# Patient Record
Sex: FEMALE | Race: OTHER | NOT HISPANIC OR LATINO | URBAN - METROPOLITAN AREA
[De-identification: names, ages, dates, MRNs, and addresses within clinical notes are randomized per-mention and may not be internally consistent; named-entity substitution may affect disease eponyms.]

---

## 2017-04-14 ENCOUNTER — EMERGENCY (EMERGENCY)
Facility: HOSPITAL | Age: 37
LOS: 1 days | Discharge: HOME | End: 2017-04-14
Attending: EMERGENCY MEDICINE | Admitting: EMERGENCY MEDICINE
Payer: COMMERCIAL

## 2017-04-14 VITALS
DIASTOLIC BLOOD PRESSURE: 85 MMHG | OXYGEN SATURATION: 98 % | SYSTOLIC BLOOD PRESSURE: 130 MMHG | RESPIRATION RATE: 16 BRPM | HEART RATE: 67 BPM | HEIGHT: 68 IN | TEMPERATURE: 99 F

## 2017-04-14 DIAGNOSIS — J34.89 OTHER SPECIFIED DISORDERS OF NOSE AND NASAL SINUSES: ICD-10-CM

## 2017-04-14 DIAGNOSIS — Z98.890 OTHER SPECIFIED POSTPROCEDURAL STATES: ICD-10-CM

## 2017-04-14 DIAGNOSIS — N60.02 SOLITARY CYST OF LEFT BREAST: Chronic | ICD-10-CM

## 2017-04-14 DIAGNOSIS — I10 ESSENTIAL (PRIMARY) HYPERTENSION: ICD-10-CM

## 2017-04-14 DIAGNOSIS — E03.9 HYPOTHYROIDISM, UNSPECIFIED: ICD-10-CM

## 2017-04-14 DIAGNOSIS — Z79.899 OTHER LONG TERM (CURRENT) DRUG THERAPY: ICD-10-CM

## 2017-04-14 LAB — RAPID RVP RESULT: SIGNIFICANT CHANGE UP

## 2017-04-14 PROCEDURE — 87798 DETECT AGENT NOS DNA AMP: CPT

## 2017-04-14 PROCEDURE — 99053 MED SERV 10PM-8AM 24 HR FAC: CPT

## 2017-04-14 PROCEDURE — 87486 CHLMYD PNEUM DNA AMP PROBE: CPT

## 2017-04-14 PROCEDURE — 99283 EMERGENCY DEPT VISIT LOW MDM: CPT

## 2017-04-14 PROCEDURE — 87633 RESP VIRUS 12-25 TARGETS: CPT

## 2017-04-14 PROCEDURE — 87581 M.PNEUMON DNA AMP PROBE: CPT

## 2017-04-14 PROCEDURE — 99283 EMERGENCY DEPT VISIT LOW MDM: CPT | Mod: 25

## 2017-04-14 NOTE — ED PROVIDER NOTE - OBJECTIVE STATEMENT
Attending Pretty: 38 y/o previously healthy female presenting with burning nose that started a few hours ago. 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 a few hours ago started with burning to her nose. no cough, fevers, sob or body aches. was told to come to the ED for further evaluation. no h/o lung disease. pt does have an infant at home as was concerned

## 2017-04-14 NOTE — ED PROVIDER NOTE - PHYSICAL EXAMINATION
Attending Pretty: Gen: NAD, heent: atrauamtic, eomi, perrla, mmm, op pink, uvula midline, neck; nttp, no nuchal rigidity, chest: nttp, no crepitus, cv: rrr, no murmurs, lungs: ctab, abd: soft, nontender, nondistended, no peritoneal signs, +BS, no guarding, ext: wwp, neg homans, skin: no rash, neuro: awake and alert, following commands, speech clear, sensation and strength intact, no focal deficits

## 2017-04-14 NOTE — ED PROVIDER NOTE - MEDICAL DECISION MAKING DETAILS
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 pt without any further symptoms of the flu. with infant at home pt request flu testing. no h/o lung disease. will write rx for tamiflu if pt develops any further symptmos of the flu and send RVP. pt well appearing. will d/c

## 2017-04-14 NOTE — ED ADULT NURSE NOTE - OBJECTIVE STATEMENT
pt c/o itchy burning nose.  pt is awake, alert and responsive to all stimuli.  no distress noted.  pt was flu swabbed and was sent by pmd.

## 2017-07-05 ENCOUNTER — RESULT REVIEW (OUTPATIENT)
Age: 37
End: 2017-07-05

## 2017-09-24 ENCOUNTER — EMERGENCY (EMERGENCY)
Facility: HOSPITAL | Age: 37
LOS: 1 days | Discharge: HOME | End: 2017-09-24
Attending: EMERGENCY MEDICINE
Payer: COMMERCIAL

## 2017-09-24 VITALS
WEIGHT: 220.02 LBS | HEART RATE: 93 BPM | OXYGEN SATURATION: 100 % | RESPIRATION RATE: 20 BRPM | TEMPERATURE: 99 F | SYSTOLIC BLOOD PRESSURE: 157 MMHG | HEIGHT: 68 IN | DIASTOLIC BLOOD PRESSURE: 95 MMHG

## 2017-09-24 VITALS
SYSTOLIC BLOOD PRESSURE: 134 MMHG | DIASTOLIC BLOOD PRESSURE: 65 MMHG | RESPIRATION RATE: 16 BRPM | OXYGEN SATURATION: 98 % | HEART RATE: 62 BPM

## 2017-09-24 DIAGNOSIS — N60.02 SOLITARY CYST OF LEFT BREAST: Chronic | ICD-10-CM

## 2017-09-24 LAB
ALBUMIN SERPL ELPH-MCNC: 4.5 G/DL — SIGNIFICANT CHANGE UP (ref 3.3–5.2)
ALP SERPL-CCNC: 95 U/L — SIGNIFICANT CHANGE UP (ref 40–120)
ALT FLD-CCNC: 25 U/L — SIGNIFICANT CHANGE UP
ANION GAP SERPL CALC-SCNC: 16 MMOL/L — SIGNIFICANT CHANGE UP (ref 5–17)
APPEARANCE UR: CLEAR — SIGNIFICANT CHANGE UP
APPEARANCE UR: CLEAR — SIGNIFICANT CHANGE UP
AST SERPL-CCNC: 53 U/L — HIGH
BACTERIA # UR AUTO: ABNORMAL
BILIRUB SERPL-MCNC: 0.7 MG/DL — SIGNIFICANT CHANGE UP (ref 0.4–2)
BILIRUB UR-MCNC: NEGATIVE — SIGNIFICANT CHANGE UP
BILIRUB UR-MCNC: NEGATIVE — SIGNIFICANT CHANGE UP
BUN SERPL-MCNC: 9 MG/DL — SIGNIFICANT CHANGE UP (ref 8–20)
CALCIUM SERPL-MCNC: 9.5 MG/DL — SIGNIFICANT CHANGE UP (ref 8.6–10.2)
CHLORIDE SERPL-SCNC: 102 MMOL/L — SIGNIFICANT CHANGE UP (ref 98–107)
CO2 SERPL-SCNC: 18 MMOL/L — LOW (ref 22–29)
COLOR SPEC: YELLOW — SIGNIFICANT CHANGE UP
COLOR SPEC: YELLOW — SIGNIFICANT CHANGE UP
CREAT SERPL-MCNC: 0.51 MG/DL — SIGNIFICANT CHANGE UP (ref 0.5–1.3)
DIFF PNL FLD: ABNORMAL
DIFF PNL FLD: NEGATIVE — SIGNIFICANT CHANGE UP
EPI CELLS # UR: SIGNIFICANT CHANGE UP
GLUCOSE SERPL-MCNC: 121 MG/DL — HIGH (ref 70–115)
GLUCOSE UR QL: NEGATIVE MG/DL — SIGNIFICANT CHANGE UP
GLUCOSE UR QL: NEGATIVE MG/DL — SIGNIFICANT CHANGE UP
HCG UR QL: NEGATIVE — SIGNIFICANT CHANGE UP
HCT VFR BLD CALC: 37.5 % — SIGNIFICANT CHANGE UP (ref 37–47)
HGB BLD-MCNC: 12.3 G/DL — SIGNIFICANT CHANGE UP (ref 12–16)
KETONES UR-MCNC: ABNORMAL
KETONES UR-MCNC: ABNORMAL
LEUKOCYTE ESTERASE UR-ACNC: NEGATIVE — SIGNIFICANT CHANGE UP
LEUKOCYTE ESTERASE UR-ACNC: NEGATIVE — SIGNIFICANT CHANGE UP
MCHC RBC-ENTMCNC: 27.7 PG — SIGNIFICANT CHANGE UP (ref 27–31)
MCHC RBC-ENTMCNC: 32.8 G/DL — SIGNIFICANT CHANGE UP (ref 32–36)
MCV RBC AUTO: 84.5 FL — SIGNIFICANT CHANGE UP (ref 81–99)
NITRITE UR-MCNC: NEGATIVE — SIGNIFICANT CHANGE UP
NITRITE UR-MCNC: NEGATIVE — SIGNIFICANT CHANGE UP
PH UR: 6 — SIGNIFICANT CHANGE UP (ref 5–8)
PH UR: 6 — SIGNIFICANT CHANGE UP (ref 5–8)
PLATELET # BLD AUTO: 452 K/UL — HIGH (ref 150–400)
POTASSIUM SERPL-MCNC: 4.5 MMOL/L — SIGNIFICANT CHANGE UP (ref 3.5–5.3)
POTASSIUM SERPL-SCNC: 4.5 MMOL/L — SIGNIFICANT CHANGE UP (ref 3.5–5.3)
PROT SERPL-MCNC: 8.2 G/DL — SIGNIFICANT CHANGE UP (ref 6.6–8.7)
PROT UR-MCNC: 15 MG/DL
PROT UR-MCNC: 30 MG/DL
RBC # BLD: 4.44 M/UL — SIGNIFICANT CHANGE UP (ref 4.4–5.2)
RBC # FLD: 14.2 % — SIGNIFICANT CHANGE UP (ref 11–15.6)
RBC CASTS # UR COMP ASSIST: SIGNIFICANT CHANGE UP /HPF (ref 0–4)
SODIUM SERPL-SCNC: 136 MMOL/L — SIGNIFICANT CHANGE UP (ref 135–145)
SP GR SPEC: 1.01 — SIGNIFICANT CHANGE UP (ref 1.01–1.02)
SP GR SPEC: 1.02 — SIGNIFICANT CHANGE UP (ref 1.01–1.02)
UROBILINOGEN FLD QL: NEGATIVE MG/DL — SIGNIFICANT CHANGE UP
UROBILINOGEN FLD QL: NEGATIVE MG/DL — SIGNIFICANT CHANGE UP
WBC # BLD: 13.2 K/UL — HIGH (ref 4.8–10.8)
WBC # FLD AUTO: 13.2 K/UL — HIGH (ref 4.8–10.8)
WBC UR QL: SIGNIFICANT CHANGE UP
WBC UR QL: SIGNIFICANT CHANGE UP

## 2017-09-24 PROCEDURE — 81001 URINALYSIS AUTO W/SCOPE: CPT

## 2017-09-24 PROCEDURE — 96376 TX/PRO/DX INJ SAME DRUG ADON: CPT

## 2017-09-24 PROCEDURE — 99285 EMERGENCY DEPT VISIT HI MDM: CPT | Mod: 25

## 2017-09-24 PROCEDURE — 83690 ASSAY OF LIPASE: CPT

## 2017-09-24 PROCEDURE — 96375 TX/PRO/DX INJ NEW DRUG ADDON: CPT

## 2017-09-24 PROCEDURE — 80053 COMPREHEN METABOLIC PANEL: CPT

## 2017-09-24 PROCEDURE — 99284 EMERGENCY DEPT VISIT MOD MDM: CPT | Mod: 25

## 2017-09-24 PROCEDURE — 96374 THER/PROPH/DIAG INJ IV PUSH: CPT

## 2017-09-24 PROCEDURE — 81025 URINE PREGNANCY TEST: CPT

## 2017-09-24 PROCEDURE — 76705 ECHO EXAM OF ABDOMEN: CPT

## 2017-09-24 PROCEDURE — 76705 ECHO EXAM OF ABDOMEN: CPT | Mod: 26

## 2017-09-24 PROCEDURE — 36415 COLL VENOUS BLD VENIPUNCTURE: CPT

## 2017-09-24 PROCEDURE — 85027 COMPLETE CBC AUTOMATED: CPT

## 2017-09-24 RX ORDER — MORPHINE SULFATE 50 MG/1
4 CAPSULE, EXTENDED RELEASE ORAL ONCE
Qty: 0 | Refills: 0 | Status: DISCONTINUED | OUTPATIENT
Start: 2017-09-24 | End: 2017-09-24

## 2017-09-24 RX ORDER — METOCLOPRAMIDE HCL 10 MG
10 TABLET ORAL ONCE
Qty: 0 | Refills: 0 | Status: COMPLETED | OUTPATIENT
Start: 2017-09-24 | End: 2017-09-24

## 2017-09-24 RX ORDER — SODIUM CHLORIDE 9 MG/ML
1000 INJECTION INTRAMUSCULAR; INTRAVENOUS; SUBCUTANEOUS ONCE
Qty: 0 | Refills: 0 | Status: COMPLETED | OUTPATIENT
Start: 2017-09-24 | End: 2017-09-24

## 2017-09-24 RX ORDER — HYDROMORPHONE HYDROCHLORIDE 2 MG/ML
0.5 INJECTION INTRAMUSCULAR; INTRAVENOUS; SUBCUTANEOUS ONCE
Qty: 0 | Refills: 0 | Status: DISCONTINUED | OUTPATIENT
Start: 2017-09-24 | End: 2017-09-24

## 2017-09-24 RX ORDER — SODIUM CHLORIDE 9 MG/ML
3 INJECTION INTRAMUSCULAR; INTRAVENOUS; SUBCUTANEOUS ONCE
Qty: 0 | Refills: 0 | Status: COMPLETED | OUTPATIENT
Start: 2017-09-24 | End: 2017-09-24

## 2017-09-24 RX ORDER — FAMOTIDINE 10 MG/ML
20 INJECTION INTRAVENOUS ONCE
Qty: 0 | Refills: 0 | Status: COMPLETED | OUTPATIENT
Start: 2017-09-24 | End: 2017-09-24

## 2017-09-24 RX ORDER — KETOROLAC TROMETHAMINE 30 MG/ML
30 SYRINGE (ML) INJECTION ONCE
Qty: 0 | Refills: 0 | Status: DISCONTINUED | OUTPATIENT
Start: 2017-09-24 | End: 2017-09-24

## 2017-09-24 RX ORDER — ESOMEPRAZOLE MAGNESIUM 40 MG/1
1 CAPSULE, DELAYED RELEASE ORAL
Qty: 14 | Refills: 0 | OUTPATIENT
Start: 2017-09-24 | End: 2017-10-08

## 2017-09-24 RX ADMIN — MORPHINE SULFATE 4 MILLIGRAM(S): 50 CAPSULE, EXTENDED RELEASE ORAL at 06:17

## 2017-09-24 RX ADMIN — Medication 30 MILLIGRAM(S): at 03:26

## 2017-09-24 RX ADMIN — FAMOTIDINE 20 MILLIGRAM(S): 10 INJECTION INTRAVENOUS at 03:26

## 2017-09-24 RX ADMIN — HYDROMORPHONE HYDROCHLORIDE 0.5 MILLIGRAM(S): 2 INJECTION INTRAMUSCULAR; INTRAVENOUS; SUBCUTANEOUS at 07:48

## 2017-09-24 RX ADMIN — Medication 30 MILLIGRAM(S): at 04:29

## 2017-09-24 RX ADMIN — Medication 10 MILLIGRAM(S): at 03:26

## 2017-09-24 RX ADMIN — SODIUM CHLORIDE 1000 MILLILITER(S): 9 INJECTION INTRAMUSCULAR; INTRAVENOUS; SUBCUTANEOUS at 04:29

## 2017-09-24 RX ADMIN — MORPHINE SULFATE 4 MILLIGRAM(S): 50 CAPSULE, EXTENDED RELEASE ORAL at 06:50

## 2017-09-24 RX ADMIN — SODIUM CHLORIDE 3 MILLILITER(S): 9 INJECTION INTRAMUSCULAR; INTRAVENOUS; SUBCUTANEOUS at 03:27

## 2017-09-24 RX ADMIN — HYDROMORPHONE HYDROCHLORIDE 0.5 MILLIGRAM(S): 2 INJECTION INTRAMUSCULAR; INTRAVENOUS; SUBCUTANEOUS at 08:00

## 2017-09-24 RX ADMIN — Medication 10 MILLIGRAM(S): at 08:03

## 2017-09-24 NOTE — CONSULT NOTE ADULT - SUBJECTIVE AND OBJECTIVE BOX
GENERAL SURGERY CONSULT NOTE    FROM:   FOR:   RFC:    HPI:  38 y/o F pt with hx of HTN presents to ED c/o epigastric and  mid back pain starting yesterday. Ibuprofen taken with minimal relief. Today back pain worsened associated to epigastric abdominal pain with nausea. She states that yesterday she was having episodes of diarrhea. Denies any recent sick contacts, or recent travel. Denies fevers, chills, emesis, urinary symptoms, chest pain, SOB, or any recent weight loss. CURRENT MEDICAL PROBLEMS:      PAST MEDICAL HISTORY:  Pregnancy  Depression  Hypertension  Hypothyroidism      SURGICAL HISTORY:  Breast cyst, left   delivery delivered      REVIEW OF SYSTEMS:    CONSTITUTIONAL: No fever, weight loss, or fatigue  ENMT:  No difficulty hearing, tinnitus, vertigo; No sinus or throat pain  NECK: No pain or stiffness  BREASTS: No pain, masses, or nipple discharge  RESPIRATORY: No cough, wheezing, chills or hemoptysis; No shortness of breath  CARDIOVASCULAR: No chest pain, palpitations, dizziness, or leg swelling  GASTROINTESTINAL: epigastric pain. nausea, No vomiting, or hematemesis; No diarrhea or constipation. No melena or hematochezia.  GENITOURINARY: No dysuria, frequency, hematuria, or incontinence  NEUROLOGICAL: No headaches, memory loss, loss of strength, numbness, or tremors  SKIN: No itching, burning, rashes, or lesions   MUSCULOSKELETAL: No joint pain or swelling; No muscle, back, or extremity pain  ALLERGY AND IMMUNOLOGIC: No hives or eczema      MEDICATIONS  (STANDING):    MEDICATIONS  (PRN):      Allergies    No Known Allergies    Intolerances        SOCIAL HISTORY:    FAMILY HISTORY:      Vital Signs Last 24 Hrs  T(C): 37.1 (24 Sep 2017 07:43), Max: 37.2 (24 Sep 2017 02:59)  T(F): 98.8 (24 Sep 2017 07:43), Max: 98.9 (24 Sep 2017 02:59)  HR: 84 (24 Sep 2017 07:43) (84 - 93)  BP: 155/93 (24 Sep 2017 07:43) (155/93 - 157/95)  BP(mean): --  RR: 17 (24 Sep 2017 07:43) (17 - 20)  SpO2: 99% (24 Sep 2017 07:43) (99% - 100%)    PHYSICAL EXAM:    GENERAL: NAD, well-groomed, well-developed  HEAD:  Atraumatic, Normocephalic  EYES: EOMI, PERRLA, conjunctiva and sclera clear  ENMT: No tonsillar erythema, exudates, or enlargement; Moist mucous membranes, Good dentition, No lesions  NECK: Supple, No JVD, Normal thyroid  NERVOUS SYSTEM:  Alert & Oriented X3, Good concentration; Motor Strength 5/5 B/L upper and lower extremities; DTRs 2+ intact and symmetric  CHEST/LUNG: Clear to percussion bilaterally; No rales, rhonchi, wheezing, or rubs  HEART: Regular rate and rhythm; No murmurs, rubs, or gallops  ABDOMEN: Soft, Minimally tender to epigastric. , Nondistended; Bowel sounds present. Negative Gavin's   EXTREMITIES:  2+ Peripheral Pulses, No clubbing, cyanosis, or edema  LYMPH: No lymphadenopathy noted  SKIN: No rashes or lesions    LABS:                        12.3   13.2  )-----------( 452      ( 24 Sep 2017 04:23 )             37.5         136  |  102  |  9.0  ----------------------------<  121<H>  4.5   |  18.0<L>  |  0.51    Ca    9.5      24 Sep 2017 04:23    TPro  8.2  /  Alb  4.5  /  TBili  0.7  /  DBili  x   /  AST  53<H>  /  ALT  25  /  AlkPhos  95        Urinalysis Basic - ( 24 Sep 2017 04:23 )    Color: Yellow / Appearance: Clear / S.015 / pH: x  Gluc: x / Ketone: Trace  / Bili: Negative / Urobili: Negative mg/dL   Blood: x / Protein: 15 mg/dL / Nitrite: Negative   Leuk Esterase: Negative / RBC: 0-2 /HPF / WBC 0-2   Sq Epi: x / Non Sq Epi: Few / Bacteria: Moderate            RADIOLOGY & ADDITIONAL STUDIES:

## 2017-09-24 NOTE — ED PROVIDER NOTE - OBJECTIVE STATEMENT
36 y/o F pt with hx of HTN presents to ED c/o mid back pain starting yesterday. Ibuprofen taken with minimal relief. Today back pain worsened associated to mid abdominal pain, nausea, chills. No fevers, urinary symptoms, chest pain, SOB. No further complaints at this time.

## 2017-09-24 NOTE — ED ADULT NURSE REASSESSMENT NOTE - NS ED NURSE REASSESS COMMENT FT1
Plan for PO challenge per surgery team. Plan to monitor and assess for PO challenge tolerance
Resumed care from RACHANA Sommers, PT c/o mid epigastric ABD pain radiating to upper back. Plan for surgery consult. PT update about plan of care at this time.

## 2017-09-24 NOTE — ED PROVIDER NOTE - PROGRESS NOTE DETAILS
Bedside ultrasound; shows gallstones. labs and imaging reviewed  still with pain and tenderness  surgery consulted. pt seen and examined by surgery say ok for d/c home from their perspective if tolerates po which she did no n/v recommend tx for gastrtis will also refer to GI abdomen is soft and benign, pt asks for Dilaudid or morphine says nothing else works but abdomen is benign will recommend ppi/GI f/u and return for worsening pain

## 2017-09-24 NOTE — CONSULT NOTE ADULT - ASSESSMENT
36 y/o F with epigastric pain and diarrhea starting yesterday. Lipase of 18.    - No surgical intervention at this time  - PO trail if patient is able to tolerate, patient is able to be Discharged home  - If worsening pain nausea, emesis, fevers, or chills to return to the ED

## 2017-09-25 ENCOUNTER — TRANSCRIPTION ENCOUNTER (OUTPATIENT)
Age: 37
End: 2017-09-25

## 2017-09-26 ENCOUNTER — INPATIENT (INPATIENT)
Facility: HOSPITAL | Age: 37
LOS: 2 days | Discharge: HOME | DRG: 417 | End: 2017-09-29
Attending: SURGERY | Admitting: SURGERY
Payer: COMMERCIAL

## 2017-09-26 ENCOUNTER — APPOINTMENT (OUTPATIENT)
Dept: GASTROENTEROLOGY | Facility: CLINIC | Age: 37
End: 2017-09-26
Payer: COMMERCIAL

## 2017-09-26 VITALS
HEART RATE: 75 BPM | HEIGHT: 68 IN | DIASTOLIC BLOOD PRESSURE: 98 MMHG | OXYGEN SATURATION: 97 % | RESPIRATION RATE: 20 BRPM | TEMPERATURE: 99 F | SYSTOLIC BLOOD PRESSURE: 139 MMHG | WEIGHT: 222.01 LBS

## 2017-09-26 VITALS
DIASTOLIC BLOOD PRESSURE: 78 MMHG | HEART RATE: 58 BPM | BODY MASS INDEX: 33.65 KG/M2 | SYSTOLIC BLOOD PRESSURE: 160 MMHG | HEIGHT: 68 IN | RESPIRATION RATE: 18 BRPM | WEIGHT: 222 LBS

## 2017-09-26 DIAGNOSIS — R10.11 RIGHT UPPER QUADRANT PAIN: ICD-10-CM

## 2017-09-26 DIAGNOSIS — K80.20 CALCULUS OF GALLBLADDER W/OUT CHOLECYSTITIS W/OUT OBSTRUCTION: ICD-10-CM

## 2017-09-26 DIAGNOSIS — N60.02 SOLITARY CYST OF LEFT BREAST: Chronic | ICD-10-CM

## 2017-09-26 DIAGNOSIS — Z86.79 PERSONAL HISTORY OF OTHER DISEASES OF THE CIRCULATORY SYSTEM: ICD-10-CM

## 2017-09-26 PROCEDURE — 82270 OCCULT BLOOD FECES: CPT

## 2017-09-26 PROCEDURE — 99204 OFFICE O/P NEW MOD 45 MIN: CPT

## 2017-09-26 RX ORDER — METRONIDAZOLE 7.5 MG/G
0.75 GEL VAGINAL
Qty: 1 | Refills: 0 | Status: DISCONTINUED | COMMUNITY
Start: 2017-07-05 | End: 2017-09-26

## 2017-09-26 NOTE — ED ADULT TRIAGE NOTE - CHIEF COMPLAINT QUOTE
As perPt, sent in by Dr. Sumit Thomas for admission to have gal bladder removed.  Pt reports RUQ abd and back pain.

## 2017-09-27 DIAGNOSIS — K80.20 CALCULUS OF GALLBLADDER WITHOUT CHOLECYSTITIS WITHOUT OBSTRUCTION: ICD-10-CM

## 2017-09-27 DIAGNOSIS — K81.0 ACUTE CHOLECYSTITIS: ICD-10-CM

## 2017-09-27 DIAGNOSIS — K85.10 BILIARY ACUTE PANCREATITIS WITHOUT NECROSIS OR INFECTION: ICD-10-CM

## 2017-09-27 LAB
ALBUMIN SERPL ELPH-MCNC: 3.7 G/DL — SIGNIFICANT CHANGE UP (ref 3.3–5.2)
ALBUMIN SERPL ELPH-MCNC: 4.3 G/DL — SIGNIFICANT CHANGE UP (ref 3.3–5.2)
ALP SERPL-CCNC: 214 U/L — HIGH (ref 40–120)
ALP SERPL-CCNC: 240 U/L — HIGH (ref 40–120)
ALT FLD-CCNC: 358 U/L — HIGH
ALT FLD-CCNC: 424 U/L — HIGH
AMYLASE P1 CFR SERPL: 1279 U/L — HIGH (ref 36–128)
ANION GAP SERPL CALC-SCNC: 12 MMOL/L — SIGNIFICANT CHANGE UP (ref 5–17)
ANION GAP SERPL CALC-SCNC: 15 MMOL/L — SIGNIFICANT CHANGE UP (ref 5–17)
APPEARANCE UR: CLEAR — SIGNIFICANT CHANGE UP
AST SERPL-CCNC: 192 U/L — HIGH
AST SERPL-CCNC: 266 U/L — HIGH
BACTERIA # UR AUTO: ABNORMAL
BASOPHILS # BLD AUTO: 0 K/UL — SIGNIFICANT CHANGE UP (ref 0–0.2)
BASOPHILS NFR BLD AUTO: 0.4 % — SIGNIFICANT CHANGE UP (ref 0–2)
BILIRUB SERPL-MCNC: 2.8 MG/DL — HIGH (ref 0.4–2)
BILIRUB SERPL-MCNC: 2.8 MG/DL — HIGH (ref 0.4–2)
BILIRUB UR-MCNC: ABNORMAL
BLD GP AB SCN SERPL QL: SIGNIFICANT CHANGE UP
BUN SERPL-MCNC: 5 MG/DL — LOW (ref 8–20)
BUN SERPL-MCNC: 6 MG/DL — LOW (ref 8–20)
CALCIUM SERPL-MCNC: 9 MG/DL — SIGNIFICANT CHANGE UP (ref 8.6–10.2)
CALCIUM SERPL-MCNC: 9.3 MG/DL — SIGNIFICANT CHANGE UP (ref 8.6–10.2)
CHLORIDE SERPL-SCNC: 104 MMOL/L — SIGNIFICANT CHANGE UP (ref 98–107)
CHLORIDE SERPL-SCNC: 108 MMOL/L — HIGH (ref 98–107)
CO2 SERPL-SCNC: 23 MMOL/L — SIGNIFICANT CHANGE UP (ref 22–29)
CO2 SERPL-SCNC: 23 MMOL/L — SIGNIFICANT CHANGE UP (ref 22–29)
COLOR SPEC: YELLOW — SIGNIFICANT CHANGE UP
CREAT SERPL-MCNC: 0.51 MG/DL — SIGNIFICANT CHANGE UP (ref 0.5–1.3)
CREAT SERPL-MCNC: 0.52 MG/DL — SIGNIFICANT CHANGE UP (ref 0.5–1.3)
DIFF PNL FLD: ABNORMAL
EOSINOPHIL # BLD AUTO: 0 K/UL — SIGNIFICANT CHANGE UP (ref 0–0.5)
EOSINOPHIL NFR BLD AUTO: 0.4 % — SIGNIFICANT CHANGE UP (ref 0–6)
EPI CELLS # UR: SIGNIFICANT CHANGE UP
GLUCOSE SERPL-MCNC: 109 MG/DL — SIGNIFICANT CHANGE UP (ref 70–115)
GLUCOSE SERPL-MCNC: 126 MG/DL — HIGH (ref 70–115)
GLUCOSE UR QL: NEGATIVE MG/DL — SIGNIFICANT CHANGE UP
HCG SERPL-ACNC: <2 MIU/ML — SIGNIFICANT CHANGE UP
HCT VFR BLD CALC: 34.4 % — LOW (ref 37–47)
HCT VFR BLD CALC: 36.7 % — LOW (ref 37–47)
HGB BLD-MCNC: 11.3 G/DL — LOW (ref 12–16)
HGB BLD-MCNC: 12.2 G/DL — SIGNIFICANT CHANGE UP (ref 12–16)
INR BLD: 1.07 RATIO — SIGNIFICANT CHANGE UP (ref 0.88–1.16)
KETONES UR-MCNC: NEGATIVE — SIGNIFICANT CHANGE UP
LDH SERPL L TO P-CCNC: 261 U/L — HIGH (ref 98–192)
LEUKOCYTE ESTERASE UR-ACNC: ABNORMAL
LIDOCAIN IGE QN: 2673 U/L — HIGH (ref 22–51)
LIDOCAIN IGE QN: >3000 U/L — HIGH (ref 22–51)
LYMPHOCYTES # BLD AUTO: 1.5 K/UL — SIGNIFICANT CHANGE UP (ref 1–4.8)
LYMPHOCYTES # BLD AUTO: 14.6 % — LOW (ref 20–55)
MAGNESIUM SERPL-MCNC: 1.8 MG/DL — SIGNIFICANT CHANGE UP (ref 1.8–2.6)
MCHC RBC-ENTMCNC: 27.6 PG — SIGNIFICANT CHANGE UP (ref 27–31)
MCHC RBC-ENTMCNC: 27.7 PG — SIGNIFICANT CHANGE UP (ref 27–31)
MCHC RBC-ENTMCNC: 32.8 G/DL — SIGNIFICANT CHANGE UP (ref 32–36)
MCHC RBC-ENTMCNC: 33.2 G/DL — SIGNIFICANT CHANGE UP (ref 32–36)
MCV RBC AUTO: 83.4 FL — SIGNIFICANT CHANGE UP (ref 81–99)
MCV RBC AUTO: 84.1 FL — SIGNIFICANT CHANGE UP (ref 81–99)
MONOCYTES # BLD AUTO: 1.1 K/UL — HIGH (ref 0–0.8)
MONOCYTES NFR BLD AUTO: 10.6 % — HIGH (ref 3–10)
NEUTROPHILS # BLD AUTO: 7.4 K/UL — SIGNIFICANT CHANGE UP (ref 1.8–8)
NEUTROPHILS NFR BLD AUTO: 73.8 % — HIGH (ref 37–73)
NITRITE UR-MCNC: NEGATIVE — SIGNIFICANT CHANGE UP
PH UR: 6.5 — SIGNIFICANT CHANGE UP (ref 5–8)
PHOSPHATE SERPL-MCNC: 4.2 MG/DL — SIGNIFICANT CHANGE UP (ref 2.4–4.7)
PLATELET # BLD AUTO: 441 K/UL — HIGH (ref 150–400)
PLATELET # BLD AUTO: 494 K/UL — HIGH (ref 150–400)
POTASSIUM SERPL-MCNC: 3.5 MMOL/L — SIGNIFICANT CHANGE UP (ref 3.5–5.3)
POTASSIUM SERPL-MCNC: 3.9 MMOL/L — SIGNIFICANT CHANGE UP (ref 3.5–5.3)
POTASSIUM SERPL-SCNC: 3.5 MMOL/L — SIGNIFICANT CHANGE UP (ref 3.5–5.3)
POTASSIUM SERPL-SCNC: 3.9 MMOL/L — SIGNIFICANT CHANGE UP (ref 3.5–5.3)
PROT SERPL-MCNC: 7 G/DL — SIGNIFICANT CHANGE UP (ref 6.6–8.7)
PROT SERPL-MCNC: 8 G/DL — SIGNIFICANT CHANGE UP (ref 6.6–8.7)
PROT UR-MCNC: NEGATIVE MG/DL — SIGNIFICANT CHANGE UP
PROTHROM AB SERPL-ACNC: 11.8 SEC — SIGNIFICANT CHANGE UP (ref 9.8–12.7)
RBC # BLD: 4.09 M/UL — LOW (ref 4.4–5.2)
RBC # BLD: 4.4 M/UL — SIGNIFICANT CHANGE UP (ref 4.4–5.2)
RBC # FLD: 14 % — SIGNIFICANT CHANGE UP (ref 11–15.6)
RBC # FLD: 14.2 % — SIGNIFICANT CHANGE UP (ref 11–15.6)
RBC CASTS # UR COMP ASSIST: ABNORMAL /HPF (ref 0–4)
SODIUM SERPL-SCNC: 142 MMOL/L — SIGNIFICANT CHANGE UP (ref 135–145)
SODIUM SERPL-SCNC: 143 MMOL/L — SIGNIFICANT CHANGE UP (ref 135–145)
SP GR SPEC: 1.01 — SIGNIFICANT CHANGE UP (ref 1.01–1.02)
TYPE + AB SCN PNL BLD: SIGNIFICANT CHANGE UP
UROBILINOGEN FLD QL: 1 MG/DL
WBC # BLD: 10 K/UL — SIGNIFICANT CHANGE UP (ref 4.8–10.8)
WBC # BLD: 8.9 K/UL — SIGNIFICANT CHANGE UP (ref 4.8–10.8)
WBC # FLD AUTO: 10 K/UL — SIGNIFICANT CHANGE UP (ref 4.8–10.8)
WBC # FLD AUTO: 8.9 K/UL — SIGNIFICANT CHANGE UP (ref 4.8–10.8)
WBC UR QL: SIGNIFICANT CHANGE UP

## 2017-09-27 PROCEDURE — 76705 ECHO EXAM OF ABDOMEN: CPT | Mod: 26

## 2017-09-27 PROCEDURE — 74183 MRI ABD W/O CNTR FLWD CNTR: CPT | Mod: 26

## 2017-09-27 PROCEDURE — 93010 ELECTROCARDIOGRAM REPORT: CPT

## 2017-09-27 PROCEDURE — 99285 EMERGENCY DEPT VISIT HI MDM: CPT | Mod: 25

## 2017-09-27 RX ORDER — SODIUM CHLORIDE 9 MG/ML
1000 INJECTION, SOLUTION INTRAVENOUS
Qty: 0 | Refills: 0 | Status: DISCONTINUED | OUTPATIENT
Start: 2017-09-27 | End: 2017-09-28

## 2017-09-27 RX ORDER — KETOROLAC TROMETHAMINE 30 MG/ML
15 SYRINGE (ML) INJECTION EVERY 4 HOURS
Qty: 0 | Refills: 0 | Status: DISCONTINUED | OUTPATIENT
Start: 2017-09-27 | End: 2017-09-28

## 2017-09-27 RX ORDER — LEVOTHYROXINE SODIUM 125 MCG
75 TABLET ORAL DAILY
Qty: 0 | Refills: 0 | Status: DISCONTINUED | OUTPATIENT
Start: 2017-09-27 | End: 2017-09-28

## 2017-09-27 RX ORDER — SODIUM CHLORIDE 9 MG/ML
3 INJECTION INTRAMUSCULAR; INTRAVENOUS; SUBCUTANEOUS ONCE
Qty: 0 | Refills: 0 | Status: COMPLETED | OUTPATIENT
Start: 2017-09-27 | End: 2017-09-27

## 2017-09-27 RX ORDER — LABETALOL HCL 100 MG
0 TABLET ORAL
Qty: 0 | Refills: 0 | COMMUNITY

## 2017-09-27 RX ORDER — ONDANSETRON 8 MG/1
4 TABLET, FILM COATED ORAL ONCE
Qty: 0 | Refills: 0 | Status: COMPLETED | OUTPATIENT
Start: 2017-09-27 | End: 2017-09-27

## 2017-09-27 RX ORDER — MAGNESIUM SULFATE 500 MG/ML
1 VIAL (ML) INJECTION ONCE
Qty: 0 | Refills: 0 | Status: COMPLETED | OUTPATIENT
Start: 2017-09-27 | End: 2017-09-27

## 2017-09-27 RX ORDER — POTASSIUM CHLORIDE 20 MEQ
10 PACKET (EA) ORAL
Qty: 0 | Refills: 0 | Status: COMPLETED | OUTPATIENT
Start: 2017-09-27 | End: 2017-09-27

## 2017-09-27 RX ORDER — LABETALOL HCL 100 MG
300 TABLET ORAL
Qty: 0 | Refills: 0 | Status: DISCONTINUED | OUTPATIENT
Start: 2017-09-27 | End: 2017-09-28

## 2017-09-27 RX ORDER — MORPHINE SULFATE 50 MG/1
4 CAPSULE, EXTENDED RELEASE ORAL ONCE
Qty: 0 | Refills: 0 | Status: DISCONTINUED | OUTPATIENT
Start: 2017-09-27 | End: 2017-09-27

## 2017-09-27 RX ADMIN — Medication 300 MILLIGRAM(S): at 05:25

## 2017-09-27 RX ADMIN — Medication 100 GRAM(S): at 17:40

## 2017-09-27 RX ADMIN — Medication 75 MICROGRAM(S): at 05:25

## 2017-09-27 RX ADMIN — Medication 300 MILLIGRAM(S): at 17:40

## 2017-09-27 RX ADMIN — Medication 0.5 MILLIGRAM(S): at 14:53

## 2017-09-27 RX ADMIN — MORPHINE SULFATE 4 MILLIGRAM(S): 50 CAPSULE, EXTENDED RELEASE ORAL at 02:42

## 2017-09-27 RX ADMIN — ONDANSETRON 4 MILLIGRAM(S): 8 TABLET, FILM COATED ORAL at 02:34

## 2017-09-27 RX ADMIN — MORPHINE SULFATE 4 MILLIGRAM(S): 50 CAPSULE, EXTENDED RELEASE ORAL at 02:27

## 2017-09-27 RX ADMIN — Medication 100 MILLIEQUIVALENT(S): at 20:44

## 2017-09-27 RX ADMIN — Medication 100 MILLIEQUIVALENT(S): at 22:36

## 2017-09-27 RX ADMIN — Medication 15 MILLIGRAM(S): at 06:06

## 2017-09-27 RX ADMIN — SODIUM CHLORIDE 100 MILLILITER(S): 9 INJECTION, SOLUTION INTRAVENOUS at 02:34

## 2017-09-27 RX ADMIN — Medication 15 MILLIGRAM(S): at 06:21

## 2017-09-27 RX ADMIN — SODIUM CHLORIDE 3 MILLILITER(S): 9 INJECTION INTRAMUSCULAR; INTRAVENOUS; SUBCUTANEOUS at 02:02

## 2017-09-27 NOTE — H&P ADULT - NSHPREVIEWOFSYSTEMS_GEN_ALL_CORE
HEENT: unremarkable, no difficulties, sight, smell, hearing or swallowing.  respiratory: no difficulty breathing  gastro-enterology: loose bm's several days, denies hx, diverticulitis, crohns, etc.  : voids with out difficulty  extremities/ motor - moves arms and legs with out difficulty  endocrinology: hypothyroid, HTN

## 2017-09-27 NOTE — ED ADULT NURSE NOTE - OBJECTIVE STATEMENT
Pt a&ox3 c/o RLQ abd pain radiating to back, 10/10 sharp shooting x1 week. Pt reports needing gall bladder surgery on 9/27/2017 per MD Thomas. Pt last took ibuprofen @ 2100. denies cp denies sob denies bloodthinners denies n/v/d denies diaphoresis denies dizziness. RR even and unlabored, MAEx4, ambulates with steady gait noted. #18G IV placed to Left AC, bloodwork drawn and sent to lab. UA cup provided, pt verbalized understanding of providing UA sample. In no apparent distress, gown applied, pt updated on POC, pending md orders for pain meds, warm blanket provided. Will continue to monitor and reassess

## 2017-09-27 NOTE — ED PROVIDER NOTE - OBJECTIVE STATEMENT
38 y/o F pt with a PMHx of depression, hypothyroid, HTN presents to the ED c/o abdominal pain and diarrhea x3 days. Also reports n/v and pain radiating to her back. Came to this ED for abdominal pain 2 days ago when she was diagnosed with gall stones. Compliant with out patient f/u and she was told that she needed surgery. GI physician informed her he would contact Dr. Sumit Thomas. Dr. Thomas/GI physician think that she needs cholecystectomy tomorrow. Denies HA, dizziness, numbness, tingling, change in vision/hearing/gait/mental status/speech, focal weakness, neck pain, rash, fever, chills, stiffness, hx of DVT/PE, leg swelling, CP, SOB, palpitations, diaphoresis, change in urinary/bowel function, dysuria, hematuria, flank pain, malaise, or motor/sensory deficits. NKDA.

## 2017-09-27 NOTE — H&P ADULT - NSHPPHYSICALEXAM_GEN_ALL_CORE
38 y/o female awake and alert with mild abdominal pain and tenderness RUQ/ mid and radiating to back.  patient denies headache or dizziness, states some nausea no vomiting.   neck : supple trachea midline, no nodes appreciated, full ROM with ou difficulty  chest:  good air exchange, clear BS, denies SOB or chest pain   ( patient stated left present left breast cystectomy - breast exam deferred at present )    Abdomen : soft non-distended, mild tenderness mid- RUQ region, no guarding or rebound no palpable masses.                 well healed lower abdominal  scar.  +BS, +bm ( stated loose )                 denies flank pain  Back: spine with out palpable deformity or pain.  states some discomfort between scapulars( feeling inside body )  extremities:  warm to toes with out calf pain or tenderness  Output: voids with out difficulty

## 2017-09-27 NOTE — H&P ADULT - ASSESSMENT
Impression, cholecystitis , cholelithiasis , liliary pancreatitis  discussed present situation with Dr. Cox and continued care    Plan: admit, NPO,  IVF's, repeat sonogram, and follow serial labs and discuss GI f/u ? ERCP and or MRCP.

## 2017-09-27 NOTE — H&P ADULT - REASON FOR ADMISSION
abdominal pain mid- RUQ and radiates to back.  Patient states also associated with intermittent nausea, and loose bowel movements.

## 2017-09-27 NOTE — H&P ADULT - HISTORY OF PRESENT ILLNESS
36 y/o patient presents to ER with recurrent episodes of abdominal discomfort RUQ/ mid-abdomen with some radiation to mid back.  Patient was seen 4 days prior to this presentation with abdominal pain RUQ area and nausea.  Had sonogram done at that time with reported gallstones, sludge, and borderline gall bladder wall thickening.   At that time under went discussion for recommendation for surgery the or outpatient at future date; patient opted outpatient, however pain and symptoms returned and thus returned to ER and now with new symptom radiation to back.  Patient is thus admitted , labs ordered, IVF's, NPO except her am meds., and repeat sonogram .

## 2017-09-27 NOTE — H&P ADULT - ATTENDING COMMENTS
The patient was seen and examined  Details per the resident's H&P  This is a 37-year old woman who presents to the ED with abdominal pain  The pain is located in the epigastrium and radiates to the back  The patient was seen 4-days prior with abdominal pain and sent home  She now returns with worsening pain  Associated with nausea, no vomiting    Exam:  Afebrile  Anicteric  Abdomen is soft, tenderness in RUQ    Labs:  WBC=10  Bilirubin=2.8  AST/ALT=266/424  Alk phos==240  Amylase=831, Lipase >3000    Ultrasound (9/24) reviewed    Impression:  Choledocholithiasis  Biliary pancreatitis  Cholecystitis    Plan:  Admit  NPO  IVF  Repeat labs in AM  May require pre-operative ERCP  DVT prophylaxis

## 2017-09-27 NOTE — H&P ADULT - NSHPLABSRESULTS_GEN_ALL_CORE
CBC: wbc 10.0 / hg 122.2 / hct 36.7 / plts. 494  CMP:  T. bili.       2.8   up from       0.7                               alk. phos.   240   up from      95          AST/ SGOT 266   up from       53          ALT/ SGPT  424   up from       25  serum Lipase    > 3000  markedly elevated from    18        amylase            831  markedly elevated      sonogram : repeat ordered pending results _ r/o biliary pancreatitis

## 2017-09-28 ENCOUNTER — RESULT REVIEW (OUTPATIENT)
Age: 37
End: 2017-09-28

## 2017-09-28 ENCOUNTER — TRANSCRIPTION ENCOUNTER (OUTPATIENT)
Age: 37
End: 2017-09-28

## 2017-09-28 LAB
ALBUMIN SERPL ELPH-MCNC: 3.5 G/DL — SIGNIFICANT CHANGE UP (ref 3.3–5.2)
ALP SERPL-CCNC: 203 U/L — HIGH (ref 40–120)
ALT FLD-CCNC: 255 U/L — HIGH
ANION GAP SERPL CALC-SCNC: 15 MMOL/L — SIGNIFICANT CHANGE UP (ref 5–17)
AST SERPL-CCNC: 81 U/L — HIGH
BASOPHILS # BLD AUTO: 0 K/UL — SIGNIFICANT CHANGE UP (ref 0–0.2)
BASOPHILS NFR BLD AUTO: 0.3 % — SIGNIFICANT CHANGE UP (ref 0–2)
BILIRUB DIRECT SERPL-MCNC: 0.4 MG/DL — HIGH (ref 0–0.3)
BILIRUB INDIRECT FLD-MCNC: 0.7 MG/DL — SIGNIFICANT CHANGE UP (ref 0.2–1)
BILIRUB SERPL-MCNC: 1.1 MG/DL — SIGNIFICANT CHANGE UP (ref 0.4–2)
BUN SERPL-MCNC: 8 MG/DL — SIGNIFICANT CHANGE UP (ref 8–20)
CALCIUM SERPL-MCNC: 8.7 MG/DL — SIGNIFICANT CHANGE UP (ref 8.6–10.2)
CHLORIDE SERPL-SCNC: 105 MMOL/L — SIGNIFICANT CHANGE UP (ref 98–107)
CO2 SERPL-SCNC: 20 MMOL/L — LOW (ref 22–29)
CREAT SERPL-MCNC: 0.52 MG/DL — SIGNIFICANT CHANGE UP (ref 0.5–1.3)
EOSINOPHIL # BLD AUTO: 0.1 K/UL — SIGNIFICANT CHANGE UP (ref 0–0.5)
EOSINOPHIL NFR BLD AUTO: 1.3 % — SIGNIFICANT CHANGE UP (ref 0–6)
GAS PNL BLDA: SIGNIFICANT CHANGE UP
GLUCOSE SERPL-MCNC: 79 MG/DL — SIGNIFICANT CHANGE UP (ref 70–115)
HCT VFR BLD CALC: 32.3 % — LOW (ref 37–47)
HGB BLD-MCNC: 10.6 G/DL — LOW (ref 12–16)
LIDOCAIN IGE QN: 432 U/L — HIGH (ref 22–51)
LYMPHOCYTES # BLD AUTO: 1.8 K/UL — SIGNIFICANT CHANGE UP (ref 1–4.8)
LYMPHOCYTES # BLD AUTO: 28.9 % — SIGNIFICANT CHANGE UP (ref 20–55)
MAGNESIUM SERPL-MCNC: 1.9 MG/DL — SIGNIFICANT CHANGE UP (ref 1.6–2.6)
MCHC RBC-ENTMCNC: 27.4 PG — SIGNIFICANT CHANGE UP (ref 27–31)
MCHC RBC-ENTMCNC: 32.8 G/DL — SIGNIFICANT CHANGE UP (ref 32–36)
MCV RBC AUTO: 83.5 FL — SIGNIFICANT CHANGE UP (ref 81–99)
MONOCYTES # BLD AUTO: 0.5 K/UL — SIGNIFICANT CHANGE UP (ref 0–0.8)
MONOCYTES NFR BLD AUTO: 7.8 % — SIGNIFICANT CHANGE UP (ref 3–10)
NEUTROPHILS # BLD AUTO: 3.7 K/UL — SIGNIFICANT CHANGE UP (ref 1.8–8)
NEUTROPHILS NFR BLD AUTO: 61.5 % — SIGNIFICANT CHANGE UP (ref 37–73)
PHOSPHATE SERPL-MCNC: 3.5 MG/DL — SIGNIFICANT CHANGE UP (ref 2.4–4.7)
PLATELET # BLD AUTO: 436 K/UL — HIGH (ref 150–400)
POTASSIUM SERPL-MCNC: 3.9 MMOL/L — SIGNIFICANT CHANGE UP (ref 3.5–5.3)
POTASSIUM SERPL-SCNC: 3.9 MMOL/L — SIGNIFICANT CHANGE UP (ref 3.5–5.3)
PROT SERPL-MCNC: 6.7 G/DL — SIGNIFICANT CHANGE UP (ref 6.6–8.7)
RBC # BLD: 3.87 M/UL — LOW (ref 4.4–5.2)
RBC # FLD: 14.3 % — SIGNIFICANT CHANGE UP (ref 11–15.6)
SODIUM SERPL-SCNC: 140 MMOL/L — SIGNIFICANT CHANGE UP (ref 135–145)
WBC # BLD: 6 K/UL — SIGNIFICANT CHANGE UP (ref 4.8–10.8)
WBC # FLD AUTO: 6 K/UL — SIGNIFICANT CHANGE UP (ref 4.8–10.8)

## 2017-09-28 PROCEDURE — 88304 TISSUE EXAM BY PATHOLOGIST: CPT | Mod: 26

## 2017-09-28 PROCEDURE — 47562 LAPAROSCOPIC CHOLECYSTECTOMY: CPT

## 2017-09-28 PROCEDURE — 47562 LAPAROSCOPIC CHOLECYSTECTOMY: CPT | Mod: AS

## 2017-09-28 RX ORDER — HYDROMORPHONE HYDROCHLORIDE 2 MG/ML
0.5 INJECTION INTRAMUSCULAR; INTRAVENOUS; SUBCUTANEOUS
Qty: 0 | Refills: 0 | Status: DISCONTINUED | OUTPATIENT
Start: 2017-09-28 | End: 2017-09-29

## 2017-09-28 RX ORDER — LEVOTHYROXINE SODIUM 125 MCG
75 TABLET ORAL DAILY
Qty: 0 | Refills: 0 | Status: DISCONTINUED | OUTPATIENT
Start: 2017-09-28 | End: 2017-09-29

## 2017-09-28 RX ORDER — HYDROMORPHONE HYDROCHLORIDE 2 MG/ML
1 INJECTION INTRAMUSCULAR; INTRAVENOUS; SUBCUTANEOUS EVERY 4 HOURS
Qty: 0 | Refills: 0 | Status: DISCONTINUED | OUTPATIENT
Start: 2017-09-28 | End: 2017-09-29

## 2017-09-28 RX ORDER — SODIUM CHLORIDE 9 MG/ML
1000 INJECTION, SOLUTION INTRAVENOUS
Qty: 0 | Refills: 0 | Status: DISCONTINUED | OUTPATIENT
Start: 2017-09-28 | End: 2017-09-28

## 2017-09-28 RX ORDER — CEFOTETAN DISODIUM 1 G
1 VIAL (EA) INJECTION EVERY 12 HOURS
Qty: 0 | Refills: 0 | Status: DISCONTINUED | OUTPATIENT
Start: 2017-09-28 | End: 2017-09-29

## 2017-09-28 RX ORDER — OXYCODONE AND ACETAMINOPHEN 5; 325 MG/1; MG/1
1 TABLET ORAL EVERY 4 HOURS
Qty: 0 | Refills: 0 | Status: DISCONTINUED | OUTPATIENT
Start: 2017-09-28 | End: 2017-09-29

## 2017-09-28 RX ORDER — OXYCODONE AND ACETAMINOPHEN 5; 325 MG/1; MG/1
2 TABLET ORAL EVERY 6 HOURS
Qty: 0 | Refills: 0 | Status: DISCONTINUED | OUTPATIENT
Start: 2017-09-28 | End: 2017-09-29

## 2017-09-28 RX ORDER — LABETALOL HCL 100 MG
300 TABLET ORAL
Qty: 0 | Refills: 0 | Status: DISCONTINUED | OUTPATIENT
Start: 2017-09-28 | End: 2017-09-29

## 2017-09-28 RX ORDER — ONDANSETRON 8 MG/1
4 TABLET, FILM COATED ORAL ONCE
Qty: 0 | Refills: 0 | Status: COMPLETED | OUTPATIENT
Start: 2017-09-28 | End: 2017-09-28

## 2017-09-28 RX ORDER — ONDANSETRON 8 MG/1
4 TABLET, FILM COATED ORAL ONCE
Qty: 0 | Refills: 0 | Status: DISCONTINUED | OUTPATIENT
Start: 2017-09-28 | End: 2017-09-28

## 2017-09-28 RX ORDER — HYDROMORPHONE HYDROCHLORIDE 2 MG/ML
0.5 INJECTION INTRAMUSCULAR; INTRAVENOUS; SUBCUTANEOUS
Qty: 0 | Refills: 0 | Status: DISCONTINUED | OUTPATIENT
Start: 2017-09-28 | End: 2017-09-28

## 2017-09-28 RX ORDER — FENTANYL CITRATE 50 UG/ML
50 INJECTION INTRAVENOUS
Qty: 0 | Refills: 0 | Status: DISCONTINUED | OUTPATIENT
Start: 2017-09-28 | End: 2017-09-28

## 2017-09-28 RX ORDER — SODIUM CHLORIDE 9 MG/ML
1000 INJECTION, SOLUTION INTRAVENOUS
Qty: 0 | Refills: 0 | Status: DISCONTINUED | OUTPATIENT
Start: 2017-09-28 | End: 2017-09-29

## 2017-09-28 RX ADMIN — OXYCODONE AND ACETAMINOPHEN 1 TABLET(S): 5; 325 TABLET ORAL at 20:04

## 2017-09-28 RX ADMIN — ONDANSETRON 4 MILLIGRAM(S): 8 TABLET, FILM COATED ORAL at 19:36

## 2017-09-28 RX ADMIN — OXYCODONE AND ACETAMINOPHEN 1 TABLET(S): 5; 325 TABLET ORAL at 19:20

## 2017-09-28 RX ADMIN — Medication 75 MICROGRAM(S): at 05:35

## 2017-09-28 RX ADMIN — Medication 300 MILLIGRAM(S): at 19:56

## 2017-09-28 RX ADMIN — Medication 15 MILLIGRAM(S): at 03:03

## 2017-09-28 RX ADMIN — Medication 300 MILLIGRAM(S): at 05:35

## 2017-09-28 RX ADMIN — Medication 200 MILLIGRAM(S): at 20:48

## 2017-09-28 RX ADMIN — FENTANYL CITRATE 50 MICROGRAM(S): 50 INJECTION INTRAVENOUS at 18:49

## 2017-09-28 RX ADMIN — SODIUM CHLORIDE 125 MILLILITER(S): 9 INJECTION, SOLUTION INTRAVENOUS at 02:49

## 2017-09-28 RX ADMIN — FENTANYL CITRATE 50 MICROGRAM(S): 50 INJECTION INTRAVENOUS at 19:21

## 2017-09-28 RX ADMIN — Medication 15 MILLIGRAM(S): at 02:48

## 2017-09-28 RX ADMIN — FENTANYL CITRATE 50 MICROGRAM(S): 50 INJECTION INTRAVENOUS at 18:33

## 2017-09-28 RX ADMIN — FENTANYL CITRATE 50 MICROGRAM(S): 50 INJECTION INTRAVENOUS at 19:50

## 2017-09-28 RX ADMIN — Medication 100 MILLIEQUIVALENT(S): at 00:48

## 2017-09-28 NOTE — PROGRESS NOTE ADULT - ASSESSMENT
Pt is a 38 yo F s/p Lap mariann POD#0. Doing well postop, denies any pain, nausea or vomiting. Tolerating CLD.   - advance diet as tolerated  - pain control PRN  - encourage ambulation  - monitor bowel function  - serial abdominal exams  - DVT PPX  - cont course of ABx

## 2017-09-28 NOTE — BRIEF OPERATIVE NOTE - PROCEDURE
<<-----Click on this checkbox to enter Procedure Laparoscopic cholecystectomy  09/28/2017    Active  DALE

## 2017-09-28 NOTE — PROGRESS NOTE ADULT - SUBJECTIVE AND OBJECTIVE BOX
POST-OP CHECK    Patient seen and examined, s/p laparoscopic cholecystectomy  Tolerated procedure well. Denied any pain at all when seen at bedside. Tolerated CLD with no nausea or vomiting. Abdomen soft, mildly tender, non distended, lap incision dressings dry and intact.     T(C): 36.8 (09-28-17 @ 21:32), Max: 37.1 (09-27-17 @ 23:16)  HR: 75 (09-28-17 @ 21:32) (62 - 90)  BP: 182/100 (09-28-17 @ 21:32) (106/90 - 185/90)  RR: 18 (09-28-17 @ 21:32) (11 - 18)  SpO2: 99% (09-28-17 @ 21:32) (95% - 100%)    09-27-17 @ 07:01  -  09-28-17 @ 07:00  --------------------------------------------------------  IN: 1925 mL / OUT: 0 mL / NET: 1925 mL    09-28-17 @ 07:01  -  09-28-17 @ 23:05  --------------------------------------------------------  IN: 500 mL / OUT: 0 mL / NET: 500 mL        lactated ringers. 1000 milliLiter(s) (100 mL/Hr) IV Continuous <Continuous>  cefoTEtan  IVPB 1 Gram(s) IV Intermittent every 12 hours  HYDROmorphone  Injectable 0.5 milliGRAM(s) IV Push every 3 hours PRN  HYDROmorphone  Injectable 1 milliGRAM(s) IV Push every 4 hours PRN  oxyCODONE    5 mG/acetaminophen 325 mG 1 Tablet(s) Oral every 4 hours PRN  oxyCODONE    5 mG/acetaminophen 325 mG 2 Tablet(s) Oral every 6 hours PRN  labetalol 300 milliGRAM(s) Oral two times a day  levothyroxine 75 MICROGram(s) Oral daily      GEN:  Appears to be in no acute distress  HEAD: NC/AT  EYES: PERRL, EOMI.  CARDIAC: RRR, S1/S2. No S3, S4   LUNGS: CTAB  ABDOMEN: S, ND, mildly tender, lap incision dressing dry  SKIN: Skin normal color, no rash

## 2017-09-29 ENCOUNTER — TRANSCRIPTION ENCOUNTER (OUTPATIENT)
Age: 37
End: 2017-09-29

## 2017-09-29 VITALS
SYSTOLIC BLOOD PRESSURE: 134 MMHG | HEART RATE: 73 BPM | RESPIRATION RATE: 18 BRPM | OXYGEN SATURATION: 98 % | TEMPERATURE: 98 F | DIASTOLIC BLOOD PRESSURE: 80 MMHG

## 2017-09-29 LAB
ALBUMIN SERPL ELPH-MCNC: 3.6 G/DL — SIGNIFICANT CHANGE UP (ref 3.3–5.2)
ALP SERPL-CCNC: 188 U/L — HIGH (ref 40–120)
ALT FLD-CCNC: 220 U/L — HIGH
AMYLASE P1 CFR SERPL: 75 U/L — SIGNIFICANT CHANGE UP (ref 36–128)
ANION GAP SERPL CALC-SCNC: 14 MMOL/L — SIGNIFICANT CHANGE UP (ref 5–17)
AST SERPL-CCNC: 73 U/L — HIGH
BILIRUB SERPL-MCNC: 0.5 MG/DL — SIGNIFICANT CHANGE UP (ref 0.4–2)
BUN SERPL-MCNC: 7 MG/DL — LOW (ref 8–20)
CALCIUM SERPL-MCNC: 8.9 MG/DL — SIGNIFICANT CHANGE UP (ref 8.6–10.2)
CHLORIDE SERPL-SCNC: 100 MMOL/L — SIGNIFICANT CHANGE UP (ref 98–107)
CO2 SERPL-SCNC: 23 MMOL/L — SIGNIFICANT CHANGE UP (ref 22–29)
CREAT SERPL-MCNC: 0.64 MG/DL — SIGNIFICANT CHANGE UP (ref 0.5–1.3)
GLUCOSE SERPL-MCNC: 163 MG/DL — HIGH (ref 70–115)
HCT VFR BLD CALC: 32.5 % — LOW (ref 37–47)
HGB BLD-MCNC: 10.9 G/DL — LOW (ref 12–16)
LIDOCAIN IGE QN: 60 U/L — HIGH (ref 22–51)
MCHC RBC-ENTMCNC: 27.8 PG — SIGNIFICANT CHANGE UP (ref 27–31)
MCHC RBC-ENTMCNC: 33.5 G/DL — SIGNIFICANT CHANGE UP (ref 32–36)
MCV RBC AUTO: 82.9 FL — SIGNIFICANT CHANGE UP (ref 81–99)
PLATELET # BLD AUTO: 450 K/UL — HIGH (ref 150–400)
POTASSIUM SERPL-MCNC: 4 MMOL/L — SIGNIFICANT CHANGE UP (ref 3.5–5.3)
POTASSIUM SERPL-SCNC: 4 MMOL/L — SIGNIFICANT CHANGE UP (ref 3.5–5.3)
PROT SERPL-MCNC: 7 G/DL — SIGNIFICANT CHANGE UP (ref 6.6–8.7)
RBC # BLD: 3.92 M/UL — LOW (ref 4.4–5.2)
RBC # FLD: 14.2 % — SIGNIFICANT CHANGE UP (ref 11–15.6)
SODIUM SERPL-SCNC: 137 MMOL/L — SIGNIFICANT CHANGE UP (ref 135–145)
WBC # BLD: 8.3 K/UL — SIGNIFICANT CHANGE UP (ref 4.8–10.8)
WBC # FLD AUTO: 8.3 K/UL — SIGNIFICANT CHANGE UP (ref 4.8–10.8)

## 2017-09-29 PROCEDURE — 80076 HEPATIC FUNCTION PANEL: CPT

## 2017-09-29 PROCEDURE — 84132 ASSAY OF SERUM POTASSIUM: CPT

## 2017-09-29 PROCEDURE — 85610 PROTHROMBIN TIME: CPT

## 2017-09-29 PROCEDURE — 36415 COLL VENOUS BLD VENIPUNCTURE: CPT

## 2017-09-29 PROCEDURE — 76705 ECHO EXAM OF ABDOMEN: CPT

## 2017-09-29 PROCEDURE — 80053 COMPREHEN METABOLIC PANEL: CPT

## 2017-09-29 PROCEDURE — 82150 ASSAY OF AMYLASE: CPT

## 2017-09-29 PROCEDURE — 81001 URINALYSIS AUTO W/SCOPE: CPT

## 2017-09-29 PROCEDURE — 99285 EMERGENCY DEPT VISIT HI MDM: CPT | Mod: 25

## 2017-09-29 PROCEDURE — 83605 ASSAY OF LACTIC ACID: CPT

## 2017-09-29 PROCEDURE — 93005 ELECTROCARDIOGRAM TRACING: CPT

## 2017-09-29 PROCEDURE — 85014 HEMATOCRIT: CPT

## 2017-09-29 PROCEDURE — 83615 LACTATE (LD) (LDH) ENZYME: CPT

## 2017-09-29 PROCEDURE — 85027 COMPLETE CBC AUTOMATED: CPT

## 2017-09-29 PROCEDURE — 86901 BLOOD TYPING SEROLOGIC RH(D): CPT

## 2017-09-29 PROCEDURE — 96375 TX/PRO/DX INJ NEW DRUG ADDON: CPT

## 2017-09-29 PROCEDURE — 82435 ASSAY OF BLOOD CHLORIDE: CPT

## 2017-09-29 PROCEDURE — 88304 TISSUE EXAM BY PATHOLOGIST: CPT

## 2017-09-29 PROCEDURE — 83690 ASSAY OF LIPASE: CPT

## 2017-09-29 PROCEDURE — 80048 BASIC METABOLIC PNL TOTAL CA: CPT

## 2017-09-29 PROCEDURE — 96374 THER/PROPH/DIAG INJ IV PUSH: CPT

## 2017-09-29 PROCEDURE — 84295 ASSAY OF SERUM SODIUM: CPT

## 2017-09-29 PROCEDURE — 84702 CHORIONIC GONADOTROPIN TEST: CPT

## 2017-09-29 PROCEDURE — 83735 ASSAY OF MAGNESIUM: CPT

## 2017-09-29 PROCEDURE — 86900 BLOOD TYPING SEROLOGIC ABO: CPT

## 2017-09-29 PROCEDURE — 74183 MRI ABD W/O CNTR FLWD CNTR: CPT

## 2017-09-29 PROCEDURE — 86850 RBC ANTIBODY SCREEN: CPT

## 2017-09-29 PROCEDURE — 82803 BLOOD GASES ANY COMBINATION: CPT

## 2017-09-29 PROCEDURE — 36600 WITHDRAWAL OF ARTERIAL BLOOD: CPT

## 2017-09-29 PROCEDURE — 84100 ASSAY OF PHOSPHORUS: CPT

## 2017-09-29 PROCEDURE — 82947 ASSAY GLUCOSE BLOOD QUANT: CPT

## 2017-09-29 PROCEDURE — 82330 ASSAY OF CALCIUM: CPT

## 2017-09-29 RX ORDER — ONDANSETRON 8 MG/1
4 TABLET, FILM COATED ORAL ONCE
Qty: 0 | Refills: 0 | Status: COMPLETED | OUTPATIENT
Start: 2017-09-29 | End: 2017-09-29

## 2017-09-29 RX ADMIN — ONDANSETRON 4 MILLIGRAM(S): 8 TABLET, FILM COATED ORAL at 11:44

## 2017-09-29 RX ADMIN — SODIUM CHLORIDE 100 MILLILITER(S): 9 INJECTION, SOLUTION INTRAVENOUS at 05:57

## 2017-09-29 RX ADMIN — HYDROMORPHONE HYDROCHLORIDE 1 MILLIGRAM(S): 2 INJECTION INTRAMUSCULAR; INTRAVENOUS; SUBCUTANEOUS at 08:15

## 2017-09-29 RX ADMIN — HYDROMORPHONE HYDROCHLORIDE 1 MILLIGRAM(S): 2 INJECTION INTRAMUSCULAR; INTRAVENOUS; SUBCUTANEOUS at 03:24

## 2017-09-29 RX ADMIN — Medication 100 GRAM(S): at 06:39

## 2017-09-29 RX ADMIN — HYDROMORPHONE HYDROCHLORIDE 1 MILLIGRAM(S): 2 INJECTION INTRAMUSCULAR; INTRAVENOUS; SUBCUTANEOUS at 03:39

## 2017-09-29 RX ADMIN — Medication 300 MILLIGRAM(S): at 05:53

## 2017-09-29 RX ADMIN — Medication 75 MICROGRAM(S): at 05:53

## 2017-09-29 RX ADMIN — HYDROMORPHONE HYDROCHLORIDE 1 MILLIGRAM(S): 2 INJECTION INTRAMUSCULAR; INTRAVENOUS; SUBCUTANEOUS at 07:38

## 2017-09-29 NOTE — DISCHARGE NOTE ADULT - CARE PROVIDER_API CALL
Sumit Thomas), Surgery; Surgical Critical Care  [address] [facility] [facility]  [address] [place] [address]  Phone: [phone]  Fax: [phone]

## 2017-09-29 NOTE — PROGRESS NOTE ADULT - ASSESSMENT
Patient is a 36 yo F with gallstone pancreatitis s/p lap cholecystectomy. Doing well postop, tolerating CLD and advanced to regular diet today. Experiencing some minor pain, but if resolves can be discharged today.  - Regular diet  - IVF  - pain control  - DVT PPX  - serial abdominal exams

## 2017-09-29 NOTE — DISCHARGE NOTE ADULT - MEDICATION SUMMARY - MEDICATIONS TO TAKE
I will START or STAY ON the medications listed below when I get home from the hospital:    oxyCODONE-acetaminophen 5 mg-325 mg oral tablet  -- 1 tab(s) by mouth every 4 hours, As needed, Mild Pain (1 - 3)  -- Indication: For Postop pain    labetalol 200 mg oral tablet  -- 1 tab(s) by mouth 2 times a day  -- Indication: For HTN    levothyroxine 75 mcg (0.075 mg) oral tablet  -- 1 tab(s) by mouth once a day  -- Indication: For Hypothyroidism I will START or STAY ON the medications listed below when I get home from the hospital:    oxyCODONE-acetaminophen 5 mg-325 mg oral tablet  -- 1 tab(s) by mouth every 4 hours, As needed, Mild Pain (1 - 3) MDD:6 tablets  -- Indication: For Acute cholecystitis    labetalol 200 mg oral tablet  -- 1 tab(s) by mouth 2 times a day  -- Indication: For HTN    levothyroxine 75 mcg (0.075 mg) oral tablet  -- 1 tab(s) by mouth once a day  -- Indication: For Hypothyroidism

## 2017-09-29 NOTE — DISCHARGE NOTE ADULT - HOSPITAL COURSE
Patient presented to the emergency department with abdominal pain, nausea and loose bowel movements. Patient had a sonogram done that showed sludge, gallstones, and borderline gallbladder wall thickening. Her liver enzymes were also found to be elevated so the patient also underwent an MRCP that showed cholelithiasis, but no choledocholithiasis. Patient was kept NPO and underwent laparoscopic cholecystectomy on 9/28 without complications. Post operatively, the patient was hemodynamically stable, voiding on her own and tolerating a clear liquid diet. She was on perioperative antibiotics that were discontinued the day after surgery was completed. At the time of discharge, patient was tolerating a regular diet, her pain was controlled with oral pain medications, she was fully ambulatory. Patient presented to the emergency department with abdominal pain, nausea and loose bowel movements. Patient had a sonogram done that showed sludge, gallstones, and borderline gallbladder wall thickening. Her liver enzymes were also found to be elevated so the patient also underwent an MRCP that showed cholelithiasis, but no choledocholithiasis. Patient was kept NPO and underwent laparoscopic cholecystectomy on 9/28 without complications. Post operatively, the patient was hemodynamically stable, voiding on her own and tolerating a clear liquid diet. She was on perioperative antibiotics that were discontinued the day after surgery was completed. 9/29 POD1 At the time of discharge, patient was tolerating a regular diet, her pain was controlled with oral pain medications, she was fully ambulatory.

## 2017-09-29 NOTE — DISCHARGE NOTE ADULT - PATIENT PORTAL LINK FT
“You can access the [employer] Patient Portal, offered by [facility], by registering with the following website: [email or web address]”

## 2017-09-29 NOTE — DISCHARGE NOTE ADULT - ADDITIONAL INSTRUCTIONS
Please call to make an appointment with the ACS clinic in 1-2 weeks for follow up. Please follow up with a PMD within 2 weeks to discuss this hospital course and the findings of fatty liver seen intraoperatively. Ok to shower as per routine. Please call to make an appointment with the ACS clinic in 1-2 weeks for follow up. Please follow up with a PMD within 2 weeks to discuss this hospital course and the findings of fatty liver seen intraoperatively. Ok to shower as per routine. Ok to remove transparent dressing 9/30, leave steristrips they will fall off on their own

## 2017-09-29 NOTE — PROGRESS NOTE ADULT - SUBJECTIVE AND OBJECTIVE BOX
INTERVAL HPI/OVERNIGHT EVENTS:  s/p Lap Maria Elena   Patient was seen and examined at bedside this AM.  No acute events overnight.   Patient still doing well, but experiencing minor abdominal pain in RUQ, appropriate postop  Tolerating CLD, no nausea or vomiting  Voiding and ambulating  Abdomen soft, ND, mildly tender in RUQ, incision sites c/d/i  Denies fever, chills, nausea, vomiting, shortness of breath, chest pain, dysuria      MEDICATIONS  (STANDING):  lactated ringers. 1000 milliLiter(s) (100 mL/Hr) IV Continuous <Continuous>  cefoTEtan  IVPB 1 Gram(s) IV Intermittent every 12 hours  labetalol 300 milliGRAM(s) Oral two times a day  levothyroxine 75 MICROGram(s) Oral daily    MEDICATIONS  (PRN):  HYDROmorphone  Injectable 0.5 milliGRAM(s) IV Push every 3 hours PRN Mild Pain (1 - 3)  HYDROmorphone  Injectable 1 milliGRAM(s) IV Push every 4 hours PRN Moderate Pain (4 - 6)  oxyCODONE    5 mG/acetaminophen 325 mG 1 Tablet(s) Oral every 4 hours PRN Mild Pain (1 - 3)  oxyCODONE    5 mG/acetaminophen 325 mG 2 Tablet(s) Oral every 6 hours PRN Moderate Pain (4 - 6)      Vital Signs Last 24 Hrs  T(C): 36.8 (28 Sep 2017 21:32), Max: 36.9 (28 Sep 2017 11:16)  T(F): 98.3 (28 Sep 2017 21:32), Max: 98.4 (28 Sep 2017 11:16)  HR: 75 (28 Sep 2017 21:32) (67 - 90)  BP: 182/100 (28 Sep 2017 21:32) (106/90 - 182/100)  BP(mean): --  RR: 18 (28 Sep 2017 21:32) (11 - 18)  SpO2: 99% (28 Sep 2017 21:32) (95% - 100%)    Physical Exam:  Gen: NAD  Neurological:  No sensory/motor deficits  HEENT: PERRLA, EOMI  Respiratory: Breath Sounds equal & CTA bilaterally, no accessory muscle use  Cardiovascular: Regular rate & rhythm, normal S1, S2; no murmurs, gallops or rubs  Gastrointestinal: Soft, mild RUQ tender, nondistended  Vascular: Equal and normal pulses: 2+ peripheral pulses throughout  Musculoskeletal: No joint pain, swelling or deformity; no limitation of movement  Skin: No rashes      I&O's Detail    28 Sep 2017 07:01  -  29 Sep 2017 07:00  --------------------------------------------------------  IN:    lactated ringers.: 500 mL  Total IN: 500 mL    OUT:  Total OUT: 0 mL    Total NET: 500 mL          LABS:                        10.6   6.0   )-----------( 436      ( 28 Sep 2017 05:52 )             32.3         140  |  105  |  8.0  ----------------------------<  79  3.9   |  20.0<L>  |  0.52    Ca    8.7      28 Sep 2017 05:52  Phos  3.5       Mg     1.9         TPro  6.7  /  Alb  3.5  /  TBili  1.1  /  DBili  0.4<H>  /  AST  81<H>  /  ALT  255<H>  /  AlkPhos  203<H>        Urinalysis Basic - ( 27 Sep 2017 08:06 )    Color: Yellow / Appearance: Clear / S.010 / pH: x  Gluc: x / Ketone: Negative  / Bili: Small / Urobili: 1 mg/dL   Blood: x / Protein: Negative mg/dL / Nitrite: Negative   Leuk Esterase: Trace / RBC: 6-10 /HPF / WBC 3-5   Sq Epi: x / Non Sq Epi: x / Bacteria: Few        RADIOLOGY & ADDITIONAL STUDIES:

## 2017-09-29 NOTE — DISCHARGE NOTE ADULT - NS AS ACTIVITY OBS
Walking-Indoors allowed/Bathing allowed/No Heavy lifting/straining/Showering allowed/Walking-Outdoors allowed

## 2017-09-29 NOTE — DISCHARGE NOTE ADULT - CARE PLAN
Principal Discharge DX:	Cholecystitis, acute  Goal:	alleviation of symptoms  Instructions for follow-up, activity and diet:	Please follow up with ACS clinic in 1-2 weeks for follow up outpatient. Resume activity as tolerated. No heavy lifting of 15pounds for 4 weeks. Resume diet as tolerated.  Secondary Diagnosis:	Hypothyroidism  Goal:	maintain normal thyroid levels  Instructions for follow-up, activity and diet:	Please continue home dose of levothyroxine Principal Discharge DX:	Cholecystitis, acute  Goal:	alleviation of symptoms  Instructions for follow-up, activity and diet:	Please follow up with ACS clinic in 1-2 weeks for follow up outpatient. No heavy lifting greater than 10lbs for anything uncomfortable for 5 weeks. Resume diet as tolerated.  Secondary Diagnosis:	Hypothyroidism  Goal:	maintain normal thyroid levels  Instructions for follow-up, activity and diet:	Please continue home dose of levothyroxine Principal Discharge DX:	Cholecystitis, acute  Goal:	alleviation of symptoms  Instructions for follow-up, activity and diet:	Please follow up with ACS clinic in 1-2 weeks for follow up outpatient. No heavy lifting greater than 10lbs for anything uncomfortable for 5 weeks. Resume diet as tolerated. While on percocet you can not drive , operate heavy machinery , make important decisions, take tylenol, drink alcohol, drive a car.  Secondary Diagnosis:	Hypothyroidism  Goal:	maintain normal thyroid levels  Instructions for follow-up, activity and diet:	Please continue home dose of levothyroxine

## 2017-10-04 LAB — SURGICAL PATHOLOGY FINAL REPORT - CH: SIGNIFICANT CHANGE UP

## 2017-10-05 ENCOUNTER — APPOINTMENT (OUTPATIENT)
Dept: TRAUMA SURGERY | Facility: CLINIC | Age: 37
End: 2017-10-05
Payer: COMMERCIAL

## 2017-10-05 VITALS
RESPIRATION RATE: 16 BRPM | HEIGHT: 66.25 IN | OXYGEN SATURATION: 99 % | HEART RATE: 68 BPM | TEMPERATURE: 98.9 F | SYSTOLIC BLOOD PRESSURE: 128 MMHG | WEIGHT: 218 LBS | BODY MASS INDEX: 35.03 KG/M2 | DIASTOLIC BLOOD PRESSURE: 85 MMHG

## 2017-10-05 PROCEDURE — 99024 POSTOP FOLLOW-UP VISIT: CPT

## 2017-10-09 ENCOUNTER — APPOINTMENT (OUTPATIENT)
Dept: TRAUMA SURGERY | Facility: CLINIC | Age: 37
End: 2017-10-09
Payer: COMMERCIAL

## 2017-10-09 VITALS
OXYGEN SATURATION: 100 % | DIASTOLIC BLOOD PRESSURE: 85 MMHG | HEART RATE: 85 BPM | TEMPERATURE: 98.9 F | SYSTOLIC BLOOD PRESSURE: 129 MMHG | HEIGHT: 66.25 IN | WEIGHT: 220.03 LBS | RESPIRATION RATE: 16 BRPM | BODY MASS INDEX: 35.36 KG/M2

## 2017-10-09 PROCEDURE — 99024 POSTOP FOLLOW-UP VISIT: CPT

## 2017-10-13 ENCOUNTER — APPOINTMENT (OUTPATIENT)
Dept: TRAUMA SURGERY | Facility: CLINIC | Age: 37
End: 2017-10-13
Payer: COMMERCIAL

## 2017-10-13 VITALS
SYSTOLIC BLOOD PRESSURE: 132 MMHG | BODY MASS INDEX: 36 KG/M2 | OXYGEN SATURATION: 98 % | HEART RATE: 78 BPM | DIASTOLIC BLOOD PRESSURE: 88 MMHG | HEIGHT: 66.25 IN | TEMPERATURE: 98.6 F | WEIGHT: 224 LBS

## 2017-10-13 PROCEDURE — 99024 POSTOP FOLLOW-UP VISIT: CPT

## 2017-10-20 ENCOUNTER — APPOINTMENT (OUTPATIENT)
Dept: TRAUMA SURGERY | Facility: CLINIC | Age: 37
End: 2017-10-20
Payer: COMMERCIAL

## 2017-10-20 VITALS
RESPIRATION RATE: 16 BRPM | HEIGHT: 66.25 IN | TEMPERATURE: 98.5 F | WEIGHT: 220 LBS | DIASTOLIC BLOOD PRESSURE: 90 MMHG | OXYGEN SATURATION: 98 % | SYSTOLIC BLOOD PRESSURE: 137 MMHG | HEART RATE: 80 BPM | BODY MASS INDEX: 35.36 KG/M2

## 2017-10-20 DIAGNOSIS — Z90.49 ACQUIRED ABSENCE OF OTHER SPECIFIED PARTS OF DIGESTIVE TRACT: ICD-10-CM

## 2017-10-20 PROCEDURE — 99024 POSTOP FOLLOW-UP VISIT: CPT

## 2017-10-25 PROBLEM — Z90.49 STATUS POST LAPAROSCOPIC CHOLECYSTECTOMY: Status: ACTIVE | Noted: 2017-10-11

## 2018-04-30 ENCOUNTER — APPOINTMENT (OUTPATIENT)
Dept: HUMAN REPRODUCTION | Facility: CLINIC | Age: 38
End: 2018-04-30
Payer: COMMERCIAL

## 2018-04-30 PROCEDURE — 76830 TRANSVAGINAL US NON-OB: CPT

## 2018-04-30 PROCEDURE — 36415 COLL VENOUS BLD VENIPUNCTURE: CPT

## 2018-04-30 PROCEDURE — 99205 OFFICE O/P NEW HI 60 MIN: CPT | Mod: 25

## 2018-05-08 ENCOUNTER — OTHER (OUTPATIENT)
Age: 38
End: 2018-05-08

## 2018-05-08 DIAGNOSIS — N97.9 FEMALE INFERTILITY, UNSPECIFIED: ICD-10-CM

## 2018-05-15 ENCOUNTER — APPOINTMENT (OUTPATIENT)
Dept: HUMAN REPRODUCTION | Facility: CLINIC | Age: 38
End: 2018-05-15
Payer: COMMERCIAL

## 2018-05-15 ENCOUNTER — APPOINTMENT (OUTPATIENT)
Dept: RADIOLOGY | Facility: HOSPITAL | Age: 38
End: 2018-05-15

## 2018-05-15 ENCOUNTER — OUTPATIENT (OUTPATIENT)
Dept: OUTPATIENT SERVICES | Facility: HOSPITAL | Age: 38
LOS: 1 days | End: 2018-05-15
Payer: COMMERCIAL

## 2018-05-15 DIAGNOSIS — N60.02 SOLITARY CYST OF LEFT BREAST: Chronic | ICD-10-CM

## 2018-05-15 DIAGNOSIS — N97.1 FEMALE INFERTILITY OF TUBAL ORIGIN: ICD-10-CM

## 2018-05-15 PROCEDURE — 99213 OFFICE O/P EST LOW 20 MIN: CPT | Mod: 25

## 2018-05-15 PROCEDURE — 74740 X-RAY FEMALE GENITAL TRACT: CPT | Mod: 59

## 2018-05-15 PROCEDURE — 36415 COLL VENOUS BLD VENIPUNCTURE: CPT

## 2018-05-15 PROCEDURE — 58340 CATHETER FOR HYSTEROGRAPHY: CPT | Mod: 59

## 2018-05-16 PROCEDURE — 74740 X-RAY FEMALE GENITAL TRACT: CPT

## 2018-05-16 PROCEDURE — 58340 CATHETER FOR HYSTEROGRAPHY: CPT

## 2018-06-04 ENCOUNTER — APPOINTMENT (OUTPATIENT)
Dept: HUMAN REPRODUCTION | Facility: CLINIC | Age: 38
End: 2018-06-04

## 2018-06-20 ENCOUNTER — APPOINTMENT (OUTPATIENT)
Dept: ULTRASOUND IMAGING | Facility: CLINIC | Age: 38
End: 2018-06-20

## 2018-06-20 ENCOUNTER — APPOINTMENT (OUTPATIENT)
Dept: MAMMOGRAPHY | Facility: CLINIC | Age: 38
End: 2018-06-20

## 2018-07-27 ENCOUNTER — APPOINTMENT (OUTPATIENT)
Dept: NEUROLOGY | Facility: CLINIC | Age: 38
End: 2018-07-27
Payer: COMMERCIAL

## 2018-07-27 VITALS
WEIGHT: 227 LBS | HEART RATE: 78 BPM | DIASTOLIC BLOOD PRESSURE: 80 MMHG | RESPIRATION RATE: 16 BRPM | SYSTOLIC BLOOD PRESSURE: 140 MMHG | BODY MASS INDEX: 35.63 KG/M2 | HEIGHT: 67 IN

## 2018-07-27 DIAGNOSIS — Z83.49 FAMILY HISTORY OF OTHER ENDOCRINE, NUTRITIONAL AND METABOLIC DISEASES: ICD-10-CM

## 2018-07-27 DIAGNOSIS — Z83.42 FAMILY HISTORY OF FAMILIAL HYPERCHOLESTEROLEMIA: ICD-10-CM

## 2018-07-27 PROCEDURE — 99204 OFFICE O/P NEW MOD 45 MIN: CPT

## 2018-08-15 ENCOUNTER — APPOINTMENT (OUTPATIENT)
Dept: HUMAN REPRODUCTION | Facility: CLINIC | Age: 38
End: 2018-08-15
Payer: COMMERCIAL

## 2018-08-15 PROCEDURE — 99215 OFFICE O/P EST HI 40 MIN: CPT

## 2018-08-16 ENCOUNTER — FORM ENCOUNTER (OUTPATIENT)
Age: 38
End: 2018-08-16

## 2018-08-17 ENCOUNTER — OUTPATIENT (OUTPATIENT)
Dept: OUTPATIENT SERVICES | Facility: HOSPITAL | Age: 38
LOS: 1 days | End: 2018-08-17
Payer: COMMERCIAL

## 2018-08-17 ENCOUNTER — APPOINTMENT (OUTPATIENT)
Dept: MRI IMAGING | Facility: CLINIC | Age: 38
End: 2018-08-17
Payer: COMMERCIAL

## 2018-08-17 DIAGNOSIS — N60.02 SOLITARY CYST OF LEFT BREAST: Chronic | ICD-10-CM

## 2018-08-17 DIAGNOSIS — R51 HEADACHE: ICD-10-CM

## 2018-08-17 DIAGNOSIS — G43.009 MIGRAINE WITHOUT AURA, NOT INTRACTABLE, WITHOUT STATUS MIGRAINOSUS: ICD-10-CM

## 2018-08-17 PROCEDURE — 70553 MRI BRAIN STEM W/O & W/DYE: CPT

## 2018-08-17 PROCEDURE — 70553 MRI BRAIN STEM W/O & W/DYE: CPT | Mod: 26

## 2018-08-31 ENCOUNTER — OUTPATIENT (OUTPATIENT)
Dept: OUTPATIENT SERVICES | Facility: HOSPITAL | Age: 38
LOS: 1 days | End: 2018-08-31
Payer: COMMERCIAL

## 2018-08-31 ENCOUNTER — APPOINTMENT (OUTPATIENT)
Dept: RADIOLOGY | Facility: HOSPITAL | Age: 38
End: 2018-08-31

## 2018-08-31 DIAGNOSIS — R76.11 NONSPECIFIC REACTION TO TUBERCULIN SKIN TEST WITHOUT ACTIVE TUBERCULOSIS: ICD-10-CM

## 2018-08-31 DIAGNOSIS — N60.02 SOLITARY CYST OF LEFT BREAST: Chronic | ICD-10-CM

## 2018-08-31 PROCEDURE — 71045 X-RAY EXAM CHEST 1 VIEW: CPT | Mod: 26

## 2018-09-11 ENCOUNTER — RESULT REVIEW (OUTPATIENT)
Age: 38
End: 2018-09-11

## 2018-09-14 ENCOUNTER — OUTPATIENT (OUTPATIENT)
Dept: OUTPATIENT SERVICES | Facility: HOSPITAL | Age: 38
LOS: 1 days | End: 2018-09-14
Payer: COMMERCIAL

## 2018-09-14 ENCOUNTER — APPOINTMENT (OUTPATIENT)
Dept: ULTRASOUND IMAGING | Facility: CLINIC | Age: 38
End: 2018-09-14
Payer: COMMERCIAL

## 2018-09-14 ENCOUNTER — APPOINTMENT (OUTPATIENT)
Dept: MAMMOGRAPHY | Facility: CLINIC | Age: 38
End: 2018-09-14
Payer: COMMERCIAL

## 2018-09-14 DIAGNOSIS — N60.02 SOLITARY CYST OF LEFT BREAST: Chronic | ICD-10-CM

## 2018-09-14 DIAGNOSIS — Z00.8 ENCOUNTER FOR OTHER GENERAL EXAMINATION: ICD-10-CM

## 2018-09-14 PROCEDURE — 77066 DX MAMMO INCL CAD BI: CPT | Mod: 26

## 2018-09-14 PROCEDURE — G0279: CPT | Mod: 26

## 2018-09-14 PROCEDURE — 76641 ULTRASOUND BREAST COMPLETE: CPT | Mod: 26,50

## 2018-09-14 PROCEDURE — 76641 ULTRASOUND BREAST COMPLETE: CPT

## 2018-09-14 PROCEDURE — 77066 DX MAMMO INCL CAD BI: CPT

## 2018-09-14 PROCEDURE — G0279: CPT

## 2018-09-20 ENCOUNTER — APPOINTMENT (OUTPATIENT)
Dept: NEUROLOGY | Facility: CLINIC | Age: 38
End: 2018-09-20
Payer: COMMERCIAL

## 2018-09-20 VITALS
WEIGHT: 226 LBS | SYSTOLIC BLOOD PRESSURE: 142 MMHG | DIASTOLIC BLOOD PRESSURE: 84 MMHG | HEIGHT: 67 IN | HEART RATE: 78 BPM | BODY MASS INDEX: 35.47 KG/M2

## 2018-09-20 PROCEDURE — 99214 OFFICE O/P EST MOD 30 MIN: CPT

## 2018-09-20 RX ORDER — NADOLOL 40 MG/1
40 TABLET ORAL
Qty: 30 | Refills: 0 | Status: DISCONTINUED | COMMUNITY
Start: 2018-07-26 | End: 2018-09-20

## 2018-10-29 ENCOUNTER — APPOINTMENT (OUTPATIENT)
Dept: HUMAN REPRODUCTION | Facility: CLINIC | Age: 38
End: 2018-10-29
Payer: COMMERCIAL

## 2018-10-29 PROCEDURE — 76830 TRANSVAGINAL US NON-OB: CPT

## 2018-10-29 PROCEDURE — 99213 OFFICE O/P EST LOW 20 MIN: CPT | Mod: 25

## 2018-11-02 ENCOUNTER — APPOINTMENT (OUTPATIENT)
Dept: HUMAN REPRODUCTION | Facility: CLINIC | Age: 38
End: 2018-11-02
Payer: COMMERCIAL

## 2018-11-02 PROCEDURE — 99213 OFFICE O/P EST LOW 20 MIN: CPT | Mod: 25

## 2018-11-02 PROCEDURE — 76830 TRANSVAGINAL US NON-OB: CPT

## 2018-11-05 ENCOUNTER — APPOINTMENT (OUTPATIENT)
Dept: HUMAN REPRODUCTION | Facility: CLINIC | Age: 38
End: 2018-11-05
Payer: COMMERCIAL

## 2018-11-05 PROCEDURE — 76830 TRANSVAGINAL US NON-OB: CPT

## 2018-11-05 PROCEDURE — 36415 COLL VENOUS BLD VENIPUNCTURE: CPT

## 2018-11-05 PROCEDURE — 99213 OFFICE O/P EST LOW 20 MIN: CPT | Mod: 25

## 2018-11-06 ENCOUNTER — APPOINTMENT (OUTPATIENT)
Dept: HUMAN REPRODUCTION | Facility: CLINIC | Age: 38
End: 2018-11-06

## 2018-11-07 ENCOUNTER — APPOINTMENT (OUTPATIENT)
Dept: HUMAN REPRODUCTION | Facility: CLINIC | Age: 38
End: 2018-11-07
Payer: COMMERCIAL

## 2018-11-07 PROCEDURE — 36415 COLL VENOUS BLD VENIPUNCTURE: CPT

## 2018-11-07 PROCEDURE — 99213 OFFICE O/P EST LOW 20 MIN: CPT | Mod: 25

## 2018-11-07 PROCEDURE — 76830 TRANSVAGINAL US NON-OB: CPT

## 2018-11-08 ENCOUNTER — APPOINTMENT (OUTPATIENT)
Dept: HUMAN REPRODUCTION | Facility: CLINIC | Age: 38
End: 2018-11-08
Payer: COMMERCIAL

## 2018-11-08 PROCEDURE — 76830 TRANSVAGINAL US NON-OB: CPT

## 2018-11-08 PROCEDURE — 89261 SPERM ISOLATION COMPLEX: CPT

## 2018-11-08 PROCEDURE — 58322 ARTIFICIAL INSEMINATION: CPT

## 2018-11-08 PROCEDURE — 99213 OFFICE O/P EST LOW 20 MIN: CPT | Mod: 25

## 2018-11-23 ENCOUNTER — APPOINTMENT (OUTPATIENT)
Dept: HUMAN REPRODUCTION | Facility: CLINIC | Age: 38
End: 2018-11-23
Payer: COMMERCIAL

## 2018-11-23 PROCEDURE — 76830 TRANSVAGINAL US NON-OB: CPT

## 2018-11-23 PROCEDURE — 99213 OFFICE O/P EST LOW 20 MIN: CPT | Mod: 25

## 2018-12-04 ENCOUNTER — APPOINTMENT (OUTPATIENT)
Dept: HUMAN REPRODUCTION | Facility: CLINIC | Age: 38
End: 2018-12-04

## 2018-12-14 ENCOUNTER — APPOINTMENT (OUTPATIENT)
Dept: NEUROLOGY | Facility: CLINIC | Age: 38
End: 2018-12-14

## 2019-01-19 ENCOUNTER — EMERGENCY (EMERGENCY)
Facility: HOSPITAL | Age: 39
LOS: 1 days | Discharge: HOME | End: 2019-01-19
Attending: EMERGENCY MEDICINE
Payer: COMMERCIAL

## 2019-01-19 VITALS
WEIGHT: 225.09 LBS | TEMPERATURE: 98 F | HEIGHT: 68 IN | SYSTOLIC BLOOD PRESSURE: 166 MMHG | HEART RATE: 84 BPM | OXYGEN SATURATION: 100 % | RESPIRATION RATE: 20 BRPM | DIASTOLIC BLOOD PRESSURE: 109 MMHG

## 2019-01-19 VITALS
TEMPERATURE: 98 F | RESPIRATION RATE: 18 BRPM | OXYGEN SATURATION: 98 % | SYSTOLIC BLOOD PRESSURE: 142 MMHG | DIASTOLIC BLOOD PRESSURE: 77 MMHG | HEART RATE: 70 BPM

## 2019-01-19 DIAGNOSIS — N60.02 SOLITARY CYST OF LEFT BREAST: Chronic | ICD-10-CM

## 2019-01-19 LAB
ALBUMIN SERPL ELPH-MCNC: 4.6 G/DL — SIGNIFICANT CHANGE UP (ref 3.3–5)
ALP SERPL-CCNC: 56 U/L — SIGNIFICANT CHANGE UP (ref 40–120)
ALT FLD-CCNC: 15 U/L — SIGNIFICANT CHANGE UP (ref 10–45)
ANION GAP SERPL CALC-SCNC: 14 MMOL/L — SIGNIFICANT CHANGE UP (ref 5–17)
AST SERPL-CCNC: 16 U/L — SIGNIFICANT CHANGE UP (ref 10–40)
BASOPHILS # BLD AUTO: 0 K/UL — SIGNIFICANT CHANGE UP (ref 0–0.2)
BASOPHILS NFR BLD AUTO: 0.4 % — SIGNIFICANT CHANGE UP (ref 0–2)
BILIRUB SERPL-MCNC: 0.4 MG/DL — SIGNIFICANT CHANGE UP (ref 0.2–1.2)
BUN SERPL-MCNC: 8 MG/DL — SIGNIFICANT CHANGE UP (ref 7–23)
CALCIUM SERPL-MCNC: 9.5 MG/DL — SIGNIFICANT CHANGE UP (ref 8.4–10.5)
CHLORIDE SERPL-SCNC: 104 MMOL/L — SIGNIFICANT CHANGE UP (ref 96–108)
CO2 SERPL-SCNC: 23 MMOL/L — SIGNIFICANT CHANGE UP (ref 22–31)
CREAT SERPL-MCNC: 0.6 MG/DL — SIGNIFICANT CHANGE UP (ref 0.5–1.3)
EOSINOPHIL # BLD AUTO: 0 K/UL — SIGNIFICANT CHANGE UP (ref 0–0.5)
EOSINOPHIL NFR BLD AUTO: 0.3 % — SIGNIFICANT CHANGE UP (ref 0–6)
GLUCOSE SERPL-MCNC: 111 MG/DL — HIGH (ref 70–99)
HCT VFR BLD CALC: 37.7 % — SIGNIFICANT CHANGE UP (ref 34.5–45)
HGB BLD-MCNC: 12.6 G/DL — SIGNIFICANT CHANGE UP (ref 11.5–15.5)
LYMPHOCYTES # BLD AUTO: 1.4 K/UL — SIGNIFICANT CHANGE UP (ref 1–3.3)
LYMPHOCYTES # BLD AUTO: 13.3 % — SIGNIFICANT CHANGE UP (ref 13–44)
MCHC RBC-ENTMCNC: 28 PG — SIGNIFICANT CHANGE UP (ref 27–34)
MCHC RBC-ENTMCNC: 33.4 GM/DL — SIGNIFICANT CHANGE UP (ref 32–36)
MCV RBC AUTO: 83.8 FL — SIGNIFICANT CHANGE UP (ref 80–100)
MONOCYTES # BLD AUTO: 0.5 K/UL — SIGNIFICANT CHANGE UP (ref 0–0.9)
MONOCYTES NFR BLD AUTO: 4.8 % — SIGNIFICANT CHANGE UP (ref 2–14)
NEUTROPHILS # BLD AUTO: 8.6 K/UL — HIGH (ref 1.8–7.4)
NEUTROPHILS NFR BLD AUTO: 81.2 % — HIGH (ref 43–77)
PLATELET # BLD AUTO: 562 K/UL — HIGH (ref 150–400)
POTASSIUM SERPL-MCNC: 3.9 MMOL/L — SIGNIFICANT CHANGE UP (ref 3.5–5.3)
POTASSIUM SERPL-SCNC: 3.9 MMOL/L — SIGNIFICANT CHANGE UP (ref 3.5–5.3)
PROT SERPL-MCNC: 8 G/DL — SIGNIFICANT CHANGE UP (ref 6–8.3)
RBC # BLD: 4.5 M/UL — SIGNIFICANT CHANGE UP (ref 3.8–5.2)
RBC # FLD: 13.7 % — SIGNIFICANT CHANGE UP (ref 10.3–14.5)
SODIUM SERPL-SCNC: 141 MMOL/L — SIGNIFICANT CHANGE UP (ref 135–145)
WBC # BLD: 10.6 K/UL — HIGH (ref 3.8–10.5)
WBC # FLD AUTO: 10.6 K/UL — HIGH (ref 3.8–10.5)

## 2019-01-19 PROCEDURE — 96375 TX/PRO/DX INJ NEW DRUG ADDON: CPT

## 2019-01-19 PROCEDURE — 80053 COMPREHEN METABOLIC PANEL: CPT

## 2019-01-19 PROCEDURE — 85027 COMPLETE CBC AUTOMATED: CPT

## 2019-01-19 PROCEDURE — 99284 EMERGENCY DEPT VISIT MOD MDM: CPT | Mod: 25

## 2019-01-19 PROCEDURE — 99284 EMERGENCY DEPT VISIT MOD MDM: CPT

## 2019-01-19 PROCEDURE — 96374 THER/PROPH/DIAG INJ IV PUSH: CPT

## 2019-01-19 RX ORDER — ACETAMINOPHEN 500 MG
975 TABLET ORAL ONCE
Qty: 0 | Refills: 0 | Status: COMPLETED | OUTPATIENT
Start: 2019-01-19 | End: 2019-01-19

## 2019-01-19 RX ORDER — KETOROLAC TROMETHAMINE 30 MG/ML
15 SYRINGE (ML) INJECTION ONCE
Qty: 0 | Refills: 0 | Status: DISCONTINUED | OUTPATIENT
Start: 2019-01-19 | End: 2019-01-19

## 2019-01-19 RX ORDER — DEXAMETHASONE 0.5 MG/5ML
10 ELIXIR ORAL ONCE
Qty: 0 | Refills: 0 | Status: COMPLETED | OUTPATIENT
Start: 2019-01-19 | End: 2019-01-19

## 2019-01-19 RX ORDER — METOCLOPRAMIDE HCL 10 MG
10 TABLET ORAL ONCE
Qty: 0 | Refills: 0 | Status: COMPLETED | OUTPATIENT
Start: 2019-01-19 | End: 2019-01-19

## 2019-01-19 RX ORDER — SODIUM CHLORIDE 9 MG/ML
1000 INJECTION, SOLUTION INTRAVENOUS ONCE
Qty: 0 | Refills: 0 | Status: COMPLETED | OUTPATIENT
Start: 2019-01-19 | End: 2019-01-19

## 2019-01-19 RX ADMIN — Medication 975 MILLIGRAM(S): at 21:15

## 2019-01-19 RX ADMIN — Medication 15 MILLIGRAM(S): at 22:14

## 2019-01-19 RX ADMIN — SODIUM CHLORIDE 2000 MILLILITER(S): 9 INJECTION, SOLUTION INTRAVENOUS at 21:15

## 2019-01-19 RX ADMIN — Medication 10 MILLIGRAM(S): at 22:17

## 2019-01-19 RX ADMIN — Medication 15 MILLIGRAM(S): at 23:07

## 2019-01-19 RX ADMIN — Medication 10 MILLIGRAM(S): at 21:15

## 2019-01-19 RX ADMIN — Medication 975 MILLIGRAM(S): at 22:17

## 2019-01-19 RX ADMIN — Medication 102 MILLIGRAM(S): at 22:14

## 2019-01-19 NOTE — ED ADULT NURSE NOTE - NSIMPLEMENTINTERV_GEN_ALL_ED
Implemented All Universal Safety Interventions:  Floyd to call system. Call bell, personal items and telephone within reach. Instruct patient to call for assistance. Room bathroom lighting operational. Non-slip footwear when patient is off stretcher. Physically safe environment: no spills, clutter or unnecessary equipment. Stretcher in lowest position, wheels locked, appropriate side rails in place.

## 2019-01-19 NOTE — ED PROVIDER NOTE - OBJECTIVE STATEMENT
38F with pmh HTN, hypothyroidism, migraine presenting with headache, nausea, vomiting, diarrhea x 1 day. Patient states migraine quality similar to usual migraine. Endorses MR several months ago which was normal and follows with neurology for migraines. Tried taking excedrin at home with no relief. 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 No fever, chills, cp, sob, dizziness, dysuria 38F with pmh HTN, hypothyroidism, migraine presenting with headache, nausea, vomiting, diarrhea x 1 day. States after overnight shift noted mild headache, worsening throughout daytime after nap. Took BP at home and noted diastolic of 110 and became worried. Patient states migraine quality similar to usual migraine. Endorses MR several months ago which was normal and follows with neurology for migraines. Tried taking excedrin at home with no relief. 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 No fever, chills, cp, sob, dizziness, dysuria

## 2019-01-19 NOTE — ED PROVIDER NOTE - ATTENDING CONTRIBUTION TO CARE
37 yo female, hx of migraines, HTN, p/w headache, N/V, diarrhea x 2.  HA c/w her prior migraines per patient.  No recent illness otherwise.  On exam, conversant, smiling, no acute distress, no neuro deficit.  Will check labs, IVF, medicate and reassess.  Not HTN emergency, not CVA.

## 2019-01-19 NOTE — ED ADULT NURSE NOTE - CHPI ED NUR SYMPTOMS NEG
no nausea/no tingling/no fever/no vomiting/no chills/no weakness/no decreased eating/drinking/no dizziness

## 2019-01-19 NOTE — ED PROVIDER NOTE - PROGRESS NOTE DETAILS
ERASTO: still in pain.  states that hydrocortisone/corticosteroids has worked for her in the past.  will give decadron x 1. Patient with improvement of symptoms

## 2019-01-19 NOTE — ED PROVIDER NOTE - NSFOLLOWUPINSTRUCTIONS_ED_ALL_ED_FT
Take tylenol 600mg every 6 hours for pain  May also take motrin 400mg every 6 hours  Follow up with your primary care doctor  Return to ED for any new or worsening symptoms

## 2019-01-19 NOTE — ED ADULT NURSE NOTE - OBJECTIVE STATEMENT
2039 pt 38yf aox4 c/o ha hx migraine since 8am, neck pain and elev b/p at home, had vomited in the afternoon, was being f/u w/ neuro last chk was november and was prescribed meds for migraine w/c the pt never had the prescription filled/took her last dose of labetalol 200 mg po at 1730, bp slightly elevated, pt able to verbalize concerns, inc anxiety noted/pending eval/gcruz

## 2019-01-19 NOTE — ED PROVIDER NOTE - MEDICAL DECISION MAKING DETAILS
38F presenting headache, nausea, vomiting, diarrhea. Likely migraine vs tension headache. Possible viral gastro. Benign exam. Will treat symptomatically 38F presenting headache, nausea, vomiting, diarrhea. Likely migraine vs tension headache. Possible viral gastro. Benign exam. Will treat symptomatically. Low suspicion for hypertensive emergency given presentation and exam

## 2019-04-22 ENCOUNTER — APPOINTMENT (OUTPATIENT)
Dept: NEUROLOGY | Facility: CLINIC | Age: 39
End: 2019-04-22
Payer: COMMERCIAL

## 2019-04-22 VITALS
HEART RATE: 75 BPM | WEIGHT: 225 LBS | SYSTOLIC BLOOD PRESSURE: 166 MMHG | BODY MASS INDEX: 35.31 KG/M2 | DIASTOLIC BLOOD PRESSURE: 90 MMHG | HEIGHT: 67 IN

## 2019-04-22 DIAGNOSIS — I10 ESSENTIAL (PRIMARY) HYPERTENSION: ICD-10-CM

## 2019-04-22 PROCEDURE — 99214 OFFICE O/P EST MOD 30 MIN: CPT

## 2019-04-22 RX ORDER — DOXYCYCLINE HYCLATE 100 MG/1
100 TABLET ORAL TWICE DAILY
Qty: 5 | Refills: 0 | Status: DISCONTINUED | COMMUNITY
Start: 2018-04-30 | End: 2019-04-22

## 2019-04-22 RX ORDER — IBUPROFEN 800 MG/1
800 TABLET, FILM COATED ORAL
Qty: 21 | Refills: 0 | Status: DISCONTINUED | COMMUNITY
Start: 2017-09-23 | End: 2019-04-22

## 2019-04-22 RX ORDER — CLOMIPHENE CITRATE 50 MG/1
50 TABLET ORAL DAILY
Qty: 5 | Refills: 0 | Status: DISCONTINUED | COMMUNITY
Start: 2018-11-23 | End: 2019-04-22

## 2019-04-22 RX ORDER — DOXYCYCLINE HYCLATE 100 MG/1
100 CAPSULE ORAL
Qty: 5 | Refills: 0 | Status: DISCONTINUED | COMMUNITY
Start: 2018-05-08 | End: 2019-04-22

## 2019-04-22 RX ORDER — CHORIOGONADOTROPIN ALFA 250 UG/.5ML
250 INJECTION, SOLUTION SUBCUTANEOUS
Qty: 1 | Refills: 1 | Status: DISCONTINUED | COMMUNITY
Start: 2018-10-29 | End: 2019-04-22

## 2019-04-22 NOTE — REASON FOR VISIT
[Follow-Up: _____] : a [unfilled] follow-up visit [FreeTextEntry1] : Follow up fro pt with Ch Migraines with underlying uncontrolled HTN

## 2019-04-22 NOTE — HISTORY OF PRESENT ILLNESS
[FreeTextEntry1] :  she is 38-year-old patient coming here for followup evaluation for uncontrolled hypertension and chronic migraine headaches mixed tension headaches coming here for followup evaluation.\par \par The pressure is still elevated and currently taking labetalol 200 mg twice a day and was taking Avapro which was not continued patient states that she was seen in an urgent care center was given Avapro and which helped her headaches and restarted having increasing headaches when she completed the medication. Did not make a followup with her primary care doctor properly noted to be elevated today at 166/90. Headaches are mostly bilateral biparietal headaches pressure-like headaches not associated any focal symptoms. Neurological work up is negative.\par \par Sometimes when she gets migraines associated nausea vomiting s. Did not respond with Maxalt.\par \par No other focal symptoms.

## 2019-04-22 NOTE — PHYSICAL EXAM
[General Appearance - Alert] : alert [General Appearance - In No Acute Distress] : in no acute distress [Oriented To Time, Place, And Person] : oriented to person, place, and time [Affect] : the affect was normal [Impaired Insight] : insight and judgment were intact [Person] : oriented to person [Place] : oriented to place [Concentration Intact] : normal concentrating ability [Visual Intact] : visual attention was ~T not ~L decreased [Time] : oriented to time [Naming Objects] : no difficulty naming common objects [Repeating Phrases] : no difficulty repeating a phrase [Fluency] : fluency intact [Writing A Sentence] : no difficulty writing a sentence [Comprehension] : comprehension intact [Reading] : reading intact [Past History] : adequate knowledge of personal past history [Cranial Nerves Optic (II)] : visual acuity intact bilaterally,  visual fields full to confrontation, pupils equal round and reactive to light [Cranial Nerves Oculomotor (III)] : extraocular motion intact [Cranial Nerves Trigeminal (V)] : facial sensation intact symmetrically [Cranial Nerves Facial (VII)] : face symmetrical [Cranial Nerves Vestibulocochlear (VIII)] : hearing was intact bilaterally [Cranial Nerves Glossopharyngeal (IX)] : tongue and palate midline [Cranial Nerves Accessory (XI - Cranial And Spinal)] : head turning and shoulder shrug symmetric [Cranial Nerves Hypoglossal (XII)] : there was no tongue deviation with protrusion [Motor Strength] : muscle strength was normal in all four extremities [No Muscle Atrophy] : normal bulk in all four extremities [Sensation Tactile Decrease] : light touch was intact [Past-pointing] : there was no past-pointing [Balance] : balance was intact [Tremor] : no tremor present [2+] : Brachioradialis left 2+ [1+] : Ankle jerk left 1+ [Plantar Reflex Right Only] : normal on the right [Plantar Reflex Left Only] : normal on the left [Sclera] : the sclera and conjunctiva were normal [Extraocular Movements] : extraocular movements were intact [PERRL With Normal Accommodation] : pupils were equal in size, round, reactive to light, with normal accommodation [Oropharynx] : the oropharynx was normal [Outer Ear] : the ears and nose were normal in appearance [Neck Appearance] : the appearance of the neck was normal [Neck Cervical Mass (___cm)] : no neck mass was observed [Thyroid Diffuse Enlargement] : the thyroid was not enlarged [Jugular Venous Distention Increased] : there was no jugular-venous distention [Thyroid Nodule] : there were no palpable thyroid nodules [Auscultation Breath Sounds / Voice Sounds] : lungs were clear to auscultation bilaterally [Heart Rate And Rhythm] : heart rate was normal and rhythm regular [Heart Sounds] : normal S1 and S2 [Heart Sounds Gallop] : no gallops [Heart Sounds Pericardial Friction Rub] : no pericardial rub [Murmurs] : no murmurs [Full Pulse] : the pedal pulses are present [Edema] : there was no peripheral edema [Abdomen Soft] : soft [Bowel Sounds] : normal bowel sounds [Abdomen Tenderness] : non-tender [Abdomen Mass (___ Cm)] : no abdominal mass palpated [No Spinal Tenderness] : no spinal tenderness [No CVA Tenderness] : no ~M costovertebral angle tenderness [Nail Clubbing] : no clubbing  or cyanosis of the fingernails [Musculoskeletal - Swelling] : no joint swelling seen [Abnormal Walk] : normal gait [Motor Tone] : muscle strength and tone were normal [Skin Color & Pigmentation] : normal skin color and pigmentation [Skin Turgor] : normal skin turgor [] : no rash

## 2019-04-22 NOTE — REVIEW OF SYSTEMS
[Recent Weight Gain (___ Lbs)] : recent [unfilled] ~Ulb weight gain [Migraine Headache] : migraine headaches [Tension Headache] : tension-type headaches [Negative] : Heme/Lymph

## 2019-04-22 NOTE — CONSULT LETTER
[Dear  ___] : Dear  [unfilled], [Consult Letter:] : I had the pleasure of evaluating your patient, [unfilled]. [Consult Closing:] : Thank you very much for allowing me to participate in the care of this patient.  If you have any questions, please do not hesitate to contact me. [Please see my note below.] : Please see my note below. [Sincerely,] : Sincerely,

## 2019-04-22 NOTE — DISCUSSION/SUMMARY
[FreeTextEntry1] : Patient with chronic migraine headaches with underlying uncontrolled hypertension. And nonfocal examination.\par \par At this time patient is not planning to get pregnant.\par \par Continue labetalol and add verapamil  mg once a day.\par \par Adjust doses as needed. \par \par Recommend follow up with you.\par \par Patient education provided.\par \par Return for followup in 3 months or as needed.\par \par \par \par

## 2019-04-29 NOTE — ED ADULT NURSE NOTE - CHIEF COMPLAINT QUOTE
Hi can you please call and schedule this pt an apt with Dr. Pennington? He is already an established pt. Thank you!   headache and elevated bp at home today. +vomiting. Denies numbness/tingling

## 2019-05-20 ENCOUNTER — TRANSCRIPTION ENCOUNTER (OUTPATIENT)
Age: 39
End: 2019-05-20

## 2019-06-20 ENCOUNTER — APPOINTMENT (OUTPATIENT)
Dept: DERMATOLOGY | Facility: CLINIC | Age: 39
End: 2019-06-20

## 2019-07-10 ENCOUNTER — APPOINTMENT (OUTPATIENT)
Dept: ORTHOPEDIC SURGERY | Facility: CLINIC | Age: 39
End: 2019-07-10

## 2019-07-22 ENCOUNTER — APPOINTMENT (OUTPATIENT)
Dept: ORTHOPEDIC SURGERY | Facility: CLINIC | Age: 39
End: 2019-07-22
Payer: COMMERCIAL

## 2019-07-22 VITALS
HEIGHT: 67 IN | WEIGHT: 226 LBS | SYSTOLIC BLOOD PRESSURE: 149 MMHG | DIASTOLIC BLOOD PRESSURE: 89 MMHG | BODY MASS INDEX: 35.47 KG/M2 | HEART RATE: 75 BPM

## 2019-07-22 DIAGNOSIS — M54.6 PAIN IN THORACIC SPINE: ICD-10-CM

## 2019-07-22 DIAGNOSIS — G89.29 PAIN IN THORACIC SPINE: ICD-10-CM

## 2019-07-22 PROCEDURE — 99204 OFFICE O/P NEW MOD 45 MIN: CPT

## 2019-07-22 PROCEDURE — 72040 X-RAY EXAM NECK SPINE 2-3 VW: CPT

## 2019-07-22 PROCEDURE — 72100 X-RAY EXAM L-S SPINE 2/3 VWS: CPT

## 2019-07-22 NOTE — HISTORY OF PRESENT ILLNESS
[de-identified] : Patient is here for neck/back pain that began about 2 years ago after her . She states that she had an epidural performed which did not work and then had a spinal. She has had pain in her neck and thoracic spine since. She describes the pain as stiffness and says that some days she has difficulty getting out of bed. She does not have radicular symptoms in her UE but does have occasional numbness/tingling into her toes. She has taken Ibuprofen in the past but did not get relief. Denies prior injury.

## 2019-07-22 NOTE — DISCUSSION/SUMMARY
[de-identified] : Patient was seen today for evaluation of chronic global back pain. Patient states her pain started after epidural injection and subsequent  section delivery of her child 2 years ago. She has had persistent cervicothoracic back pain since that time. She's also had intermittent low back pain since that time. And most recently has now developed new onset of bilateral feet numbness. Patient has clinical exam consistent with paraspinal muscle spasm throughout her cervical, thoracic and lumbar areas. She does not have any significant motor weakness in either the upper or lower extremities. However, with the new onset of numbness and the chronicity of this pain would recommend advance imaging of the lumbar spine with MRI to evaluate for disc herniation.  Patient has only tried oral NSAIDs without any benefit. Also recommend patient start a course of physical therapy to address this entirety of back pain. Patient will followup one MRI results are available.  Patient appreciates and agrees with current plan.\par \par This note was generated using dragon medical dictation software.  A reasonable effort has been made for proofreading its contents, but typos may still remain.  If there are any questions or points of clarification needed please notify my office.

## 2019-07-22 NOTE — PHYSICAL EXAM
[de-identified] : Constitutional: Well-nourished, well-developed, No acute distress, obese\par Respiratory:  Good respiratory effort, no SOB\par Lymphatic: No regional lymphadenopathy, no lymphedema\par Psychiatric: Pleasant and normal affect, alert and oriented x3\par Skin: Clean dry and intact B/L UE/LE\par Musculoskeletal: normal except where as noted in regional exam\par \par Cervical Spine Exam\par APPEARANCE: no marked deformities or malalignment, normal curvature, good posture\par POSITIVE TENDERNESS: + Bilateral upper trapezius, levator scapula, rhomboid major, and rhomboid minor, + spasm noted in the same muscles.\par NONTENDER: no bony midline tenderness.\par ROM: limited in all planes, most notably in flexion and sidebending due to pain\par RESISTIVE TESTING: painful 4/5 resisted ext, bilateral sidebending, rotation and shoulder shrug , 5/5 flexion \par SPECIAL TESTS: neg Spurling's b/l\par Vasc: 2+ radial pulse b/l\par Neuro: C5 - T1 intact to motor, DTRs 2+/4 biceps, triceps, brachioradialis\par Sensation: Intact to light touch throughout b/l UE\par \par Thoracic Spine Exam:\par \par normal curvature and normal alignment. good posture. no midline bony tenderness, + marked spasm and associated tenderness of bilateral paraspinal and periscapular muscles.  ROM mildly limited in sidebending and rotation due to noted spasm\par \par B/L Shoulders:  No asymmetry, malalignment, or swelling, Full ROM, 5/5 strength in flexion/ext, Abd/Add, IR/ER, Joints stable\par B/L Elbows:  No asymmetry, malalignment, or swelling, Full ROM, 5/5 strength in flexion/ext, pronation/supination, Joints stable\par B/L Wrist and Hand:  No asymmetry, malalignment, or swelling, Full ROM, 5/5 strength in wrist and long finger flexion/ext, radial/ulnar deviation, Joints stable\par \par Lumbar Spine Exam\par \par APPEARANCE: no marked deformities or malalignment, normal curvature of the lumbosacral spine. no marked deformities. good posture\par POSITIVE TENDERNESS: + Bilateral lumbar tenderness and spasm noted in erector spinae and quadratus lumborum\par NONTENDER: no bony midline tenderness\par ROM: full, although painful at end range of flexion and extension\par RESISTIVE TESTING: painless 5/5 resisted flex/ext, sidebending b/l, and rotation\par SPECIAL TESTS: neg SLR b/l, neg JORGE L b/l, neg Trendelenburg b/l \par PULSES: 2+ DP/PT pulses\par NEURO:  L1 - S2 intact to sensation and motor, DTRs 2+/4 patella and achilles\par \par B/L Hips: No asymmetry, malalignment, or swelling, Full ROM, 5/5 strength in flexion/ext, IR/ER, Abd/Add, Joints stable\par B/L Knees: No asymmetry, malalignment, or swelling, Full ROM, 5/5 strength in flexion/ext, Joints stable\par B/L Ankles: No asymmetry, malalignment, or swelling, Full ROM, 5/5 strength in DF/PF/Inv/Ev, Joints stable\par BIOMECHANICAL EXAM: no marked leg length discrepancy, moderate hip abductor weakness b/l, no marked foot pronation, Normal gait and station\par  [de-identified] : The following radiographs were ordered and read by me during this patient's visit. I reviewed each radiograph in detail with the patient and discussed the findings as highlighted below. \par \par 2 views of the cervical spine were obtained today that show no fracture, or dislocation. There are no degenerative changes seen. There is no malalignment. No obvious osseous abnormality. Otherwise unremarkable.\par \par 2 views of the lumbar spine were obtained today that show no fracture, or dislocation. There are no degenerative changes seen. There is no malalignment. No obvious osseous abnormality. Otherwise unremarkable.\par

## 2019-07-29 ENCOUNTER — APPOINTMENT (OUTPATIENT)
Dept: NEUROLOGY | Facility: CLINIC | Age: 39
End: 2019-07-29
Payer: COMMERCIAL

## 2019-07-29 VITALS
SYSTOLIC BLOOD PRESSURE: 136 MMHG | BODY MASS INDEX: 35.47 KG/M2 | DIASTOLIC BLOOD PRESSURE: 80 MMHG | WEIGHT: 226 LBS | HEART RATE: 76 BPM | HEIGHT: 67 IN

## 2019-07-29 DIAGNOSIS — G89.29 CERVICALGIA: ICD-10-CM

## 2019-07-29 DIAGNOSIS — M54.2 CERVICALGIA: ICD-10-CM

## 2019-07-29 DIAGNOSIS — M54.16 RADICULOPATHY, LUMBAR REGION: ICD-10-CM

## 2019-07-29 PROCEDURE — 99214 OFFICE O/P EST MOD 30 MIN: CPT

## 2019-07-29 RX ORDER — RIZATRIPTAN BENZOATE 5 MG/1
5 TABLET ORAL
Qty: 12 | Refills: 2 | Status: DISCONTINUED | COMMUNITY
Start: 2018-07-27 | End: 2019-07-29

## 2019-07-29 RX ORDER — VERAPAMIL HYDROCHLORIDE 120 MG/1
120 CAPSULE, EXTENDED RELEASE ORAL
Qty: 30 | Refills: 2 | Status: DISCONTINUED | COMMUNITY
Start: 2019-04-22 | End: 2019-07-29

## 2019-07-29 NOTE — REASON FOR VISIT
[Follow-Up: _____] : a [unfilled] follow-up visit [FreeTextEntry1] : Follow up for pt with [facility] Migraines mixed with tension Headaches

## 2019-07-29 NOTE — PHYSICAL EXAM
[General Appearance - Alert] : alert [General Appearance - In No Acute Distress] : in no acute distress [Oriented To Time, Place, And Person] : oriented to person, place, and time [Affect] : the affect was normal [Impaired Insight] : insight and judgment were intact [Person] : oriented to person [Time] : oriented to time [Place] : oriented to place [Visual Intact] : visual attention was ~T not ~L decreased [Concentration Intact] : normal concentrating ability [Naming Objects] : no difficulty naming common objects [Repeating Phrases] : no difficulty repeating a phrase [Writing A Sentence] : no difficulty writing a sentence [Comprehension] : comprehension intact [Fluency] : fluency intact [Reading] : reading intact [Past History] : adequate knowledge of personal past history [Cranial Nerves Trigeminal (V)] : facial sensation intact symmetrically [Cranial Nerves Oculomotor (III)] : extraocular motion intact [Cranial Nerves Optic (II)] : visual acuity intact bilaterally,  visual fields full to confrontation, pupils equal round and reactive to light [Cranial Nerves Glossopharyngeal (IX)] : tongue and palate midline [Cranial Nerves Vestibulocochlear (VIII)] : hearing was intact bilaterally [Cranial Nerves Facial (VII)] : face symmetrical [Cranial Nerves Hypoglossal (XII)] : there was no tongue deviation with protrusion [Cranial Nerves Accessory (XI - Cranial And Spinal)] : head turning and shoulder shrug symmetric [No Muscle Atrophy] : normal bulk in all four extremities [Motor Strength] : muscle strength was normal in all four extremities [Sensation Tactile Decrease] : light touch was intact [Past-pointing] : there was no past-pointing [Balance] : balance was intact [Tremor] : no tremor present [2+] : Patella left 2+ [Plantar Reflex Right Only] : normal on the right [Plantar Reflex Left Only] : normal on the left [PERRL With Normal Accommodation] : pupils were equal in size, round, reactive to light, with normal accommodation [Sclera] : the sclera and conjunctiva were normal [Oropharynx] : the oropharynx was normal [Extraocular Movements] : extraocular movements were intact [Outer Ear] : the ears and nose were normal in appearance [Neck Cervical Mass (___cm)] : no neck mass was observed [Neck Appearance] : the appearance of the neck was normal [Jugular Venous Distention Increased] : there was no jugular-venous distention [Thyroid Diffuse Enlargement] : the thyroid was not enlarged [Thyroid Nodule] : there were no palpable thyroid nodules [Auscultation Breath Sounds / Voice Sounds] : lungs were clear to auscultation bilaterally [Heart Rate And Rhythm] : heart rate was normal and rhythm regular [Heart Sounds] : normal S1 and S2 [Heart Sounds Gallop] : no gallops [Heart Sounds Pericardial Friction Rub] : no pericardial rub [Full Pulse] : the pedal pulses are present [Murmurs] : no murmurs [Edema] : there was no peripheral edema [Bowel Sounds] : normal bowel sounds [Abdomen Soft] : soft [Abdomen Tenderness] : non-tender [Abdomen Mass (___ Cm)] : no abdominal mass palpated [No CVA Tenderness] : no ~M costovertebral angle tenderness [No Spinal Tenderness] : no spinal tenderness [Abnormal Walk] : normal gait [Nail Clubbing] : no clubbing  or cyanosis of the fingernails [Musculoskeletal - Swelling] : no joint swelling seen [Motor Tone] : muscle strength and tone were normal [Skin Turgor] : normal skin turgor [Skin Color & Pigmentation] : normal skin color and pigmentation [] : no rash

## 2019-07-29 NOTE — DISCUSSION/SUMMARY
[FreeTextEntry1] : Patient with chronic migraine headaches mixed with tension headaches with a nonfocal examination.\par \par Uncontrolled hypertension well controlled currently with verapamil and labetalol.\par \par Continue verapamil  mg once a day.\par \par If needed if headaches persist or increase can adjust the dose to 240-360 mg as needed.\par \par Adjust labetalol dose accordingly.\par \par Patient education provided and discussed with the patient.\par \par Followup with you for other medical problems.\par \par Maxalt 10 mg p.r.n. as additional medication given.\par \par Return for reevaluation in 6 months or as needed.\par \par \par \par

## 2019-07-29 NOTE — HISTORY OF PRESENT ILLNESS
[FreeTextEntry1] : She is 39-year-old patient coming here for a followup evaluation for chronic migraine headaches mixed with tension headaches and uncontrolled hypertension.\par \par Headaches improved significantly since starting on verapamil  mg added to Labetalol 200 mg twice a day past visit. No new complaints since last visit tolerating medication well. Verapamil dose was increased to 180 mg. Blood pressure is well controlled offers no side effects no new complaints. Had only 2 minor headache since last visit. Offers no other new complaints.

## 2019-08-02 ENCOUNTER — TRANSCRIPTION ENCOUNTER (OUTPATIENT)
Age: 39
End: 2019-08-02

## 2019-09-19 ENCOUNTER — APPOINTMENT (OUTPATIENT)
Dept: MRI IMAGING | Facility: CLINIC | Age: 39
End: 2019-09-19

## 2019-09-24 ENCOUNTER — APPOINTMENT (OUTPATIENT)
Dept: INTERNAL MEDICINE | Facility: CLINIC | Age: 39
End: 2019-09-24

## 2019-10-09 ENCOUNTER — APPOINTMENT (OUTPATIENT)
Dept: GASTROENTEROLOGY | Facility: CLINIC | Age: 39
End: 2019-10-09
Payer: COMMERCIAL

## 2019-10-09 VITALS
HEART RATE: 85 BPM | DIASTOLIC BLOOD PRESSURE: 108 MMHG | HEIGHT: 67 IN | BODY MASS INDEX: 35.94 KG/M2 | WEIGHT: 229 LBS | SYSTOLIC BLOOD PRESSURE: 170 MMHG

## 2019-10-09 DIAGNOSIS — R10.84 GENERALIZED ABDOMINAL PAIN: ICD-10-CM

## 2019-10-09 DIAGNOSIS — L81.8 OTHER SPECIFIED DISORDERS OF PIGMENTATION: ICD-10-CM

## 2019-10-09 DIAGNOSIS — Z78.9 OTHER SPECIFIED HEALTH STATUS: ICD-10-CM

## 2019-10-09 PROCEDURE — 99203 OFFICE O/P NEW LOW 30 MIN: CPT

## 2019-10-09 PROCEDURE — 99213 OFFICE O/P EST LOW 20 MIN: CPT

## 2019-10-09 NOTE — ASSESSMENT
[FreeTextEntry1] : This is  39 year old woman with constipation likely related to verapamil. Increase water and fiber intake. I will start her on MiraLAX daily. She will follow up in 3 weeks.

## 2019-10-09 NOTE — HISTORY OF PRESENT ILLNESS
[de-identified] : This is a 39 year old man with a history of GERD. She has nausea and heartburn daily. She also complains of constipation daily for 3-4 months associated with abdominal pain. She has needed enemas. Tried Miralax as needed. She was recently started on verapamil for headaches. She denies rectal bleeding and weight loss.

## 2019-10-09 NOTE — PHYSICAL EXAM
[General Appearance - Alert] : alert [General Appearance - In No Acute Distress] : in no acute distress [Sclera] : the sclera and conjunctiva were normal [PERRL With Normal Accommodation] : pupils were equal in size, round, and reactive to light [Extraocular Movements] : extraocular movements were intact [Neck Appearance] : the appearance of the neck was normal [Neck Cervical Mass (___cm)] : no neck mass was observed [Jugular Venous Distention Increased] : there was no jugular-venous distention [Thyroid Diffuse Enlargement] : the thyroid was not enlarged [Thyroid Nodule] : there were no palpable thyroid nodules [Auscultation Breath Sounds / Voice Sounds] : lungs were clear to auscultation bilaterally [Heart Rate And Rhythm] : heart rate was normal and rhythm regular [Heart Sounds] : normal S1 and S2 [Murmurs] : no murmurs [Heart Sounds Gallop] : no gallops [Heart Sounds Pericardial Friction Rub] : no pericardial rub [Bowel Sounds] : normal bowel sounds [Abdomen Soft] : soft [Abdomen Tenderness] : non-tender [Abdomen Mass (___ Cm)] : no abdominal mass palpated [No CVA Tenderness] : no ~M costovertebral angle tenderness [No Spinal Tenderness] : no spinal tenderness [Skin Color & Pigmentation] : normal skin color and pigmentation [Skin Turgor] : normal skin turgor [] : no rash [Oriented To Time, Place, And Person] : oriented to person, place, and time [Impaired Insight] : insight and judgment were intact [Affect] : the affect was normal

## 2019-11-27 ENCOUNTER — APPOINTMENT (OUTPATIENT)
Dept: GASTROENTEROLOGY | Facility: CLINIC | Age: 39
End: 2019-11-27
Payer: COMMERCIAL

## 2019-11-27 VITALS — WEIGHT: 230 LBS | HEIGHT: 69 IN | BODY MASS INDEX: 34.07 KG/M2

## 2019-11-27 DIAGNOSIS — K59.00 CONSTIPATION, UNSPECIFIED: ICD-10-CM

## 2019-11-27 DIAGNOSIS — K21.9 GASTRO-ESOPHAGEAL REFLUX DISEASE W/OUT ESOPHAGITIS: ICD-10-CM

## 2019-11-27 DIAGNOSIS — K62.5 HEMORRHAGE OF ANUS AND RECTUM: ICD-10-CM

## 2019-11-27 PROCEDURE — 99213 OFFICE O/P EST LOW 20 MIN: CPT

## 2019-11-27 NOTE — HISTORY OF PRESENT ILLNESS
[de-identified] : This is a 39 year old man with a history of GERD. She has nausea and heartburn daily. She also complains of constipation daily for 3-4 months associated with abdominal pain. She has needed enemas. Tried Miralax as needed. She was recently started on verapamil for headaches. She denies rectal bleeding and weight loss. \par \par Follow up \par She has tried the MiraLAX daily. She still only has a BM once a week. She has noticed some rectal bleeding.  Three episodes of bright red blood per rectum. She denies a history of hemorrhoids. She occasionally has associated abdominal discomfort. She still has the epigastric pain after eating. She has not tried the omeprazole yet because she never picked it up. She has occasional nausea after eating.

## 2019-11-27 NOTE — ASSESSMENT
[FreeTextEntry1] : This is  39 year old woman with constipation likely related to verapamil. Continue MiraLAX daily. I will schedule her for a colonoscopy given the rectal bleeding.

## 2019-12-12 ENCOUNTER — FORM ENCOUNTER (OUTPATIENT)
Age: 39
End: 2019-12-12

## 2019-12-13 ENCOUNTER — APPOINTMENT (OUTPATIENT)
Dept: ULTRASOUND IMAGING | Facility: CLINIC | Age: 39
End: 2019-12-13
Payer: COMMERCIAL

## 2019-12-13 ENCOUNTER — APPOINTMENT (OUTPATIENT)
Dept: MRI IMAGING | Facility: CLINIC | Age: 39
End: 2019-12-13
Payer: COMMERCIAL

## 2019-12-13 ENCOUNTER — APPOINTMENT (OUTPATIENT)
Dept: MAMMOGRAPHY | Facility: CLINIC | Age: 39
End: 2019-12-13
Payer: COMMERCIAL

## 2019-12-13 ENCOUNTER — OUTPATIENT (OUTPATIENT)
Dept: OUTPATIENT SERVICES | Facility: HOSPITAL | Age: 39
LOS: 1 days | End: 2019-12-13
Payer: COMMERCIAL

## 2019-12-13 DIAGNOSIS — N60.02 SOLITARY CYST OF LEFT BREAST: Chronic | ICD-10-CM

## 2019-12-13 DIAGNOSIS — M54.16 RADICULOPATHY, LUMBAR REGION: ICD-10-CM

## 2019-12-13 PROCEDURE — 77063 BREAST TOMOSYNTHESIS BI: CPT

## 2019-12-13 PROCEDURE — 77067 SCR MAMMO BI INCL CAD: CPT | Mod: 26

## 2019-12-13 PROCEDURE — 77067 SCR MAMMO BI INCL CAD: CPT

## 2019-12-13 PROCEDURE — 76641 ULTRASOUND BREAST COMPLETE: CPT | Mod: 26,50

## 2019-12-13 PROCEDURE — 76641 ULTRASOUND BREAST COMPLETE: CPT

## 2019-12-13 PROCEDURE — 72148 MRI LUMBAR SPINE W/O DYE: CPT | Mod: 26

## 2019-12-13 PROCEDURE — 72148 MRI LUMBAR SPINE W/O DYE: CPT

## 2019-12-13 PROCEDURE — 77063 BREAST TOMOSYNTHESIS BI: CPT | Mod: 26

## 2019-12-18 ENCOUNTER — CLINICAL ADVICE (OUTPATIENT)
Age: 39
End: 2019-12-18

## 2020-01-08 ENCOUNTER — APPOINTMENT (OUTPATIENT)
Dept: GASTROENTEROLOGY | Facility: AMBULATORY MEDICAL SERVICES | Age: 40
End: 2020-01-08
Payer: COMMERCIAL

## 2020-01-08 PROCEDURE — 45378 DIAGNOSTIC COLONOSCOPY: CPT

## 2020-02-18 ENCOUNTER — APPOINTMENT (OUTPATIENT)
Dept: NEUROLOGY | Facility: CLINIC | Age: 40
End: 2020-02-18
Payer: COMMERCIAL

## 2020-02-18 VITALS
BODY MASS INDEX: 34.07 KG/M2 | HEART RATE: 81 BPM | WEIGHT: 230 LBS | DIASTOLIC BLOOD PRESSURE: 96 MMHG | HEIGHT: 69 IN | SYSTOLIC BLOOD PRESSURE: 146 MMHG

## 2020-02-18 PROCEDURE — 99214 OFFICE O/P EST MOD 30 MIN: CPT

## 2020-02-18 RX ORDER — FLUOXETINE HYDROCHLORIDE 40 MG/1
40 CAPSULE ORAL
Refills: 0 | Status: DISCONTINUED | COMMUNITY
End: 2020-02-18

## 2020-02-18 NOTE — HISTORY OF PRESENT ILLNESS
[FreeTextEntry1] : She is 40-year-old patient coming here for followup evaluation for chronic migraine headaches mixed with tension headaches.\par \par Her headaches improved significantly since starting on verapamil  mg once a day. But patient complaining about constipation since increase the dose from 120 milligrams to 180 mg dose. Recently had GI workup did not reveal any acute pathology except for hemorrhoids.\par \par Denies of any other new complaints except using fiber in her diet and extra water. No other new complaints. Her pressure noted to be limited today on examination states that she did not take her labetalol this morning.

## 2020-02-18 NOTE — DISCUSSION/SUMMARY
[FreeTextEntry1] : Patient with chronic migraine headaches mixed with tension headaches with a nonfocal examination.\par \par Recommend continue verapamil  mg once a day.\par \par Reduce the dose from 180- milligrams to 120 mg daily. \par \par Followup with you for adequate control of blood pressure.\par \par Discussed the patient regarding side effects and constipation.\par \par Adequate fiber intake and fluid intake recommended.\par \par Continue Maxalt 10 mg p.r.n. as rescue medication.\par \par Patient education provided regarding migraines.\par \par Followup evaluation in 6 months or as needed.\par \par 4 chronic back problems recommend physical therapy and follow up with orthopedics spine surgery.

## 2020-02-18 NOTE — PHYSICAL EXAM
[General Appearance - Alert] : alert [General Appearance - In No Acute Distress] : in no acute distress [Impaired Insight] : insight and judgment were intact [Oriented To Time, Place, And Person] : oriented to person, place, and time [Affect] : the affect was normal [Place] : oriented to place [Person] : oriented to person [Time] : oriented to time [Concentration Intact] : normal concentrating ability [Visual Intact] : visual attention was ~T not ~L decreased [Naming Objects] : no difficulty naming common objects [Repeating Phrases] : no difficulty repeating a phrase [Writing A Sentence] : no difficulty writing a sentence [Fluency] : fluency intact [Comprehension] : comprehension intact [Reading] : reading intact [Past History] : adequate knowledge of personal past history [Cranial Nerves Optic (II)] : visual acuity intact bilaterally,  visual fields full to confrontation, pupils equal round and reactive to light [Cranial Nerves Oculomotor (III)] : extraocular motion intact [Cranial Nerves Trigeminal (V)] : facial sensation intact symmetrically [Cranial Nerves Facial (VII)] : face symmetrical [Cranial Nerves Vestibulocochlear (VIII)] : hearing was intact bilaterally [Cranial Nerves Glossopharyngeal (IX)] : tongue and palate midline [Cranial Nerves Accessory (XI - Cranial And Spinal)] : head turning and shoulder shrug symmetric [Motor Strength] : muscle strength was normal in all four extremities [Cranial Nerves Hypoglossal (XII)] : there was no tongue deviation with protrusion [No Muscle Atrophy] : normal bulk in all four extremities [Sensation Tactile Decrease] : light touch was intact [Balance] : balance was intact [Past-pointing] : there was no past-pointing [Tremor] : no tremor present [Plantar Reflex Right Only] : normal on the right [2+] : Ankle jerk left 2+ [Plantar Reflex Left Only] : normal on the left [Sclera] : the sclera and conjunctiva were normal [PERRL With Normal Accommodation] : pupils were equal in size, round, reactive to light, with normal accommodation [Extraocular Movements] : extraocular movements were intact [Outer Ear] : the ears and nose were normal in appearance [Oropharynx] : the oropharynx was normal [Neck Cervical Mass (___cm)] : no neck mass was observed [Neck Appearance] : the appearance of the neck was normal [Thyroid Diffuse Enlargement] : the thyroid was not enlarged [Jugular Venous Distention Increased] : there was no jugular-venous distention [Thyroid Nodule] : there were no palpable thyroid nodules [Auscultation Breath Sounds / Voice Sounds] : lungs were clear to auscultation bilaterally [Heart Sounds] : normal S1 and S2 [Heart Rate And Rhythm] : heart rate was normal and rhythm regular [Heart Sounds Gallop] : no gallops [Heart Sounds Pericardial Friction Rub] : no pericardial rub [Murmurs] : no murmurs [Edema] : there was no peripheral edema [Full Pulse] : the pedal pulses are present [Abdomen Soft] : soft [Bowel Sounds] : normal bowel sounds [Abdomen Tenderness] : non-tender [No CVA Tenderness] : no ~M costovertebral angle tenderness [Abdomen Mass (___ Cm)] : no abdominal mass palpated [No Spinal Tenderness] : no spinal tenderness [Abnormal Walk] : normal gait [Nail Clubbing] : no clubbing  or cyanosis of the fingernails [Motor Tone] : muscle strength and tone were normal [Musculoskeletal - Swelling] : no joint swelling seen [Skin Color & Pigmentation] : normal skin color and pigmentation [Skin Turgor] : normal skin turgor [] : no rash

## 2020-02-18 NOTE — REASON FOR VISIT
[Follow-Up: _____] : a [unfilled] follow-up visit [FreeTextEntry1] : Follow up for CH Migraines mixed with tension Headaches

## 2020-02-18 NOTE — CONSULT LETTER
[Please see my note below.] : Please see my note below. [Consult Letter:] : I had the pleasure of evaluating your patient, [unfilled]. [Dear  ___] : Dear  [unfilled], [Consult Closing:] : Thank you very much for allowing me to participate in the care of this patient.  If you have any questions, please do not hesitate to contact me. [Sincerely,] : Sincerely,

## 2020-04-25 ENCOUNTER — MESSAGE (OUTPATIENT)
Age: 40
End: 2020-04-25

## 2020-05-05 LAB
SARS-COV-2 IGG SERPL IA-ACNC: 0.6 INDEX
SARS-COV-2 IGG SERPL QL IA: NEGATIVE

## 2020-05-12 ENCOUNTER — APPOINTMENT (OUTPATIENT)
Dept: DISASTER EMERGENCY | Facility: CLINIC | Age: 40
End: 2020-05-12

## 2020-06-29 ENCOUNTER — TRANSCRIPTION ENCOUNTER (OUTPATIENT)
Age: 40
End: 2020-06-29

## 2020-09-10 ENCOUNTER — APPOINTMENT (OUTPATIENT)
Dept: HUMAN REPRODUCTION | Facility: CLINIC | Age: 40
End: 2020-09-10
Payer: COMMERCIAL

## 2020-09-10 PROCEDURE — 99204 OFFICE O/P NEW MOD 45 MIN: CPT | Mod: 95

## 2020-10-21 ENCOUNTER — APPOINTMENT (OUTPATIENT)
Dept: HUMAN REPRODUCTION | Facility: CLINIC | Age: 40
End: 2020-10-21
Payer: COMMERCIAL

## 2020-10-21 PROCEDURE — 36415 COLL VENOUS BLD VENIPUNCTURE: CPT

## 2020-10-21 PROCEDURE — 99072 ADDL SUPL MATRL&STAF TM PHE: CPT

## 2020-10-27 ENCOUNTER — APPOINTMENT (OUTPATIENT)
Dept: HUMAN REPRODUCTION | Facility: CLINIC | Age: 40
End: 2020-10-27
Payer: COMMERCIAL

## 2020-10-27 PROCEDURE — 36415 COLL VENOUS BLD VENIPUNCTURE: CPT

## 2020-10-27 PROCEDURE — 99072 ADDL SUPL MATRL&STAF TM PHE: CPT

## 2020-10-29 ENCOUNTER — APPOINTMENT (OUTPATIENT)
Dept: HUMAN REPRODUCTION | Facility: CLINIC | Age: 40
End: 2020-10-29
Payer: COMMERCIAL

## 2020-10-29 PROCEDURE — 58340 CATHETER FOR HYSTEROGRAPHY: CPT

## 2020-10-29 PROCEDURE — 99072 ADDL SUPL MATRL&STAF TM PHE: CPT

## 2020-10-29 PROCEDURE — 99213 OFFICE O/P EST LOW 20 MIN: CPT | Mod: 25

## 2020-10-29 PROCEDURE — 76831 ECHO EXAM UTERUS: CPT

## 2020-12-03 ENCOUNTER — FORM ENCOUNTER (OUTPATIENT)
Age: 40
End: 2020-12-03

## 2020-12-04 ENCOUNTER — APPOINTMENT (OUTPATIENT)
Dept: OBGYN | Facility: CLINIC | Age: 40
End: 2020-12-04
Payer: COMMERCIAL

## 2020-12-04 PROCEDURE — 99203 OFFICE O/P NEW LOW 30 MIN: CPT

## 2020-12-04 PROCEDURE — 99072 ADDL SUPL MATRL&STAF TM PHE: CPT

## 2020-12-06 ENCOUNTER — FORM ENCOUNTER (OUTPATIENT)
Age: 40
End: 2020-12-06

## 2020-12-08 ENCOUNTER — FORM ENCOUNTER (OUTPATIENT)
Age: 40
End: 2020-12-08

## 2020-12-31 ENCOUNTER — NON-APPOINTMENT (OUTPATIENT)
Age: 40
End: 2020-12-31

## 2021-01-07 ENCOUNTER — APPOINTMENT (OUTPATIENT)
Dept: OBGYN | Facility: CLINIC | Age: 41
End: 2021-01-07

## 2021-01-19 ENCOUNTER — OUTPATIENT (OUTPATIENT)
Dept: OUTPATIENT SERVICES | Facility: HOSPITAL | Age: 41
LOS: 1 days | End: 2021-01-19
Payer: COMMERCIAL

## 2021-01-19 VITALS
TEMPERATURE: 98 F | RESPIRATION RATE: 16 BRPM | HEART RATE: 76 BPM | DIASTOLIC BLOOD PRESSURE: 90 MMHG | WEIGHT: 235.89 LBS | HEIGHT: 67.5 IN | SYSTOLIC BLOOD PRESSURE: 140 MMHG | OXYGEN SATURATION: 99 %

## 2021-01-19 DIAGNOSIS — I10 ESSENTIAL (PRIMARY) HYPERTENSION: ICD-10-CM

## 2021-01-19 DIAGNOSIS — N60.02 SOLITARY CYST OF LEFT BREAST: Chronic | ICD-10-CM

## 2021-01-19 DIAGNOSIS — N84.0 POLYP OF CORPUS UTERI: ICD-10-CM

## 2021-01-19 DIAGNOSIS — N97.9 FEMALE INFERTILITY, UNSPECIFIED: ICD-10-CM

## 2021-01-19 DIAGNOSIS — Z01.818 ENCOUNTER FOR OTHER PREPROCEDURAL EXAMINATION: ICD-10-CM

## 2021-01-19 DIAGNOSIS — Z90.49 ACQUIRED ABSENCE OF OTHER SPECIFIED PARTS OF DIGESTIVE TRACT: Chronic | ICD-10-CM

## 2021-01-19 DIAGNOSIS — G43.909 MIGRAINE, UNSPECIFIED, NOT INTRACTABLE, WITHOUT STATUS MIGRAINOSUS: ICD-10-CM

## 2021-01-19 LAB
ANION GAP SERPL CALC-SCNC: 13 MMOL/L — SIGNIFICANT CHANGE UP (ref 5–17)
BUN SERPL-MCNC: 12 MG/DL — SIGNIFICANT CHANGE UP (ref 7–23)
CALCIUM SERPL-MCNC: 9.3 MG/DL — SIGNIFICANT CHANGE UP (ref 8.4–10.5)
CHLORIDE SERPL-SCNC: 101 MMOL/L — SIGNIFICANT CHANGE UP (ref 96–108)
CO2 SERPL-SCNC: 23 MMOL/L — SIGNIFICANT CHANGE UP (ref 22–31)
CREAT SERPL-MCNC: 0.68 MG/DL — SIGNIFICANT CHANGE UP (ref 0.5–1.3)
GLUCOSE SERPL-MCNC: 91 MG/DL — SIGNIFICANT CHANGE UP (ref 70–99)
HCT VFR BLD CALC: 33 % — LOW (ref 34.5–45)
HGB BLD-MCNC: 10.7 G/DL — LOW (ref 11.5–15.5)
MCHC RBC-ENTMCNC: 27.9 PG — SIGNIFICANT CHANGE UP (ref 27–34)
MCHC RBC-ENTMCNC: 32.4 GM/DL — SIGNIFICANT CHANGE UP (ref 32–36)
MCV RBC AUTO: 85.9 FL — SIGNIFICANT CHANGE UP (ref 80–100)
NRBC # BLD: 0 /100 WBCS — SIGNIFICANT CHANGE UP (ref 0–0)
PLATELET # BLD AUTO: 566 K/UL — HIGH (ref 150–400)
POTASSIUM SERPL-MCNC: 3.8 MMOL/L — SIGNIFICANT CHANGE UP (ref 3.5–5.3)
POTASSIUM SERPL-SCNC: 3.8 MMOL/L — SIGNIFICANT CHANGE UP (ref 3.5–5.3)
RBC # BLD: 3.84 M/UL — SIGNIFICANT CHANGE UP (ref 3.8–5.2)
RBC # FLD: 13.4 % — SIGNIFICANT CHANGE UP (ref 10.3–14.5)
SODIUM SERPL-SCNC: 137 MMOL/L — SIGNIFICANT CHANGE UP (ref 135–145)
WBC # BLD: 9.91 K/UL — SIGNIFICANT CHANGE UP (ref 3.8–10.5)
WBC # FLD AUTO: 9.91 K/UL — SIGNIFICANT CHANGE UP (ref 3.8–10.5)

## 2021-01-19 PROCEDURE — 85027 COMPLETE CBC AUTOMATED: CPT

## 2021-01-19 PROCEDURE — 80048 BASIC METABOLIC PNL TOTAL CA: CPT

## 2021-01-19 PROCEDURE — G0463: CPT

## 2021-01-19 RX ORDER — SODIUM CHLORIDE 9 MG/ML
3 INJECTION INTRAMUSCULAR; INTRAVENOUS; SUBCUTANEOUS EVERY 8 HOURS
Refills: 0 | Status: DISCONTINUED | OUTPATIENT
Start: 2021-02-02 | End: 2021-02-17

## 2021-01-19 RX ORDER — LIDOCAINE HCL 20 MG/ML
0.2 VIAL (ML) INJECTION ONCE
Refills: 0 | Status: DISCONTINUED | OUTPATIENT
Start: 2021-02-02 | End: 2021-02-17

## 2021-01-19 NOTE — H&P PST ADULT - HISTORY OF PRESENT ILLNESS
40 yo female, PMH HTN, migraines, reports menorrhagia and pelvic pain with menses for many years, pt. presents to [facility] for scheduled Operative Hysteroscopy, Polypectomy, Fibroid Resection on 2/2/21. Pt. denies COVID-19 infection this year, denies close contact with anyone that has been ill, no fever, cough, dyspnea in past two weeks.    Pt. has COVID-19 testing scheduled on 1/30/21 at [facility] 40 yo female, PMH HTN, migraines, reports menorrhagia and pelvic pain with menses for many years, pt. presents to [facility] for scheduled Operative Hysteroscopy, Polypectomy, Fibroid Resection on 2/2/21. Pt. denies COVID-19 infection in 2020/2021, denies close contact with anyone that has been ill, no fever, cough, dyspnea in past two weeks.    Pt. has COVID-19 testing scheduled on 1/30/21 at [facility]

## 2021-01-19 NOTE — H&P PST ADULT - ATTENDING COMMENTS
pt seen at bedside, hx of infertility with endometrial polyp/fibroid, here for resection, explained risks of procedure including but not limited to fluid overload, need for interval procedure, uterine perforation, need for laparoscopy. Discussed post operative recovery, pain control. Has follow up in office to see me. All questions answered. Pt understands learners are part of her case and will be performing EUA.

## 2021-01-19 NOTE — H&P PST ADULT - NSICDXPROBLEM_GEN_ALL_CORE_FT
PROBLEM DIAGNOSES  Problem: Polyp of corpus uteri  Assessment and Plan: Operative Hysteroscopy, Polypectomy, Fibroid Resection on 2/2/21  Pre-op instructions provided - all questions answered  [facility] DOS    Problem: Hypertension  Assessment and Plan: Continue labetolol as directed, including am of surgery with sip of water    Problem: Migraine  Assessment and Plan: Continue verapamil as directed, including am of surgery with sip of water

## 2021-01-21 ENCOUNTER — FORM ENCOUNTER (OUTPATIENT)
Age: 41
End: 2021-01-21

## 2021-01-25 ENCOUNTER — NON-APPOINTMENT (OUTPATIENT)
Age: 41
End: 2021-01-25

## 2021-01-25 ENCOUNTER — APPOINTMENT (OUTPATIENT)
Dept: CARDIOLOGY | Facility: CLINIC | Age: 41
End: 2021-01-25
Payer: COMMERCIAL

## 2021-01-25 VITALS
OXYGEN SATURATION: 97 % | BODY MASS INDEX: 34.8 KG/M2 | SYSTOLIC BLOOD PRESSURE: 144 MMHG | HEART RATE: 80 BPM | DIASTOLIC BLOOD PRESSURE: 92 MMHG | WEIGHT: 235 LBS | HEIGHT: 69 IN

## 2021-01-25 VITALS — SYSTOLIC BLOOD PRESSURE: 122 MMHG | DIASTOLIC BLOOD PRESSURE: 70 MMHG

## 2021-01-25 DIAGNOSIS — R06.00 DYSPNEA, UNSPECIFIED: ICD-10-CM

## 2021-01-25 PROCEDURE — 99072 ADDL SUPL MATRL&STAF TM PHE: CPT

## 2021-01-25 PROCEDURE — 99204 OFFICE O/P NEW MOD 45 MIN: CPT

## 2021-01-25 PROCEDURE — 93000 ELECTROCARDIOGRAM COMPLETE: CPT

## 2021-01-25 RX ORDER — VERAPAMIL HYDROCHLORIDE 180 MG/1
180 TABLET ORAL
Refills: 0 | Status: DISCONTINUED | COMMUNITY
End: 2021-01-25

## 2021-01-25 RX ORDER — LABETALOL HYDROCHLORIDE 300 MG/1
300 TABLET, FILM COATED ORAL
Qty: 90 | Refills: 0 | Status: DISCONTINUED | COMMUNITY
Start: 2017-09-08 | End: 2021-01-25

## 2021-01-25 NOTE — HISTORY OF PRESENT ILLNESS
[FreeTextEntry1] : 46 year old with a history of fibroid, GERDs, hypertension. migraines, hypothyroid presents for an initial cardiac evaluation. \par \par She has been complaining of more dyspnea on exertion which has worsened over the past few months. She notes her exercise tolerance is about 1 flight of stairs and 2 blocks of walking. \par She   denies any chest pain, PND, orthopnea, lower extremity edema, near syncope, syncope, strokelike symptoms. \par \par She is planning on having a hysteroscopy with a removal of a polyp and possible myomectomy for fibroids on 2/2/21 at [facility].

## 2021-01-25 NOTE — PHYSICAL EXAM
[Well Groomed] : well groomed [General Appearance - In No Acute Distress] : no acute distress [Normal Conjunctiva] : the conjunctiva exhibited no abnormalities [Eyelids - No Xanthelasma] : the eyelids demonstrated no xanthelasmas [Normal Oral Mucosa] : normal oral mucosa [No Oral Pallor] : no oral pallor [No Oral Cyanosis] : no oral cyanosis [Normal Jugular Venous A Waves Present] : normal jugular venous A waves present [Normal Jugular Venous V Waves Present] : normal jugular venous V waves present [No Jugular Venous Murphy A Waves] : no jugular venous murphy A waves [Normal Rate] : normal [Rhythm Regular] : regular [Normal S1] : normal S1 [Normal S2] : normal S2 [No Gallop] : no gallop heard [No Murmur] : no murmurs heard [2+] : left 2+ [No Pitting Edema] : no pitting edema present [Respiration, Rhythm And Depth] : normal respiratory rhythm and effort [Exaggerated Use Of Accessory Muscles For Inspiration] : no accessory muscle use [Auscultation Breath Sounds / Voice Sounds] : lungs were clear to auscultation bilaterally [Abdomen Soft] : soft [Abdomen Tenderness] : non-tender [Abdomen Mass (___ Cm)] : no abdominal mass palpated [Abnormal Walk] : normal gait [Gait - Sufficient For Exercise Testing] : the gait was sufficient for exercise testing [Nail Clubbing] : no clubbing of the fingernails [Cyanosis, Localized] : no localized cyanosis [Petechial Hemorrhages (___cm)] : no petechial hemorrhages [Skin Color & Pigmentation] : normal skin color and pigmentation [] : no rash [No Venous Stasis] : no venous stasis [Skin Lesions] : no skin lesions [No Skin Ulcers] : no skin ulcer [No Xanthoma] : no  xanthoma was observed [Oriented To Time, Place, And Person] : oriented to person, place, and time [Affect] : the affect was normal [Mood] : the mood was normal [No Anxiety] : not feeling anxious [Right Carotid Bruit] : no bruit heard over the right carotid [Left Carotid Bruit] : no bruit heard over the left carotid [Bruit] : no bruit heard

## 2021-01-25 NOTE — DISCUSSION/SUMMARY
[FreeTextEntry1] : 40 year woman with a history as listed presents for an initial cardiac evaluation. \par Mandie is complaining of CAMEJO which is likely multifactorial, but mostly from deconditioning and her anemia. She denies any anginal symptoms. Clinically she is euvolemic on exam. Her EKG did not reveal any significant ischemic changes. She will get a 2d echo to assess for any  new structural heart disease, changes in valvular and ventricular function. \par  She currently has no active cardiac conditions. She may proceed with the planned low to intermediate risk GYN  surgery without any further cardiac workup. Routine hemodynamic monitoring is suggested during the procedure \par Her blood pressure and heart rate are controlled. She will continue Labetalol 200mg q12. She will take Verapamil for her migraine. Higher doses of the verapamil previously caused issues. \par Exercise and diet counseling was performed in order to reduce her future cardiovascular risk.\par She will followup with me in 6 months or sooner if necessary.

## 2021-01-26 ENCOUNTER — APPOINTMENT (OUTPATIENT)
Dept: CARDIOLOGY | Facility: CLINIC | Age: 41
End: 2021-01-26
Payer: COMMERCIAL

## 2021-01-26 PROCEDURE — 99072 ADDL SUPL MATRL&STAF TM PHE: CPT

## 2021-01-26 PROCEDURE — 93306 TTE W/DOPPLER COMPLETE: CPT

## 2021-01-30 ENCOUNTER — OUTPATIENT (OUTPATIENT)
Dept: OUTPATIENT SERVICES | Facility: HOSPITAL | Age: 41
LOS: 1 days | End: 2021-01-30
Payer: COMMERCIAL

## 2021-01-30 DIAGNOSIS — Z11.52 ENCOUNTER FOR SCREENING FOR COVID-19: ICD-10-CM

## 2021-01-30 DIAGNOSIS — Z90.49 ACQUIRED ABSENCE OF OTHER SPECIFIED PARTS OF DIGESTIVE TRACT: Chronic | ICD-10-CM

## 2021-01-30 DIAGNOSIS — N60.02 SOLITARY CYST OF LEFT BREAST: Chronic | ICD-10-CM

## 2021-01-30 LAB — SARS-COV-2 RNA SPEC QL NAA+PROBE: SIGNIFICANT CHANGE UP

## 2021-01-30 PROCEDURE — U0005: CPT

## 2021-01-30 PROCEDURE — U0003: CPT

## 2021-01-30 PROCEDURE — C9803: CPT

## 2021-02-01 ENCOUNTER — TRANSCRIPTION ENCOUNTER (OUTPATIENT)
Age: 41
End: 2021-02-01

## 2021-02-01 RX ORDER — OXYCODONE HYDROCHLORIDE 5 MG/1
5 TABLET ORAL ONCE
Refills: 0 | Status: DISCONTINUED | OUTPATIENT
Start: 2021-02-02 | End: 2021-02-02

## 2021-02-01 RX ORDER — SODIUM CHLORIDE 9 MG/ML
1000 INJECTION, SOLUTION INTRAVENOUS
Refills: 0 | Status: DISCONTINUED | OUTPATIENT
Start: 2021-02-02 | End: 2021-02-17

## 2021-02-01 RX ORDER — ONDANSETRON 8 MG/1
4 TABLET, FILM COATED ORAL ONCE
Refills: 0 | Status: COMPLETED | OUTPATIENT
Start: 2021-02-02 | End: 2021-02-02

## 2021-02-02 ENCOUNTER — RESULT REVIEW (OUTPATIENT)
Age: 41
End: 2021-02-02

## 2021-02-02 ENCOUNTER — APPOINTMENT (OUTPATIENT)
Dept: OBGYN | Facility: HOSPITAL | Age: 41
End: 2021-02-02

## 2021-02-02 ENCOUNTER — OUTPATIENT (OUTPATIENT)
Dept: OUTPATIENT SERVICES | Facility: HOSPITAL | Age: 41
LOS: 1 days | End: 2021-02-02
Payer: COMMERCIAL

## 2021-02-02 ENCOUNTER — FORM ENCOUNTER (OUTPATIENT)
Age: 41
End: 2021-02-02

## 2021-02-02 VITALS
DIASTOLIC BLOOD PRESSURE: 76 MMHG | TEMPERATURE: 98 F | WEIGHT: 235.89 LBS | SYSTOLIC BLOOD PRESSURE: 146 MMHG | HEIGHT: 67.5 IN | HEART RATE: 93 BPM | OXYGEN SATURATION: 98 %

## 2021-02-02 VITALS
HEART RATE: 77 BPM | SYSTOLIC BLOOD PRESSURE: 140 MMHG | DIASTOLIC BLOOD PRESSURE: 70 MMHG | OXYGEN SATURATION: 98 % | RESPIRATION RATE: 16 BRPM

## 2021-02-02 DIAGNOSIS — Z90.49 ACQUIRED ABSENCE OF OTHER SPECIFIED PARTS OF DIGESTIVE TRACT: Chronic | ICD-10-CM

## 2021-02-02 DIAGNOSIS — D25.0 SUBMUCOUS LEIOMYOMA OF UTERUS: ICD-10-CM

## 2021-02-02 DIAGNOSIS — N60.02 SOLITARY CYST OF LEFT BREAST: Chronic | ICD-10-CM

## 2021-02-02 DIAGNOSIS — N97.9 FEMALE INFERTILITY, UNSPECIFIED: ICD-10-CM

## 2021-02-02 PROCEDURE — 88305 TISSUE EXAM BY PATHOLOGIST: CPT | Mod: 26

## 2021-02-02 PROCEDURE — 58558 HYSTEROSCOPY BIOPSY: CPT

## 2021-02-02 PROCEDURE — C9399: CPT

## 2021-02-02 PROCEDURE — 88305 TISSUE EXAM BY PATHOLOGIST: CPT

## 2021-02-02 RX ORDER — VERAPAMIL HCL 240 MG
1 CAPSULE, EXTENDED RELEASE PELLETS 24 HR ORAL
Qty: 0 | Refills: 0 | DISCHARGE

## 2021-02-02 RX ORDER — LEVOTHYROXINE SODIUM 125 MCG
1 TABLET ORAL
Qty: 0 | Refills: 0 | DISCHARGE

## 2021-02-02 RX ORDER — LABETALOL HCL 100 MG
1 TABLET ORAL
Qty: 0 | Refills: 0 | DISCHARGE

## 2021-02-02 RX ADMIN — OXYCODONE HYDROCHLORIDE 5 MILLIGRAM(S): 5 TABLET ORAL at 17:34

## 2021-02-02 RX ADMIN — ONDANSETRON 4 MILLIGRAM(S): 8 TABLET, FILM COATED ORAL at 17:34

## 2021-02-02 NOTE — BRIEF OPERATIVE NOTE - NSICDXBRIEFPREOP_GEN_ALL_CORE_FT
PRE-OP DIAGNOSIS:  Fibroids, submucosal 02-Feb-2021 17:07:44  Maty Schaefer  Female infertility 02-Feb-2021 17:07:30  Maty Schaefer

## 2021-02-02 NOTE — ASU DISCHARGE PLAN (ADULT/PEDIATRIC) - MEDICATION INSTRUCTIONS
Take Tylenol 975mg q 6 hours, Motrin 600mg q 6 hours as needed Take Tylenol 975mg q 6 hours, Motrin 600mg q 6 hours as needed, next dose of tylenol @ 10pm

## 2021-02-02 NOTE — BRIEF OPERATIVE NOTE - OPERATION/FINDINGS
anteverted 10 week size uterus, on EUA cervix noted to be small/friable. no discernable posterior lip of cervix, retracted back. Grasped with Vinicio tenaculum, dilation began with hegar dilators, dilated without difficultly to 6mm, without inability to place traction without tenaculum ripping cervix unable to dilate further, hysteroscopy performed and cervical canal seen noted to be intact, small polyp removed from canal. with any traction on cervix, cervix would rip/ further retract. Unable to perform hysteroscopy in entirety. Hysteroscope removed, polyp sent to pathology. cervical laceration with left extension repaired with vicryl. anteverted 10 week size uterus, on EUA cervix noted to be small/friable. no discernable posterior lip of cervix, retracted back. Grasped with Vinicio tenaculum, dilation began with hegar dilators, cervical canal dilated without difficultly to 6mm, without inability to place traction without tenaculum ripping cervix unable to dilate further, and extremely stenotic cervical os. hysteroscopy performed and cervical canal seen noted to be intact, small polyp removed from canal. with any traction on cervix, cervix would rip/ further retract. Unable to perform hysteroscopy in entirety. Hysteroscope removed, polyp sent to pathology. cervical laceration with left extension repaired with vicryl.  Attempt made to change to smallest scope possible for hysteroscopy and unable to get through internal os- any sort of counter traction caused the cervix to rip

## 2021-02-02 NOTE — BRIEF OPERATIVE NOTE - NSICDXBRIEFPROCEDURE_GEN_ALL_CORE_FT
PROCEDURES:  Dilation and curettage, cervix 02-Feb-2021 17:07:10  Maty Schaefer  Dilation and curettage, uterus, without hysteroscopy 02-Feb-2021 17:06:55  Maty Schaefer

## 2021-02-02 NOTE — PRE-ANESTHESIA EVALUATION ADULT - NSANTHPMHFT_GEN_ALL_CORE
Migraines, HLD, gallstones/pancreatitis, fatty liver, lumbar radiculopathy; GERD (when she lies flat, she starts coughing).

## 2021-02-04 LAB — SURGICAL PATHOLOGY STUDY: SIGNIFICANT CHANGE UP

## 2021-02-05 ENCOUNTER — APPOINTMENT (OUTPATIENT)
Dept: OBGYN | Facility: CLINIC | Age: 41
End: 2021-02-05

## 2021-02-08 ENCOUNTER — FORM ENCOUNTER (OUTPATIENT)
Age: 41
End: 2021-02-08

## 2021-02-09 ENCOUNTER — APPOINTMENT (OUTPATIENT)
Dept: OBGYN | Facility: CLINIC | Age: 41
End: 2021-02-09
Payer: COMMERCIAL

## 2021-02-09 PROCEDURE — 99212 OFFICE O/P EST SF 10 MIN: CPT

## 2021-02-09 PROCEDURE — 99072 ADDL SUPL MATRL&STAF TM PHE: CPT

## 2021-02-11 ENCOUNTER — APPOINTMENT (OUTPATIENT)
Dept: HUMAN REPRODUCTION | Facility: CLINIC | Age: 41
End: 2021-02-11
Payer: COMMERCIAL

## 2021-02-11 PROCEDURE — 99214 OFFICE O/P EST MOD 30 MIN: CPT | Mod: 95

## 2021-03-12 ENCOUNTER — APPOINTMENT (OUTPATIENT)
Dept: HUMAN REPRODUCTION | Facility: CLINIC | Age: 41
End: 2021-03-12
Payer: COMMERCIAL

## 2021-03-12 PROCEDURE — 36415 COLL VENOUS BLD VENIPUNCTURE: CPT

## 2021-03-12 PROCEDURE — 99072 ADDL SUPL MATRL&STAF TM PHE: CPT

## 2021-03-15 ENCOUNTER — APPOINTMENT (OUTPATIENT)
Dept: HUMAN REPRODUCTION | Facility: CLINIC | Age: 41
End: 2021-03-15
Payer: COMMERCIAL

## 2021-03-15 PROCEDURE — 99213 OFFICE O/P EST LOW 20 MIN: CPT | Mod: 25

## 2021-03-15 PROCEDURE — 76830 TRANSVAGINAL US NON-OB: CPT

## 2021-03-15 PROCEDURE — 99072 ADDL SUPL MATRL&STAF TM PHE: CPT

## 2021-03-17 ENCOUNTER — APPOINTMENT (OUTPATIENT)
Dept: HUMAN REPRODUCTION | Facility: CLINIC | Age: 41
End: 2021-03-17
Payer: COMMERCIAL

## 2021-03-17 PROCEDURE — 76830 TRANSVAGINAL US NON-OB: CPT

## 2021-03-17 PROCEDURE — 99213 OFFICE O/P EST LOW 20 MIN: CPT | Mod: 25

## 2021-03-17 PROCEDURE — 36415 COLL VENOUS BLD VENIPUNCTURE: CPT

## 2021-03-17 PROCEDURE — 99072 ADDL SUPL MATRL&STAF TM PHE: CPT

## 2021-03-17 PROCEDURE — 58340 CATHETER FOR HYSTEROGRAPHY: CPT | Mod: 52

## 2021-03-17 PROCEDURE — 76831 ECHO EXAM UTERUS: CPT | Mod: 52

## 2021-03-25 ENCOUNTER — APPOINTMENT (OUTPATIENT)
Dept: OBGYN | Facility: CLINIC | Age: 41
End: 2021-03-25

## 2021-03-31 ENCOUNTER — APPOINTMENT (OUTPATIENT)
Dept: HUMAN REPRODUCTION | Facility: CLINIC | Age: 41
End: 2021-03-31
Payer: COMMERCIAL

## 2021-03-31 PROCEDURE — 99072 ADDL SUPL MATRL&STAF TM PHE: CPT

## 2021-03-31 PROCEDURE — 76830 TRANSVAGINAL US NON-OB: CPT

## 2021-03-31 PROCEDURE — 99213 OFFICE O/P EST LOW 20 MIN: CPT | Mod: 25

## 2021-03-31 PROCEDURE — 36415 COLL VENOUS BLD VENIPUNCTURE: CPT

## 2021-04-29 ENCOUNTER — APPOINTMENT (OUTPATIENT)
Dept: HUMAN REPRODUCTION | Facility: CLINIC | Age: 41
End: 2021-04-29
Payer: COMMERCIAL

## 2021-04-29 PROCEDURE — 76830 TRANSVAGINAL US NON-OB: CPT

## 2021-04-29 PROCEDURE — 99213 OFFICE O/P EST LOW 20 MIN: CPT | Mod: 25

## 2021-04-29 PROCEDURE — 99072 ADDL SUPL MATRL&STAF TM PHE: CPT

## 2021-04-29 PROCEDURE — 36415 COLL VENOUS BLD VENIPUNCTURE: CPT

## 2021-05-04 ENCOUNTER — APPOINTMENT (OUTPATIENT)
Dept: HUMAN REPRODUCTION | Facility: CLINIC | Age: 41
End: 2021-05-04
Payer: COMMERCIAL

## 2021-05-04 PROCEDURE — 99072 ADDL SUPL MATRL&STAF TM PHE: CPT

## 2021-05-04 PROCEDURE — 99213 OFFICE O/P EST LOW 20 MIN: CPT | Mod: 25

## 2021-05-04 PROCEDURE — 76830 TRANSVAGINAL US NON-OB: CPT

## 2021-05-04 PROCEDURE — 36415 COLL VENOUS BLD VENIPUNCTURE: CPT

## 2021-05-07 ENCOUNTER — APPOINTMENT (OUTPATIENT)
Dept: HUMAN REPRODUCTION | Facility: CLINIC | Age: 41
End: 2021-05-07

## 2021-05-13 ENCOUNTER — ASOB RESULT (OUTPATIENT)
Age: 41
End: 2021-05-13

## 2021-05-13 ENCOUNTER — APPOINTMENT (OUTPATIENT)
Dept: MATERNAL FETAL MEDICINE | Facility: CLINIC | Age: 41
End: 2021-05-13
Payer: COMMERCIAL

## 2021-05-13 PROCEDURE — 99215 OFFICE O/P EST HI 40 MIN: CPT | Mod: 95

## 2021-07-22 ENCOUNTER — APPOINTMENT (OUTPATIENT)
Dept: NEUROLOGY | Facility: CLINIC | Age: 41
End: 2021-07-22
Payer: COMMERCIAL

## 2021-07-22 VITALS
WEIGHT: 234 LBS | BODY MASS INDEX: 34.66 KG/M2 | HEART RATE: 78 BPM | DIASTOLIC BLOOD PRESSURE: 86 MMHG | HEIGHT: 69 IN | TEMPERATURE: 97.2 F | SYSTOLIC BLOOD PRESSURE: 126 MMHG

## 2021-07-22 DIAGNOSIS — R51.9 HEADACHE, UNSPECIFIED: ICD-10-CM

## 2021-07-22 PROCEDURE — 67515 INJECT/TREAT EYE SOCKET: CPT

## 2021-07-22 PROCEDURE — 99072 ADDL SUPL MATRL&STAF TM PHE: CPT

## 2021-07-22 PROCEDURE — 99213 OFFICE O/P EST LOW 20 MIN: CPT | Mod: 25

## 2021-07-22 RX ORDER — VERAPAMIL HYDROCHLORIDE 120 MG/1
120 TABLET ORAL DAILY
Qty: 30 | Refills: 5 | Status: ACTIVE | COMMUNITY
Start: 2020-02-18 | End: 1900-01-01

## 2021-07-22 RX ORDER — METOCLOPRAMIDE 10 MG/1
10 TABLET ORAL
Qty: 30 | Refills: 2 | Status: ACTIVE | COMMUNITY
Start: 2021-07-22 | End: 1900-01-01

## 2021-07-22 RX ORDER — RIZATRIPTAN BENZOATE 10 MG/1
10 TABLET ORAL
Qty: 27 | Refills: 1 | Status: DISCONTINUED | COMMUNITY
Start: 2019-07-29 | End: 2021-07-22

## 2021-07-22 NOTE — HISTORY OF PRESENT ILLNESS
[FreeTextEntry1] : She is 41-year-old patient coming here for followup evaluation for chronic migraine headaches mixed tension headaches currently on verapamil  mg once a day persistent symptoms with the headaches with nausea and vomiting.\par Currently using Relpax p.r.n. as a rescue medication with breakthrough headaches. No significant change but unable to come for followup evaluation since she was moving and unable to come. No focal symptoms associated with the headaches. Workup was negative.\par Unable to use Imitrex and Relpax due to blood pressure problems. Also taking labetalol for elevated blood pressure. Seen by cardiology and recently.\par No other new complaints. Discussed with her her recently regarding new medications.\par \par

## 2021-07-22 NOTE — PHYSICAL EXAM
[General Appearance - Alert] : alert [General Appearance - In No Acute Distress] : in no acute distress [Oriented To Time, Place, And Person] : oriented to person, place, and time [Impaired Insight] : insight and judgment were intact [Affect] : the affect was normal [Person] : oriented to person [Place] : oriented to place [Time] : oriented to time [Concentration Intact] : normal concentrating ability [Visual Intact] : visual attention was ~T not ~L decreased [Naming Objects] : no difficulty naming common objects [Repeating Phrases] : no difficulty repeating a phrase [Writing A Sentence] : no difficulty writing a sentence [Fluency] : fluency intact [Comprehension] : comprehension intact [Reading] : reading intact [Past History] : adequate knowledge of personal past history [Cranial Nerves Optic (II)] : visual acuity intact bilaterally,  visual fields full to confrontation, pupils equal round and reactive to light [Cranial Nerves Oculomotor (III)] : extraocular motion intact [Cranial Nerves Trigeminal (V)] : facial sensation intact symmetrically [Cranial Nerves Facial (VII)] : face symmetrical [Cranial Nerves Vestibulocochlear (VIII)] : hearing was intact bilaterally [Cranial Nerves Glossopharyngeal (IX)] : tongue and palate midline [Cranial Nerves Accessory (XI - Cranial And Spinal)] : head turning and shoulder shrug symmetric [Cranial Nerves Hypoglossal (XII)] : there was no tongue deviation with protrusion [Motor Strength] : muscle strength was normal in all four extremities [No Muscle Atrophy] : normal bulk in all four extremities [Sensation Tactile Decrease] : light touch was intact [Balance] : balance was intact [Past-pointing] : there was no past-pointing [Tremor] : no tremor present [2+] : Ankle jerk left 2+ [Plantar Reflex Right Only] : normal on the right [Plantar Reflex Left Only] : normal on the left [Sclera] : the sclera and conjunctiva were normal [PERRL With Normal Accommodation] : pupils were equal in size, round, reactive to light, with normal accommodation [Extraocular Movements] : extraocular movements were intact [Outer Ear] : the ears and nose were normal in appearance [Oropharynx] : the oropharynx was normal [Neck Appearance] : the appearance of the neck was normal [Neck Cervical Mass (___cm)] : no neck mass was observed [Jugular Venous Distention Increased] : there was no jugular-venous distention [Thyroid Diffuse Enlargement] : the thyroid was not enlarged [Thyroid Nodule] : there were no palpable thyroid nodules [Auscultation Breath Sounds / Voice Sounds] : lungs were clear to auscultation bilaterally [Heart Rate And Rhythm] : heart rate was normal and rhythm regular [Heart Sounds Gallop] : no gallops [Heart Sounds] : normal S1 and S2 [Murmurs] : no murmurs [Heart Sounds Pericardial Friction Rub] : no pericardial rub [Full Pulse] : the pedal pulses are present [Edema] : there was no peripheral edema [Bowel Sounds] : normal bowel sounds [Abdomen Soft] : soft [Abdomen Tenderness] : non-tender [Abdomen Mass (___ Cm)] : no abdominal mass palpated [No CVA Tenderness] : no ~M costovertebral angle tenderness [No Spinal Tenderness] : no spinal tenderness [Abnormal Walk] : normal gait [Nail Clubbing] : no clubbing  or cyanosis of the fingernails [Musculoskeletal - Swelling] : no joint swelling seen [Motor Tone] : muscle strength and tone were normal [Skin Color & Pigmentation] : normal skin color and pigmentation [Skin Turgor] : normal skin turgor [] : no rash

## 2021-07-22 NOTE — REVIEW OF SYSTEMS
[Recent Weight Gain (___ Lbs)] : recent [unfilled] ~Ulb weight gain [Migraine Headache] : migraine headaches [Tension Headache] : tension-type headaches [Negative] : Heme/Lymph [de-identified] : Persistent Headaches

## 2021-07-22 NOTE — REASON FOR VISIT
[Follow-Up: _____] : a [unfilled] follow-up visit [FreeTextEntry1] : Follow up for pt with Ch MIgraines  with increasing frequency

## 2021-07-22 NOTE — DISCUSSION/SUMMARY
[FreeTextEntry1] : Patient with chronic migraine headaches mixed with tension headaches not responding to conventional treatments with underlying hypertension.\par Recommend trial dose of  Aimovig 140 mg sq given in office.\par Rx for Aimovig 70 mg sq monthly .\par Continue verapamil  mg once a day.\par Follow up with you on cardiology for other medical problems.\par Reglan 10 mg p.r.n. for nausea/vomiting.\par Recommend adequate hydration.\par Patient education provided and discussed with patient regarding migraines and prophylaxis.\par Returns for followup evaluation in 3 months or as needed.\par

## 2021-08-20 ENCOUNTER — APPOINTMENT (OUTPATIENT)
Dept: RADIOLOGY | Facility: CLINIC | Age: 41
End: 2021-08-20
Payer: COMMERCIAL

## 2021-08-20 ENCOUNTER — OUTPATIENT (OUTPATIENT)
Dept: OUTPATIENT SERVICES | Facility: HOSPITAL | Age: 41
LOS: 1 days | End: 2021-08-20
Payer: COMMERCIAL

## 2021-08-20 DIAGNOSIS — N60.02 SOLITARY CYST OF LEFT BREAST: Chronic | ICD-10-CM

## 2021-08-20 DIAGNOSIS — Z00.8 ENCOUNTER FOR OTHER GENERAL EXAMINATION: ICD-10-CM

## 2021-08-20 DIAGNOSIS — Z90.49 ACQUIRED ABSENCE OF OTHER SPECIFIED PARTS OF DIGESTIVE TRACT: Chronic | ICD-10-CM

## 2021-08-20 PROCEDURE — 71046 X-RAY EXAM CHEST 2 VIEWS: CPT

## 2021-08-20 PROCEDURE — 71046 X-RAY EXAM CHEST 2 VIEWS: CPT | Mod: 26

## 2021-09-15 NOTE — H&P PST ADULT - GENITOURINARY
Date of Service: 09/15/2021    HISTORY OF PRESENT ILLNESS:  The patient is a 77-year-old gentleman being followed by me for acute on chronic kidney injury with IV hydration.  He has good urine output and serum creatinine has improved back to his baseline.  He complains of some swelling of his lower extremities, generalized weakness.  Appetite is not the best, though denies any nausea or vomiting, no chest pain.    PHYSICAL EXAMINATION:  On examination, awake, alert, lying supine, not in any acute distress.  VITAL SIGNS:  Blood pressure 133/75, heart rate 91 per minute, temperature 98.4 Fahrenheit.  Oxygen saturation 98% on room air.  Documented I's and O's:  Urine output yesterday 850 mL.  LUNGS:  Essentially clear on auscultation bilaterally.  Diminished breath sounds bibasilar posteriorly.  HEART:  S1, S2 audible.  ABDOMEN:  Bowel sounds positive, soft, nontender.  EXTREMITIES:  Bilateral lower extremity edema positive.    LABORATORY DATA:  This morning, serum sodium 148, potassium 3.1, BUN 36, creatinine 1.3, chloride 116.    Hemoglobin 9.3.    ASSESSMENT:  1.  Acute kidney injury, multifactorial including possibly prerenal, improved, nonoliguric.  2.  Hyponatremia, mild.  3.  Hyperkalemia, mild, asymptomatic.  4.  Hypophosphatemia, improving.  5.  Lower extremity edema.  6.  Other medical problem including anemia, being managed by his hematologist/oncologist, and hospitalist.    PLAN:  Discontinue IV fluids.  Cautious diuretic use.  We will give him a dose of IV Lasix today.  Continue potassium and phosphorus oral supplement.  Continue to monitor I's and O's and serum chemistry closely.  No indication for renal replacement therapy at this time.    I will continue to follow the patient for further nephrology care, modify the treatment as needed based on his clinical and biochemical course.      Dictated By: Casa Estevez MD  Signing Provider: MD MARQUITA Stern/celine ([id number])  DD: 09/15/2021 13:06:05  TD: 09/15/2021 13:24:25    Copy Sent To:    details…

## 2021-10-22 ENCOUNTER — APPOINTMENT (OUTPATIENT)
Dept: NEUROLOGY | Facility: CLINIC | Age: 41
End: 2021-10-22

## 2021-10-26 ENCOUNTER — APPOINTMENT (OUTPATIENT)
Dept: OBGYN | Facility: CLINIC | Age: 41
End: 2021-10-26

## 2021-12-14 ENCOUNTER — RESULT REVIEW (OUTPATIENT)
Age: 41
End: 2021-12-14

## 2021-12-14 ENCOUNTER — APPOINTMENT (OUTPATIENT)
Dept: OBGYN | Facility: CLINIC | Age: 41
End: 2021-12-14
Payer: COMMERCIAL

## 2021-12-14 VITALS
BODY MASS INDEX: 34.07 KG/M2 | WEIGHT: 230 LBS | SYSTOLIC BLOOD PRESSURE: 148 MMHG | HEIGHT: 69 IN | DIASTOLIC BLOOD PRESSURE: 92 MMHG

## 2021-12-14 DIAGNOSIS — Z12.31 ENCOUNTER FOR SCREENING MAMMOGRAM FOR MALIGNANT NEOPLASM OF BREAST: ICD-10-CM

## 2021-12-14 DIAGNOSIS — R92.2 INCONCLUSIVE MAMMOGRAM: ICD-10-CM

## 2021-12-14 DIAGNOSIS — Z01.419 ENCOUNTER FOR GYNECOLOGICAL EXAMINATION (GENERAL) (ROUTINE) W/OUT ABNORMAL FINDINGS: ICD-10-CM

## 2021-12-14 PROCEDURE — 99396 PREV VISIT EST AGE 40-64: CPT

## 2021-12-14 RX ORDER — ERENUMAB-AOOE 70 MG/ML
70 INJECTION SUBCUTANEOUS
Qty: 1 | Refills: 5 | Status: DISCONTINUED | COMMUNITY
Start: 2021-07-22 | End: 2021-12-14

## 2021-12-14 RX ORDER — SODIUM SULFATE, POTASSIUM SULFATE, MAGNESIUM SULFATE 17.5; 3.13; 1.6 G/ML; G/ML; G/ML
17.5-3.13-1.6 SOLUTION, CONCENTRATE ORAL
Qty: 1 | Refills: 0 | Status: DISCONTINUED | COMMUNITY
Start: 2019-11-27 | End: 2021-12-14

## 2021-12-14 NOTE — PLAN
no
[FreeTextEntry1] : HCM\par -SBE\par -unable to do pap r/t menses - pt. will come back at another time\par -Rx mammo/sono\par -MVI, Calcium, Vit d, iron supplement\par -Weight/exercise\par RTO 1 year\par

## 2021-12-14 NOTE — PHYSICAL EXAM
[Chaperone Declined] : Patient declined chaperone [Appropriately responsive] : appropriately responsive [Alert] : alert [No Acute Distress] : no acute distress [No Lymphadenopathy] : no lymphadenopathy [Soft] : soft [Non-tender] : non-tender [Non-distended] : non-distended [No Lesions] : no lesions [No Mass] : no mass [Oriented x3] : oriented x3 [Examination Of The Breasts] : a normal appearance [No Masses] : no breast masses were palpable [Labia Majora] : normal [Labia Minora] : normal [Normal] : normal [Uterine Adnexae] : normal [FreeTextEntry5] : Resp. rate wnl, color pink, no SOB [FreeTextEntry4] : + menses - dark moderate

## 2021-12-14 NOTE — HISTORY OF PRESENT ILLNESS
[Regular Cycle Intervals] : periods have been regular [Frequency: Q ___ days] : menstrual periods occur approximately every [unfilled] days [Prior Menses:  ___ (Date)] : date of prior menstruation was [unfilled] [Menarche Age: ____] : age at menarche was [unfilled] [Previously active] : previously active [No] : No [Patient refuses STI testing] : Patient refuses STI testing [TextBox_4] : 40yo  presents routine gyn exam.  Pt. reports her periods continue to be heavy with regular cycle and after Vaccine for COVID she is now bleeding for 6-7 days instead of 5.  Pt with elevated BP today but did not take her blood pressure medication and will take it before she leaves today.\par \par Hx of hypertension\par PGynHx: Hx of abnormal paps s/p cryo/accepts blood products\par Demographics:  Race: Black or  Ethnicity: Not  or  Native Language: English Occupation: RN at [employer] [place] \par Referring Physician: Nathalie Maxwell MD \par : 1  Para:  1 0 0 1 \par c/s  [Mammogramdate] : 2020 [TextBox_19] : wnl [BreastSonogramDate] : 2020 [TextBox_25] : wnl [PapSmeardate] : 2020 [TextBox_31] : remote Hx abnormal paps [ColonoscopyDate] : 2020 [FreeTextEntry1] : 12/14/21

## 2022-01-01 ENCOUNTER — APPOINTMENT (OUTPATIENT)
Dept: BARIATRICS/WEIGHT MGMT | Facility: CLINIC | Age: 42
End: 2022-01-01

## 2022-01-01 ENCOUNTER — NON-APPOINTMENT (OUTPATIENT)
Age: 42
End: 2022-01-01

## 2022-01-01 ENCOUNTER — APPOINTMENT (OUTPATIENT)
Dept: HEMATOLOGY ONCOLOGY | Facility: CLINIC | Age: 42
End: 2022-01-01

## 2022-01-01 DIAGNOSIS — D75.839 THROMBOCYTOSIS, UNSPECIFIED: ICD-10-CM

## 2022-01-01 PROCEDURE — 99213 OFFICE O/P EST LOW 20 MIN: CPT

## 2022-01-13 ENCOUNTER — NON-APPOINTMENT (OUTPATIENT)
Age: 42
End: 2022-01-13

## 2022-01-13 ENCOUNTER — APPOINTMENT (OUTPATIENT)
Dept: FAMILY MEDICINE | Facility: CLINIC | Age: 42
End: 2022-01-13
Payer: COMMERCIAL

## 2022-01-13 VITALS
HEART RATE: 78 BPM | SYSTOLIC BLOOD PRESSURE: 139 MMHG | BODY MASS INDEX: 34.96 KG/M2 | WEIGHT: 236 LBS | DIASTOLIC BLOOD PRESSURE: 97 MMHG | HEIGHT: 69 IN | RESPIRATION RATE: 16 BRPM

## 2022-01-13 DIAGNOSIS — Z11.4 ENCOUNTER FOR SCREENING FOR HUMAN IMMUNODEFICIENCY VIRUS [HIV]: ICD-10-CM

## 2022-01-13 DIAGNOSIS — Z11.59 ENCOUNTER FOR SCREENING FOR OTHER VIRAL DISEASES: ICD-10-CM

## 2022-01-13 DIAGNOSIS — Z78.9 OTHER SPECIFIED HEALTH STATUS: ICD-10-CM

## 2022-01-13 DIAGNOSIS — Z13.31 ENCOUNTER FOR SCREENING FOR DEPRESSION: ICD-10-CM

## 2022-01-13 DIAGNOSIS — Z23 ENCOUNTER FOR IMMUNIZATION: ICD-10-CM

## 2022-01-13 DIAGNOSIS — Z13.6 ENCOUNTER FOR SCREENING FOR CARDIOVASCULAR DISORDERS: ICD-10-CM

## 2022-01-13 DIAGNOSIS — Z13.1 ENCOUNTER FOR SCREENING FOR DIABETES MELLITUS: ICD-10-CM

## 2022-01-13 PROCEDURE — 90471 IMMUNIZATION ADMIN: CPT

## 2022-01-13 PROCEDURE — 36415 COLL VENOUS BLD VENIPUNCTURE: CPT

## 2022-01-13 PROCEDURE — 99386 PREV VISIT NEW AGE 40-64: CPT | Mod: 25

## 2022-01-13 PROCEDURE — 90715 TDAP VACCINE 7 YRS/> IM: CPT

## 2022-01-13 PROCEDURE — 99203 OFFICE O/P NEW LOW 30 MIN: CPT | Mod: 25

## 2022-01-13 PROCEDURE — G0444 DEPRESSION SCREEN ANNUAL: CPT | Mod: 59

## 2022-01-13 RX ORDER — OMEPRAZOLE 20 MG/1
20 CAPSULE, DELAYED RELEASE ORAL TWICE DAILY
Qty: 60 | Refills: 3 | Status: DISCONTINUED | COMMUNITY
Start: 2019-10-09 | End: 2022-01-13

## 2022-01-13 RX ORDER — LABETALOL HYDROCHLORIDE 200 MG/1
200 TABLET, FILM COATED ORAL
Qty: 60 | Refills: 5 | Status: DISCONTINUED | COMMUNITY
End: 2022-01-13

## 2022-01-13 NOTE — HISTORY OF PRESENT ILLNESS
[FreeTextEntry1] : establish care [de-identified] : Ms. YOAN JORDAN is a pleasant 41 year old female with PMH of migraines, GERD, HTN, obesity, hypothyroidism not on levothyroxine who comes in to the office to establish care and for CPE. She has been on labetalol for HTN since her last pregnancy 5 years ago but doesn’t take it consistently. 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

## 2022-01-13 NOTE — PHYSICAL EXAM
[No Acute Distress] : no acute distress [Well Nourished] : well nourished [Well Developed] : well developed [Well-Appearing] : well-appearing [Normal Voice/Communication] : normal voice/communication [Normal Sclera/Conjunctiva] : normal sclera/conjunctiva [PERRL] : pupils equal round and reactive to light [EOMI] : extraocular movements intact [Normal Outer Ear/Nose] : the outer ears and nose were normal in appearance [Normal Oropharynx] : the oropharynx was normal [Normal Nasal Mucosa] : the nasal mucosa was normal [No JVD] : no jugular venous distention [No Lymphadenopathy] : no lymphadenopathy [Supple] : supple [Thyroid Normal, No Nodules] : the thyroid was normal and there were no nodules present [No Respiratory Distress] : no respiratory distress  [No Accessory Muscle Use] : no accessory muscle use [Clear to Auscultation] : lungs were clear to auscultation bilaterally [Normal Rate] : normal rate  [Regular Rhythm] : with a regular rhythm [Normal S1, S2] : normal S1 and S2 [No Murmur] : no murmur heard [No Carotid Bruits] : no carotid bruits [No Abdominal Bruit] : a ~M bruit was not heard ~T in the abdomen [No Varicosities] : no varicosities [Pedal Pulses Present] : the pedal pulses are present [No Edema] : there was no peripheral edema [No Palpable Aorta] : no palpable aorta [No Extremity Clubbing/Cyanosis] : no extremity clubbing/cyanosis [Soft] : abdomen soft [Non Tender] : non-tender [Non-distended] : non-distended [No Masses] : no abdominal mass palpated [No HSM] : no HSM [Normal Bowel Sounds] : normal bowel sounds [Normal Posterior Cervical Nodes] : no posterior cervical lymphadenopathy [Normal Anterior Cervical Nodes] : no anterior cervical lymphadenopathy [No CVA Tenderness] : no CVA  tenderness [No Spinal Tenderness] : no spinal tenderness [No Joint Swelling] : no joint swelling [Grossly Normal Strength/Tone] : grossly normal strength/tone [No Rash] : no rash [Coordination Grossly Intact] : coordination grossly intact [No Focal Deficits] : no focal deficits [Normal Gait] : normal gait [Deep Tendon Reflexes (DTR)] : deep tendon reflexes were 2+ and symmetric [Speech Grossly Normal] : speech grossly normal [Normal Affect] : the affect was normal [Normal Mood] : the mood was normal [Normal Insight/Judgement] : insight and judgment were intact [de-identified] : wax in right ear. Left TM normal.

## 2022-01-13 NOTE — PLAN
[FreeTextEntry1] : \par In regards annual physical exam: \par Not interested in Influenza vaccine, Tdap vaccine given, tolerated well\par Ordering CBC, CMP, Hep C, HIV\par Checking MMR, Hep B titers per patient's requests\par Following up with OB/Gyn for her PAP smear and mammogram\par PHQ-2 test done, < 2 as noted above\par AUDIT-C test done, negative as noted above\par Advised to see optometrist once at year and dentist every 6 months \par \par In regards to hypertension\par Patient's blood pressure not in target today. She has not been taking her labetalol consistently. WIll change to amlodipine 5 mg. Patient will monitor her blood pressure and knos that if it consoisnsntly: systolic BP > 140 or diastolic BP > 90, she will increase the dose to 10 mg QD.\par Reports that her cardiologist advised her that would eventually need to be off the beta blocker.\par \par In regards Hypothyroidism:\par Checking TSH\par Has not been on levothyroxine for years, reports that her thyroid was controlled and can only take the Synthroid brand\par EKG reviewed: no acute ST or T-wave abnormalities, patient without cardiac symptoms. \par \par Obesity \par Lifestyle modifications discussed\par Checking TSH, lipid panel, HbA1C\par Referring to weight medicine \par \par Return to care: within 2 weeks for blood pressure check or earlier if needed\par Call or return for any questions

## 2022-01-13 NOTE — HEALTH RISK ASSESSMENT
[Never] : Never [No] : No [0] : 2) Feeling down, depressed, or hopeless: Not at all (0) [PHQ-2 Negative - No further assessment needed] : PHQ-2 Negative - No further assessment needed [Patient reported mammogram was normal] : Patient reported mammogram was normal [Patient reported PAP Smear was normal] : Patient reported PAP Smear was normal [HIV Test offered] : HIV Test offered [Hepatitis C test offered] : Hepatitis C test offered [Employed] : employed [Single] : single [Sexually Active] : sexually active [[id number]] : 0 [Reports changes in vision] : Reports no changes in vision [Reports changes in dental health] : Reports no changes in dental health [MammogramDate] : 12/2019 [MammogramComments] : has one scheduled for this month by OB/Gyn [PapSmearDate] : 09/2020 [PapSmearComments] : h [FreeTextEntry2] : RN

## 2022-01-14 LAB
ALBUMIN SERPL ELPH-MCNC: 4.1 G/DL
ALP BLD-CCNC: 61 U/L
ALT SERPL-CCNC: 13 U/L
ANION GAP SERPL CALC-SCNC: 14 MMOL/L
AST SERPL-CCNC: 15 U/L
BASOPHILS # BLD AUTO: 0.05 K/UL
BASOPHILS NFR BLD AUTO: 0.8 %
BILIRUB SERPL-MCNC: <0.2 MG/DL
BUN SERPL-MCNC: 7 MG/DL
CALCIUM SERPL-MCNC: 9.6 MG/DL
CHLORIDE SERPL-SCNC: 106 MMOL/L
CHOLEST SERPL-MCNC: 193 MG/DL
CO2 SERPL-SCNC: 21 MMOL/L
CREAT SERPL-MCNC: 0.7 MG/DL
EOSINOPHIL # BLD AUTO: 0.09 K/UL
EOSINOPHIL NFR BLD AUTO: 1.4 %
ESTIMATED AVERAGE GLUCOSE: 128 MG/DL
GLUCOSE SERPL-MCNC: 90 MG/DL
HBA1C MFR BLD HPLC: 6.1 %
HBV SURFACE AB SER QL: REACTIVE
HBV SURFACE AG SER QL: NONREACTIVE
HCT VFR BLD CALC: 33.9 %
HCV AB SER QL: NONREACTIVE
HCV S/CO RATIO: 0.09 S/CO
HDLC SERPL-MCNC: 47 MG/DL
HGB BLD-MCNC: 10.1 G/DL
HIV1+2 AB SPEC QL IA.RAPID: NONREACTIVE
IMM GRANULOCYTES NFR BLD AUTO: 0.3 %
LDLC SERPL CALC-MCNC: 124 MG/DL
LYMPHOCYTES # BLD AUTO: 2.26 K/UL
LYMPHOCYTES NFR BLD AUTO: 35.1 %
MAN DIFF?: NORMAL
MCHC RBC-ENTMCNC: 24.8 PG
MCHC RBC-ENTMCNC: 29.8 GM/DL
MCV RBC AUTO: 83.1 FL
MONOCYTES # BLD AUTO: 0.46 K/UL
MONOCYTES NFR BLD AUTO: 7.1 %
NEUTROPHILS # BLD AUTO: 3.56 K/UL
NEUTROPHILS NFR BLD AUTO: 55.3 %
NONHDLC SERPL-MCNC: 146 MG/DL
PLATELET # BLD AUTO: 606 K/UL
POTASSIUM SERPL-SCNC: 4.8 MMOL/L
PROT SERPL-MCNC: 7.2 G/DL
RBC # BLD: 4.08 M/UL
RBC # FLD: 17.6 %
SODIUM SERPL-SCNC: 140 MMOL/L
TRIGL SERPL-MCNC: 109 MG/DL
TSH SERPL-ACNC: 0.69 UIU/ML
WBC # FLD AUTO: 6.44 K/UL

## 2022-01-15 LAB
MEV IGG FLD QL IA: >300 AU/ML
MEV IGG+IGM SER-IMP: POSITIVE
MUV AB SER-ACNC: POSITIVE
MUV IGG SER QL IA: 33.8 AU/ML
RUBV IGG FLD-ACNC: 1.3 INDEX
RUBV IGG SER-IMP: POSITIVE

## 2022-01-18 ENCOUNTER — NON-APPOINTMENT (OUTPATIENT)
Age: 42
End: 2022-01-18

## 2022-01-21 ENCOUNTER — RESULT REVIEW (OUTPATIENT)
Age: 42
End: 2022-01-21

## 2022-01-21 ENCOUNTER — APPOINTMENT (OUTPATIENT)
Dept: ULTRASOUND IMAGING | Facility: CLINIC | Age: 42
End: 2022-01-21
Payer: COMMERCIAL

## 2022-01-21 ENCOUNTER — APPOINTMENT (OUTPATIENT)
Dept: MAMMOGRAPHY | Facility: CLINIC | Age: 42
End: 2022-01-21
Payer: COMMERCIAL

## 2022-01-21 ENCOUNTER — OUTPATIENT (OUTPATIENT)
Dept: OUTPATIENT SERVICES | Facility: HOSPITAL | Age: 42
LOS: 1 days | End: 2022-01-21
Payer: COMMERCIAL

## 2022-01-21 DIAGNOSIS — R92.2 INCONCLUSIVE MAMMOGRAM: ICD-10-CM

## 2022-01-21 DIAGNOSIS — N60.02 SOLITARY CYST OF LEFT BREAST: Chronic | ICD-10-CM

## 2022-01-21 DIAGNOSIS — Z12.31 ENCOUNTER FOR SCREENING MAMMOGRAM FOR MALIGNANT NEOPLASM OF BREAST: ICD-10-CM

## 2022-01-21 DIAGNOSIS — Z90.49 ACQUIRED ABSENCE OF OTHER SPECIFIED PARTS OF DIGESTIVE TRACT: Chronic | ICD-10-CM

## 2022-01-21 DIAGNOSIS — Z00.8 ENCOUNTER FOR OTHER GENERAL EXAMINATION: ICD-10-CM

## 2022-01-21 PROCEDURE — 76641 ULTRASOUND BREAST COMPLETE: CPT | Mod: 26,50

## 2022-01-21 PROCEDURE — 76641 ULTRASOUND BREAST COMPLETE: CPT

## 2022-01-21 PROCEDURE — 77067 SCR MAMMO BI INCL CAD: CPT | Mod: 26

## 2022-01-21 PROCEDURE — 77063 BREAST TOMOSYNTHESIS BI: CPT | Mod: 26

## 2022-01-21 PROCEDURE — 77067 SCR MAMMO BI INCL CAD: CPT

## 2022-01-21 PROCEDURE — 77063 BREAST TOMOSYNTHESIS BI: CPT

## 2022-02-08 ENCOUNTER — APPOINTMENT (OUTPATIENT)
Dept: ORTHOPEDIC SURGERY | Facility: CLINIC | Age: 42
End: 2022-02-08
Payer: COMMERCIAL

## 2022-02-08 VITALS
BODY MASS INDEX: 34.86 KG/M2 | SYSTOLIC BLOOD PRESSURE: 142 MMHG | WEIGHT: 230 LBS | DIASTOLIC BLOOD PRESSURE: 89 MMHG | HEIGHT: 68 IN | HEART RATE: 82 BPM

## 2022-02-08 DIAGNOSIS — M54.12 RADICULOPATHY, CERVICAL REGION: ICD-10-CM

## 2022-02-08 DIAGNOSIS — M25.519 PAIN IN UNSPECIFIED SHOULDER: ICD-10-CM

## 2022-02-08 PROCEDURE — 20610 DRAIN/INJ JOINT/BURSA W/O US: CPT | Mod: RT

## 2022-02-08 PROCEDURE — 73030 X-RAY EXAM OF SHOULDER: CPT | Mod: RT

## 2022-02-08 PROCEDURE — 99213 OFFICE O/P EST LOW 20 MIN: CPT | Mod: 25

## 2022-02-08 NOTE — DISCUSSION/SUMMARY
[Medication Risks Reviewed] : Medication risks reviewed [de-identified] : Patient is a 42-year-old female with right rotator cuff tendinitis and shoulder impingement presenting today for initial evaluation.  I recommended conservative treatment at this time.  I gave her an injection of cortisone which she tolerated well.  Have also recommended Loxitane 15 mg daily as needed for pain.  I have given her prescription for physical therapy.  I will see her back in 2 months for repeat evaluation.  If no improvement or symptoms we will consider an MRI for further evaluation of her right shoulder.  All questions were asked and answered.  As for her cervical radiculopathy I referred her to spine surgery for further evaluation and management.

## 2022-02-08 NOTE — PROCEDURE
[de-identified] : I injected the right shoulder. I discussed at length with the patient the planned steroid and lidocaine injection. The risks, benefits, convalescence and alternatives were reviewed. The possible side effects discussed included but were not limited to: pain, swelling, heat, bleeding, and redness. Symptoms are generally mild but if they are extensive then contact the office. Giving pain relievers by mouth such as NSAIDs or Tylenol can generally treat the reactions to steroid and lidocaine. Rare cases of infection have been noted. Rash, hives and itching may occur post injection. If you have muscle pain or cramps, flushing and or swelling of the face, rapid heart beat, nausea, dizziness, fever, chills, headache, difficulty breathing, swelling in the arms or legs, or have a prickly feeling of your skin, contact a health care provider immediately. Following this discussion, the shoulder was prepped with Alcohol and under sterile condition the 80 mg Depo-Medrol and 6 cc Lidocaine injection was performed with a 20 gauge needle. The needle was introduced into the joint, aspiration was performed to ensure intra-articular placement and the medication was injected. Upon withdrawal of the needle the site was cleaned with alcohol and a band aid applied. The patient tolerated the injection well and there were no adverse effects. Post injection instructions included no strenuous activity for 24 hours, cryotherapy and if there are any adverse effects to contact the office.

## 2022-02-08 NOTE — HISTORY OF PRESENT ILLNESS
[de-identified] : 42-year-old female presenting with chief complaint of right shoulder pain has been going to the past 3 to 4 months.  Patient states that she had pain in the anterior and posterior aspects of her shoulder and now over the last few weeks has been developing pain in her neck that is also radiating down her right arm towards her hand.  Denies any weakness but does complain of some tightness feeling radiating down her arm.  States that she has pain with overhead activities as well as lifting heavy objects and pushing objects.  Has been taking some NSAIDs as needed for the pain.  Denies any mechanical symptoms in the shoulder.  No elbow pain.

## 2022-02-08 NOTE — PHYSICAL EXAM
[de-identified] : GENERAL APPEARANCE: Well nourished and hydrated, pleasant, alert, and oriented x 3. Appears their stated age. \par HEENT: Normocephalic, extraocular eye motion intact. Nasal septum midline. Oral cavity clear. External auditory canal clear. \par RESPIRATORY: Breath sounds clear and audible in all lobes. No wheezing, No accessory muscle use.\par CARDIOVASCULAR: No apparent abnormalities. No lower leg edema. No varicosities. Pedal pulses are palpable.\par NEUROLOGIC: Sensation is normal, no muscle weakness in the upper or lower extremities.\par DERMATOLOGIC: No apparent skin lesions, moist, warm, no rash.\par SPINE: Cervical spine appears normal and moves freely; thoracic spine appears normal and moves freely; lumbosacral spine appears normal and moves freely, normal, nontender.\par MUSCULOSKELETAL: Hands, wrists, and elbows are normal and move freely, \par Psychiatric: Oriented to person, place, and time, insight and judgement were intact and the affect was normal. \par Right upper extremity: Full active and passive range of motion of the right shoulder without pain, positive empty can on the right, positive cross chest, mildly positive [place]'s, negative belly press, full external and internal rotation, AIN PIN median radial ulnar nerves intact, fingers warm well-perfused brisk cap refill, strength 5 out of 5 in bilateral deltoids biceps triceps wrist flexors extensors and hand intrinsics [de-identified] : 3 views of the right shoulder obtained in the office today show no acute fracture or dislocation.  No significant degenerative changes noted.

## 2022-03-02 NOTE — H&P ADULT - DOES THIS PATIENT HAVE A HISTORY OF OR HAS BEEN DX WITH HEART FAILURE?
3/2/2022       RE: Deepika Oliva  [address] [id number]     Dear Colleague,    Thank you for referring your patient, Deepika Oliva, to the [facility] [place] at . Please see a copy of my visit note below.    [facility] follow up     Sumatriptan inj and oral-not effective  Topiramate 100 mg daily -no significant side effects-may be some tiredness and helping with headaches   Rizatriptan helps   Headache frequency -one headache past month and Nurtec was helpful.   Ketorolac pills-did not try it yet.   Patient is on zoloft and trazadone   On abilify for depression and stable after zoloft was increased to 150 mg daily  Gastroparesis -on promethazine     PMH reviewed and updated  Meds reviewed and updated.     Plan:  Headache prevention-continue topiramate 100 mg at bedtime and staying hydrated  Rescue treatment -Nurtec and/rizatriptan as needed  If you take ketorolac for a short time only -2-5 days per month and take it with food.   Follow up 6-9 months or sooner if needed     Patient is alert and no in apparent acute distress,  mentation appears normal, judgement and insight intact, normal speech.    I discussed all my recommendations with Deepika WHITNEY Destinyjesse who verbalizes understanding and comfortable with the plan.  All of patient's questions were answered from the best of my knowledge.  Patient is in agreement with the plan.     23 minutes spent on the date of the encounter doing video access, chart  review, results review,  meds review, treatment plan, documentation and further activities as noted above      LORRIE Potter, CNP [facility]  Headache certified  [facility]  
no

## 2022-04-11 ENCOUNTER — APPOINTMENT (OUTPATIENT)
Dept: ORTHOPEDIC SURGERY | Facility: CLINIC | Age: 42
End: 2022-04-11
Payer: COMMERCIAL

## 2022-04-11 VITALS
BODY MASS INDEX: 34.86 KG/M2 | SYSTOLIC BLOOD PRESSURE: 144 MMHG | HEIGHT: 68 IN | DIASTOLIC BLOOD PRESSURE: 97 MMHG | WEIGHT: 230 LBS | HEART RATE: 76 BPM

## 2022-04-11 DIAGNOSIS — M75.80 OTHER SHOULDER LESIONS, UNSPECIFIED SHOULDER: ICD-10-CM

## 2022-04-11 PROCEDURE — 99214 OFFICE O/P EST MOD 30 MIN: CPT

## 2022-04-11 NOTE — HISTORY OF PRESENT ILLNESS
[de-identified] : 42 to female here today for follow-up of her right shoulder pain.  Patient has been taking the meloxicam for her pain and states that there is been no improvement the pain is still 7 out of 10 and the injection did not help.  She has not attended physical therapy due to her father being in the hospital and work.  States that she is also not followed up with spine surgery for cervical radiculopathy due to being busy as well as problems at work.  Patient is still having severe pain in the shoulder and especially at night.  Denies any new issues since her last visit.  Denies any elbow pain.

## 2022-04-11 NOTE — DISCUSSION/SUMMARY
[Medication Risks Reviewed] : Medication risks reviewed [de-identified] : Patient is a 42-year-old female with continued right shoulder pain despite a cortisone injection as well as NSAID use.  She is still having 7 out of 10 pain is impacting her daily activities.  She does have pain with empty can as well as some shoulder impingement.  I therefore recommend an MRI of her right shoulder for further evaluation.  I have also again recommended she follow-up with one of my spine colleagues for her neck pain and cervical radiculopathy.  All questions were asked and answered.  I will see her back after the MRI for further evaluation and management.

## 2022-04-11 NOTE — PHYSICAL EXAM
[de-identified] : MUSCULOSKELETAL: Hands, wrists, and elbows are normal and move freely, \par Psychiatric: Oriented to person, place, and time, insight and judgement were intact and the affect was normal. \par Right upper extremity: Full active and passive range of motion of the right shoulder without pain, positive empty can on the right, positive cross chest, mildly positive [place]'s, negative belly press, full external and internal rotation, AIN PIN median radial ulnar nerves intact, fingers warm well-perfused brisk cap refill, strength 5 out of 5 in bilateral deltoids biceps triceps wrist flexors extensors and hand intrinsics

## 2022-04-12 NOTE — ASU PREOP CHECKLIST - ISOLATION PRECAUTIONS
[FreeTextEntry1] : In summary this is an 5 year male  presenting to the child neurology clinic for concerns for headaches. \par \par The patient has a normal neurological exam without focal deficits, lateralizing signs or signs of increased intracranial pressure. \par \par  \par 1. Headache type/description:  Tension headache improved\par 2. Enlarged Pituitary\par \par \par \par \par \par 
none

## 2022-05-12 ENCOUNTER — APPOINTMENT (OUTPATIENT)
Dept: HUMAN REPRODUCTION | Facility: CLINIC | Age: 42
End: 2022-05-12
Payer: COMMERCIAL

## 2022-05-12 PROCEDURE — 99215 OFFICE O/P EST HI 40 MIN: CPT | Mod: 95

## 2022-05-25 ENCOUNTER — APPOINTMENT (OUTPATIENT)
Dept: HUMAN REPRODUCTION | Facility: CLINIC | Age: 42
End: 2022-05-25
Payer: COMMERCIAL

## 2022-05-25 PROCEDURE — 99214 OFFICE O/P EST MOD 30 MIN: CPT | Mod: 95

## 2022-06-01 ENCOUNTER — APPOINTMENT (OUTPATIENT)
Dept: HUMAN REPRODUCTION | Facility: CLINIC | Age: 42
End: 2022-06-01
Payer: COMMERCIAL

## 2022-06-01 PROCEDURE — 36415 COLL VENOUS BLD VENIPUNCTURE: CPT

## 2022-06-01 PROCEDURE — 99213 OFFICE O/P EST LOW 20 MIN: CPT | Mod: 25

## 2022-06-01 PROCEDURE — 76830 TRANSVAGINAL US NON-OB: CPT

## 2022-06-09 DIAGNOSIS — N84.0 POLYP OF CORPUS UTERI: ICD-10-CM

## 2022-06-09 DIAGNOSIS — Z87.42 PERSONAL HISTORY OF OTHER DISEASES OF THE FEMALE GENITAL TRACT: ICD-10-CM

## 2022-06-09 DIAGNOSIS — Z82.0 FAMILY HISTORY OF EPILEPSY AND OTHER DISEASES OF THE NERVOUS SYSTEM: ICD-10-CM

## 2022-06-10 ENCOUNTER — APPOINTMENT (OUTPATIENT)
Dept: FAMILY MEDICINE | Facility: CLINIC | Age: 42
End: 2022-06-10
Payer: COMMERCIAL

## 2022-06-10 ENCOUNTER — APPOINTMENT (OUTPATIENT)
Dept: HUMAN REPRODUCTION | Facility: CLINIC | Age: 42
End: 2022-06-10
Payer: COMMERCIAL

## 2022-06-10 VITALS
WEIGHT: 233 LBS | DIASTOLIC BLOOD PRESSURE: 92 MMHG | SYSTOLIC BLOOD PRESSURE: 136 MMHG | BODY MASS INDEX: 35.31 KG/M2 | HEIGHT: 68 IN

## 2022-06-10 DIAGNOSIS — Z01.818 ENCOUNTER FOR OTHER PREPROCEDURAL EXAMINATION: ICD-10-CM

## 2022-06-10 PROCEDURE — 99213 OFFICE O/P EST LOW 20 MIN: CPT | Mod: 25

## 2022-06-10 PROCEDURE — 99215 OFFICE O/P EST HI 40 MIN: CPT

## 2022-06-10 PROCEDURE — 36415 COLL VENOUS BLD VENIPUNCTURE: CPT

## 2022-06-10 PROCEDURE — 76830 TRANSVAGINAL US NON-OB: CPT

## 2022-06-10 RX ORDER — LABETALOL HYDROCHLORIDE 200 MG/1
200 TABLET, FILM COATED ORAL TWICE DAILY
Refills: 0 | Status: DISCONTINUED | COMMUNITY
End: 2022-06-10

## 2022-06-10 NOTE — HISTORY OF PRESENT ILLNESS
[FreeTextEntry1] : follow up [de-identified] : Ms. YOAN JORDAN is a pleasant 42 year old female with PMH of migraines, GERD, HTN, obesity, hypothyroidism not on levothyroxine, pre-DM, HLD, thrombocytosis who comes in to the office to follow up in medical conditions. She was seen at this office one time previously on 01/13/2022, I changed her labetalol to amlodipine as she was not taking her labetalol consistently. Also was found with anemia, thrombocytosis, I advised her to take ASA and repeat lab work within 1 month after. Patient did not follow up at this office as advised since then. \par She is seeing a reproductive endocrinologist specialist, will get Intrauterine insemination. She had repeat lab work done recently\par No other complaints today. \par She needs a note saying that she is being treated for blood pressure\par \par Fertility Specialist: [place]\par Previous PCP: Norm

## 2022-06-10 NOTE — PLAN
[FreeTextEntry1] : \par In regards to hypertension\par Patient's blood pressure improving but not in target today. \par Increase amlodipine from 5 mg to 10 mg QD. \par Avoid salt\par Patient wants a letter for her fertility specialist . Patient is giving me her permission to release her medical information regarding her current treatment for HTN\par \par In regards Hypothyroidism:\par Last TSH 0.96 on 06/01/2022 per [facility]IE\par Has not been on levothyroxine for years, reports that her thyroid was controlled and can only take the Synthroid brand\par \par Obesity, HLD, pre-DM\par Lifestyle modifications discussed\par Per [facility]IE, labs from 06/01/2022: HbA1C 5.9%\par Repeat lipids\par Pending to see weight medicine \par \par Thrombocytosis\par Asymptomatic, chronic per HIE, down to 539 on 06/01/2022 in the HIE, from 606 on jan/2022\par Continue ASA 81 mg\par Referring to heme/onc, may need clearance for her IUI procedure and pregnancy\par \par Anemia\par Asymptomatic, improving per CBC from 06/01/2022 in the HIE: H/H 11.1/35.7 from 10.1/33.9 on jan/2022\par Reports heavy menstrual periods\par Continue taking MVI with iron and Vit C, folic acid\par Monitor for bleeding symptoms\par \par This was an extensive visit that included review of previous labs and specialists notes before and after the face to face encounter \par Return to care: within 2 weeks for blood pressure check or earlier if needed, advised to keep her appointments and adhere to the treatment\par Call or return for any questions

## 2022-06-16 ENCOUNTER — OUTPATIENT (OUTPATIENT)
Dept: OUTPATIENT SERVICES | Facility: HOSPITAL | Age: 42
LOS: 1 days | Discharge: HOME | End: 2022-06-16

## 2022-06-16 DIAGNOSIS — N60.02 SOLITARY CYST OF LEFT BREAST: Chronic | ICD-10-CM

## 2022-06-16 DIAGNOSIS — D75.839 THROMBOCYTOSIS, UNSPECIFIED: ICD-10-CM

## 2022-06-16 DIAGNOSIS — Z90.49 ACQUIRED ABSENCE OF OTHER SPECIFIED PARTS OF DIGESTIVE TRACT: Chronic | ICD-10-CM

## 2022-06-22 ENCOUNTER — APPOINTMENT (OUTPATIENT)
Dept: HEMATOLOGY ONCOLOGY | Facility: CLINIC | Age: 42
End: 2022-06-22

## 2022-07-05 ENCOUNTER — APPOINTMENT (OUTPATIENT)
Dept: HEMATOLOGY ONCOLOGY | Facility: CLINIC | Age: 42
End: 2022-07-05

## 2022-07-05 ENCOUNTER — RESULT REVIEW (OUTPATIENT)
Age: 42
End: 2022-07-05

## 2022-07-05 VITALS
BODY MASS INDEX: 34.25 KG/M2 | HEIGHT: 68 IN | HEART RATE: 82 BPM | DIASTOLIC BLOOD PRESSURE: 80 MMHG | SYSTOLIC BLOOD PRESSURE: 138 MMHG | WEIGHT: 226 LBS | OXYGEN SATURATION: 98 %

## 2022-07-05 DIAGNOSIS — D64.9 ANEMIA, UNSPECIFIED: ICD-10-CM

## 2022-07-05 LAB
BASOPHILS # BLD AUTO: 0.1 K/UL — SIGNIFICANT CHANGE UP (ref 0–0.2)
BASOPHILS NFR BLD AUTO: 0.9 % — SIGNIFICANT CHANGE UP (ref 0–2)
EOSINOPHIL # BLD AUTO: 0.2 K/UL — SIGNIFICANT CHANGE UP (ref 0–0.5)
EOSINOPHIL NFR BLD AUTO: 2.2 % — SIGNIFICANT CHANGE UP (ref 0–6)
HCT VFR BLD CALC: 35.6 % — SIGNIFICANT CHANGE UP (ref 34.5–45)
HGB BLD-MCNC: 11.4 G/DL — LOW (ref 11.5–15.5)
LYMPHOCYTES # BLD AUTO: 2.2 K/UL — SIGNIFICANT CHANGE UP (ref 1–3.3)
LYMPHOCYTES # BLD AUTO: 29.4 % — SIGNIFICANT CHANGE UP (ref 13–44)
MCHC RBC-ENTMCNC: 26.8 PG — LOW (ref 27–34)
MCHC RBC-ENTMCNC: 32 G/DL — SIGNIFICANT CHANGE UP (ref 32–36)
MCV RBC AUTO: 83.8 FL — SIGNIFICANT CHANGE UP (ref 80–100)
MONOCYTES # BLD AUTO: 0.6 K/UL — SIGNIFICANT CHANGE UP (ref 0–0.9)
MONOCYTES NFR BLD AUTO: 7.9 % — SIGNIFICANT CHANGE UP (ref 2–14)
NEUTROPHILS # BLD AUTO: 4.4 K/UL — SIGNIFICANT CHANGE UP (ref 1.8–7.4)
NEUTROPHILS NFR BLD AUTO: 59.6 % — SIGNIFICANT CHANGE UP (ref 43–77)
PLATELET # BLD AUTO: 534 K/UL — HIGH (ref 150–400)
RBC # BLD: 4.25 M/UL — SIGNIFICANT CHANGE UP (ref 3.8–5.2)
RBC # FLD: 14.8 % — HIGH (ref 10.3–14.5)
WBC # BLD: 7.4 K/UL — SIGNIFICANT CHANGE UP (ref 3.8–10.5)
WBC # FLD AUTO: 7.4 K/UL — SIGNIFICANT CHANGE UP (ref 3.8–10.5)

## 2022-07-05 PROCEDURE — 99205 OFFICE O/P NEW HI 60 MIN: CPT

## 2022-07-05 NOTE — HISTORY OF PRESENT ILLNESS
[de-identified] :  YOAN JORDAN is a 42 year old F with a h/o migraines, GERD, HTN, obesity, hypothyroidism not on levothyroxine, pre-DM, HLD was referred to our office with Thrombocytosis \par with a plt count of 606 in Jan 2022\par \par Patient reports that she has had THrombocytosis in the past and saw a hematologist ~ 2012, Reports that she was told that she needed an in fusion which awas expensive so she did not proceed with it \par Patient reports heavy periods  Takes oral iron Intermittently. \par \par 5/3/2016: wbc 14.6, hB 11, pLT 419 \par 1/13/22: WBC 6.4, Hb 10.1, plT 606 \par 7/5/222: wbc 7.4, hB 11.4, pLT 534  [de-identified] : Patient here for initial visit \par \par SHe has been taking oral iron over the past 3-4 month \par + constipation, Some abdominal discomfort \par + heavy periods Recently completed period 3 days ago \par She is an OR nurse at [employer] \par \par \par \par \par

## 2022-07-05 NOTE — ASSESSMENT
[FreeTextEntry1] :  YOAN JORDAN is a 42 year old F with a h/o migraines, GERD, HTN, obesity, hypothyroidism not on levothyroxine, pre-DM, HLD was referred to our office with Thrombocytosis \par h/o THrombocytosis sinc 2012 and saw a hematologist around then, advised 'infusion' which she did not f/u with \par \par Patient reports heavy periods  Takes oral iron Intermittently. \par \par 5/3/2016: wbc 14.6, hB 11, pLT 419 \par 1/13/22: WBC 6.4, Hb 10.1, plT 606 \par 7/5/222: wbc 7.4, hB 11.4, pLT 534 \par \par # thrombocytosis \par # anemia\par \par DD for thrombocytosis include reactive, 2/2 anemia, However also discussed primary BM d/o like MPN \par - send CMp, Iron Ferritin, Folate, B12 \par -  She has recently started taking oral iron over the past 3-4 months\par Hb increased today to 11.4 and Plt count at 534 \par - she is currently on Baby asa however stops taking it around thetime of her period, \par - If counts continue to trend up will consider w/u with [facility] and Jak2 \par - She is considering having another child ( via artificial insemination) later this year \par - f/u with me in 3 months

## 2022-07-07 ENCOUNTER — APPOINTMENT (OUTPATIENT)
Dept: FAMILY MEDICINE | Facility: CLINIC | Age: 42
End: 2022-07-07

## 2022-07-07 VITALS
SYSTOLIC BLOOD PRESSURE: 138 MMHG | WEIGHT: 226 LBS | HEART RATE: 81 BPM | DIASTOLIC BLOOD PRESSURE: 88 MMHG | HEIGHT: 68 IN | RESPIRATION RATE: 16 BRPM | BODY MASS INDEX: 34.25 KG/M2

## 2022-07-07 DIAGNOSIS — L30.9 DERMATITIS, UNSPECIFIED: ICD-10-CM

## 2022-07-07 LAB
ALBUMIN SERPL ELPH-MCNC: 4.3 G/DL
ALP BLD-CCNC: 67 U/L
ALT SERPL-CCNC: 17 U/L
ANION GAP SERPL CALC-SCNC: 12 MMOL/L
AST SERPL-CCNC: 19 U/L
BILIRUB SERPL-MCNC: 0.2 MG/DL
BUN SERPL-MCNC: 10 MG/DL
CALCIUM SERPL-MCNC: 9.6 MG/DL
CHLORIDE SERPL-SCNC: 103 MMOL/L
CO2 SERPL-SCNC: 24 MMOL/L
CREAT SERPL-MCNC: 0.7 MG/DL
EGFR: 110 ML/MIN/1.73M2
FERRITIN SERPL-MCNC: 23 NG/ML
FOLATE SERPL-MCNC: 15.7 NG/ML
GLUCOSE SERPL-MCNC: 90 MG/DL
IRON SATN MFR SERPL: 11 %
IRON SERPL-MCNC: 44 UG/DL
POTASSIUM SERPL-SCNC: 4.4 MMOL/L
PROT SERPL-MCNC: 7.3 G/DL
RBC # BLD: 4.32 M/UL
RETICS # AUTO: 1.3 %
RETICS AGGREG/RBC NFR: 55.7 K/UL
SODIUM SERPL-SCNC: 139 MMOL/L
TIBC SERPL-MCNC: 409 UG/DL
UIBC SERPL-MCNC: 365 UG/DL
VIT B12 SERPL-MCNC: 461 PG/ML

## 2022-07-07 PROCEDURE — 99214 OFFICE O/P EST MOD 30 MIN: CPT

## 2022-07-07 NOTE — PLAN
[FreeTextEntry1] : \par In regards to hypertension\par Patient's blood pressure now in target. \par Continue amlodipine from 5 mg to 10 mg QD. \par Avoid salt\par \par Dermatitis\par Seems that is resolving\par Sending a medrol-pack, patient will take it if her symptoms don't improved within 2 days\par \par In regards Hypothyroidism:\par Last TSH 0.96 on 06/01/2022 per HIE\par Has not been on levothyroxine for years, reports that her thyroid was controlled and can only take the Synthroid brand\par \par Obesity, HLD, pre-DM\par Lifestyle modifications discussed\par Per HIE, labs from 06/01/2022: HbA1C 5.9%\par Repeat lipids on sep/2022\par Pending to see weight medicine \par \par Thrombocytosis\par Asymptomatic, chronic per HIE\par Saw heme/onc. On ASA 81 mg and iron\par Referring to heme/onc, may need clearance for her IUI procedure and pregnancy\par \par Anemia\par Asymptomatic, improving per CBC from 07/05/2022 from 10.1/33.9 on jan/2022\par Reports heavy menstrual periods\par Continue taking MVI with iron and Vit C, folic acid, sees heme/onc\par Monitor for bleeding symptoms\par \par Return to care: within 3 months for blood pressure check or earlier if needed, advised to keep her appointments and adhere to the treatment\par Call or return for any questions

## 2022-07-07 NOTE — PHYSICAL EXAM
[Normal] : normal rate, regular rhythm, normal S1 and S2 and no murmur heard [de-identified] : minimal macular rash in both antecubital fossae. Nobleeding or wounds noted

## 2022-07-07 NOTE — HISTORY OF PRESENT ILLNESS
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 YOAN JORDAN is a pleasant 42 year old female with PMH of migraines, GERD, HTN, obesity, hypothyroidism not on levothyroxine, pre-DM, HLD, thrombocytosis who comes in to the office to follow up in medical conditions. Her amlodipine was increased to 10 mg last month..Also was found with anemia, thrombocytosis, saw heme/onc Dr Garcia on 07/05/2022,  is on ASA and iron. \par She is seeing a reproductive endocrinologist specialist, will get Intrauterine insemination. \par She reports a seasonal rash in her her arms, at the beginning of the summer and with sun exposure which is clearing now. She sees dermatology who gives her a medrol-pack and it resolves. Denies shortness of breath, other triggers. \par She needs a job physical form to be filled out\par No other complaints today.\par \par Fertility Specialist: [place]\par Previous PCP: Norm

## 2022-07-14 LAB
BASOPHILS # BLD AUTO: 0.04 K/UL
BASOPHILS NFR BLD AUTO: 0.6 %
EOSINOPHIL # BLD AUTO: 0.15 K/UL
EOSINOPHIL NFR BLD AUTO: 2.2 %
HCT VFR BLD CALC: 36.7 %
HGB BLD-MCNC: 11.3 G/DL
IMM GRANULOCYTES NFR BLD AUTO: 0.3 %
LYMPHOCYTES # BLD AUTO: 1.93 K/UL
LYMPHOCYTES NFR BLD AUTO: 28.8 %
MAN DIFF?: NORMAL
MCHC RBC-ENTMCNC: 26.2 PG
MCHC RBC-ENTMCNC: 30.8 GM/DL
MCV RBC AUTO: 85 FL
MONOCYTES # BLD AUTO: 0.59 K/UL
MONOCYTES NFR BLD AUTO: 8.8 %
NEUTROPHILS # BLD AUTO: 3.98 K/UL
NEUTROPHILS NFR BLD AUTO: 59.3 %
PLATELET # BLD AUTO: 624 K/UL
RBC # BLD: 4.32 M/UL
RBC # FLD: 16 %
WBC # FLD AUTO: 6.71 K/UL

## 2022-07-25 ENCOUNTER — APPOINTMENT (OUTPATIENT)
Dept: OBGYN | Facility: CLINIC | Age: 42
End: 2022-07-25

## 2022-07-25 VITALS
WEIGHT: 226 LBS | SYSTOLIC BLOOD PRESSURE: 135 MMHG | BODY MASS INDEX: 34.25 KG/M2 | DIASTOLIC BLOOD PRESSURE: 90 MMHG | HEIGHT: 68 IN

## 2022-07-25 DIAGNOSIS — Z12.4 ENCOUNTER FOR SCREENING FOR MALIGNANT NEOPLASM OF CERVIX: ICD-10-CM

## 2022-07-25 DIAGNOSIS — Z11.51 ENCOUNTER FOR SCREENING FOR HUMAN PAPILLOMAVIRUS (HPV): ICD-10-CM

## 2022-07-25 PROCEDURE — 99212 OFFICE O/P EST SF 10 MIN: CPT

## 2022-07-25 NOTE — PHYSICAL EXAM
[Appropriately responsive] : appropriately responsive [Alert] : alert [No Acute Distress] : no acute distress [Soft] : soft [Non-tender] : non-tender [Non-distended] : non-distended [No HSM] : No HSM [No Lesions] : no lesions [No Mass] : no mass [Oriented x3] : oriented x3 [Labia Majora] : normal [Labia Minora] : normal [Normal] : normal [Uterine Adnexae] : normal [Chaperone Declined] : Patient declined chaperone [No Bleeding] : There was no active vaginal bleeding

## 2022-07-25 NOTE — END OF VISIT
[FreeTextEntry3] :  I, Bela Dawson, acted as a scribe on behalf of Jenna Pat NP on 07/25/2022 .\par \par All medical entries made by the scribe were at my, Jenna Pat NP direction and personally dictated by me on 07/25/2022. I have reviewed the chart and agree that the record accurately reflects my personal performance of the history, physical exam, assessment and plan. I have also personally directed, reviewed, and agreed with the chart. \par

## 2022-07-25 NOTE — COUNSELING
[Nutrition/ Exercise/ Weight Management] : nutrition, exercise, weight management [Vitamins/Supplements] : vitamins/supplements [Breast Self Exam] : breast self exam [STD (testing, results, tx)] : STD (testing, results, tx) [HPV Vaccine] : HPV Vaccine [Confidentiality] : confidentiality [Lab Results] : lab results

## 2022-07-25 NOTE — HISTORY OF PRESENT ILLNESS
[Patient reported PAP Smear was normal] : Patient reported PAP Smear was normal [FreeTextEntry1] : 41 yo  LMP 22, presents for annual pap. Pt saw NP Jillian ABBOTT on 2021 for annual GYN; did not have pap at that time because pt was bleeding heavily with her period. Her periods have been heavy, lasting for 6-7 days following receiving the covid vaccine in September. Pt has not had the Gardasil vaccine. 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

## 2022-07-25 NOTE — PLAN
[FreeTextEntry1] : 41 yo  LMP 22,for pap\par \par HCM\par -offered Gardasil vaccine, pt declined.\par -Discussed and reviewed importance of monthly BSE\par -Pap test, HPV collected and sent at today's visit\par -Mammo/sono utd, will call next Anupam for repeat rx.\par -f/u PCP for recommended HCM, vaccinations and CA screening \par -RTO 1 year for annual

## 2022-07-26 LAB — HPV HIGH+LOW RISK DNA PNL CVX: NOT DETECTED

## 2022-07-27 ENCOUNTER — APPOINTMENT (OUTPATIENT)
Dept: FAMILY MEDICINE | Facility: CLINIC | Age: 42
End: 2022-07-27

## 2022-08-02 LAB — CYTOLOGY CVX/VAG DOC THIN PREP: NORMAL

## 2022-08-17 ENCOUNTER — NON-APPOINTMENT (OUTPATIENT)
Age: 42
End: 2022-08-17

## 2022-08-19 ENCOUNTER — APPOINTMENT (OUTPATIENT)
Dept: BARIATRICS/WEIGHT MGMT | Facility: CLINIC | Age: 42
End: 2022-08-19

## 2022-08-19 VITALS — BODY MASS INDEX: 33.8 KG/M2 | HEIGHT: 68 IN | WEIGHT: 223 LBS

## 2022-08-19 DIAGNOSIS — G43.009 MIGRAINE W/OUT AURA, NOT INTRACTABLE, W/OUT STATUS MIGRAINOSUS: ICD-10-CM

## 2022-08-19 PROCEDURE — 99204 OFFICE O/P NEW MOD 45 MIN: CPT | Mod: 95

## 2022-08-19 RX ORDER — METHYLPREDNISOLONE 4 MG/1
4 TABLET ORAL
Qty: 1 | Refills: 0 | Status: DISCONTINUED | COMMUNITY
Start: 2022-07-07 | End: 2022-08-19

## 2022-08-19 RX ORDER — MELOXICAM 15 MG/1
15 TABLET ORAL
Qty: 30 | Refills: 0 | Status: DISCONTINUED | COMMUNITY
Start: 2022-02-08 | End: 2022-08-19

## 2022-09-02 LAB
25(OH)D3 SERPL-MCNC: 40.4 NG/ML
ALBUMIN SERPL ELPH-MCNC: 4.4 G/DL
ALP BLD-CCNC: 67 U/L
ALT SERPL-CCNC: 9 U/L
ANION GAP SERPL CALC-SCNC: 21 MMOL/L
AST SERPL-CCNC: 14 U/L
BASOPHILS # BLD AUTO: 0.08 K/UL
BASOPHILS NFR BLD AUTO: 1 %
BILIRUB SERPL-MCNC: 0.2 MG/DL
BUN SERPL-MCNC: 12 MG/DL
CALCIUM SERPL-MCNC: 10.4 MG/DL
CHLORIDE SERPL-SCNC: 101 MMOL/L
CHOLEST SERPL-MCNC: 185 MG/DL
CO2 SERPL-SCNC: 17 MMOL/L
CREAT SERPL-MCNC: 0.73 MG/DL
CRP SERPL-MCNC: <3 MG/L
EGFR: 105 ML/MIN/1.73M2
EOSINOPHIL # BLD AUTO: 0.07 K/UL
EOSINOPHIL NFR BLD AUTO: 0.8 %
ESTIMATED AVERAGE GLUCOSE: 131 MG/DL
GLUCOSE SERPL-MCNC: 102 MG/DL
HBA1C MFR BLD HPLC: 6.2 %
HCT VFR BLD CALC: 36.9 %
HDLC SERPL-MCNC: 51 MG/DL
HGB BLD-MCNC: 11.4 G/DL
IMM GRANULOCYTES NFR BLD AUTO: 0.1 %
INSULIN SERPL-MCNC: 9.4 UU/ML
LDLC SERPL CALC-MCNC: 112 MG/DL
LYMPHOCYTES # BLD AUTO: 2.96 K/UL
LYMPHOCYTES NFR BLD AUTO: 35.4 %
MAN DIFF?: NORMAL
MCHC RBC-ENTMCNC: 26.8 PG
MCHC RBC-ENTMCNC: 30.9 GM/DL
MCV RBC AUTO: 86.8 FL
MONOCYTES # BLD AUTO: 0.56 K/UL
MONOCYTES NFR BLD AUTO: 6.7 %
NEUTROPHILS # BLD AUTO: 4.67 K/UL
NEUTROPHILS NFR BLD AUTO: 56 %
NONHDLC SERPL-MCNC: 134 MG/DL
PLATELET # BLD AUTO: 642 K/UL
POTASSIUM SERPL-SCNC: 4.3 MMOL/L
PROT SERPL-MCNC: 7.6 G/DL
RBC # BLD: 4.25 M/UL
RBC # FLD: 16.4 %
SODIUM SERPL-SCNC: 140 MMOL/L
T3FREE SERPL-MCNC: 2.9 PG/ML
T4 FREE SERPL-MCNC: 1.2 NG/DL
TRIGL SERPL-MCNC: 112 MG/DL
TSH SERPL-ACNC: 3.32 UIU/ML
WBC # FLD AUTO: 8.35 K/UL

## 2022-09-02 NOTE — REVIEW OF SYSTEMS
[Negative] : Genitourinary [MED-ROS-Musclo-FT] : knee pain right  [MED-ROS-Neuro-FT] : hx of migraines

## 2022-09-02 NOTE — ASSESSMENT
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

## 2022-09-02 NOTE — HISTORY OF PRESENT ILLNESS
[Home] : at home, [unfilled] , at the time of the visit. [Other Location: e.g. Home (Enter Location, City,State)___] : at [unfilled] [Verbal consent obtained from patient] : the patient, [unfilled] [Improved Health] : Improved health [Quality of Life] : Quality of life [Improved Looks/Aesthetics] : Improved looks/aesthetics [Improve Mood] : Improved mood [Decrease Medications] : Decrease medications [Young Adult] : yound adult [Cut/Track Calories] : cut/track calories [Exercise: ____] : exercise: [unfilled] [Poor eating habits] : poor eating habits [Time management] : time management [Cravings] : cravings [Motivation] : motivation [Portions/overeating] : portions/overeating [Work stress] : work stress [Medical conditions] : medical conditions [Change in activity level] : change in activity level [6] : 6 [I usually sleep 4-6 hours] : I usually sleep 4-6 hours [I snore] : I snore [Lunch] : lunch [Dinner] : dinner [Evening] : evening [Crunchy] : crunchy [Salty] : salty [1-2] : Meat, fish, poultry, eggs, cheese: 1-2 [Less than 1] : Sweets: less than 1 [3] : Fast food - meals per week: 3 [1] : How many cans of soda do you drink per day: 1 [8+] : How many cups of water do you regularly drink per day: 8+ [2] : Cups of coffee per day: 2 [Creamer] : creamer [Sugar] : sugar [Self] : self [Overlarge portions] : overlarge portions [Skip Meals] : skip meals [Binge] : binge [Often Go For Seconds] : often go for seconds [Emotional Eating] : emotional eating [Boredom Eating] : boredom eating [Finish Your Plate Mentality] : finish your plate mentality [1 mile] : Walking distance capability:1 mile [Walking] : walking [0] : 0 [None] : none [T2DM] : T2DM [HTN] : HTN [FreeTextEntry2] : 165 [FreeTextEntry3] : 233 [] : No [FreeTextEntry1] : 42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\par \par Breakfast: \par gen does not eat\par coffee: 1T sugar with creamer\par if she were to eat breakfast:  3 eggs and 2 slices of bread\par \par snack \par none\par \par Lunch\par sandwich : turkey , cheese( 1) bean or or mustard \par water\par dessert\par none \par \par snack \par +/-\par ramen noodles \par chips\par \par Dinner: 5pm - 7 pm \par soup: chicken noodle\par or \par rice beans and chicken or beef\par +/- cooked veggies \par dessert: none\par \par snack \par popcorn, cheese nips or gold fish \par \par No exercise \par No ETOH\par \par Sleep: works nights : 3 days: 6p to 6 a one week a month 4th night

## 2022-09-02 NOTE — PHYSICAL EXAM
[Obese, well nourished, in no acute distress] : obese, well nourished, in no acute distress [Normal] : affect appropriate [de-identified] : enlarged thyroid visually

## 2022-09-02 NOTE — CONSULT LETTER
[Dear  ___] : Dear  [unfilled], [Consult Letter:] : I had the pleasure of evaluating your patient, [unfilled]. [Please see my note below.] : Please see my note below. [Consult Closing:] : Thank you very much for allowing me to participate in the care of this patient.  If you have any questions, please do not hesitate to contact me. [Sincerely,] : Sincerely, [FreeTextEntry3] : Priscilla Tijerina MD

## 2022-09-28 ENCOUNTER — APPOINTMENT (OUTPATIENT)
Dept: BARIATRICS | Facility: CLINIC | Age: 42
End: 2022-09-28

## 2022-09-28 ENCOUNTER — APPOINTMENT (OUTPATIENT)
Dept: FAMILY MEDICINE | Facility: CLINIC | Age: 42
End: 2022-09-28

## 2022-09-28 VITALS — WEIGHT: 218 LBS | BODY MASS INDEX: 33.04 KG/M2 | HEIGHT: 68 IN

## 2022-09-28 VITALS
SYSTOLIC BLOOD PRESSURE: 135 MMHG | WEIGHT: 218 LBS | DIASTOLIC BLOOD PRESSURE: 93 MMHG | HEART RATE: 71 BPM | BODY MASS INDEX: 33.04 KG/M2 | HEIGHT: 68 IN

## 2022-09-28 PROCEDURE — 97802 MEDICAL NUTRITION INDIV IN: CPT | Mod: 95

## 2022-09-28 PROCEDURE — 99214 OFFICE O/P EST MOD 30 MIN: CPT

## 2022-09-28 RX ORDER — AMLODIPINE BESYLATE 10 MG/1
10 TABLET ORAL
Qty: 90 | Refills: 0 | Status: DISCONTINUED | COMMUNITY
Start: 2022-01-13 | End: 2022-09-28

## 2022-09-28 RX ORDER — LEVOTHYROXINE SODIUM 75 UG/1
75 TABLET ORAL DAILY
Refills: 0 | Status: DISCONTINUED | COMMUNITY
End: 2022-09-28

## 2022-09-28 NOTE — PLAN
[FreeTextEntry1] : \par In regards to hypertension\par Patient's blood pressure now in target. \par Changing amlodipine 10 mg QD to Losartan 50 mg QD due to leg swelling. Can try leg elevation\par Avoid salt\par Monitor blood pressures at home and birng the log\par \par In regards Hypothyroidism:\par Last TSH 3.32 on 09/01/2022\par Has not been on levothyroxine for years, reports that her thyroid was controlled and can only take the Synthroid brand\par \par Obesity, HLD, pre-DM\par Lifestyle modifications discussed\par HbA1C 6.2% on 09/01/2022\par lipids on 09/01/2022: , rest unremarkable\par Follows up with weight medicine \par \par Thrombocytosis, anemia\par Asymptomatic, chronic per HIE\par Saw heme/onc. On ASA 81 mg and iron\par May need clearance for her IUI procedure and pregnancy\par Monitor for bleeding symptoms\par \par Return to care: within 3 months for blood pressure check or earlier if needed, advised to keep her appointments and adhere to the treatment\par Call or return for any questions

## 2022-09-28 NOTE — HISTORY OF PRESENT ILLNESS
[FreeTextEntry1] : follow up [de-identified] : Ms. YOAN JORDAN is a pleasant 42 year old female with PMH of migraines, GERD, HTN, obesity, hypothyroidism not on levothyroxine, pre-DM, HLD, thrombocytosis who comes in to the office to follow up in medical conditions. Her amlodipine was increased to 10 mg and has ankle swelling. She stopped the amlodipine 2 days ago and her leg/ankle swelling is almost resolved.\par \par She has  anemia, thrombocytosis, saw heme/onc Dr Garcia on 07/05/2022,  is on ASA and iron. \par She is seeing a reproductive endocrinologist specialist, will get Intrauterine insemination. \par She reports a seasonal rash in her her arms, at the beginning of the summer and with sun exposure which is clearing now. She sees dermatology who gives her a medrol-pack and it resolves. Denies shortness of breath, other triggers. \par She needs a job physical form to be filled out\par No other complaints today.\par \par Fertility Specialist: [place] [facility]\par Previous PCP: Norm

## 2022-10-07 ENCOUNTER — APPOINTMENT (OUTPATIENT)
Dept: BARIATRICS/WEIGHT MGMT | Facility: CLINIC | Age: 42
End: 2022-10-07

## 2022-10-07 VITALS — WEIGHT: 214.8 LBS | BODY MASS INDEX: 32.66 KG/M2

## 2022-10-07 PROCEDURE — 99213 OFFICE O/P EST LOW 20 MIN: CPT | Mod: 95

## 2022-10-07 NOTE — HISTORY OF PRESENT ILLNESS
[Home] : at home, [unfilled] , at the time of the visit. [Other Location: e.g. Home (Enter Location, City,State)___] : at [unfilled] [Verbal consent obtained from patient] : the patient, [unfilled] [FreeTextEntry1] : here for follow up for wt management. Diet had improved in general, aware of her a1c 6.2 and more aware of what she is eating \par \par breakfast\par egg with veggies\par coffee: no sugar creamer\par \par snack \par none \par \par lunch \par ground chicken and veggies \par cauliflower rice or brown rice \par \par snack\par +/- apple \par \par Dinner 5: 30 \par plant based burger/black bean and broccoli and cauliflower and carrots \par no dessert\par \par exer\par none \par sleep \par does the night shift 6p to 6a \par hour during lunch break\par 9a - 2 pm the latest  no continuous sleep \par \par ETOH none

## 2022-10-07 NOTE — ASSESSMENT
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 A1c = 6.2 so  will try for Mounjaro 2.5 mg, last exam no nodules in her neck, no family hx of malig or personal \par 1 stop simple carbs: done\par 2 veg with every meal, eating healthy crackers, and has days that she is more hungry but making better choices , suggested apples  fruits berries \par 3 fruit instead of chips and fruit daily- has cookies occasionally \par 4 exer: goal 150/60 cardio-wts\par 5 nutritional eval\par 6 journal food\par \par \par \par

## 2022-10-13 NOTE — REVIEW OF SYSTEMS
[Negative] : Heme/Lymph Quinolones Counseling:  I discussed with the patient the risks of fluoroquinolones including but not limited to GI upset, allergic reaction, drug rash, diarrhea, dizziness, photosensitivity, yeast infections, liver function test abnormalities, tendonitis/tendon rupture.

## 2022-10-31 ENCOUNTER — APPOINTMENT (OUTPATIENT)
Dept: HUMAN REPRODUCTION | Facility: CLINIC | Age: 42
End: 2022-10-31

## 2022-11-03 ENCOUNTER — APPOINTMENT (OUTPATIENT)
Dept: HUMAN REPRODUCTION | Facility: CLINIC | Age: 42
End: 2022-11-03

## 2022-11-03 PROCEDURE — 99215 OFFICE O/P EST HI 40 MIN: CPT | Mod: 95

## 2022-11-07 ENCOUNTER — APPOINTMENT (OUTPATIENT)
Dept: BARIATRICS/WEIGHT MGMT | Facility: CLINIC | Age: 42
End: 2022-11-07

## 2022-11-07 ENCOUNTER — OUTPATIENT (OUTPATIENT)
Dept: OUTPATIENT SERVICES | Facility: HOSPITAL | Age: 42
LOS: 1 days | Discharge: HOME | End: 2022-11-07

## 2022-11-07 VITALS — WEIGHT: 208 LBS | BODY MASS INDEX: 31.63 KG/M2

## 2022-11-07 DIAGNOSIS — N60.02 SOLITARY CYST OF LEFT BREAST: Chronic | ICD-10-CM

## 2022-11-07 DIAGNOSIS — D75.839 THROMBOCYTOSIS, UNSPECIFIED: ICD-10-CM

## 2022-11-07 DIAGNOSIS — Z90.49 ACQUIRED ABSENCE OF OTHER SPECIFIED PARTS OF DIGESTIVE TRACT: Chronic | ICD-10-CM

## 2022-11-07 PROCEDURE — 99213 OFFICE O/P EST LOW 20 MIN: CPT | Mod: 95

## 2022-11-07 RX ORDER — AMOXICILLIN 500 MG/1
500 CAPSULE ORAL
Qty: 21 | Refills: 0 | Status: COMPLETED | COMMUNITY
Start: 2022-10-12

## 2022-11-07 NOTE — ASSESSMENT
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 A1c = 6.2 on Mounjaro 2.5 mg, last exam no nodules in her neck, no family hx of malig or personal , and down 7 lbs but with some constipation  and  adjusting to the meds so will increase to 5, and add MirLax and or senna or Mg so she has a BM daily\par 1 stop simple carbs: done\par 2 veg with every meal, eating healthy crackers, cukes , and has days that she is more hungry but making better choices , suggested apples  fruits berries \par 3 fruit instead of chips and fruit daily- has cookies occasionally \par 4 exer: goal 150/60 cardio-wts doing u tube with her daughter \par 5 nutritional eval done \par 6 journal food\par 7 food desires have clearly decreased \par \par \par \par

## 2022-11-07 NOTE — HISTORY OF PRESENT ILLNESS
[Home] : at home, [unfilled] , at the time of the visit. [Other Location: e.g. Home (Enter Location, City,State)___] : at [unfilled] [Verbal consent obtained from patient] : the patient, [unfilled] [FreeTextEntry1] : here for follow up for wt management . Finding that her appetite was initially suppressed  and now she is hungrier and able to eat more  but is constipated despite colace and fiber( does take with a bottle of water) \par \par breakfast: \par 1 egg with veggies \par or oatmeal with 1/2 cup of yogurt \par \par snack none\par \par Lunch \par ground meat: chicken or turkey with lettuce or cukes \par \par snack \par none \par \par dinner: 3: 30\par ground turkey with chick pea pasta \par snack 4: 30 \par chicken with egg plant \par \par 9 pm kiwi with water\par \par exer:\par walking up and down the starir, doing u tube with her daughter\par \par ETOH none \par \par Sleep : works the overnight shift \par

## 2022-11-18 ENCOUNTER — APPOINTMENT (OUTPATIENT)
Dept: HUMAN REPRODUCTION | Facility: CLINIC | Age: 42
End: 2022-11-18

## 2022-11-18 PROCEDURE — 58999I: CUSTOM

## 2022-11-18 PROCEDURE — 58340 CATHETER FOR HYSTEROGRAPHY: CPT

## 2022-11-18 PROCEDURE — 76831 ECHO EXAM UTERUS: CPT

## 2022-12-02 PROBLEM — D75.839 THROMBOCYTOSIS: Status: ACTIVE | Noted: 2022-06-10

## 2022-12-02 NOTE — ADDENDUM
[FreeTextEntry1] : Documented by Winsome David acting as scribe for Dr. Dillon on 12/02/2022 \par \par All Medical record entries made by the Scribe were at my, Dr. Dillon's, direction and personally dictated by me on 12/02/2022 . I have reviewed the chart and agree that the record accurately reflects my personal performance of the history, physical exam, assessment and plan. I have also personally directed, reviewed, and agreed with the discharge instructions.\par

## 2022-12-02 NOTE — ASSESSMENT
[FreeTextEntry1] :  YOAN JORDAN is a 42 year old F with a h/o migraines, GERD, HTN, obesity, hypothyroidism not on levothyroxine, pre-DM, HLD was referred to our office with Thrombocytosis \par h/o THrombocytosis sinc 2012 and saw a hematologist around then, advised 'infusion' which she did not f/u with \par \par Patient reports heavy periods  Takes oral iron Intermittently. \par \par 5/3/2016: wbc 14.6, hB 11, pLT 419 \par 1/13/22: WBC 6.4, Hb 10.1, plT 606 \par 7/5/222: wbc 7.4, hB 11.4, pLT 534 \par 7/7/22: Ferritin 23, B-12 461, Folate 15.7, \par \par # thrombocytosis \par # anemia\par \par DD for thrombocytosis include reactive, 2/2 anemia, However also discussed primary BM d/o like MPN \par - send CMp, Iron Ferritin, Folate, B12 \par -  She has recently started taking oral iron over the past 3-4 months\par Hb increased today to 11.4 and Plt count at 534 \par - she is currently on Baby asa however stops taking it around thetime of her period, \par - If counts continue to trend up will consider w/u with [facility]PL and Jak2 \par - She is considering having another child ( via artificial insemination) later this year \par - f/u with me in 3 months

## 2022-12-02 NOTE — HISTORY OF PRESENT ILLNESS
[de-identified] :  YOAN JORDAN is a 42 year old F with a h/o migraines, GERD, HTN, obesity, hypothyroidism not on levothyroxine, pre-DM, HLD was referred to our office with Thrombocytosis \par with a plt count of 606 in Jan 2022\par \par Patient reports that she has had THrombocytosis in the past and saw a hematologist ~ 2012, Reports that she was told that she needed an in fusion which awas expensive so she did not proceed with it \par Patient reports heavy periods  Takes oral iron Intermittently. \par \par 5/3/2016: wbc 14.6, hB 11, pLT 419 \par 1/13/22: WBC 6.4, Hb 10.1, plT 606 \par 7/5/222: wbc 7.4, hB 11.4, pLT 534  [de-identified] : Patient presents for a followup\par \par \par SHe has been taking oral iron over the past 3-4 month \par + constipation, Some abdominal discomfort \par + heavy periods Recently completed period 3 days ago \par She is an OR nurse at [employer] \par \par \par \par \par

## 2022-12-14 NOTE — HISTORY OF PRESENT ILLNESS
[FreeTextEntry1] : here for follow up,  doing well with the Mounjaro 5 mg, constipation still present, 1-2 bm per week but she stopped the fiber when she started the senna and colace \par \par Breakfast: \par coffee\par egg x 1 with kale\par \par \par lunch \par chicken with kale and carrots, hearts of palm rice \par \par snack \par none \par \par dinner \par ground meat with veggies and hearts of palm rice or salad \par \par ETOH \par none \par \par Exer: \par had covid so none \par \par sleep : works nights

## 2022-12-14 NOTE — PHYSICAL EXAM
[Obese, well nourished, in no acute distress] : obese, well nourished, in no acute distress [Normal] : normoactive bowel sounds, soft and nontender, no hepatosplenomegaly or masses appreciated [de-identified] : no thyroid nodules, ?enlarged

## 2022-12-14 NOTE — ASSESSMENT
[FreeTextEntry1] : 1.Doing well> needs to get her constipation under control:restart the fiber pills and veg in add to the senna and colace,  and if that does not work then will need to add something in add prior to increasing the Mounjaro 7.5 .\par \par 2. exer as her energy returns from covid\par \par 3. labs in a month: after 3 mos of Mounjaro\par \par 4 renewed the Mounjaro recently \par 5 to see endo for ? enlarged thyroid \par \par

## 2023-01-01 ENCOUNTER — NON-APPOINTMENT (OUTPATIENT)
Age: 43
End: 2023-01-01

## 2023-01-01 ENCOUNTER — APPOINTMENT (OUTPATIENT)
Dept: OBGYN | Facility: CLINIC | Age: 43
End: 2023-01-01
Payer: COMMERCIAL

## 2023-01-01 ENCOUNTER — APPOINTMENT (OUTPATIENT)
Dept: HUMAN REPRODUCTION | Facility: CLINIC | Age: 43
End: 2023-01-01
Payer: COMMERCIAL

## 2023-01-01 ENCOUNTER — OUTPATIENT (OUTPATIENT)
Dept: OUTPATIENT SERVICES | Facility: HOSPITAL | Age: 43
LOS: 1 days | End: 2023-01-01
Payer: COMMERCIAL

## 2023-01-01 ENCOUNTER — RX RENEWAL (OUTPATIENT)
Age: 43
End: 2023-01-01

## 2023-01-01 ENCOUNTER — RESULT REVIEW (OUTPATIENT)
Age: 43
End: 2023-01-01

## 2023-01-01 ENCOUNTER — APPOINTMENT (OUTPATIENT)
Dept: FAMILY MEDICINE | Facility: CLINIC | Age: 43
End: 2023-01-01
Payer: COMMERCIAL

## 2023-01-01 ENCOUNTER — APPOINTMENT (OUTPATIENT)
Dept: ULTRASOUND IMAGING | Facility: CLINIC | Age: 43
End: 2023-01-01
Payer: COMMERCIAL

## 2023-01-01 ENCOUNTER — APPOINTMENT (OUTPATIENT)
Dept: BARIATRICS/WEIGHT MGMT | Facility: CLINIC | Age: 43
End: 2023-01-01

## 2023-01-01 ENCOUNTER — TRANSCRIPTION ENCOUNTER (OUTPATIENT)
Age: 43
End: 2023-01-01

## 2023-01-01 ENCOUNTER — APPOINTMENT (OUTPATIENT)
Dept: ENDOCRINOLOGY | Facility: CLINIC | Age: 43
End: 2023-01-01
Payer: COMMERCIAL

## 2023-01-01 ENCOUNTER — APPOINTMENT (OUTPATIENT)
Dept: BARIATRICS/WEIGHT MGMT | Facility: CLINIC | Age: 43
End: 2023-01-01
Payer: COMMERCIAL

## 2023-01-01 ENCOUNTER — APPOINTMENT (OUTPATIENT)
Dept: OBGYN | Facility: HOSPITAL | Age: 43
End: 2023-01-01

## 2023-01-01 ENCOUNTER — APPOINTMENT (OUTPATIENT)
Dept: ENDOCRINOLOGY | Facility: CLINIC | Age: 43
End: 2023-01-01

## 2023-01-01 ENCOUNTER — APPOINTMENT (OUTPATIENT)
Dept: MAMMOGRAPHY | Facility: CLINIC | Age: 43
End: 2023-01-01

## 2023-01-01 ENCOUNTER — APPOINTMENT (OUTPATIENT)
Dept: MAMMOGRAPHY | Facility: CLINIC | Age: 43
End: 2023-01-01
Payer: COMMERCIAL

## 2023-01-01 ENCOUNTER — APPOINTMENT (OUTPATIENT)
Dept: ULTRASOUND IMAGING | Facility: CLINIC | Age: 43
End: 2023-01-01

## 2023-01-01 ENCOUNTER — INPATIENT (INPATIENT)
Facility: HOSPITAL | Age: 43
LOS: 12 days | DRG: 91 | End: 2023-11-27
Attending: INTERNAL MEDICINE | Admitting: INTERNAL MEDICINE
Payer: COMMERCIAL

## 2023-01-01 ENCOUNTER — APPOINTMENT (OUTPATIENT)
Dept: HUMAN REPRODUCTION | Facility: CLINIC | Age: 43
End: 2023-01-01

## 2023-01-01 VITALS
SYSTOLIC BLOOD PRESSURE: 121 MMHG | DIASTOLIC BLOOD PRESSURE: 92 MMHG | WEIGHT: 194 LBS | RESPIRATION RATE: 16 BRPM | HEIGHT: 67 IN | HEART RATE: 82 BPM | BODY MASS INDEX: 30.45 KG/M2

## 2023-01-01 VITALS
OXYGEN SATURATION: 100 % | TEMPERATURE: 98 F | HEART RATE: 80 BPM | RESPIRATION RATE: 16 BRPM | SYSTOLIC BLOOD PRESSURE: 124 MMHG | DIASTOLIC BLOOD PRESSURE: 90 MMHG | HEIGHT: 67 IN | WEIGHT: 192.02 LBS

## 2023-01-01 VITALS
HEART RATE: 71 BPM | OXYGEN SATURATION: 100 % | SYSTOLIC BLOOD PRESSURE: 140 MMHG | RESPIRATION RATE: 16 BRPM | DIASTOLIC BLOOD PRESSURE: 83 MMHG

## 2023-01-01 VITALS
WEIGHT: 197 LBS | SYSTOLIC BLOOD PRESSURE: 133 MMHG | RESPIRATION RATE: 16 BRPM | DIASTOLIC BLOOD PRESSURE: 88 MMHG | HEIGHT: 67 IN | HEART RATE: 81 BPM | BODY MASS INDEX: 30.92 KG/M2

## 2023-01-01 VITALS
BODY MASS INDEX: 31.55 KG/M2 | SYSTOLIC BLOOD PRESSURE: 156 MMHG | HEART RATE: 75 BPM | OXYGEN SATURATION: 99 % | DIASTOLIC BLOOD PRESSURE: 103 MMHG | WEIGHT: 201 LBS | HEIGHT: 67 IN

## 2023-01-01 VITALS
DIASTOLIC BLOOD PRESSURE: 93 MMHG | OXYGEN SATURATION: 96 % | HEART RATE: 109 BPM | SYSTOLIC BLOOD PRESSURE: 145 MMHG | RESPIRATION RATE: 18 BRPM | HEIGHT: 68 IN | WEIGHT: 192.02 LBS

## 2023-01-01 VITALS
WEIGHT: 198 LBS | SYSTOLIC BLOOD PRESSURE: 136 MMHG | DIASTOLIC BLOOD PRESSURE: 100 MMHG | BODY MASS INDEX: 31.08 KG/M2 | HEIGHT: 67 IN

## 2023-01-01 VITALS — BODY MASS INDEX: 30.13 KG/M2 | HEIGHT: 67 IN | WEIGHT: 192 LBS

## 2023-01-01 VITALS — BODY MASS INDEX: 30.07 KG/M2 | WEIGHT: 192 LBS

## 2023-01-01 VITALS
DIASTOLIC BLOOD PRESSURE: 88 MMHG | SYSTOLIC BLOOD PRESSURE: 154 MMHG | TEMPERATURE: 97 F | OXYGEN SATURATION: 100 % | HEART RATE: 82 BPM | RESPIRATION RATE: 18 BRPM

## 2023-01-01 VITALS — HEIGHT: 67 IN | WEIGHT: 197 LBS | BODY MASS INDEX: 30.92 KG/M2

## 2023-01-01 VITALS
DIASTOLIC BLOOD PRESSURE: 80 MMHG | BODY MASS INDEX: 29.82 KG/M2 | HEIGHT: 67 IN | WEIGHT: 190 LBS | SYSTOLIC BLOOD PRESSURE: 130 MMHG

## 2023-01-01 VITALS
DIASTOLIC BLOOD PRESSURE: 98 MMHG | BODY MASS INDEX: 30.45 KG/M2 | HEART RATE: 80 BPM | HEIGHT: 67 IN | WEIGHT: 194 LBS | SYSTOLIC BLOOD PRESSURE: 150 MMHG

## 2023-01-01 DIAGNOSIS — N60.02 SOLITARY CYST OF LEFT BREAST: Chronic | ICD-10-CM

## 2023-01-01 DIAGNOSIS — Z98.890 OTHER SPECIFIED POSTPROCEDURAL STATES: Chronic | ICD-10-CM

## 2023-01-01 DIAGNOSIS — I46.9 CARDIAC ARREST, CAUSE UNSPECIFIED: ICD-10-CM

## 2023-01-01 DIAGNOSIS — Z11.3 ENCOUNTER FOR SCREENING FOR INFECTIONS WITH A PREDOMINANTLY SEXUAL MODE OF TRANSMISSION: ICD-10-CM

## 2023-01-01 DIAGNOSIS — E03.9 HYPOTHYROIDISM, UNSPECIFIED: ICD-10-CM

## 2023-01-01 DIAGNOSIS — Z11.8 ENCOUNTER FOR SCREENING FOR OTHER INFECTIOUS AND PARASITIC DISEASES: ICD-10-CM

## 2023-01-01 DIAGNOSIS — Z12.39 ENCOUNTER FOR OTHER SCREENING FOR MALIGNANT NEOPLASM OF BREAST: ICD-10-CM

## 2023-01-01 DIAGNOSIS — D21.9 BENIGN NEOPLASM OF CONNECTIVE AND OTHER SOFT TISSUE, UNSPECIFIED: ICD-10-CM

## 2023-01-01 DIAGNOSIS — Z01.818 ENCOUNTER FOR OTHER PREPROCEDURAL EXAMINATION: ICD-10-CM

## 2023-01-01 DIAGNOSIS — R11.0 NAUSEA: ICD-10-CM

## 2023-01-01 DIAGNOSIS — R92.8 OTHER ABNORMAL AND INCONCLUSIVE FINDINGS ON DIAGNOSTIC IMAGING OF BREAST: ICD-10-CM

## 2023-01-01 DIAGNOSIS — Z11.4 ENCOUNTER FOR SCREENING FOR HUMAN IMMUNODEFICIENCY VIRUS [HIV]: ICD-10-CM

## 2023-01-01 DIAGNOSIS — E03.9 ENDOCRINE, NUTRITIONAL AND METABOLIC DISEASES COMPLICATING PREGNANCY, SECOND TRIMESTER: ICD-10-CM

## 2023-01-01 DIAGNOSIS — R73.03 PREDIABETES.: ICD-10-CM

## 2023-01-01 DIAGNOSIS — Z90.49 ACQUIRED ABSENCE OF OTHER SPECIFIED PARTS OF DIGESTIVE TRACT: Chronic | ICD-10-CM

## 2023-01-01 DIAGNOSIS — R10.11 RIGHT UPPER QUADRANT PAIN: ICD-10-CM

## 2023-01-01 DIAGNOSIS — E78.5 HYPERLIPIDEMIA, UNSPECIFIED: ICD-10-CM

## 2023-01-01 DIAGNOSIS — E66.9 OBESITY, UNSPECIFIED: ICD-10-CM

## 2023-01-01 DIAGNOSIS — H60.90 UNSPECIFIED OTITIS EXTERNA, UNSPECIFIED EAR: ICD-10-CM

## 2023-01-01 DIAGNOSIS — N84.0 POLYP OF CORPUS UTERI: ICD-10-CM

## 2023-01-01 DIAGNOSIS — I10 ESSENTIAL (PRIMARY) HYPERTENSION: ICD-10-CM

## 2023-01-01 DIAGNOSIS — Z02.89 ENCOUNTER FOR OTHER ADMINISTRATIVE EXAMINATIONS: ICD-10-CM

## 2023-01-01 DIAGNOSIS — Z00.8 ENCOUNTER FOR OTHER GENERAL EXAMINATION: ICD-10-CM

## 2023-01-01 DIAGNOSIS — Z11.52 ENCOUNTER FOR SCREENING FOR COVID-19: ICD-10-CM

## 2023-01-01 DIAGNOSIS — Z51.5 ENCOUNTER FOR PALLIATIVE CARE: ICD-10-CM

## 2023-01-01 DIAGNOSIS — R53.2 FUNCTIONAL QUADRIPLEGIA: ICD-10-CM

## 2023-01-01 DIAGNOSIS — O99.282 ENDOCRINE, NUTRITIONAL AND METABOLIC DISEASES COMPLICATING PREGNANCY, SECOND TRIMESTER: ICD-10-CM

## 2023-01-01 DIAGNOSIS — Z11.1 ENCOUNTER FOR SCREENING FOR RESPIRATORY TUBERCULOSIS: ICD-10-CM

## 2023-01-01 DIAGNOSIS — R59.9 ENLARGED LYMPH NODES, UNSPECIFIED: ICD-10-CM

## 2023-01-01 DIAGNOSIS — Z71.89 OTHER SPECIFIED COUNSELING: ICD-10-CM

## 2023-01-01 DIAGNOSIS — E11.9 TYPE 2 DIABETES MELLITUS WITHOUT COMPLICATIONS: ICD-10-CM

## 2023-01-01 DIAGNOSIS — J06.9 ACUTE UPPER RESPIRATORY INFECTION, UNSPECIFIED: ICD-10-CM

## 2023-01-01 DIAGNOSIS — Z00.00 ENCOUNTER FOR GENERAL ADULT MEDICAL EXAMINATION W/OUT ABNORMAL FINDINGS: ICD-10-CM

## 2023-01-01 DIAGNOSIS — G93.1 ANOXIC BRAIN DAMAGE, NOT ELSEWHERE CLASSIFIED: ICD-10-CM

## 2023-01-01 LAB
-  STAPHYLOCOCCUS EPIDERMIDIS, METHICILLIN RESISTANT: SIGNIFICANT CHANGE UP
-  STAPHYLOCOCCUS EPIDERMIDIS, METHICILLIN RESISTANT: SIGNIFICANT CHANGE UP
25(OH)D3 SERPL-MCNC: 29.9 NG/ML
ALBUMIN SERPL ELPH-MCNC: 1.8 G/DL — LOW (ref 3.3–5)
ALBUMIN SERPL ELPH-MCNC: 1.8 G/DL — LOW (ref 3.3–5)
ALBUMIN SERPL ELPH-MCNC: 1.9 G/DL — LOW (ref 3.3–5)
ALBUMIN SERPL ELPH-MCNC: 1.9 G/DL — LOW (ref 3.3–5)
ALBUMIN SERPL ELPH-MCNC: 2 G/DL — LOW (ref 3.3–5)
ALBUMIN SERPL ELPH-MCNC: 2 G/DL — LOW (ref 3.3–5)
ALBUMIN SERPL ELPH-MCNC: 2.2 G/DL — LOW (ref 3.3–5)
ALBUMIN SERPL ELPH-MCNC: 2.2 G/DL — LOW (ref 3.3–5)
ALBUMIN SERPL ELPH-MCNC: 2.5 G/DL — LOW (ref 3.3–5)
ALBUMIN SERPL ELPH-MCNC: 2.5 G/DL — LOW (ref 3.3–5)
ALBUMIN SERPL ELPH-MCNC: 2.6 G/DL — LOW (ref 3.3–5)
ALBUMIN SERPL ELPH-MCNC: 2.8 G/DL — LOW (ref 3.3–5)
ALBUMIN SERPL ELPH-MCNC: 2.9 G/DL — LOW (ref 3.3–5)
ALBUMIN SERPL ELPH-MCNC: 3 G/DL — LOW (ref 3.3–5)
ALBUMIN SERPL ELPH-MCNC: 3.2 G/DL — LOW (ref 3.3–5)
ALBUMIN SERPL ELPH-MCNC: 3.4 G/DL — SIGNIFICANT CHANGE UP (ref 3.3–5)
ALBUMIN SERPL ELPH-MCNC: 3.4 G/DL — SIGNIFICANT CHANGE UP (ref 3.3–5)
ALBUMIN SERPL ELPH-MCNC: 4.2 G/DL
ALBUMIN SERPL ELPH-MCNC: 4.6 G/DL
ALP BLD-CCNC: 57 U/L
ALP BLD-CCNC: 64 U/L
ALP SERPL-CCNC: 108 U/L — SIGNIFICANT CHANGE UP (ref 40–120)
ALP SERPL-CCNC: 108 U/L — SIGNIFICANT CHANGE UP (ref 40–120)
ALP SERPL-CCNC: 114 U/L — SIGNIFICANT CHANGE UP (ref 40–120)
ALP SERPL-CCNC: 114 U/L — SIGNIFICANT CHANGE UP (ref 40–120)
ALP SERPL-CCNC: 116 U/L — SIGNIFICANT CHANGE UP (ref 40–120)
ALP SERPL-CCNC: 116 U/L — SIGNIFICANT CHANGE UP (ref 40–120)
ALP SERPL-CCNC: 117 U/L — SIGNIFICANT CHANGE UP (ref 40–120)
ALP SERPL-CCNC: 117 U/L — SIGNIFICANT CHANGE UP (ref 40–120)
ALP SERPL-CCNC: 119 U/L — SIGNIFICANT CHANGE UP (ref 40–120)
ALP SERPL-CCNC: 119 U/L — SIGNIFICANT CHANGE UP (ref 40–120)
ALP SERPL-CCNC: 126 U/L — HIGH (ref 40–120)
ALP SERPL-CCNC: 126 U/L — HIGH (ref 40–120)
ALP SERPL-CCNC: 137 U/L — HIGH (ref 40–120)
ALP SERPL-CCNC: 137 U/L — HIGH (ref 40–120)
ALP SERPL-CCNC: 138 U/L — HIGH (ref 40–120)
ALP SERPL-CCNC: 138 U/L — HIGH (ref 40–120)
ALP SERPL-CCNC: 148 U/L — HIGH (ref 40–120)
ALP SERPL-CCNC: 148 U/L — HIGH (ref 40–120)
ALP SERPL-CCNC: 152 U/L — HIGH (ref 40–120)
ALP SERPL-CCNC: 152 U/L — HIGH (ref 40–120)
ALP SERPL-CCNC: 159 U/L — HIGH (ref 40–120)
ALP SERPL-CCNC: 159 U/L — HIGH (ref 40–120)
ALP SERPL-CCNC: 165 U/L — HIGH (ref 40–120)
ALP SERPL-CCNC: 165 U/L — HIGH (ref 40–120)
ALP SERPL-CCNC: 168 U/L — HIGH (ref 40–120)
ALP SERPL-CCNC: 168 U/L — HIGH (ref 40–120)
ALP SERPL-CCNC: 176 U/L — HIGH (ref 40–120)
ALP SERPL-CCNC: 176 U/L — HIGH (ref 40–120)
ALP SERPL-CCNC: 193 U/L — HIGH (ref 40–120)
ALP SERPL-CCNC: 193 U/L — HIGH (ref 40–120)
ALP SERPL-CCNC: 75 U/L — SIGNIFICANT CHANGE UP (ref 40–120)
ALP SERPL-CCNC: 75 U/L — SIGNIFICANT CHANGE UP (ref 40–120)
ALP SERPL-CCNC: 79 U/L — SIGNIFICANT CHANGE UP (ref 40–120)
ALP SERPL-CCNC: 82 U/L — SIGNIFICANT CHANGE UP (ref 40–120)
ALP SERPL-CCNC: 82 U/L — SIGNIFICANT CHANGE UP (ref 40–120)
ALT FLD-CCNC: 11 U/L — SIGNIFICANT CHANGE UP (ref 10–45)
ALT FLD-CCNC: 11 U/L — SIGNIFICANT CHANGE UP (ref 10–45)
ALT FLD-CCNC: 13 U/L — SIGNIFICANT CHANGE UP (ref 10–45)
ALT FLD-CCNC: 18 U/L — SIGNIFICANT CHANGE UP (ref 10–45)
ALT FLD-CCNC: 18 U/L — SIGNIFICANT CHANGE UP (ref 10–45)
ALT FLD-CCNC: 23 U/L — SIGNIFICANT CHANGE UP (ref 10–45)
ALT FLD-CCNC: 23 U/L — SIGNIFICANT CHANGE UP (ref 10–45)
ALT FLD-CCNC: 26 U/L — SIGNIFICANT CHANGE UP (ref 10–45)
ALT FLD-CCNC: 26 U/L — SIGNIFICANT CHANGE UP (ref 10–45)
ALT FLD-CCNC: 27 U/L — SIGNIFICANT CHANGE UP (ref 10–45)
ALT FLD-CCNC: 30 U/L — SIGNIFICANT CHANGE UP (ref 10–45)
ALT FLD-CCNC: 30 U/L — SIGNIFICANT CHANGE UP (ref 10–45)
ALT FLD-CCNC: 33 U/L — SIGNIFICANT CHANGE UP (ref 10–45)
ALT FLD-CCNC: 33 U/L — SIGNIFICANT CHANGE UP (ref 10–45)
ALT FLD-CCNC: 36 U/L — SIGNIFICANT CHANGE UP (ref 10–45)
ALT FLD-CCNC: 36 U/L — SIGNIFICANT CHANGE UP (ref 10–45)
ALT FLD-CCNC: 37 U/L — SIGNIFICANT CHANGE UP (ref 10–45)
ALT FLD-CCNC: 37 U/L — SIGNIFICANT CHANGE UP (ref 10–45)
ALT FLD-CCNC: 38 U/L — SIGNIFICANT CHANGE UP (ref 10–45)
ALT FLD-CCNC: 43 U/L — SIGNIFICANT CHANGE UP (ref 10–45)
ALT FLD-CCNC: 43 U/L — SIGNIFICANT CHANGE UP (ref 10–45)
ALT FLD-CCNC: 51 U/L — HIGH (ref 10–45)
ALT FLD-CCNC: 51 U/L — HIGH (ref 10–45)
ALT FLD-CCNC: 52 U/L — HIGH (ref 10–45)
ALT FLD-CCNC: 52 U/L — HIGH (ref 10–45)
ALT FLD-CCNC: 65 U/L — HIGH (ref 10–45)
ALT FLD-CCNC: 65 U/L — HIGH (ref 10–45)
ALT FLD-CCNC: 69 U/L — HIGH (ref 10–45)
ALT FLD-CCNC: 69 U/L — HIGH (ref 10–45)
ALT SERPL-CCNC: 16 U/L
ALT SERPL-CCNC: 9 U/L
ANA TITR SER: NEGATIVE — SIGNIFICANT CHANGE UP
ANA TITR SER: NEGATIVE — SIGNIFICANT CHANGE UP
ANION GAP SERPL CALC-SCNC: 10 MMOL/L — SIGNIFICANT CHANGE UP (ref 5–17)
ANION GAP SERPL CALC-SCNC: 11 MMOL/L — SIGNIFICANT CHANGE UP (ref 5–17)
ANION GAP SERPL CALC-SCNC: 12 MMOL/L — SIGNIFICANT CHANGE UP (ref 5–17)
ANION GAP SERPL CALC-SCNC: 13 MMOL/L — SIGNIFICANT CHANGE UP (ref 5–17)
ANION GAP SERPL CALC-SCNC: 14 MMOL/L
ANION GAP SERPL CALC-SCNC: 14 MMOL/L — SIGNIFICANT CHANGE UP (ref 5–17)
ANION GAP SERPL CALC-SCNC: 14 MMOL/L — SIGNIFICANT CHANGE UP (ref 5–17)
ANION GAP SERPL CALC-SCNC: 15 MMOL/L — SIGNIFICANT CHANGE UP (ref 5–17)
ANION GAP SERPL CALC-SCNC: 15 MMOL/L — SIGNIFICANT CHANGE UP (ref 5–17)
ANION GAP SERPL CALC-SCNC: 16 MMOL/L — SIGNIFICANT CHANGE UP (ref 5–17)
ANION GAP SERPL CALC-SCNC: 16 MMOL/L — SIGNIFICANT CHANGE UP (ref 5–17)
ANION GAP SERPL CALC-SCNC: 17 MMOL/L — SIGNIFICANT CHANGE UP (ref 5–17)
ANION GAP SERPL CALC-SCNC: 19 MMOL/L
ANION GAP SERPL CALC-SCNC: 9 MMOL/L — SIGNIFICANT CHANGE UP (ref 5–17)
ANION GAP SERPL CALC-SCNC: 9 MMOL/L — SIGNIFICANT CHANGE UP (ref 5–17)
ANISOCYTOSIS BLD QL: SLIGHT — SIGNIFICANT CHANGE UP
ANISOCYTOSIS BLD QL: SLIGHT — SIGNIFICANT CHANGE UP
APPEARANCE UR: ABNORMAL
APPEARANCE UR: CLEAR — SIGNIFICANT CHANGE UP
APTT BLD: 33.9 SEC — SIGNIFICANT CHANGE UP (ref 24.5–35.6)
APTT BLD: 33.9 SEC — SIGNIFICANT CHANGE UP (ref 24.5–35.6)
AST SERPL-CCNC: 14 U/L
AST SERPL-CCNC: 15 U/L — SIGNIFICANT CHANGE UP (ref 10–40)
AST SERPL-CCNC: 15 U/L — SIGNIFICANT CHANGE UP (ref 10–40)
AST SERPL-CCNC: 16 U/L — SIGNIFICANT CHANGE UP (ref 10–40)
AST SERPL-CCNC: 16 U/L — SIGNIFICANT CHANGE UP (ref 10–40)
AST SERPL-CCNC: 18 U/L — SIGNIFICANT CHANGE UP (ref 10–40)
AST SERPL-CCNC: 19 U/L
AST SERPL-CCNC: 19 U/L — SIGNIFICANT CHANGE UP (ref 10–40)
AST SERPL-CCNC: 22 U/L — SIGNIFICANT CHANGE UP (ref 10–40)
AST SERPL-CCNC: 22 U/L — SIGNIFICANT CHANGE UP (ref 10–40)
AST SERPL-CCNC: 24 U/L — SIGNIFICANT CHANGE UP (ref 10–40)
AST SERPL-CCNC: 24 U/L — SIGNIFICANT CHANGE UP (ref 10–40)
AST SERPL-CCNC: 26 U/L — SIGNIFICANT CHANGE UP (ref 10–40)
AST SERPL-CCNC: 26 U/L — SIGNIFICANT CHANGE UP (ref 10–40)
AST SERPL-CCNC: 28 U/L — SIGNIFICANT CHANGE UP (ref 10–40)
AST SERPL-CCNC: 28 U/L — SIGNIFICANT CHANGE UP (ref 10–40)
AST SERPL-CCNC: 32 U/L — SIGNIFICANT CHANGE UP (ref 10–40)
AST SERPL-CCNC: 32 U/L — SIGNIFICANT CHANGE UP (ref 10–40)
AST SERPL-CCNC: 37 U/L — SIGNIFICANT CHANGE UP (ref 10–40)
AST SERPL-CCNC: 37 U/L — SIGNIFICANT CHANGE UP (ref 10–40)
AST SERPL-CCNC: 42 U/L — HIGH (ref 10–40)
AST SERPL-CCNC: 42 U/L — HIGH (ref 10–40)
AST SERPL-CCNC: 45 U/L — HIGH (ref 10–40)
AST SERPL-CCNC: 45 U/L — HIGH (ref 10–40)
AST SERPL-CCNC: 49 U/L — HIGH (ref 10–40)
AST SERPL-CCNC: 49 U/L — HIGH (ref 10–40)
AST SERPL-CCNC: 50 U/L — HIGH (ref 10–40)
AST SERPL-CCNC: 50 U/L — HIGH (ref 10–40)
AST SERPL-CCNC: 52 U/L — HIGH (ref 10–40)
AST SERPL-CCNC: 52 U/L — HIGH (ref 10–40)
AST SERPL-CCNC: 61 U/L — HIGH (ref 10–40)
AST SERPL-CCNC: 61 U/L — HIGH (ref 10–40)
AST SERPL-CCNC: 79 U/L — HIGH (ref 10–40)
AST SERPL-CCNC: 79 U/L — HIGH (ref 10–40)
AUTO DIFF PNL BLD: NEGATIVE — SIGNIFICANT CHANGE UP
AUTO DIFF PNL BLD: NEGATIVE — SIGNIFICANT CHANGE UP
B-OH-BUTYR SERPL-SCNC: 0.1 MMOL/L — SIGNIFICANT CHANGE UP
B-OH-BUTYR SERPL-SCNC: 0.1 MMOL/L — SIGNIFICANT CHANGE UP
B2 GLYCOPROT1 AB SER QL: NEGATIVE — SIGNIFICANT CHANGE UP
B2 GLYCOPROT1 AB SER QL: NEGATIVE — SIGNIFICANT CHANGE UP
BACTERIA # UR AUTO: ABNORMAL /HPF
BACTERIA # UR AUTO: ABNORMAL /HPF
BACTERIA # UR AUTO: NEGATIVE /HPF — SIGNIFICANT CHANGE UP
BASE EXCESS BLDV CALC-SCNC: 10.8 MMOL/L — HIGH (ref -2–3)
BASE EXCESS BLDV CALC-SCNC: 10.8 MMOL/L — HIGH (ref -2–3)
BASE EXCESS BLDV CALC-SCNC: 2 MMOL/L — SIGNIFICANT CHANGE UP (ref -2–3)
BASE EXCESS BLDV CALC-SCNC: 2 MMOL/L — SIGNIFICANT CHANGE UP (ref -2–3)
BASE EXCESS BLDV CALC-SCNC: 4.8 MMOL/L — HIGH (ref -2–3)
BASE EXCESS BLDV CALC-SCNC: 4.8 MMOL/L — HIGH (ref -2–3)
BASE EXCESS BLDV CALC-SCNC: 6.4 MMOL/L — HIGH (ref -2–3)
BASE EXCESS BLDV CALC-SCNC: 6.4 MMOL/L — HIGH (ref -2–3)
BASE EXCESS BLDV CALC-SCNC: 9.6 MMOL/L — HIGH (ref -2–3)
BASE EXCESS BLDV CALC-SCNC: 9.6 MMOL/L — HIGH (ref -2–3)
BASOPHILS # BLD AUTO: 0 K/UL — SIGNIFICANT CHANGE UP (ref 0–0.2)
BASOPHILS # BLD AUTO: 0.01 K/UL — SIGNIFICANT CHANGE UP (ref 0–0.2)
BASOPHILS # BLD AUTO: 0.01 K/UL — SIGNIFICANT CHANGE UP (ref 0–0.2)
BASOPHILS # BLD AUTO: 0.02 K/UL — SIGNIFICANT CHANGE UP (ref 0–0.2)
BASOPHILS # BLD AUTO: 0.03 K/UL — SIGNIFICANT CHANGE UP (ref 0–0.2)
BASOPHILS # BLD AUTO: 0.04 K/UL — SIGNIFICANT CHANGE UP (ref 0–0.2)
BASOPHILS # BLD AUTO: 0.05 K/UL — SIGNIFICANT CHANGE UP (ref 0–0.2)
BASOPHILS # BLD AUTO: 0.06 K/UL
BASOPHILS NFR BLD AUTO: 0 % — SIGNIFICANT CHANGE UP (ref 0–2)
BASOPHILS NFR BLD AUTO: 0.1 % — SIGNIFICANT CHANGE UP (ref 0–2)
BASOPHILS NFR BLD AUTO: 0.2 % — SIGNIFICANT CHANGE UP (ref 0–2)
BASOPHILS NFR BLD AUTO: 0.3 % — SIGNIFICANT CHANGE UP (ref 0–2)
BASOPHILS NFR BLD AUTO: 0.4 % — SIGNIFICANT CHANGE UP (ref 0–2)
BASOPHILS NFR BLD AUTO: 0.8 %
BILIRUB SERPL-MCNC: 0.1 MG/DL — LOW (ref 0.2–1.2)
BILIRUB SERPL-MCNC: 0.2 MG/DL
BILIRUB SERPL-MCNC: 0.2 MG/DL
BILIRUB SERPL-MCNC: 0.2 MG/DL — SIGNIFICANT CHANGE UP (ref 0.2–1.2)
BILIRUB SERPL-MCNC: 0.3 MG/DL — SIGNIFICANT CHANGE UP (ref 0.2–1.2)
BILIRUB SERPL-MCNC: 0.3 MG/DL — SIGNIFICANT CHANGE UP (ref 0.2–1.2)
BILIRUB SERPL-MCNC: 0.4 MG/DL — SIGNIFICANT CHANGE UP (ref 0.2–1.2)
BILIRUB SERPL-MCNC: 0.5 MG/DL — SIGNIFICANT CHANGE UP (ref 0.2–1.2)
BILIRUB SERPL-MCNC: <0.1 MG/DL — LOW (ref 0.2–1.2)
BILIRUB SERPL-MCNC: <0.1 MG/DL — LOW (ref 0.2–1.2)
BILIRUB UR-MCNC: NEGATIVE — SIGNIFICANT CHANGE UP
BLD GP AB SCN SERPL QL: NEGATIVE — SIGNIFICANT CHANGE UP
BUN SERPL-MCNC: 10 MG/DL — SIGNIFICANT CHANGE UP (ref 7–23)
BUN SERPL-MCNC: 101 MG/DL — HIGH (ref 7–23)
BUN SERPL-MCNC: 101 MG/DL — HIGH (ref 7–23)
BUN SERPL-MCNC: 104 MG/DL — HIGH (ref 7–23)
BUN SERPL-MCNC: 104 MG/DL — HIGH (ref 7–23)
BUN SERPL-MCNC: 11 MG/DL
BUN SERPL-MCNC: 11 MG/DL — SIGNIFICANT CHANGE UP (ref 7–23)
BUN SERPL-MCNC: 13 MG/DL — SIGNIFICANT CHANGE UP (ref 7–23)
BUN SERPL-MCNC: 13 MG/DL — SIGNIFICANT CHANGE UP (ref 7–23)
BUN SERPL-MCNC: 14 MG/DL — SIGNIFICANT CHANGE UP (ref 7–23)
BUN SERPL-MCNC: 15 MG/DL — SIGNIFICANT CHANGE UP (ref 7–23)
BUN SERPL-MCNC: 18 MG/DL — SIGNIFICANT CHANGE UP (ref 7–23)
BUN SERPL-MCNC: 19 MG/DL — SIGNIFICANT CHANGE UP (ref 7–23)
BUN SERPL-MCNC: 19 MG/DL — SIGNIFICANT CHANGE UP (ref 7–23)
BUN SERPL-MCNC: 20 MG/DL — SIGNIFICANT CHANGE UP (ref 7–23)
BUN SERPL-MCNC: 20 MG/DL — SIGNIFICANT CHANGE UP (ref 7–23)
BUN SERPL-MCNC: 23 MG/DL — SIGNIFICANT CHANGE UP (ref 7–23)
BUN SERPL-MCNC: 23 MG/DL — SIGNIFICANT CHANGE UP (ref 7–23)
BUN SERPL-MCNC: 32 MG/DL — HIGH (ref 7–23)
BUN SERPL-MCNC: 32 MG/DL — HIGH (ref 7–23)
BUN SERPL-MCNC: 44 MG/DL — HIGH (ref 7–23)
BUN SERPL-MCNC: 44 MG/DL — HIGH (ref 7–23)
BUN SERPL-MCNC: 54 MG/DL — HIGH (ref 7–23)
BUN SERPL-MCNC: 54 MG/DL — HIGH (ref 7–23)
BUN SERPL-MCNC: 77 MG/DL — HIGH (ref 7–23)
BUN SERPL-MCNC: 77 MG/DL — HIGH (ref 7–23)
BUN SERPL-MCNC: 8 MG/DL
C-ANCA SER-ACNC: NEGATIVE — SIGNIFICANT CHANGE UP
C-ANCA SER-ACNC: NEGATIVE — SIGNIFICANT CHANGE UP
CA-I SERPL-SCNC: 1.12 MMOL/L — LOW (ref 1.15–1.33)
CA-I SERPL-SCNC: 1.12 MMOL/L — LOW (ref 1.15–1.33)
CA-I SERPL-SCNC: 1.3 MMOL/L — SIGNIFICANT CHANGE UP (ref 1.15–1.33)
CA-I SERPL-SCNC: 1.3 MMOL/L — SIGNIFICANT CHANGE UP (ref 1.15–1.33)
CA-I SERPL-SCNC: 1.31 MMOL/L — SIGNIFICANT CHANGE UP (ref 1.15–1.33)
CA-I SERPL-SCNC: 1.31 MMOL/L — SIGNIFICANT CHANGE UP (ref 1.15–1.33)
CA-I SERPL-SCNC: 1.38 MMOL/L — HIGH (ref 1.15–1.33)
CA-I SERPL-SCNC: 1.38 MMOL/L — HIGH (ref 1.15–1.33)
CA-I SERPL-SCNC: 1.4 MMOL/L — HIGH (ref 1.15–1.33)
CA-I SERPL-SCNC: 1.4 MMOL/L — HIGH (ref 1.15–1.33)
CALCIUM SERPL-MCNC: 10.1 MG/DL
CALCIUM SERPL-MCNC: 10.1 MG/DL — SIGNIFICANT CHANGE UP (ref 8.4–10.5)
CALCIUM SERPL-MCNC: 10.1 MG/DL — SIGNIFICANT CHANGE UP (ref 8.4–10.5)
CALCIUM SERPL-MCNC: 10.4 MG/DL — SIGNIFICANT CHANGE UP (ref 8.4–10.5)
CALCIUM SERPL-MCNC: 10.4 MG/DL — SIGNIFICANT CHANGE UP (ref 8.4–10.5)
CALCIUM SERPL-MCNC: 10.5 MG/DL — SIGNIFICANT CHANGE UP (ref 8.4–10.5)
CALCIUM SERPL-MCNC: 10.5 MG/DL — SIGNIFICANT CHANGE UP (ref 8.4–10.5)
CALCIUM SERPL-MCNC: 10.7 MG/DL — HIGH (ref 8.4–10.5)
CALCIUM SERPL-MCNC: 10.7 MG/DL — HIGH (ref 8.4–10.5)
CALCIUM SERPL-MCNC: 10.8 MG/DL — HIGH (ref 8.4–10.5)
CALCIUM SERPL-MCNC: 11 MG/DL — HIGH (ref 8.4–10.5)
CALCIUM SERPL-MCNC: 11 MG/DL — HIGH (ref 8.4–10.5)
CALCIUM SERPL-MCNC: 11.4 MG/DL — HIGH (ref 8.4–10.5)
CALCIUM SERPL-MCNC: 11.4 MG/DL — HIGH (ref 8.4–10.5)
CALCIUM SERPL-MCNC: 8.5 MG/DL — SIGNIFICANT CHANGE UP (ref 8.4–10.5)
CALCIUM SERPL-MCNC: 8.5 MG/DL — SIGNIFICANT CHANGE UP (ref 8.4–10.5)
CALCIUM SERPL-MCNC: 8.8 MG/DL — SIGNIFICANT CHANGE UP (ref 8.4–10.5)
CALCIUM SERPL-MCNC: 8.8 MG/DL — SIGNIFICANT CHANGE UP (ref 8.4–10.5)
CALCIUM SERPL-MCNC: 9.3 MG/DL — SIGNIFICANT CHANGE UP (ref 8.4–10.5)
CALCIUM SERPL-MCNC: 9.3 MG/DL — SIGNIFICANT CHANGE UP (ref 8.4–10.5)
CALCIUM SERPL-MCNC: 9.4 MG/DL
CALCIUM SERPL-MCNC: 9.4 MG/DL — SIGNIFICANT CHANGE UP (ref 8.4–10.5)
CALCIUM SERPL-MCNC: 9.5 MG/DL — SIGNIFICANT CHANGE UP (ref 8.4–10.5)
CALCIUM SERPL-MCNC: 9.5 MG/DL — SIGNIFICANT CHANGE UP (ref 8.4–10.5)
CALCIUM SERPL-MCNC: 9.6 MG/DL — SIGNIFICANT CHANGE UP (ref 8.4–10.5)
CALCIUM SERPL-MCNC: 9.6 MG/DL — SIGNIFICANT CHANGE UP (ref 8.4–10.5)
CALCIUM SERPL-MCNC: 9.9 MG/DL — SIGNIFICANT CHANGE UP (ref 8.4–10.5)
CARDIOLIPIN AB SER-ACNC: NEGATIVE — SIGNIFICANT CHANGE UP
CARDIOLIPIN AB SER-ACNC: NEGATIVE — SIGNIFICANT CHANGE UP
CAST: 2 /LPF — SIGNIFICANT CHANGE UP (ref 0–4)
CAST: 2 /LPF — SIGNIFICANT CHANGE UP (ref 0–4)
CAST: 24 /LPF — HIGH (ref 0–4)
CAST: 24 /LPF — HIGH (ref 0–4)
CAST: 3 /LPF — SIGNIFICANT CHANGE UP (ref 0–4)
CHLORIDE BLDV-SCNC: 106 MMOL/L — SIGNIFICANT CHANGE UP (ref 96–108)
CHLORIDE BLDV-SCNC: 106 MMOL/L — SIGNIFICANT CHANGE UP (ref 96–108)
CHLORIDE BLDV-SCNC: 112 MMOL/L — HIGH (ref 96–108)
CHLORIDE BLDV-SCNC: 112 MMOL/L — HIGH (ref 96–108)
CHLORIDE BLDV-SCNC: 115 MMOL/L — HIGH (ref 96–108)
CHLORIDE BLDV-SCNC: 115 MMOL/L — HIGH (ref 96–108)
CHLORIDE BLDV-SCNC: 117 MMOL/L — HIGH (ref 96–108)
CHLORIDE BLDV-SCNC: 117 MMOL/L — HIGH (ref 96–108)
CHLORIDE BLDV-SCNC: 118 MMOL/L — HIGH (ref 96–108)
CHLORIDE BLDV-SCNC: 118 MMOL/L — HIGH (ref 96–108)
CHLORIDE SERPL-SCNC: 101 MMOL/L — SIGNIFICANT CHANGE UP (ref 96–108)
CHLORIDE SERPL-SCNC: 101 MMOL/L — SIGNIFICANT CHANGE UP (ref 96–108)
CHLORIDE SERPL-SCNC: 102 MMOL/L
CHLORIDE SERPL-SCNC: 103 MMOL/L
CHLORIDE SERPL-SCNC: 103 MMOL/L — SIGNIFICANT CHANGE UP (ref 96–108)
CHLORIDE SERPL-SCNC: 103 MMOL/L — SIGNIFICANT CHANGE UP (ref 96–108)
CHLORIDE SERPL-SCNC: 104 MMOL/L — SIGNIFICANT CHANGE UP (ref 96–108)
CHLORIDE SERPL-SCNC: 104 MMOL/L — SIGNIFICANT CHANGE UP (ref 96–108)
CHLORIDE SERPL-SCNC: 105 MMOL/L — SIGNIFICANT CHANGE UP (ref 96–108)
CHLORIDE SERPL-SCNC: 105 MMOL/L — SIGNIFICANT CHANGE UP (ref 96–108)
CHLORIDE SERPL-SCNC: 106 MMOL/L — SIGNIFICANT CHANGE UP (ref 96–108)
CHLORIDE SERPL-SCNC: 106 MMOL/L — SIGNIFICANT CHANGE UP (ref 96–108)
CHLORIDE SERPL-SCNC: 107 MMOL/L — SIGNIFICANT CHANGE UP (ref 96–108)
CHLORIDE SERPL-SCNC: 109 MMOL/L — HIGH (ref 96–108)
CHLORIDE SERPL-SCNC: 110 MMOL/L — HIGH (ref 96–108)
CHLORIDE SERPL-SCNC: 111 MMOL/L — HIGH (ref 96–108)
CHLORIDE SERPL-SCNC: 112 MMOL/L — HIGH (ref 96–108)
CHLORIDE SERPL-SCNC: 112 MMOL/L — HIGH (ref 96–108)
CHLORIDE SERPL-SCNC: 114 MMOL/L — HIGH (ref 96–108)
CHLORIDE SERPL-SCNC: 115 MMOL/L — HIGH (ref 96–108)
CHOLEST SERPL-MCNC: 159 MG/DL
CHOLEST SERPL-MCNC: 165 MG/DL
CHOLEST SERPL-MCNC: 170 MG/DL
CK MB BLD-MCNC: 1.5 % — SIGNIFICANT CHANGE UP (ref 0–3.5)
CK MB BLD-MCNC: 1.5 % — SIGNIFICANT CHANGE UP (ref 0–3.5)
CK MB CFR SERPL CALC: 3.7 NG/ML — SIGNIFICANT CHANGE UP (ref 0–3.8)
CK MB CFR SERPL CALC: 3.7 NG/ML — SIGNIFICANT CHANGE UP (ref 0–3.8)
CK SERPL-CCNC: 239 U/L — HIGH (ref 25–170)
CK SERPL-CCNC: 239 U/L — HIGH (ref 25–170)
CO2 BLDV-SCNC: 29 MMOL/L — HIGH (ref 22–26)
CO2 BLDV-SCNC: 35 MMOL/L — HIGH (ref 22–26)
CO2 BLDV-SCNC: 35 MMOL/L — HIGH (ref 22–26)
CO2 BLDV-SCNC: 36 MMOL/L — HIGH (ref 22–26)
CO2 BLDV-SCNC: 36 MMOL/L — HIGH (ref 22–26)
CO2 BLDV-SCNC: 37 MMOL/L — HIGH (ref 22–26)
CO2 BLDV-SCNC: 37 MMOL/L — HIGH (ref 22–26)
CO2 SERPL-SCNC: 18 MMOL/L
CO2 SERPL-SCNC: 20 MMOL/L — LOW (ref 22–31)
CO2 SERPL-SCNC: 20 MMOL/L — LOW (ref 22–31)
CO2 SERPL-SCNC: 21 MMOL/L — LOW (ref 22–31)
CO2 SERPL-SCNC: 21 MMOL/L — LOW (ref 22–31)
CO2 SERPL-SCNC: 22 MMOL/L — SIGNIFICANT CHANGE UP (ref 22–31)
CO2 SERPL-SCNC: 22 MMOL/L — SIGNIFICANT CHANGE UP (ref 22–31)
CO2 SERPL-SCNC: 23 MMOL/L
CO2 SERPL-SCNC: 23 MMOL/L — SIGNIFICANT CHANGE UP (ref 22–31)
CO2 SERPL-SCNC: 23 MMOL/L — SIGNIFICANT CHANGE UP (ref 22–31)
CO2 SERPL-SCNC: 24 MMOL/L — SIGNIFICANT CHANGE UP (ref 22–31)
CO2 SERPL-SCNC: 24 MMOL/L — SIGNIFICANT CHANGE UP (ref 22–31)
CO2 SERPL-SCNC: 26 MMOL/L — SIGNIFICANT CHANGE UP (ref 22–31)
CO2 SERPL-SCNC: 27 MMOL/L — SIGNIFICANT CHANGE UP (ref 22–31)
CO2 SERPL-SCNC: 27 MMOL/L — SIGNIFICANT CHANGE UP (ref 22–31)
CO2 SERPL-SCNC: 28 MMOL/L — SIGNIFICANT CHANGE UP (ref 22–31)
CO2 SERPL-SCNC: 29 MMOL/L — SIGNIFICANT CHANGE UP (ref 22–31)
CO2 SERPL-SCNC: 30 MMOL/L — SIGNIFICANT CHANGE UP (ref 22–31)
CO2 SERPL-SCNC: 30 MMOL/L — SIGNIFICANT CHANGE UP (ref 22–31)
CO2 SERPL-SCNC: 31 MMOL/L — SIGNIFICANT CHANGE UP (ref 22–31)
COLOR SPEC: ABNORMAL
COLOR SPEC: ABNORMAL
COLOR SPEC: SIGNIFICANT CHANGE UP
COLOR SPEC: SIGNIFICANT CHANGE UP
COLOR SPEC: YELLOW — SIGNIFICANT CHANGE UP
CORTIS AM PEAK SERPL-MCNC: 21.4 UG/DL — HIGH (ref 6–18.4)
CORTIS AM PEAK SERPL-MCNC: 21.4 UG/DL — HIGH (ref 6–18.4)
CREAT ?TM UR-MCNC: 44 MG/DL — SIGNIFICANT CHANGE UP
CREAT ?TM UR-MCNC: 44 MG/DL — SIGNIFICANT CHANGE UP
CREAT SERPL-MCNC: 0.43 MG/DL — LOW (ref 0.5–1.3)
CREAT SERPL-MCNC: 0.43 MG/DL — LOW (ref 0.5–1.3)
CREAT SERPL-MCNC: 0.48 MG/DL — LOW (ref 0.5–1.3)
CREAT SERPL-MCNC: 0.48 MG/DL — LOW (ref 0.5–1.3)
CREAT SERPL-MCNC: 0.49 MG/DL — LOW (ref 0.5–1.3)
CREAT SERPL-MCNC: 0.49 MG/DL — LOW (ref 0.5–1.3)
CREAT SERPL-MCNC: 0.53 MG/DL — SIGNIFICANT CHANGE UP (ref 0.5–1.3)
CREAT SERPL-MCNC: 0.53 MG/DL — SIGNIFICANT CHANGE UP (ref 0.5–1.3)
CREAT SERPL-MCNC: 0.57 MG/DL — SIGNIFICANT CHANGE UP (ref 0.5–1.3)
CREAT SERPL-MCNC: 0.58 MG/DL — SIGNIFICANT CHANGE UP (ref 0.5–1.3)
CREAT SERPL-MCNC: 0.58 MG/DL — SIGNIFICANT CHANGE UP (ref 0.5–1.3)
CREAT SERPL-MCNC: 0.62 MG/DL — SIGNIFICANT CHANGE UP (ref 0.5–1.3)
CREAT SERPL-MCNC: 0.62 MG/DL — SIGNIFICANT CHANGE UP (ref 0.5–1.3)
CREAT SERPL-MCNC: 0.63 MG/DL — SIGNIFICANT CHANGE UP (ref 0.5–1.3)
CREAT SERPL-MCNC: 0.63 MG/DL — SIGNIFICANT CHANGE UP (ref 0.5–1.3)
CREAT SERPL-MCNC: 0.64 MG/DL — SIGNIFICANT CHANGE UP (ref 0.5–1.3)
CREAT SERPL-MCNC: 0.64 MG/DL — SIGNIFICANT CHANGE UP (ref 0.5–1.3)
CREAT SERPL-MCNC: 0.65 MG/DL — SIGNIFICANT CHANGE UP (ref 0.5–1.3)
CREAT SERPL-MCNC: 0.65 MG/DL — SIGNIFICANT CHANGE UP (ref 0.5–1.3)
CREAT SERPL-MCNC: 0.71 MG/DL — SIGNIFICANT CHANGE UP (ref 0.5–1.3)
CREAT SERPL-MCNC: 0.71 MG/DL — SIGNIFICANT CHANGE UP (ref 0.5–1.3)
CREAT SERPL-MCNC: 0.76 MG/DL
CREAT SERPL-MCNC: 0.8 MG/DL
CREAT SERPL-MCNC: 0.91 MG/DL — SIGNIFICANT CHANGE UP (ref 0.5–1.3)
CREAT SERPL-MCNC: 0.91 MG/DL — SIGNIFICANT CHANGE UP (ref 0.5–1.3)
CREAT SERPL-MCNC: 1.03 MG/DL — SIGNIFICANT CHANGE UP (ref 0.5–1.3)
CREAT SERPL-MCNC: 1.03 MG/DL — SIGNIFICANT CHANGE UP (ref 0.5–1.3)
CREAT SERPL-MCNC: 1.67 MG/DL — HIGH (ref 0.5–1.3)
CREAT SERPL-MCNC: 1.67 MG/DL — HIGH (ref 0.5–1.3)
CREAT SERPL-MCNC: 2.57 MG/DL — HIGH (ref 0.5–1.3)
CREAT SERPL-MCNC: 2.57 MG/DL — HIGH (ref 0.5–1.3)
CREAT SERPL-MCNC: 3.18 MG/DL — HIGH (ref 0.5–1.3)
CREAT SERPL-MCNC: 3.18 MG/DL — HIGH (ref 0.5–1.3)
CREAT SERPL-MCNC: 3.8 MG/DL — HIGH (ref 0.5–1.3)
CREAT SERPL-MCNC: 3.8 MG/DL — HIGH (ref 0.5–1.3)
CREAT SERPL-MCNC: 4.16 MG/DL — HIGH (ref 0.5–1.3)
CREAT SERPL-MCNC: 4.16 MG/DL — HIGH (ref 0.5–1.3)
CREAT SERPL-MCNC: 4.24 MG/DL — HIGH (ref 0.5–1.3)
CREAT SERPL-MCNC: 4.24 MG/DL — HIGH (ref 0.5–1.3)
CRP SERPL-MCNC: <3 MG/L
CULTURE RESULTS: ABNORMAL
CULTURE RESULTS: NO GROWTH — SIGNIFICANT CHANGE UP
CULTURE RESULTS: SIGNIFICANT CHANGE UP
DIFF PNL FLD: ABNORMAL
DIFF PNL FLD: NEGATIVE — SIGNIFICANT CHANGE UP
DIFF PNL FLD: NEGATIVE — SIGNIFICANT CHANGE UP
DRVVT RATIO: 0.94 RATIO — SIGNIFICANT CHANGE UP (ref 0–1.21)
DRVVT RATIO: 0.94 RATIO — SIGNIFICANT CHANGE UP (ref 0–1.21)
DRVVT SCREEN TO CONFIRM RATIO: SIGNIFICANT CHANGE UP
DRVVT SCREEN TO CONFIRM RATIO: SIGNIFICANT CHANGE UP
EGFR: 107 ML/MIN/1.73M2 — SIGNIFICANT CHANGE UP
EGFR: 107 ML/MIN/1.73M2 — SIGNIFICANT CHANGE UP
EGFR: 111 ML/MIN/1.73M2 — SIGNIFICANT CHANGE UP
EGFR: 111 ML/MIN/1.73M2 — SIGNIFICANT CHANGE UP
EGFR: 112 ML/MIN/1.73M2 — SIGNIFICANT CHANGE UP
EGFR: 113 ML/MIN/1.73M2 — SIGNIFICANT CHANGE UP
EGFR: 113 ML/MIN/1.73M2 — SIGNIFICANT CHANGE UP
EGFR: 114 ML/MIN/1.73M2 — SIGNIFICANT CHANGE UP
EGFR: 114 ML/MIN/1.73M2 — SIGNIFICANT CHANGE UP
EGFR: 115 ML/MIN/1.73M2 — SIGNIFICANT CHANGE UP
EGFR: 117 ML/MIN/1.73M2 — SIGNIFICANT CHANGE UP
EGFR: 117 ML/MIN/1.73M2 — SIGNIFICANT CHANGE UP
EGFR: 119 ML/MIN/1.73M2 — SIGNIFICANT CHANGE UP
EGFR: 119 ML/MIN/1.73M2 — SIGNIFICANT CHANGE UP
EGFR: 120 ML/MIN/1.73M2 — SIGNIFICANT CHANGE UP
EGFR: 120 ML/MIN/1.73M2 — SIGNIFICANT CHANGE UP
EGFR: 123 ML/MIN/1.73M2 — SIGNIFICANT CHANGE UP
EGFR: 123 ML/MIN/1.73M2 — SIGNIFICANT CHANGE UP
EGFR: 13 ML/MIN/1.73M2 — LOW
EGFR: 14 ML/MIN/1.73M2 — LOW
EGFR: 14 ML/MIN/1.73M2 — LOW
EGFR: 18 ML/MIN/1.73M2 — LOW
EGFR: 18 ML/MIN/1.73M2 — LOW
EGFR: 23 ML/MIN/1.73M2 — LOW
EGFR: 23 ML/MIN/1.73M2 — LOW
EGFR: 38 ML/MIN/1.73M2 — LOW
EGFR: 38 ML/MIN/1.73M2 — LOW
EGFR: 69 ML/MIN/1.73M2 — SIGNIFICANT CHANGE UP
EGFR: 69 ML/MIN/1.73M2 — SIGNIFICANT CHANGE UP
EGFR: 80 ML/MIN/1.73M2 — SIGNIFICANT CHANGE UP
EGFR: 80 ML/MIN/1.73M2 — SIGNIFICANT CHANGE UP
EGFR: 94 ML/MIN/1.73M2
EGFR: 99 ML/MIN/1.73M2
ELLIPTOCYTES BLD QL SMEAR: SLIGHT — SIGNIFICANT CHANGE UP
ELLIPTOCYTES BLD QL SMEAR: SLIGHT — SIGNIFICANT CHANGE UP
EOSINOPHIL # BLD AUTO: 0.02 K/UL — SIGNIFICANT CHANGE UP (ref 0–0.5)
EOSINOPHIL # BLD AUTO: 0.04 K/UL — SIGNIFICANT CHANGE UP (ref 0–0.5)
EOSINOPHIL # BLD AUTO: 0.04 K/UL — SIGNIFICANT CHANGE UP (ref 0–0.5)
EOSINOPHIL # BLD AUTO: 0.06 K/UL — SIGNIFICANT CHANGE UP (ref 0–0.5)
EOSINOPHIL # BLD AUTO: 0.06 K/UL — SIGNIFICANT CHANGE UP (ref 0–0.5)
EOSINOPHIL # BLD AUTO: 0.17 K/UL — SIGNIFICANT CHANGE UP (ref 0–0.5)
EOSINOPHIL # BLD AUTO: 0.17 K/UL — SIGNIFICANT CHANGE UP (ref 0–0.5)
EOSINOPHIL # BLD AUTO: 0.19 K/UL
EOSINOPHIL # BLD AUTO: 0.2 K/UL — SIGNIFICANT CHANGE UP (ref 0–0.5)
EOSINOPHIL # BLD AUTO: 0.22 K/UL — SIGNIFICANT CHANGE UP (ref 0–0.5)
EOSINOPHIL # BLD AUTO: 0.22 K/UL — SIGNIFICANT CHANGE UP (ref 0–0.5)
EOSINOPHIL # BLD AUTO: 0.27 K/UL — SIGNIFICANT CHANGE UP (ref 0–0.5)
EOSINOPHIL # BLD AUTO: 0.27 K/UL — SIGNIFICANT CHANGE UP (ref 0–0.5)
EOSINOPHIL # BLD AUTO: 0.32 K/UL — SIGNIFICANT CHANGE UP (ref 0–0.5)
EOSINOPHIL # BLD AUTO: 0.32 K/UL — SIGNIFICANT CHANGE UP (ref 0–0.5)
EOSINOPHIL # BLD AUTO: 0.38 K/UL — SIGNIFICANT CHANGE UP (ref 0–0.5)
EOSINOPHIL # BLD AUTO: 0.63 K/UL — HIGH (ref 0–0.5)
EOSINOPHIL # BLD AUTO: 0.63 K/UL — HIGH (ref 0–0.5)
EOSINOPHIL NFR BLD AUTO: 0.2 % — SIGNIFICANT CHANGE UP (ref 0–6)
EOSINOPHIL NFR BLD AUTO: 0.3 % — SIGNIFICANT CHANGE UP (ref 0–6)
EOSINOPHIL NFR BLD AUTO: 0.3 % — SIGNIFICANT CHANGE UP (ref 0–6)
EOSINOPHIL NFR BLD AUTO: 0.6 % — SIGNIFICANT CHANGE UP (ref 0–6)
EOSINOPHIL NFR BLD AUTO: 0.6 % — SIGNIFICANT CHANGE UP (ref 0–6)
EOSINOPHIL NFR BLD AUTO: 0.9 % — SIGNIFICANT CHANGE UP (ref 0–6)
EOSINOPHIL NFR BLD AUTO: 0.9 % — SIGNIFICANT CHANGE UP (ref 0–6)
EOSINOPHIL NFR BLD AUTO: 1 % — SIGNIFICANT CHANGE UP (ref 0–6)
EOSINOPHIL NFR BLD AUTO: 1 % — SIGNIFICANT CHANGE UP (ref 0–6)
EOSINOPHIL NFR BLD AUTO: 1.2 % — SIGNIFICANT CHANGE UP (ref 0–6)
EOSINOPHIL NFR BLD AUTO: 1.7 % — SIGNIFICANT CHANGE UP (ref 0–6)
EOSINOPHIL NFR BLD AUTO: 1.7 % — SIGNIFICANT CHANGE UP (ref 0–6)
EOSINOPHIL NFR BLD AUTO: 2.4 %
EOSINOPHIL NFR BLD AUTO: 2.4 % — SIGNIFICANT CHANGE UP (ref 0–6)
EOSINOPHIL NFR BLD AUTO: 2.4 % — SIGNIFICANT CHANGE UP (ref 0–6)
EOSINOPHIL NFR BLD AUTO: 2.7 % — SIGNIFICANT CHANGE UP (ref 0–6)
EOSINOPHIL NFR BLD AUTO: 2.7 % — SIGNIFICANT CHANGE UP (ref 0–6)
EOSINOPHIL NFR BLD AUTO: 3.1 % — SIGNIFICANT CHANGE UP (ref 0–6)
EOSINOPHIL NFR BLD AUTO: 3.1 % — SIGNIFICANT CHANGE UP (ref 0–6)
EOSINOPHIL NFR BLD AUTO: 4.4 % — SIGNIFICANT CHANGE UP (ref 0–6)
EOSINOPHIL NFR BLD AUTO: 4.4 % — SIGNIFICANT CHANGE UP (ref 0–6)
ESTIMATED AVERAGE GLUCOSE: 111 MG/DL
ESTIMATED AVERAGE GLUCOSE: 111 MG/DL
ESTIMATED AVERAGE GLUCOSE: 114 MG/DL
GAS PNL BLDA: SIGNIFICANT CHANGE UP
GAS PNL BLDV: 135 MMOL/L — LOW (ref 136–145)
GAS PNL BLDV: 135 MMOL/L — LOW (ref 136–145)
GAS PNL BLDV: 147 MMOL/L — HIGH (ref 136–145)
GAS PNL BLDV: 147 MMOL/L — HIGH (ref 136–145)
GAS PNL BLDV: 151 MMOL/L — HIGH (ref 136–145)
GAS PNL BLDV: 151 MMOL/L — HIGH (ref 136–145)
GAS PNL BLDV: 153 MMOL/L — HIGH (ref 136–145)
GAS PNL BLDV: 153 MMOL/L — HIGH (ref 136–145)
GAS PNL BLDV: 154 MMOL/L — HIGH (ref 136–145)
GAS PNL BLDV: 154 MMOL/L — HIGH (ref 136–145)
GAS PNL BLDV: SIGNIFICANT CHANGE UP
GIANT PLATELETS BLD QL SMEAR: PRESENT — SIGNIFICANT CHANGE UP
GLUCOSE BLDC GLUCOMTR-MCNC: 120 MG/DL — HIGH (ref 70–99)
GLUCOSE BLDC GLUCOMTR-MCNC: 120 MG/DL — HIGH (ref 70–99)
GLUCOSE BLDC GLUCOMTR-MCNC: 125 MG/DL — HIGH (ref 70–99)
GLUCOSE BLDC GLUCOMTR-MCNC: 140 MG/DL — HIGH (ref 70–99)
GLUCOSE BLDC GLUCOMTR-MCNC: 140 MG/DL — HIGH (ref 70–99)
GLUCOSE BLDC GLUCOMTR-MCNC: 144 MG/DL — HIGH (ref 70–99)
GLUCOSE BLDC GLUCOMTR-MCNC: 144 MG/DL — HIGH (ref 70–99)
GLUCOSE BLDC GLUCOMTR-MCNC: 146 MG/DL — HIGH (ref 70–99)
GLUCOSE BLDC GLUCOMTR-MCNC: 146 MG/DL — HIGH (ref 70–99)
GLUCOSE BLDC GLUCOMTR-MCNC: 148 MG/DL — HIGH (ref 70–99)
GLUCOSE BLDC GLUCOMTR-MCNC: 148 MG/DL — HIGH (ref 70–99)
GLUCOSE BLDC GLUCOMTR-MCNC: 156 MG/DL — HIGH (ref 70–99)
GLUCOSE BLDC GLUCOMTR-MCNC: 156 MG/DL — HIGH (ref 70–99)
GLUCOSE BLDC GLUCOMTR-MCNC: 165 MG/DL — HIGH (ref 70–99)
GLUCOSE BLDC GLUCOMTR-MCNC: 165 MG/DL — HIGH (ref 70–99)
GLUCOSE BLDC GLUCOMTR-MCNC: 191 MG/DL — HIGH (ref 70–99)
GLUCOSE BLDC GLUCOMTR-MCNC: 191 MG/DL — HIGH (ref 70–99)
GLUCOSE BLDC GLUCOMTR-MCNC: 200 MG/DL — HIGH (ref 70–99)
GLUCOSE BLDC GLUCOMTR-MCNC: 200 MG/DL — HIGH (ref 70–99)
GLUCOSE BLDC GLUCOMTR-MCNC: 204 MG/DL — HIGH (ref 70–99)
GLUCOSE BLDC GLUCOMTR-MCNC: 204 MG/DL — HIGH (ref 70–99)
GLUCOSE BLDC GLUCOMTR-MCNC: 220 MG/DL — HIGH (ref 70–99)
GLUCOSE BLDC GLUCOMTR-MCNC: 220 MG/DL — HIGH (ref 70–99)
GLUCOSE BLDC GLUCOMTR-MCNC: 232 MG/DL — HIGH (ref 70–99)
GLUCOSE BLDC GLUCOMTR-MCNC: 232 MG/DL — HIGH (ref 70–99)
GLUCOSE BLDC GLUCOMTR-MCNC: 310 MG/DL — HIGH (ref 70–99)
GLUCOSE BLDC GLUCOMTR-MCNC: 310 MG/DL — HIGH (ref 70–99)
GLUCOSE BLDC GLUCOMTR-MCNC: 313 MG/DL — HIGH (ref 70–99)
GLUCOSE BLDC GLUCOMTR-MCNC: 313 MG/DL — HIGH (ref 70–99)
GLUCOSE BLDC GLUCOMTR-MCNC: 318 MG/DL — HIGH (ref 70–99)
GLUCOSE BLDC GLUCOMTR-MCNC: 318 MG/DL — HIGH (ref 70–99)
GLUCOSE BLDC GLUCOMTR-MCNC: 354 MG/DL — HIGH (ref 70–99)
GLUCOSE BLDC GLUCOMTR-MCNC: 354 MG/DL — HIGH (ref 70–99)
GLUCOSE BLDC GLUCOMTR-MCNC: 82 MG/DL — SIGNIFICANT CHANGE UP (ref 70–99)
GLUCOSE BLDV-MCNC: 117 MG/DL — HIGH (ref 70–99)
GLUCOSE BLDV-MCNC: 117 MG/DL — HIGH (ref 70–99)
GLUCOSE BLDV-MCNC: 119 MG/DL — HIGH (ref 70–99)
GLUCOSE BLDV-MCNC: 119 MG/DL — HIGH (ref 70–99)
GLUCOSE BLDV-MCNC: 156 MG/DL — HIGH (ref 70–99)
GLUCOSE BLDV-MCNC: 156 MG/DL — HIGH (ref 70–99)
GLUCOSE BLDV-MCNC: 194 MG/DL — HIGH (ref 70–99)
GLUCOSE BLDV-MCNC: 194 MG/DL — HIGH (ref 70–99)
GLUCOSE BLDV-MCNC: 230 MG/DL — HIGH (ref 70–99)
GLUCOSE BLDV-MCNC: 230 MG/DL — HIGH (ref 70–99)
GLUCOSE SERPL-MCNC: 100 MG/DL
GLUCOSE SERPL-MCNC: 106 MG/DL — HIGH (ref 70–99)
GLUCOSE SERPL-MCNC: 106 MG/DL — HIGH (ref 70–99)
GLUCOSE SERPL-MCNC: 115 MG/DL — HIGH (ref 70–99)
GLUCOSE SERPL-MCNC: 115 MG/DL — HIGH (ref 70–99)
GLUCOSE SERPL-MCNC: 123 MG/DL — HIGH (ref 70–99)
GLUCOSE SERPL-MCNC: 126 MG/DL — HIGH (ref 70–99)
GLUCOSE SERPL-MCNC: 126 MG/DL — HIGH (ref 70–99)
GLUCOSE SERPL-MCNC: 132 MG/DL — HIGH (ref 70–99)
GLUCOSE SERPL-MCNC: 132 MG/DL — HIGH (ref 70–99)
GLUCOSE SERPL-MCNC: 134 MG/DL — HIGH (ref 70–99)
GLUCOSE SERPL-MCNC: 134 MG/DL — HIGH (ref 70–99)
GLUCOSE SERPL-MCNC: 135 MG/DL — HIGH (ref 70–99)
GLUCOSE SERPL-MCNC: 135 MG/DL — HIGH (ref 70–99)
GLUCOSE SERPL-MCNC: 140 MG/DL — HIGH (ref 70–99)
GLUCOSE SERPL-MCNC: 140 MG/DL — HIGH (ref 70–99)
GLUCOSE SERPL-MCNC: 152 MG/DL — HIGH (ref 70–99)
GLUCOSE SERPL-MCNC: 153 MG/DL — HIGH (ref 70–99)
GLUCOSE SERPL-MCNC: 153 MG/DL — HIGH (ref 70–99)
GLUCOSE SERPL-MCNC: 162 MG/DL — HIGH (ref 70–99)
GLUCOSE SERPL-MCNC: 162 MG/DL — HIGH (ref 70–99)
GLUCOSE SERPL-MCNC: 166 MG/DL — HIGH (ref 70–99)
GLUCOSE SERPL-MCNC: 166 MG/DL — HIGH (ref 70–99)
GLUCOSE SERPL-MCNC: 179 MG/DL — HIGH (ref 70–99)
GLUCOSE SERPL-MCNC: 179 MG/DL — HIGH (ref 70–99)
GLUCOSE SERPL-MCNC: 198 MG/DL — HIGH (ref 70–99)
GLUCOSE SERPL-MCNC: 198 MG/DL — HIGH (ref 70–99)
GLUCOSE SERPL-MCNC: 229 MG/DL — HIGH (ref 70–99)
GLUCOSE SERPL-MCNC: 229 MG/DL — HIGH (ref 70–99)
GLUCOSE SERPL-MCNC: 308 MG/DL — HIGH (ref 70–99)
GLUCOSE SERPL-MCNC: 308 MG/DL — HIGH (ref 70–99)
GLUCOSE SERPL-MCNC: 315 MG/DL — HIGH (ref 70–99)
GLUCOSE SERPL-MCNC: 315 MG/DL — HIGH (ref 70–99)
GLUCOSE SERPL-MCNC: 370 MG/DL — HIGH (ref 70–99)
GLUCOSE SERPL-MCNC: 370 MG/DL — HIGH (ref 70–99)
GLUCOSE SERPL-MCNC: 84 MG/DL
GLUCOSE UR QL: NEGATIVE MG/DL — SIGNIFICANT CHANGE UP
GLUCOSE UR QL: NEGATIVE — SIGNIFICANT CHANGE UP
GLUCOSE UR QL: NEGATIVE — SIGNIFICANT CHANGE UP
GRAM STN FLD: ABNORMAL
GRAM STN FLD: SIGNIFICANT CHANGE UP
GRAM STN FLD: SIGNIFICANT CHANGE UP
HAPTOGLOB SERPL-MCNC: 444 MG/DL — HIGH (ref 34–200)
HAPTOGLOB SERPL-MCNC: 444 MG/DL — HIGH (ref 34–200)
HBA1C MFR BLD HPLC: 5.5 %
HBA1C MFR BLD HPLC: 5.5 %
HBA1C MFR BLD HPLC: 5.6 %
HBV SURFACE AG SER QL: NONREACTIVE
HCG SERPL-ACNC: <2 MIU/ML — SIGNIFICANT CHANGE UP
HCG SERPL-ACNC: <2 MIU/ML — SIGNIFICANT CHANGE UP
HCO3 BLDV-SCNC: 27 MMOL/L — SIGNIFICANT CHANGE UP (ref 22–29)
HCO3 BLDV-SCNC: 27 MMOL/L — SIGNIFICANT CHANGE UP (ref 22–29)
HCO3 BLDV-SCNC: 28 MMOL/L — SIGNIFICANT CHANGE UP (ref 22–29)
HCO3 BLDV-SCNC: 28 MMOL/L — SIGNIFICANT CHANGE UP (ref 22–29)
HCO3 BLDV-SCNC: 34 MMOL/L — HIGH (ref 22–29)
HCO3 BLDV-SCNC: 35 MMOL/L — HIGH (ref 22–29)
HCO3 BLDV-SCNC: 35 MMOL/L — HIGH (ref 22–29)
HCT VFR BLD CALC: 22.5 % — LOW (ref 34.5–45)
HCT VFR BLD CALC: 22.5 % — LOW (ref 34.5–45)
HCT VFR BLD CALC: 23.6 % — LOW (ref 34.5–45)
HCT VFR BLD CALC: 23.6 % — LOW (ref 34.5–45)
HCT VFR BLD CALC: 24.1 % — LOW (ref 34.5–45)
HCT VFR BLD CALC: 24.1 % — LOW (ref 34.5–45)
HCT VFR BLD CALC: 24.3 % — LOW (ref 34.5–45)
HCT VFR BLD CALC: 24.3 % — LOW (ref 34.5–45)
HCT VFR BLD CALC: 24.5 % — LOW (ref 34.5–45)
HCT VFR BLD CALC: 24.5 % — LOW (ref 34.5–45)
HCT VFR BLD CALC: 27.7 % — LOW (ref 34.5–45)
HCT VFR BLD CALC: 27.7 % — LOW (ref 34.5–45)
HCT VFR BLD CALC: 27.8 % — LOW (ref 34.5–45)
HCT VFR BLD CALC: 27.8 % — LOW (ref 34.5–45)
HCT VFR BLD CALC: 28.4 % — LOW (ref 34.5–45)
HCT VFR BLD CALC: 28.6 % — LOW (ref 34.5–45)
HCT VFR BLD CALC: 28.6 % — LOW (ref 34.5–45)
HCT VFR BLD CALC: 29.3 % — LOW (ref 34.5–45)
HCT VFR BLD CALC: 29.3 % — LOW (ref 34.5–45)
HCT VFR BLD CALC: 29.5 % — LOW (ref 34.5–45)
HCT VFR BLD CALC: 29.5 % — LOW (ref 34.5–45)
HCT VFR BLD CALC: 30.4 % — LOW (ref 34.5–45)
HCT VFR BLD CALC: 30.4 % — LOW (ref 34.5–45)
HCT VFR BLD CALC: 30.7 % — LOW (ref 34.5–45)
HCT VFR BLD CALC: 30.7 % — LOW (ref 34.5–45)
HCT VFR BLD CALC: 38.5 %
HCT VFR BLDA CALC: 24 % — LOW (ref 34.5–46.5)
HCT VFR BLDA CALC: 24 % — LOW (ref 34.5–46.5)
HCT VFR BLDA CALC: 27 % — LOW (ref 34.5–46.5)
HCT VFR BLDA CALC: 28 % — LOW (ref 34.5–46.5)
HCT VFR BLDA CALC: 28 % — LOW (ref 34.5–46.5)
HCT VFR BLDA CALC: 29 % — LOW (ref 34.5–46.5)
HCT VFR BLDA CALC: 29 % — LOW (ref 34.5–46.5)
HCV AB SER QL: NONREACTIVE
HCV S/CO RATIO: 0.07 S/CO
HDLC SERPL-MCNC: 42 MG/DL
HDLC SERPL-MCNC: 53 MG/DL
HDLC SERPL-MCNC: 54 MG/DL
HGB BLD CALC-MCNC: 7.9 G/DL — LOW (ref 11.7–16.1)
HGB BLD CALC-MCNC: 7.9 G/DL — LOW (ref 11.7–16.1)
HGB BLD CALC-MCNC: 9.1 G/DL — LOW (ref 11.7–16.1)
HGB BLD CALC-MCNC: 9.4 G/DL — LOW (ref 11.7–16.1)
HGB BLD CALC-MCNC: 9.4 G/DL — LOW (ref 11.7–16.1)
HGB BLD CALC-MCNC: 9.5 G/DL — LOW (ref 11.7–16.1)
HGB BLD CALC-MCNC: 9.5 G/DL — LOW (ref 11.7–16.1)
HGB BLD-MCNC: 12.3 G/DL
HGB BLD-MCNC: 6.8 G/DL — CRITICAL LOW (ref 11.5–15.5)
HGB BLD-MCNC: 6.8 G/DL — CRITICAL LOW (ref 11.5–15.5)
HGB BLD-MCNC: 7 G/DL — CRITICAL LOW (ref 11.5–15.5)
HGB BLD-MCNC: 7 G/DL — CRITICAL LOW (ref 11.5–15.5)
HGB BLD-MCNC: 7.3 G/DL — LOW (ref 11.5–15.5)
HGB BLD-MCNC: 7.3 G/DL — LOW (ref 11.5–15.5)
HGB BLD-MCNC: 7.4 G/DL — LOW (ref 11.5–15.5)
HGB BLD-MCNC: 7.4 G/DL — LOW (ref 11.5–15.5)
HGB BLD-MCNC: 7.5 G/DL — LOW (ref 11.5–15.5)
HGB BLD-MCNC: 7.5 G/DL — LOW (ref 11.5–15.5)
HGB BLD-MCNC: 8.4 G/DL — LOW (ref 11.5–15.5)
HGB BLD-MCNC: 8.6 G/DL — LOW (ref 11.5–15.5)
HGB BLD-MCNC: 8.6 G/DL — LOW (ref 11.5–15.5)
HGB BLD-MCNC: 8.7 G/DL — LOW (ref 11.5–15.5)
HGB BLD-MCNC: 8.8 G/DL — LOW (ref 11.5–15.5)
HGB BLD-MCNC: 8.8 G/DL — LOW (ref 11.5–15.5)
HGB BLD-MCNC: 8.9 G/DL — LOW (ref 11.5–15.5)
HGB BLD-MCNC: 8.9 G/DL — LOW (ref 11.5–15.5)
HGB BLD-MCNC: 9.1 G/DL — LOW (ref 11.5–15.5)
HGB BLD-MCNC: 9.1 G/DL — LOW (ref 11.5–15.5)
HGB BLD-MCNC: 9.4 G/DL — LOW (ref 11.5–15.5)
HGB BLD-MCNC: 9.4 G/DL — LOW (ref 11.5–15.5)
HIV1+2 AB SPEC QL IA.RAPID: NONREACTIVE
HOROWITZ INDEX BLDV+IHG-RTO: 40 — SIGNIFICANT CHANGE UP
HOROWITZ INDEX BLDV+IHG-RTO: 50 — SIGNIFICANT CHANGE UP
HOROWITZ INDEX BLDV+IHG-RTO: 50 — SIGNIFICANT CHANGE UP
HYPOCHROMIA BLD QL: SLIGHT — SIGNIFICANT CHANGE UP
IMM GRANULOCYTES NFR BLD AUTO: 0.3 %
IMM GRANULOCYTES NFR BLD AUTO: 0.4 % — SIGNIFICANT CHANGE UP (ref 0–0.9)
IMM GRANULOCYTES NFR BLD AUTO: 0.5 % — SIGNIFICANT CHANGE UP (ref 0–0.9)
IMM GRANULOCYTES NFR BLD AUTO: 0.5 % — SIGNIFICANT CHANGE UP (ref 0–0.9)
IMM GRANULOCYTES NFR BLD AUTO: 0.8 % — SIGNIFICANT CHANGE UP (ref 0–0.9)
IMM GRANULOCYTES NFR BLD AUTO: 0.8 % — SIGNIFICANT CHANGE UP (ref 0–0.9)
IMM GRANULOCYTES NFR BLD AUTO: 0.9 % — SIGNIFICANT CHANGE UP (ref 0–0.9)
IMM GRANULOCYTES NFR BLD AUTO: 1.1 % — HIGH (ref 0–0.9)
IMM GRANULOCYTES NFR BLD AUTO: 1.3 % — HIGH (ref 0–0.9)
IMM GRANULOCYTES NFR BLD AUTO: 1.4 % — HIGH (ref 0–0.9)
IMM GRANULOCYTES NFR BLD AUTO: 1.4 % — HIGH (ref 0–0.9)
IMM GRANULOCYTES NFR BLD AUTO: 2.5 % — HIGH (ref 0–0.9)
IMM GRANULOCYTES NFR BLD AUTO: 2.5 % — HIGH (ref 0–0.9)
INSULIN SERPL-MCNC: 7 UU/ML
IRON SATN MFR SERPL: 3 % — LOW (ref 14–50)
IRON SATN MFR SERPL: 3 % — LOW (ref 14–50)
IRON SATN MFR SERPL: 7 UG/DL — LOW (ref 30–160)
IRON SATN MFR SERPL: 7 UG/DL — LOW (ref 30–160)
KETONES UR-MCNC: NEGATIVE MG/DL — SIGNIFICANT CHANGE UP
KETONES UR-MCNC: NEGATIVE — SIGNIFICANT CHANGE UP
KETONES UR-MCNC: NEGATIVE — SIGNIFICANT CHANGE UP
LACTATE BLDV-MCNC: 0.9 MMOL/L — SIGNIFICANT CHANGE UP (ref 0.5–2)
LACTATE BLDV-MCNC: 0.9 MMOL/L — SIGNIFICANT CHANGE UP (ref 0.5–2)
LACTATE BLDV-MCNC: 1.1 MMOL/L — SIGNIFICANT CHANGE UP (ref 0.5–2)
LACTATE BLDV-MCNC: 1.1 MMOL/L — SIGNIFICANT CHANGE UP (ref 0.5–2)
LACTATE BLDV-MCNC: 1.4 MMOL/L — SIGNIFICANT CHANGE UP (ref 0.5–2)
LACTATE BLDV-MCNC: 1.5 MMOL/L — SIGNIFICANT CHANGE UP (ref 0.5–2)
LACTATE BLDV-MCNC: 1.5 MMOL/L — SIGNIFICANT CHANGE UP (ref 0.5–2)
LDH SERPL L TO P-CCNC: 501 U/L — HIGH (ref 50–242)
LDH SERPL L TO P-CCNC: 501 U/L — HIGH (ref 50–242)
LDLC SERPL CALC-MCNC: 100 MG/DL
LDLC SERPL CALC-MCNC: 89 MG/DL
LDLC SERPL CALC-MCNC: 92 MG/DL
LEGIONELLA AG UR QL: NEGATIVE — SIGNIFICANT CHANGE UP
LEGIONELLA AG UR QL: NEGATIVE — SIGNIFICANT CHANGE UP
LEUKOCYTE ESTERASE UR-ACNC: ABNORMAL
LEUKOCYTE ESTERASE UR-ACNC: NEGATIVE — SIGNIFICANT CHANGE UP
LEUKOCYTE ESTERASE UR-ACNC: NEGATIVE — SIGNIFICANT CHANGE UP
LYMPHOCYTES # BLD AUTO: 1.04 K/UL — SIGNIFICANT CHANGE UP (ref 1–3.3)
LYMPHOCYTES # BLD AUTO: 1.04 K/UL — SIGNIFICANT CHANGE UP (ref 1–3.3)
LYMPHOCYTES # BLD AUTO: 1.06 K/UL — SIGNIFICANT CHANGE UP (ref 1–3.3)
LYMPHOCYTES # BLD AUTO: 1.06 K/UL — SIGNIFICANT CHANGE UP (ref 1–3.3)
LYMPHOCYTES # BLD AUTO: 1.18 K/UL — SIGNIFICANT CHANGE UP (ref 1–3.3)
LYMPHOCYTES # BLD AUTO: 1.18 K/UL — SIGNIFICANT CHANGE UP (ref 1–3.3)
LYMPHOCYTES # BLD AUTO: 1.48 K/UL — SIGNIFICANT CHANGE UP (ref 1–3.3)
LYMPHOCYTES # BLD AUTO: 1.48 K/UL — SIGNIFICANT CHANGE UP (ref 1–3.3)
LYMPHOCYTES # BLD AUTO: 1.56 K/UL — SIGNIFICANT CHANGE UP (ref 1–3.3)
LYMPHOCYTES # BLD AUTO: 1.62 K/UL — SIGNIFICANT CHANGE UP (ref 1–3.3)
LYMPHOCYTES # BLD AUTO: 1.62 K/UL — SIGNIFICANT CHANGE UP (ref 1–3.3)
LYMPHOCYTES # BLD AUTO: 1.71 K/UL — SIGNIFICANT CHANGE UP (ref 1–3.3)
LYMPHOCYTES # BLD AUTO: 1.71 K/UL — SIGNIFICANT CHANGE UP (ref 1–3.3)
LYMPHOCYTES # BLD AUTO: 1.81 K/UL — SIGNIFICANT CHANGE UP (ref 1–3.3)
LYMPHOCYTES # BLD AUTO: 1.81 K/UL — SIGNIFICANT CHANGE UP (ref 1–3.3)
LYMPHOCYTES # BLD AUTO: 1.87 K/UL — SIGNIFICANT CHANGE UP (ref 1–3.3)
LYMPHOCYTES # BLD AUTO: 1.87 K/UL — SIGNIFICANT CHANGE UP (ref 1–3.3)
LYMPHOCYTES # BLD AUTO: 1.89 K/UL — SIGNIFICANT CHANGE UP (ref 1–3.3)
LYMPHOCYTES # BLD AUTO: 1.89 K/UL — SIGNIFICANT CHANGE UP (ref 1–3.3)
LYMPHOCYTES # BLD AUTO: 10.7 % — LOW (ref 13–44)
LYMPHOCYTES # BLD AUTO: 10.7 % — LOW (ref 13–44)
LYMPHOCYTES # BLD AUTO: 10.8 % — LOW (ref 13–44)
LYMPHOCYTES # BLD AUTO: 10.8 % — LOW (ref 13–44)
LYMPHOCYTES # BLD AUTO: 11.1 % — LOW (ref 13–44)
LYMPHOCYTES # BLD AUTO: 11.1 % — LOW (ref 13–44)
LYMPHOCYTES # BLD AUTO: 11.6 % — LOW (ref 13–44)
LYMPHOCYTES # BLD AUTO: 11.6 % — LOW (ref 13–44)
LYMPHOCYTES # BLD AUTO: 12.1 % — LOW (ref 13–44)
LYMPHOCYTES # BLD AUTO: 12.1 % — LOW (ref 13–44)
LYMPHOCYTES # BLD AUTO: 13.7 % — SIGNIFICANT CHANGE UP (ref 13–44)
LYMPHOCYTES # BLD AUTO: 13.7 % — SIGNIFICANT CHANGE UP (ref 13–44)
LYMPHOCYTES # BLD AUTO: 14.4 % — SIGNIFICANT CHANGE UP (ref 13–44)
LYMPHOCYTES # BLD AUTO: 14.4 % — SIGNIFICANT CHANGE UP (ref 13–44)
LYMPHOCYTES # BLD AUTO: 15.2 % — SIGNIFICANT CHANGE UP (ref 13–44)
LYMPHOCYTES # BLD AUTO: 15.2 % — SIGNIFICANT CHANGE UP (ref 13–44)
LYMPHOCYTES # BLD AUTO: 15.6 % — SIGNIFICANT CHANGE UP (ref 13–44)
LYMPHOCYTES # BLD AUTO: 15.6 % — SIGNIFICANT CHANGE UP (ref 13–44)
LYMPHOCYTES # BLD AUTO: 15.8 % — SIGNIFICANT CHANGE UP (ref 13–44)
LYMPHOCYTES # BLD AUTO: 15.8 % — SIGNIFICANT CHANGE UP (ref 13–44)
LYMPHOCYTES # BLD AUTO: 17.4 % — SIGNIFICANT CHANGE UP (ref 13–44)
LYMPHOCYTES # BLD AUTO: 17.4 % — SIGNIFICANT CHANGE UP (ref 13–44)
LYMPHOCYTES # BLD AUTO: 2.21 K/UL — SIGNIFICANT CHANGE UP (ref 1–3.3)
LYMPHOCYTES # BLD AUTO: 2.21 K/UL — SIGNIFICANT CHANGE UP (ref 1–3.3)
LYMPHOCYTES # BLD AUTO: 2.4 K/UL
LYMPHOCYTES # BLD AUTO: 2.45 K/UL — SIGNIFICANT CHANGE UP (ref 1–3.3)
LYMPHOCYTES # BLD AUTO: 2.45 K/UL — SIGNIFICANT CHANGE UP (ref 1–3.3)
LYMPHOCYTES # BLD AUTO: 27.4 % — SIGNIFICANT CHANGE UP (ref 13–44)
LYMPHOCYTES # BLD AUTO: 27.4 % — SIGNIFICANT CHANGE UP (ref 13–44)
LYMPHOCYTES # BLD AUTO: 3.9 K/UL — HIGH (ref 1–3.3)
LYMPHOCYTES # BLD AUTO: 3.9 K/UL — HIGH (ref 1–3.3)
LYMPHOCYTES # BLD AUTO: 5.2 % — LOW (ref 13–44)
LYMPHOCYTES # BLD AUTO: 5.2 % — LOW (ref 13–44)
LYMPHOCYTES # BLD AUTO: 7 % — LOW (ref 13–44)
LYMPHOCYTES # BLD AUTO: 7 % — LOW (ref 13–44)
LYMPHOCYTES NFR BLD AUTO: 30.8 %
M TB IFN-G BLD-IMP: NEGATIVE
MAGNESIUM SERPL-MCNC: 1.8 MG/DL — SIGNIFICANT CHANGE UP (ref 1.6–2.6)
MAGNESIUM SERPL-MCNC: 1.9 MG/DL — SIGNIFICANT CHANGE UP (ref 1.6–2.6)
MAGNESIUM SERPL-MCNC: 2 MG/DL — SIGNIFICANT CHANGE UP (ref 1.6–2.6)
MAGNESIUM SERPL-MCNC: 2.1 MG/DL — SIGNIFICANT CHANGE UP (ref 1.6–2.6)
MAGNESIUM SERPL-MCNC: 2.2 MG/DL — SIGNIFICANT CHANGE UP (ref 1.6–2.6)
MAGNESIUM SERPL-MCNC: 2.2 MG/DL — SIGNIFICANT CHANGE UP (ref 1.6–2.6)
MAGNESIUM SERPL-MCNC: 2.5 MG/DL — SIGNIFICANT CHANGE UP (ref 1.6–2.6)
MAGNESIUM SERPL-MCNC: 2.5 MG/DL — SIGNIFICANT CHANGE UP (ref 1.6–2.6)
MAGNESIUM SERPL-MCNC: 2.6 MG/DL — SIGNIFICANT CHANGE UP (ref 1.6–2.6)
MAGNESIUM SERPL-MCNC: 2.6 MG/DL — SIGNIFICANT CHANGE UP (ref 1.6–2.6)
MAGNESIUM SERPL-MCNC: 2.7 MG/DL — HIGH (ref 1.6–2.6)
MAGNESIUM SERPL-MCNC: 3.2 MG/DL — HIGH (ref 1.6–2.6)
MAGNESIUM SERPL-MCNC: 3.2 MG/DL — HIGH (ref 1.6–2.6)
MAN DIFF?: NORMAL
MANUAL SMEAR VERIFICATION: SIGNIFICANT CHANGE UP
MCHC RBC-ENTMCNC: 23.7 PG — LOW (ref 27–34)
MCHC RBC-ENTMCNC: 23.8 PG — LOW (ref 27–34)
MCHC RBC-ENTMCNC: 23.8 PG — LOW (ref 27–34)
MCHC RBC-ENTMCNC: 23.9 PG — LOW (ref 27–34)
MCHC RBC-ENTMCNC: 24 PG — LOW (ref 27–34)
MCHC RBC-ENTMCNC: 24.1 PG — LOW (ref 27–34)
MCHC RBC-ENTMCNC: 24.3 PG — LOW (ref 27–34)
MCHC RBC-ENTMCNC: 24.4 PG — LOW (ref 27–34)
MCHC RBC-ENTMCNC: 24.4 PG — LOW (ref 27–34)
MCHC RBC-ENTMCNC: 24.5 PG — LOW (ref 27–34)
MCHC RBC-ENTMCNC: 24.5 PG — LOW (ref 27–34)
MCHC RBC-ENTMCNC: 24.6 PG — LOW (ref 27–34)
MCHC RBC-ENTMCNC: 24.6 PG — LOW (ref 27–34)
MCHC RBC-ENTMCNC: 26.3 PG
MCHC RBC-ENTMCNC: 29.6 GM/DL — LOW (ref 32–36)
MCHC RBC-ENTMCNC: 29.7 GM/DL — LOW (ref 32–36)
MCHC RBC-ENTMCNC: 29.8 GM/DL — LOW (ref 32–36)
MCHC RBC-ENTMCNC: 30.2 GM/DL — LOW (ref 32–36)
MCHC RBC-ENTMCNC: 30.2 GM/DL — LOW (ref 32–36)
MCHC RBC-ENTMCNC: 30.4 GM/DL — LOW (ref 32–36)
MCHC RBC-ENTMCNC: 30.4 GM/DL — LOW (ref 32–36)
MCHC RBC-ENTMCNC: 30.5 GM/DL — LOW (ref 32–36)
MCHC RBC-ENTMCNC: 30.5 GM/DL — LOW (ref 32–36)
MCHC RBC-ENTMCNC: 30.9 GM/DL — LOW (ref 32–36)
MCHC RBC-ENTMCNC: 30.9 GM/DL — LOW (ref 32–36)
MCHC RBC-ENTMCNC: 31 GM/DL — LOW (ref 32–36)
MCHC RBC-ENTMCNC: 31 GM/DL — LOW (ref 32–36)
MCHC RBC-ENTMCNC: 31.1 GM/DL — LOW (ref 32–36)
MCHC RBC-ENTMCNC: 31.1 GM/DL — LOW (ref 32–36)
MCHC RBC-ENTMCNC: 31.9 GM/DL
MCHC RBC-ENTMCNC: 32 GM/DL — SIGNIFICANT CHANGE UP (ref 32–36)
MCHC RBC-ENTMCNC: 32 GM/DL — SIGNIFICANT CHANGE UP (ref 32–36)
MCV RBC AUTO: 76 FL — LOW (ref 80–100)
MCV RBC AUTO: 76 FL — LOW (ref 80–100)
MCV RBC AUTO: 76.7 FL — LOW (ref 80–100)
MCV RBC AUTO: 76.7 FL — LOW (ref 80–100)
MCV RBC AUTO: 78.8 FL — LOW (ref 80–100)
MCV RBC AUTO: 79.2 FL — LOW (ref 80–100)
MCV RBC AUTO: 79.3 FL — LOW (ref 80–100)
MCV RBC AUTO: 79.3 FL — LOW (ref 80–100)
MCV RBC AUTO: 80.1 FL — SIGNIFICANT CHANGE UP (ref 80–100)
MCV RBC AUTO: 80.1 FL — SIGNIFICANT CHANGE UP (ref 80–100)
MCV RBC AUTO: 80.2 FL — SIGNIFICANT CHANGE UP (ref 80–100)
MCV RBC AUTO: 80.2 FL — SIGNIFICANT CHANGE UP (ref 80–100)
MCV RBC AUTO: 80.8 FL — SIGNIFICANT CHANGE UP (ref 80–100)
MCV RBC AUTO: 80.8 FL — SIGNIFICANT CHANGE UP (ref 80–100)
MCV RBC AUTO: 81.4 FL — SIGNIFICANT CHANGE UP (ref 80–100)
MCV RBC AUTO: 81.4 FL — SIGNIFICANT CHANGE UP (ref 80–100)
MCV RBC AUTO: 82.3 FL — SIGNIFICANT CHANGE UP (ref 80–100)
MCV RBC AUTO: 82.3 FL — SIGNIFICANT CHANGE UP (ref 80–100)
MCV RBC AUTO: 82.4 FL
MCV RBC AUTO: 82.8 FL — SIGNIFICANT CHANGE UP (ref 80–100)
MCV RBC AUTO: 82.8 FL — SIGNIFICANT CHANGE UP (ref 80–100)
METHOD TYPE: SIGNIFICANT CHANGE UP
METHOD TYPE: SIGNIFICANT CHANGE UP
MONOCYTES # BLD AUTO: 0.59 K/UL
MONOCYTES # BLD AUTO: 0.78 K/UL — SIGNIFICANT CHANGE UP (ref 0–0.9)
MONOCYTES # BLD AUTO: 0.78 K/UL — SIGNIFICANT CHANGE UP (ref 0–0.9)
MONOCYTES # BLD AUTO: 0.82 K/UL — SIGNIFICANT CHANGE UP (ref 0–0.9)
MONOCYTES # BLD AUTO: 0.82 K/UL — SIGNIFICANT CHANGE UP (ref 0–0.9)
MONOCYTES # BLD AUTO: 0.85 K/UL — SIGNIFICANT CHANGE UP (ref 0–0.9)
MONOCYTES # BLD AUTO: 0.85 K/UL — SIGNIFICANT CHANGE UP (ref 0–0.9)
MONOCYTES # BLD AUTO: 0.97 K/UL — HIGH (ref 0–0.9)
MONOCYTES # BLD AUTO: 1.15 K/UL — HIGH (ref 0–0.9)
MONOCYTES # BLD AUTO: 1.15 K/UL — HIGH (ref 0–0.9)
MONOCYTES # BLD AUTO: 1.16 K/UL — HIGH (ref 0–0.9)
MONOCYTES # BLD AUTO: 1.16 K/UL — HIGH (ref 0–0.9)
MONOCYTES # BLD AUTO: 1.2 K/UL — HIGH (ref 0–0.9)
MONOCYTES # BLD AUTO: 1.2 K/UL — HIGH (ref 0–0.9)
MONOCYTES # BLD AUTO: 1.25 K/UL — HIGH (ref 0–0.9)
MONOCYTES # BLD AUTO: 1.25 K/UL — HIGH (ref 0–0.9)
MONOCYTES # BLD AUTO: 1.31 K/UL — HIGH (ref 0–0.9)
MONOCYTES # BLD AUTO: 1.31 K/UL — HIGH (ref 0–0.9)
MONOCYTES # BLD AUTO: 1.32 K/UL — HIGH (ref 0–0.9)
MONOCYTES # BLD AUTO: 1.32 K/UL — HIGH (ref 0–0.9)
MONOCYTES # BLD AUTO: 1.34 K/UL — HIGH (ref 0–0.9)
MONOCYTES # BLD AUTO: 1.34 K/UL — HIGH (ref 0–0.9)
MONOCYTES # BLD AUTO: 1.4 K/UL — HIGH (ref 0–0.9)
MONOCYTES # BLD AUTO: 1.4 K/UL — HIGH (ref 0–0.9)
MONOCYTES # BLD AUTO: 1.79 K/UL — HIGH (ref 0–0.9)
MONOCYTES # BLD AUTO: 1.79 K/UL — HIGH (ref 0–0.9)
MONOCYTES NFR BLD AUTO: 10.6 % — SIGNIFICANT CHANGE UP (ref 2–14)
MONOCYTES NFR BLD AUTO: 10.6 % — SIGNIFICANT CHANGE UP (ref 2–14)
MONOCYTES NFR BLD AUTO: 10.9 % — SIGNIFICANT CHANGE UP (ref 2–14)
MONOCYTES NFR BLD AUTO: 10.9 % — SIGNIFICANT CHANGE UP (ref 2–14)
MONOCYTES NFR BLD AUTO: 11.2 % — SIGNIFICANT CHANGE UP (ref 2–14)
MONOCYTES NFR BLD AUTO: 11.2 % — SIGNIFICANT CHANGE UP (ref 2–14)
MONOCYTES NFR BLD AUTO: 5.3 % — SIGNIFICANT CHANGE UP (ref 2–14)
MONOCYTES NFR BLD AUTO: 5.3 % — SIGNIFICANT CHANGE UP (ref 2–14)
MONOCYTES NFR BLD AUTO: 6.3 % — SIGNIFICANT CHANGE UP (ref 2–14)
MONOCYTES NFR BLD AUTO: 6.3 % — SIGNIFICANT CHANGE UP (ref 2–14)
MONOCYTES NFR BLD AUTO: 7.4 % — SIGNIFICANT CHANGE UP (ref 2–14)
MONOCYTES NFR BLD AUTO: 7.6 %
MONOCYTES NFR BLD AUTO: 7.6 % — SIGNIFICANT CHANGE UP (ref 2–14)
MONOCYTES NFR BLD AUTO: 7.6 % — SIGNIFICANT CHANGE UP (ref 2–14)
MONOCYTES NFR BLD AUTO: 7.9 % — SIGNIFICANT CHANGE UP (ref 2–14)
MONOCYTES NFR BLD AUTO: 7.9 % — SIGNIFICANT CHANGE UP (ref 2–14)
MONOCYTES NFR BLD AUTO: 8.3 % — SIGNIFICANT CHANGE UP (ref 2–14)
MONOCYTES NFR BLD AUTO: 8.8 % — SIGNIFICANT CHANGE UP (ref 2–14)
MONOCYTES NFR BLD AUTO: 8.8 % — SIGNIFICANT CHANGE UP (ref 2–14)
MONOCYTES NFR BLD AUTO: 9.3 % — SIGNIFICANT CHANGE UP (ref 2–14)
MONOCYTES NFR BLD AUTO: 9.3 % — SIGNIFICANT CHANGE UP (ref 2–14)
MONOCYTES NFR BLD AUTO: 9.8 % — SIGNIFICANT CHANGE UP (ref 2–14)
MONOCYTES NFR BLD AUTO: 9.8 % — SIGNIFICANT CHANGE UP (ref 2–14)
MRSA PCR RESULT.: SIGNIFICANT CHANGE UP
NEUTROPHILS # BLD AUTO: 11.25 K/UL — HIGH (ref 1.8–7.4)
NEUTROPHILS # BLD AUTO: 11.25 K/UL — HIGH (ref 1.8–7.4)
NEUTROPHILS # BLD AUTO: 12.13 K/UL — HIGH (ref 1.8–7.4)
NEUTROPHILS # BLD AUTO: 12.13 K/UL — HIGH (ref 1.8–7.4)
NEUTROPHILS # BLD AUTO: 12.41 K/UL — HIGH (ref 1.8–7.4)
NEUTROPHILS # BLD AUTO: 12.41 K/UL — HIGH (ref 1.8–7.4)
NEUTROPHILS # BLD AUTO: 13.53 K/UL — HIGH (ref 1.8–7.4)
NEUTROPHILS # BLD AUTO: 13.53 K/UL — HIGH (ref 1.8–7.4)
NEUTROPHILS # BLD AUTO: 17.47 K/UL — HIGH (ref 1.8–7.4)
NEUTROPHILS # BLD AUTO: 17.47 K/UL — HIGH (ref 1.8–7.4)
NEUTROPHILS # BLD AUTO: 19.54 K/UL — HIGH (ref 1.8–7.4)
NEUTROPHILS # BLD AUTO: 19.54 K/UL — HIGH (ref 1.8–7.4)
NEUTROPHILS # BLD AUTO: 4.52 K/UL
NEUTROPHILS # BLD AUTO: 6.96 K/UL — SIGNIFICANT CHANGE UP (ref 1.8–7.4)
NEUTROPHILS # BLD AUTO: 6.96 K/UL — SIGNIFICANT CHANGE UP (ref 1.8–7.4)
NEUTROPHILS # BLD AUTO: 7 K/UL — SIGNIFICANT CHANGE UP (ref 1.8–7.4)
NEUTROPHILS # BLD AUTO: 7 K/UL — SIGNIFICANT CHANGE UP (ref 1.8–7.4)
NEUTROPHILS # BLD AUTO: 7.45 K/UL — HIGH (ref 1.8–7.4)
NEUTROPHILS # BLD AUTO: 7.45 K/UL — HIGH (ref 1.8–7.4)
NEUTROPHILS # BLD AUTO: 8.21 K/UL — HIGH (ref 1.8–7.4)
NEUTROPHILS # BLD AUTO: 8.21 K/UL — HIGH (ref 1.8–7.4)
NEUTROPHILS # BLD AUTO: 8.32 K/UL — HIGH (ref 1.8–7.4)
NEUTROPHILS # BLD AUTO: 8.32 K/UL — HIGH (ref 1.8–7.4)
NEUTROPHILS # BLD AUTO: 8.37 K/UL — HIGH (ref 1.8–7.4)
NEUTROPHILS # BLD AUTO: 8.37 K/UL — HIGH (ref 1.8–7.4)
NEUTROPHILS # BLD AUTO: 8.89 K/UL — HIGH (ref 1.8–7.4)
NEUTROPHILS # BLD AUTO: 8.89 K/UL — HIGH (ref 1.8–7.4)
NEUTROPHILS # BLD AUTO: 9.78 K/UL — HIGH (ref 1.8–7.4)
NEUTROPHILS # BLD AUTO: 9.78 K/UL — HIGH (ref 1.8–7.4)
NEUTROPHILS NFR BLD AUTO: 58.1 %
NEUTROPHILS NFR BLD AUTO: 58.4 % — SIGNIFICANT CHANGE UP (ref 43–77)
NEUTROPHILS NFR BLD AUTO: 58.4 % — SIGNIFICANT CHANGE UP (ref 43–77)
NEUTROPHILS NFR BLD AUTO: 68.4 % — SIGNIFICANT CHANGE UP (ref 43–77)
NEUTROPHILS NFR BLD AUTO: 68.4 % — SIGNIFICANT CHANGE UP (ref 43–77)
NEUTROPHILS NFR BLD AUTO: 69.4 % — SIGNIFICANT CHANGE UP (ref 43–77)
NEUTROPHILS NFR BLD AUTO: 69.4 % — SIGNIFICANT CHANGE UP (ref 43–77)
NEUTROPHILS NFR BLD AUTO: 72.4 % — SIGNIFICANT CHANGE UP (ref 43–77)
NEUTROPHILS NFR BLD AUTO: 72.4 % — SIGNIFICANT CHANGE UP (ref 43–77)
NEUTROPHILS NFR BLD AUTO: 74.8 % — SIGNIFICANT CHANGE UP (ref 43–77)
NEUTROPHILS NFR BLD AUTO: 74.8 % — SIGNIFICANT CHANGE UP (ref 43–77)
NEUTROPHILS NFR BLD AUTO: 76.1 % — SIGNIFICANT CHANGE UP (ref 43–77)
NEUTROPHILS NFR BLD AUTO: 76.1 % — SIGNIFICANT CHANGE UP (ref 43–77)
NEUTROPHILS NFR BLD AUTO: 77 % — SIGNIFICANT CHANGE UP (ref 43–77)
NEUTROPHILS NFR BLD AUTO: 77 % — SIGNIFICANT CHANGE UP (ref 43–77)
NEUTROPHILS NFR BLD AUTO: 77.3 % — HIGH (ref 43–77)
NEUTROPHILS NFR BLD AUTO: 77.3 % — HIGH (ref 43–77)
NEUTROPHILS NFR BLD AUTO: 77.5 % — HIGH (ref 43–77)
NEUTROPHILS NFR BLD AUTO: 77.5 % — HIGH (ref 43–77)
NEUTROPHILS NFR BLD AUTO: 78.2 % — HIGH (ref 43–77)
NEUTROPHILS NFR BLD AUTO: 78.2 % — HIGH (ref 43–77)
NEUTROPHILS NFR BLD AUTO: 79.3 % — HIGH (ref 43–77)
NEUTROPHILS NFR BLD AUTO: 79.3 % — HIGH (ref 43–77)
NEUTROPHILS NFR BLD AUTO: 79.5 % — HIGH (ref 43–77)
NEUTROPHILS NFR BLD AUTO: 79.5 % — HIGH (ref 43–77)
NEUTROPHILS NFR BLD AUTO: 83.1 % — HIGH (ref 43–77)
NEUTROPHILS NFR BLD AUTO: 83.1 % — HIGH (ref 43–77)
NEUTROPHILS NFR BLD AUTO: 86 % — HIGH (ref 43–77)
NEUTROPHILS NFR BLD AUTO: 86 % — HIGH (ref 43–77)
NITRITE UR-MCNC: NEGATIVE — SIGNIFICANT CHANGE UP
NONHDLC SERPL-MCNC: 111 MG/DL
NONHDLC SERPL-MCNC: 116 MG/DL
NONHDLC SERPL-MCNC: 117 MG/DL
NORMALIZED SCT PPP-RTO: 0.97 RATIO — SIGNIFICANT CHANGE UP (ref 0–1.16)
NORMALIZED SCT PPP-RTO: 0.97 RATIO — SIGNIFICANT CHANGE UP (ref 0–1.16)
NORMALIZED SCT PPP-RTO: SIGNIFICANT CHANGE UP
NORMALIZED SCT PPP-RTO: SIGNIFICANT CHANGE UP
NRBC # BLD: 0 /100 WBCS — SIGNIFICANT CHANGE UP (ref 0–0)
NRBC # BLD: 1 /100 — HIGH (ref 0–0)
NRBC # BLD: 1 /100 — HIGH (ref 0–0)
ORGANISM # SPEC MICROSCOPIC CNT: ABNORMAL
OSMOLALITY SERPL: 298 MOSMOL/KG — SIGNIFICANT CHANGE UP (ref 275–300)
OSMOLALITY SERPL: 298 MOSMOL/KG — SIGNIFICANT CHANGE UP (ref 275–300)
OSMOLALITY UR: 206 MOS/KG — LOW (ref 300–900)
OSMOLALITY UR: 206 MOS/KG — LOW (ref 300–900)
OSMOLALITY UR: 319 MOS/KG — SIGNIFICANT CHANGE UP (ref 300–900)
OSMOLALITY UR: 319 MOS/KG — SIGNIFICANT CHANGE UP (ref 300–900)
OSMOLALITY UR: 498 MOS/KG — SIGNIFICANT CHANGE UP (ref 300–900)
OSMOLALITY UR: 498 MOS/KG — SIGNIFICANT CHANGE UP (ref 300–900)
OVALOCYTES BLD QL SMEAR: SLIGHT — SIGNIFICANT CHANGE UP
P-ANCA SER-ACNC: NEGATIVE — SIGNIFICANT CHANGE UP
P-ANCA SER-ACNC: NEGATIVE — SIGNIFICANT CHANGE UP
PCO2 BLDV: 33 MMHG — LOW (ref 39–42)
PCO2 BLDV: 33 MMHG — LOW (ref 39–42)
PCO2 BLDV: 39 MMHG — SIGNIFICANT CHANGE UP (ref 39–42)
PCO2 BLDV: 39 MMHG — SIGNIFICANT CHANGE UP (ref 39–42)
PCO2 BLDV: 45 MMHG — HIGH (ref 39–42)
PCO2 BLDV: 45 MMHG — HIGH (ref 39–42)
PCO2 BLDV: 53 MMHG — HIGH (ref 39–42)
PCO2 BLDV: 53 MMHG — HIGH (ref 39–42)
PCO2 BLDV: 64 MMHG — HIGH (ref 39–42)
PCO2 BLDV: 64 MMHG — HIGH (ref 39–42)
PH BLDV: 7.33 — SIGNIFICANT CHANGE UP (ref 7.32–7.43)
PH BLDV: 7.33 — SIGNIFICANT CHANGE UP (ref 7.32–7.43)
PH BLDV: 7.39 — SIGNIFICANT CHANGE UP (ref 7.32–7.43)
PH BLDV: 7.39 — SIGNIFICANT CHANGE UP (ref 7.32–7.43)
PH BLDV: 7.43 — SIGNIFICANT CHANGE UP (ref 7.32–7.43)
PH BLDV: 7.43 — SIGNIFICANT CHANGE UP (ref 7.32–7.43)
PH BLDV: 7.53 — HIGH (ref 7.32–7.43)
PH BLDV: 7.53 — HIGH (ref 7.32–7.43)
PH BLDV: 7.55 — HIGH (ref 7.32–7.43)
PH BLDV: 7.55 — HIGH (ref 7.32–7.43)
PH UR: 6 — SIGNIFICANT CHANGE UP (ref 5–8)
PH UR: 8 — SIGNIFICANT CHANGE UP (ref 5–8)
PH UR: 8 — SIGNIFICANT CHANGE UP (ref 5–8)
PHOSPHATE SERPL-MCNC: 1.9 MG/DL — LOW (ref 2.5–4.5)
PHOSPHATE SERPL-MCNC: 3.1 MG/DL — SIGNIFICANT CHANGE UP (ref 2.5–4.5)
PHOSPHATE SERPL-MCNC: 3.1 MG/DL — SIGNIFICANT CHANGE UP (ref 2.5–4.5)
PHOSPHATE SERPL-MCNC: 3.3 MG/DL — SIGNIFICANT CHANGE UP (ref 2.5–4.5)
PHOSPHATE SERPL-MCNC: 3.3 MG/DL — SIGNIFICANT CHANGE UP (ref 2.5–4.5)
PHOSPHATE SERPL-MCNC: 3.4 MG/DL — SIGNIFICANT CHANGE UP (ref 2.5–4.5)
PHOSPHATE SERPL-MCNC: 3.9 MG/DL — SIGNIFICANT CHANGE UP (ref 2.5–4.5)
PHOSPHATE SERPL-MCNC: 4.1 MG/DL — SIGNIFICANT CHANGE UP (ref 2.5–4.5)
PHOSPHATE SERPL-MCNC: 4.1 MG/DL — SIGNIFICANT CHANGE UP (ref 2.5–4.5)
PHOSPHATE SERPL-MCNC: 4.4 MG/DL — SIGNIFICANT CHANGE UP (ref 2.5–4.5)
PHOSPHATE SERPL-MCNC: 4.4 MG/DL — SIGNIFICANT CHANGE UP (ref 2.5–4.5)
PHOSPHATE SERPL-MCNC: 4.6 MG/DL — HIGH (ref 2.5–4.5)
PHOSPHATE SERPL-MCNC: 4.6 MG/DL — HIGH (ref 2.5–4.5)
PHOSPHATE SERPL-MCNC: 4.7 MG/DL — HIGH (ref 2.5–4.5)
PHOSPHATE SERPL-MCNC: 4.7 MG/DL — HIGH (ref 2.5–4.5)
PHOSPHATE SERPL-MCNC: 5.6 MG/DL — HIGH (ref 2.5–4.5)
PHOSPHATE SERPL-MCNC: 5.6 MG/DL — HIGH (ref 2.5–4.5)
PHOSPHATE SERPL-MCNC: 5.7 MG/DL — HIGH (ref 2.5–4.5)
PHOSPHATE SERPL-MCNC: 5.7 MG/DL — HIGH (ref 2.5–4.5)
PHOSPHATE SERPL-MCNC: 6.4 MG/DL — HIGH (ref 2.5–4.5)
PHOSPHATE SERPL-MCNC: 6.4 MG/DL — HIGH (ref 2.5–4.5)
PHOSPHATE SERPL-MCNC: 6.6 MG/DL — HIGH (ref 2.5–4.5)
PHOSPHATE SERPL-MCNC: 6.6 MG/DL — HIGH (ref 2.5–4.5)
PHOSPHATE SERPL-MCNC: 6.7 MG/DL — HIGH (ref 2.5–4.5)
PHOSPHATE SERPL-MCNC: 7.3 MG/DL — HIGH (ref 2.5–4.5)
PHOSPHATE SERPL-MCNC: 7.3 MG/DL — HIGH (ref 2.5–4.5)
PLAT MORPH BLD: NORMAL — SIGNIFICANT CHANGE UP
PLATELET # BLD AUTO: 284 K/UL — SIGNIFICANT CHANGE UP (ref 150–400)
PLATELET # BLD AUTO: 284 K/UL — SIGNIFICANT CHANGE UP (ref 150–400)
PLATELET # BLD AUTO: 330 K/UL — SIGNIFICANT CHANGE UP (ref 150–400)
PLATELET # BLD AUTO: 330 K/UL — SIGNIFICANT CHANGE UP (ref 150–400)
PLATELET # BLD AUTO: 371 K/UL — SIGNIFICANT CHANGE UP (ref 150–400)
PLATELET # BLD AUTO: 371 K/UL — SIGNIFICANT CHANGE UP (ref 150–400)
PLATELET # BLD AUTO: 404 K/UL — HIGH (ref 150–400)
PLATELET # BLD AUTO: 404 K/UL — HIGH (ref 150–400)
PLATELET # BLD AUTO: 446 K/UL — HIGH (ref 150–400)
PLATELET # BLD AUTO: 473 K/UL — HIGH (ref 150–400)
PLATELET # BLD AUTO: 473 K/UL — HIGH (ref 150–400)
PLATELET # BLD AUTO: 483 K/UL — HIGH (ref 150–400)
PLATELET # BLD AUTO: 483 K/UL — HIGH (ref 150–400)
PLATELET # BLD AUTO: 529 K/UL — HIGH (ref 150–400)
PLATELET # BLD AUTO: 529 K/UL — HIGH (ref 150–400)
PLATELET # BLD AUTO: 533 K/UL — HIGH (ref 150–400)
PLATELET # BLD AUTO: 533 K/UL — HIGH (ref 150–400)
PLATELET # BLD AUTO: 553 K/UL
PLATELET # BLD AUTO: 565 K/UL — HIGH (ref 150–400)
PLATELET # BLD AUTO: 565 K/UL — HIGH (ref 150–400)
PLATELET # BLD AUTO: 626 K/UL — HIGH (ref 150–400)
PLATELET # BLD AUTO: 626 K/UL — HIGH (ref 150–400)
PLATELET # BLD AUTO: 660 K/UL — HIGH (ref 150–400)
PLATELET # BLD AUTO: 660 K/UL — HIGH (ref 150–400)
PLATELET # BLD AUTO: 675 K/UL — HIGH (ref 150–400)
PLATELET # BLD AUTO: 675 K/UL — HIGH (ref 150–400)
PLATELET COUNT - ESTIMATE: NORMAL — SIGNIFICANT CHANGE UP
PLATELET COUNT - ESTIMATE: NORMAL — SIGNIFICANT CHANGE UP
PO2 BLDV: 142 MMHG — HIGH (ref 25–45)
PO2 BLDV: 142 MMHG — HIGH (ref 25–45)
PO2 BLDV: 292 MMHG — HIGH (ref 25–45)
PO2 BLDV: 292 MMHG — HIGH (ref 25–45)
PO2 BLDV: 49 MMHG — HIGH (ref 25–45)
PO2 BLDV: 49 MMHG — HIGH (ref 25–45)
PO2 BLDV: 59 MMHG — HIGH (ref 25–45)
PO2 BLDV: 59 MMHG — HIGH (ref 25–45)
PO2 BLDV: 71 MMHG — HIGH (ref 25–45)
PO2 BLDV: 71 MMHG — HIGH (ref 25–45)
POIKILOCYTOSIS BLD QL AUTO: SLIGHT — SIGNIFICANT CHANGE UP
POIKILOCYTOSIS BLD QL AUTO: SLIGHT — SIGNIFICANT CHANGE UP
POLYCHROMASIA BLD QL SMEAR: SLIGHT — SIGNIFICANT CHANGE UP
POLYCHROMASIA BLD QL SMEAR: SLIGHT — SIGNIFICANT CHANGE UP
POTASSIUM BLDV-SCNC: 4 MMOL/L — SIGNIFICANT CHANGE UP (ref 3.5–5.1)
POTASSIUM BLDV-SCNC: 4 MMOL/L — SIGNIFICANT CHANGE UP (ref 3.5–5.1)
POTASSIUM BLDV-SCNC: 4.2 MMOL/L — SIGNIFICANT CHANGE UP (ref 3.5–5.1)
POTASSIUM BLDV-SCNC: 4.2 MMOL/L — SIGNIFICANT CHANGE UP (ref 3.5–5.1)
POTASSIUM BLDV-SCNC: 4.8 MMOL/L — SIGNIFICANT CHANGE UP (ref 3.5–5.1)
POTASSIUM BLDV-SCNC: 4.8 MMOL/L — SIGNIFICANT CHANGE UP (ref 3.5–5.1)
POTASSIUM BLDV-SCNC: 4.9 MMOL/L — SIGNIFICANT CHANGE UP (ref 3.5–5.1)
POTASSIUM BLDV-SCNC: 4.9 MMOL/L — SIGNIFICANT CHANGE UP (ref 3.5–5.1)
POTASSIUM BLDV-SCNC: 5 MMOL/L — SIGNIFICANT CHANGE UP (ref 3.5–5.1)
POTASSIUM BLDV-SCNC: 5 MMOL/L — SIGNIFICANT CHANGE UP (ref 3.5–5.1)
POTASSIUM SERPL-MCNC: 3.4 MMOL/L — LOW (ref 3.5–5.3)
POTASSIUM SERPL-MCNC: 3.4 MMOL/L — LOW (ref 3.5–5.3)
POTASSIUM SERPL-MCNC: 3.5 MMOL/L — SIGNIFICANT CHANGE UP (ref 3.5–5.3)
POTASSIUM SERPL-MCNC: 3.5 MMOL/L — SIGNIFICANT CHANGE UP (ref 3.5–5.3)
POTASSIUM SERPL-MCNC: 3.7 MMOL/L — SIGNIFICANT CHANGE UP (ref 3.5–5.3)
POTASSIUM SERPL-MCNC: 3.7 MMOL/L — SIGNIFICANT CHANGE UP (ref 3.5–5.3)
POTASSIUM SERPL-MCNC: 3.9 MMOL/L — SIGNIFICANT CHANGE UP (ref 3.5–5.3)
POTASSIUM SERPL-MCNC: 3.9 MMOL/L — SIGNIFICANT CHANGE UP (ref 3.5–5.3)
POTASSIUM SERPL-MCNC: 4 MMOL/L — SIGNIFICANT CHANGE UP (ref 3.5–5.3)
POTASSIUM SERPL-MCNC: 4.1 MMOL/L — SIGNIFICANT CHANGE UP (ref 3.5–5.3)
POTASSIUM SERPL-MCNC: 4.2 MMOL/L — SIGNIFICANT CHANGE UP (ref 3.5–5.3)
POTASSIUM SERPL-MCNC: 4.3 MMOL/L — SIGNIFICANT CHANGE UP (ref 3.5–5.3)
POTASSIUM SERPL-MCNC: 4.3 MMOL/L — SIGNIFICANT CHANGE UP (ref 3.5–5.3)
POTASSIUM SERPL-MCNC: 4.4 MMOL/L — SIGNIFICANT CHANGE UP (ref 3.5–5.3)
POTASSIUM SERPL-MCNC: 4.4 MMOL/L — SIGNIFICANT CHANGE UP (ref 3.5–5.3)
POTASSIUM SERPL-MCNC: 4.7 MMOL/L — SIGNIFICANT CHANGE UP (ref 3.5–5.3)
POTASSIUM SERPL-MCNC: 4.8 MMOL/L — SIGNIFICANT CHANGE UP (ref 3.5–5.3)
POTASSIUM SERPL-MCNC: 5 MMOL/L — SIGNIFICANT CHANGE UP (ref 3.5–5.3)
POTASSIUM SERPL-MCNC: 5 MMOL/L — SIGNIFICANT CHANGE UP (ref 3.5–5.3)
POTASSIUM SERPL-SCNC: 3.4 MMOL/L — LOW (ref 3.5–5.3)
POTASSIUM SERPL-SCNC: 3.4 MMOL/L — LOW (ref 3.5–5.3)
POTASSIUM SERPL-SCNC: 3.5 MMOL/L — SIGNIFICANT CHANGE UP (ref 3.5–5.3)
POTASSIUM SERPL-SCNC: 3.5 MMOL/L — SIGNIFICANT CHANGE UP (ref 3.5–5.3)
POTASSIUM SERPL-SCNC: 3.7 MMOL/L — SIGNIFICANT CHANGE UP (ref 3.5–5.3)
POTASSIUM SERPL-SCNC: 3.7 MMOL/L — SIGNIFICANT CHANGE UP (ref 3.5–5.3)
POTASSIUM SERPL-SCNC: 3.9 MMOL/L — SIGNIFICANT CHANGE UP (ref 3.5–5.3)
POTASSIUM SERPL-SCNC: 3.9 MMOL/L — SIGNIFICANT CHANGE UP (ref 3.5–5.3)
POTASSIUM SERPL-SCNC: 4 MMOL/L — SIGNIFICANT CHANGE UP (ref 3.5–5.3)
POTASSIUM SERPL-SCNC: 4.1 MMOL/L — SIGNIFICANT CHANGE UP (ref 3.5–5.3)
POTASSIUM SERPL-SCNC: 4.2 MMOL/L — SIGNIFICANT CHANGE UP (ref 3.5–5.3)
POTASSIUM SERPL-SCNC: 4.3 MMOL/L
POTASSIUM SERPL-SCNC: 4.3 MMOL/L — SIGNIFICANT CHANGE UP (ref 3.5–5.3)
POTASSIUM SERPL-SCNC: 4.3 MMOL/L — SIGNIFICANT CHANGE UP (ref 3.5–5.3)
POTASSIUM SERPL-SCNC: 4.4 MMOL/L
POTASSIUM SERPL-SCNC: 4.4 MMOL/L — SIGNIFICANT CHANGE UP (ref 3.5–5.3)
POTASSIUM SERPL-SCNC: 4.4 MMOL/L — SIGNIFICANT CHANGE UP (ref 3.5–5.3)
POTASSIUM SERPL-SCNC: 4.7 MMOL/L — SIGNIFICANT CHANGE UP (ref 3.5–5.3)
POTASSIUM SERPL-SCNC: 4.8 MMOL/L — SIGNIFICANT CHANGE UP (ref 3.5–5.3)
POTASSIUM SERPL-SCNC: 5 MMOL/L — SIGNIFICANT CHANGE UP (ref 3.5–5.3)
POTASSIUM SERPL-SCNC: 5 MMOL/L — SIGNIFICANT CHANGE UP (ref 3.5–5.3)
POTASSIUM UR-SCNC: 26 MMOL/L — SIGNIFICANT CHANGE UP
POTASSIUM UR-SCNC: 26 MMOL/L — SIGNIFICANT CHANGE UP
PROCALCITONIN SERPL-MCNC: 0.38 NG/ML — HIGH (ref 0.02–0.1)
PROCALCITONIN SERPL-MCNC: 0.38 NG/ML — HIGH (ref 0.02–0.1)
PROT ?TM UR-MCNC: 303 MG/DL — HIGH (ref 0–12)
PROT ?TM UR-MCNC: 303 MG/DL — HIGH (ref 0–12)
PROT SERPL-MCNC: 6 G/DL — SIGNIFICANT CHANGE UP (ref 6–8.3)
PROT SERPL-MCNC: 6 G/DL — SIGNIFICANT CHANGE UP (ref 6–8.3)
PROT SERPL-MCNC: 6.2 G/DL — SIGNIFICANT CHANGE UP (ref 6–8.3)
PROT SERPL-MCNC: 6.2 G/DL — SIGNIFICANT CHANGE UP (ref 6–8.3)
PROT SERPL-MCNC: 6.3 G/DL — SIGNIFICANT CHANGE UP (ref 6–8.3)
PROT SERPL-MCNC: 6.3 G/DL — SIGNIFICANT CHANGE UP (ref 6–8.3)
PROT SERPL-MCNC: 6.5 G/DL — SIGNIFICANT CHANGE UP (ref 6–8.3)
PROT SERPL-MCNC: 6.5 G/DL — SIGNIFICANT CHANGE UP (ref 6–8.3)
PROT SERPL-MCNC: 6.6 G/DL — SIGNIFICANT CHANGE UP (ref 6–8.3)
PROT SERPL-MCNC: 6.7 G/DL — SIGNIFICANT CHANGE UP (ref 6–8.3)
PROT SERPL-MCNC: 6.7 G/DL — SIGNIFICANT CHANGE UP (ref 6–8.3)
PROT SERPL-MCNC: 6.8 G/DL — SIGNIFICANT CHANGE UP (ref 6–8.3)
PROT SERPL-MCNC: 6.8 G/DL — SIGNIFICANT CHANGE UP (ref 6–8.3)
PROT SERPL-MCNC: 6.9 G/DL — SIGNIFICANT CHANGE UP (ref 6–8.3)
PROT SERPL-MCNC: 6.9 G/DL — SIGNIFICANT CHANGE UP (ref 6–8.3)
PROT SERPL-MCNC: 7.1 G/DL — SIGNIFICANT CHANGE UP (ref 6–8.3)
PROT SERPL-MCNC: 7.2 G/DL — SIGNIFICANT CHANGE UP (ref 6–8.3)
PROT SERPL-MCNC: 7.3 G/DL
PROT SERPL-MCNC: 7.4 G/DL — SIGNIFICANT CHANGE UP (ref 6–8.3)
PROT SERPL-MCNC: 7.4 G/DL — SIGNIFICANT CHANGE UP (ref 6–8.3)
PROT SERPL-MCNC: 7.5 G/DL — SIGNIFICANT CHANGE UP (ref 6–8.3)
PROT SERPL-MCNC: 7.5 G/DL — SIGNIFICANT CHANGE UP (ref 6–8.3)
PROT SERPL-MCNC: 7.6 G/DL — SIGNIFICANT CHANGE UP (ref 6–8.3)
PROT SERPL-MCNC: 7.6 G/DL — SIGNIFICANT CHANGE UP (ref 6–8.3)
PROT SERPL-MCNC: 7.7 G/DL
PROT SERPL-MCNC: 7.7 G/DL — SIGNIFICANT CHANGE UP (ref 6–8.3)
PROT SERPL-MCNC: 7.9 G/DL — SIGNIFICANT CHANGE UP (ref 6–8.3)
PROT SERPL-MCNC: 7.9 G/DL — SIGNIFICANT CHANGE UP (ref 6–8.3)
PROT UR-MCNC: 300 MG/DL
PROT UR-MCNC: ABNORMAL
PROT UR-MCNC: ABNORMAL
PROT UR-MCNC: SIGNIFICANT CHANGE UP MG/DL
PROT UR-MCNC: SIGNIFICANT CHANGE UP MG/DL
PROT/CREAT UR-RTO: 6.9 RATIO — HIGH (ref 0–0.2)
PROT/CREAT UR-RTO: 6.9 RATIO — HIGH (ref 0–0.2)
QUANTIFERON TB PLUS MITOGEN MINUS NIL: 9.29 IU/ML
QUANTIFERON TB PLUS NIL: 0.03 IU/ML
QUANTIFERON TB PLUS TB1 MINUS NIL: -0.01 IU/ML
QUANTIFERON TB PLUS TB2 MINUS NIL: -0.01 IU/ML
RAPID RVP RESULT: SIGNIFICANT CHANGE UP
RAPID RVP RESULT: SIGNIFICANT CHANGE UP
RBC # BLD: 2.84 M/UL — LOW (ref 3.8–5.2)
RBC # BLD: 2.84 M/UL — LOW (ref 3.8–5.2)
RBC # BLD: 2.92 M/UL — LOW (ref 3.8–5.2)
RBC # BLD: 2.92 M/UL — LOW (ref 3.8–5.2)
RBC # BLD: 3.06 M/UL — LOW (ref 3.8–5.2)
RBC # BLD: 3.07 M/UL — LOW (ref 3.8–5.2)
RBC # BLD: 3.07 M/UL — LOW (ref 3.8–5.2)
RBC # BLD: 3.45 M/UL — LOW (ref 3.8–5.2)
RBC # BLD: 3.45 M/UL — LOW (ref 3.8–5.2)
RBC # BLD: 3.54 M/UL — LOW (ref 3.8–5.2)
RBC # BLD: 3.61 M/UL — LOW (ref 3.8–5.2)
RBC # BLD: 3.61 M/UL — LOW (ref 3.8–5.2)
RBC # BLD: 3.63 M/UL — LOW (ref 3.8–5.2)
RBC # BLD: 3.63 M/UL — LOW (ref 3.8–5.2)
RBC # BLD: 3.66 M/UL — LOW (ref 3.8–5.2)
RBC # BLD: 3.66 M/UL — LOW (ref 3.8–5.2)
RBC # BLD: 3.72 M/UL — LOW (ref 3.8–5.2)
RBC # BLD: 3.72 M/UL — LOW (ref 3.8–5.2)
RBC # BLD: 3.77 M/UL — LOW (ref 3.8–5.2)
RBC # BLD: 3.77 M/UL — LOW (ref 3.8–5.2)
RBC # BLD: 3.86 M/UL — SIGNIFICANT CHANGE UP (ref 3.8–5.2)
RBC # BLD: 3.86 M/UL — SIGNIFICANT CHANGE UP (ref 3.8–5.2)
RBC # BLD: 4.67 M/UL
RBC # FLD: 16 % — HIGH (ref 10.3–14.5)
RBC # FLD: 16 % — HIGH (ref 10.3–14.5)
RBC # FLD: 16.2 % — HIGH (ref 10.3–14.5)
RBC # FLD: 16.3 %
RBC # FLD: 16.3 % — HIGH (ref 10.3–14.5)
RBC # FLD: 16.4 % — HIGH (ref 10.3–14.5)
RBC # FLD: 16.4 % — HIGH (ref 10.3–14.5)
RBC # FLD: 16.6 % — HIGH (ref 10.3–14.5)
RBC # FLD: 16.8 % — HIGH (ref 10.3–14.5)
RBC # FLD: 16.8 % — HIGH (ref 10.3–14.5)
RBC # FLD: 17.7 % — HIGH (ref 10.3–14.5)
RBC # FLD: 17.7 % — HIGH (ref 10.3–14.5)
RBC # FLD: 17.9 % — HIGH (ref 10.3–14.5)
RBC # FLD: 17.9 % — HIGH (ref 10.3–14.5)
RBC # FLD: 18.6 % — HIGH (ref 10.3–14.5)
RBC # FLD: 18.6 % — HIGH (ref 10.3–14.5)
RBC BLD AUTO: ABNORMAL
RBC CASTS # UR COMP ASSIST: 24 /HPF — HIGH (ref 0–4)
RBC CASTS # UR COMP ASSIST: 24 /HPF — HIGH (ref 0–4)
RBC CASTS # UR COMP ASSIST: 25 /HPF — HIGH (ref 0–4)
RBC CASTS # UR COMP ASSIST: 25 /HPF — HIGH (ref 0–4)
RBC CASTS # UR COMP ASSIST: 5 /HPF — HIGH (ref 0–4)
RBC CASTS # UR COMP ASSIST: 5 /HPF — HIGH (ref 0–4)
RBC CASTS # UR COMP ASSIST: 936 /HPF — HIGH (ref 0–4)
RBC CASTS # UR COMP ASSIST: 936 /HPF — HIGH (ref 0–4)
RETICS #: 16.6 K/UL — LOW (ref 25–125)
RETICS #: 16.6 K/UL — LOW (ref 25–125)
RETICS/RBC NFR: 0.6 % — SIGNIFICANT CHANGE UP (ref 0.5–2.5)
RETICS/RBC NFR: 0.6 % — SIGNIFICANT CHANGE UP (ref 0.5–2.5)
REVIEW: SIGNIFICANT CHANGE UP
RH IG SCN BLD-IMP: POSITIVE — SIGNIFICANT CHANGE UP
RHEUMATOID FACT SERPL-ACNC: <10 IU/ML — SIGNIFICANT CHANGE UP (ref 0–13)
RHEUMATOID FACT SERPL-ACNC: <10 IU/ML — SIGNIFICANT CHANGE UP (ref 0–13)
RPR SER-TITR: NORMAL
S AUREUS DNA NOSE QL NAA+PROBE: DETECTED
S PNEUM AG UR QL: NEGATIVE — SIGNIFICANT CHANGE UP
S PNEUM AG UR QL: NEGATIVE — SIGNIFICANT CHANGE UP
SAO2 % BLDV: 69.6 % — SIGNIFICANT CHANGE UP (ref 67–88)
SAO2 % BLDV: 69.6 % — SIGNIFICANT CHANGE UP (ref 67–88)
SAO2 % BLDV: 83.5 % — SIGNIFICANT CHANGE UP (ref 67–88)
SAO2 % BLDV: 83.5 % — SIGNIFICANT CHANGE UP (ref 67–88)
SAO2 % BLDV: 93.6 % — HIGH (ref 67–88)
SAO2 % BLDV: 93.6 % — HIGH (ref 67–88)
SAO2 % BLDV: 98.9 % — HIGH (ref 67–88)
SAO2 % BLDV: 98.9 % — HIGH (ref 67–88)
SAO2 % BLDV: 99.9 % — HIGH (ref 67–88)
SAO2 % BLDV: 99.9 % — HIGH (ref 67–88)
SARS-COV-2 RNA SPEC QL NAA+PROBE: SIGNIFICANT CHANGE UP
SCHISTOCYTES BLD QL AUTO: SLIGHT — SIGNIFICANT CHANGE UP
SCHISTOCYTES BLD QL AUTO: SLIGHT — SIGNIFICANT CHANGE UP
SODIUM SERPL-SCNC: 138 MMOL/L — SIGNIFICANT CHANGE UP (ref 135–145)
SODIUM SERPL-SCNC: 138 MMOL/L — SIGNIFICANT CHANGE UP (ref 135–145)
SODIUM SERPL-SCNC: 139 MMOL/L
SODIUM SERPL-SCNC: 140 MMOL/L
SODIUM SERPL-SCNC: 140 MMOL/L — SIGNIFICANT CHANGE UP (ref 135–145)
SODIUM SERPL-SCNC: 140 MMOL/L — SIGNIFICANT CHANGE UP (ref 135–145)
SODIUM SERPL-SCNC: 142 MMOL/L — SIGNIFICANT CHANGE UP (ref 135–145)
SODIUM SERPL-SCNC: 142 MMOL/L — SIGNIFICANT CHANGE UP (ref 135–145)
SODIUM SERPL-SCNC: 143 MMOL/L — SIGNIFICANT CHANGE UP (ref 135–145)
SODIUM SERPL-SCNC: 143 MMOL/L — SIGNIFICANT CHANGE UP (ref 135–145)
SODIUM SERPL-SCNC: 144 MMOL/L — SIGNIFICANT CHANGE UP (ref 135–145)
SODIUM SERPL-SCNC: 144 MMOL/L — SIGNIFICANT CHANGE UP (ref 135–145)
SODIUM SERPL-SCNC: 145 MMOL/L — SIGNIFICANT CHANGE UP (ref 135–145)
SODIUM SERPL-SCNC: 145 MMOL/L — SIGNIFICANT CHANGE UP (ref 135–145)
SODIUM SERPL-SCNC: 146 MMOL/L — HIGH (ref 135–145)
SODIUM SERPL-SCNC: 146 MMOL/L — HIGH (ref 135–145)
SODIUM SERPL-SCNC: 147 MMOL/L — HIGH (ref 135–145)
SODIUM SERPL-SCNC: 147 MMOL/L — HIGH (ref 135–145)
SODIUM SERPL-SCNC: 148 MMOL/L — HIGH (ref 135–145)
SODIUM SERPL-SCNC: 149 MMOL/L — HIGH (ref 135–145)
SODIUM SERPL-SCNC: 149 MMOL/L — HIGH (ref 135–145)
SODIUM SERPL-SCNC: 150 MMOL/L — HIGH (ref 135–145)
SODIUM SERPL-SCNC: 150 MMOL/L — HIGH (ref 135–145)
SODIUM SERPL-SCNC: 151 MMOL/L — HIGH (ref 135–145)
SODIUM SERPL-SCNC: 152 MMOL/L — HIGH (ref 135–145)
SODIUM SERPL-SCNC: 153 MMOL/L — HIGH (ref 135–145)
SODIUM SERPL-SCNC: 153 MMOL/L — HIGH (ref 135–145)
SODIUM SERPL-SCNC: 154 MMOL/L — HIGH (ref 135–145)
SODIUM SERPL-SCNC: 154 MMOL/L — HIGH (ref 135–145)
SODIUM SERPL-SCNC: 156 MMOL/L — HIGH (ref 135–145)
SODIUM SERPL-SCNC: 156 MMOL/L — HIGH (ref 135–145)
SODIUM SERPL-SCNC: 157 MMOL/L — HIGH (ref 135–145)
SODIUM SERPL-SCNC: 157 MMOL/L — HIGH (ref 135–145)
SODIUM UR-SCNC: 28 MMOL/L — SIGNIFICANT CHANGE UP
SODIUM UR-SCNC: 28 MMOL/L — SIGNIFICANT CHANGE UP
SODIUM UR-SCNC: 56 MMOL/L — SIGNIFICANT CHANGE UP
SODIUM UR-SCNC: 56 MMOL/L — SIGNIFICANT CHANGE UP
SODIUM UR-SCNC: 7 MMOL/L — SIGNIFICANT CHANGE UP
SODIUM UR-SCNC: 7 MMOL/L — SIGNIFICANT CHANGE UP
SP GR SPEC: 1.01 — SIGNIFICANT CHANGE UP (ref 1–1.03)
SP GR SPEC: 1.03 — SIGNIFICANT CHANGE UP
SP GR SPEC: 1.03 — SIGNIFICANT CHANGE UP
SPECIMEN SOURCE: SIGNIFICANT CHANGE UP
SQUAMOUS # UR AUTO: 0 /HPF — SIGNIFICANT CHANGE UP (ref 0–5)
SQUAMOUS # UR AUTO: 0 /HPF — SIGNIFICANT CHANGE UP (ref 0–5)
SQUAMOUS # UR AUTO: 1 /HPF — SIGNIFICANT CHANGE UP (ref 0–5)
SQUAMOUS # UR AUTO: 1 /HPF — SIGNIFICANT CHANGE UP (ref 0–5)
SQUAMOUS # UR AUTO: 2 /HPF — SIGNIFICANT CHANGE UP (ref 0–5)
SQUAMOUS # UR AUTO: 2 /HPF — SIGNIFICANT CHANGE UP (ref 0–5)
SQUAMOUS # UR AUTO: 6 /HPF — HIGH (ref 0–5)
SQUAMOUS # UR AUTO: 6 /HPF — HIGH (ref 0–5)
SURGICAL PATHOLOGY STUDY: SIGNIFICANT CHANGE UP
SURGICAL PATHOLOGY STUDY: SIGNIFICANT CHANGE UP
T3 SERPL-MCNC: 57 NG/DL — LOW (ref 80–200)
T3 SERPL-MCNC: 57 NG/DL — LOW (ref 80–200)
T3FREE SERPL-MCNC: 3.05 PG/ML
T4 AB SER-ACNC: 4.9 UG/DL — SIGNIFICANT CHANGE UP (ref 4.6–12)
T4 AB SER-ACNC: 4.9 UG/DL — SIGNIFICANT CHANGE UP (ref 4.6–12)
T4 FREE SERPL-MCNC: 1.2 NG/DL
TARGETS BLD QL SMEAR: SLIGHT — SIGNIFICANT CHANGE UP
TARGETS BLD QL SMEAR: SLIGHT — SIGNIFICANT CHANGE UP
TIBC SERPL-MCNC: 258 UG/DL — SIGNIFICANT CHANGE UP (ref 220–430)
TIBC SERPL-MCNC: 258 UG/DL — SIGNIFICANT CHANGE UP (ref 220–430)
TRIGL SERPL-MCNC: 122 MG/DL
TRIGL SERPL-MCNC: 125 MG/DL
TRIGL SERPL-MCNC: 91 MG/DL
TROPONIN T, HIGH SENSITIVITY RESULT: 59 NG/L — HIGH (ref 0–51)
TROPONIN T, HIGH SENSITIVITY RESULT: 59 NG/L — HIGH (ref 0–51)
TSH SERPL-ACNC: 1.22 UIU/ML
TSH SERPL-ACNC: 1.66 UIU/ML
TSH SERPL-MCNC: 0.21 UIU/ML — LOW (ref 0.27–4.2)
TSH SERPL-MCNC: 0.21 UIU/ML — LOW (ref 0.27–4.2)
UIBC SERPL-MCNC: 250 UG/DL — SIGNIFICANT CHANGE UP (ref 110–370)
UIBC SERPL-MCNC: 250 UG/DL — SIGNIFICANT CHANGE UP (ref 110–370)
UROBILINOGEN FLD QL: 0.2 MG/DL — SIGNIFICANT CHANGE UP (ref 0.2–1)
UROBILINOGEN FLD QL: 0.2 MG/DL — SIGNIFICANT CHANGE UP (ref 0.2–1)
UROBILINOGEN FLD QL: 1 MG/DL — SIGNIFICANT CHANGE UP (ref 0.2–1)
UROBILINOGEN FLD QL: SIGNIFICANT CHANGE UP
UROBILINOGEN FLD QL: SIGNIFICANT CHANGE UP
UUN UR-MCNC: 259 MG/DL — SIGNIFICANT CHANGE UP
UUN UR-MCNC: 259 MG/DL — SIGNIFICANT CHANGE UP
WBC # BLD: 10.24 K/UL — SIGNIFICANT CHANGE UP (ref 3.8–10.5)
WBC # BLD: 10.24 K/UL — SIGNIFICANT CHANGE UP (ref 3.8–10.5)
WBC # BLD: 10.82 K/UL — HIGH (ref 3.8–10.5)
WBC # BLD: 10.82 K/UL — HIGH (ref 3.8–10.5)
WBC # BLD: 10.97 K/UL — HIGH (ref 3.8–10.5)
WBC # BLD: 10.97 K/UL — HIGH (ref 3.8–10.5)
WBC # BLD: 12.29 K/UL — HIGH (ref 3.8–10.5)
WBC # BLD: 12.29 K/UL — HIGH (ref 3.8–10.5)
WBC # BLD: 14.08 K/UL — HIGH (ref 3.8–10.5)
WBC # BLD: 14.08 K/UL — HIGH (ref 3.8–10.5)
WBC # BLD: 14.25 K/UL — HIGH (ref 3.8–10.5)
WBC # BLD: 14.25 K/UL — HIGH (ref 3.8–10.5)
WBC # BLD: 14.4 K/UL — HIGH (ref 3.8–10.5)
WBC # BLD: 14.4 K/UL — HIGH (ref 3.8–10.5)
WBC # BLD: 15.65 K/UL — HIGH (ref 3.8–10.5)
WBC # BLD: 15.65 K/UL — HIGH (ref 3.8–10.5)
WBC # BLD: 16.11 K/UL — HIGH (ref 3.8–10.5)
WBC # BLD: 16.11 K/UL — HIGH (ref 3.8–10.5)
WBC # BLD: 16.99 K/UL — HIGH (ref 3.8–10.5)
WBC # BLD: 16.99 K/UL — HIGH (ref 3.8–10.5)
WBC # BLD: 21.04 K/UL — HIGH (ref 3.8–10.5)
WBC # BLD: 21.04 K/UL — HIGH (ref 3.8–10.5)
WBC # BLD: 22.72 K/UL — HIGH (ref 3.8–10.5)
WBC # BLD: 22.72 K/UL — HIGH (ref 3.8–10.5)
WBC # BLD: 9.15 K/UL — SIGNIFICANT CHANGE UP (ref 3.8–10.5)
WBC # BLD: 9.15 K/UL — SIGNIFICANT CHANGE UP (ref 3.8–10.5)
WBC # BLD: 9.4 K/UL — SIGNIFICANT CHANGE UP (ref 3.8–10.5)
WBC # BLD: 9.4 K/UL — SIGNIFICANT CHANGE UP (ref 3.8–10.5)
WBC # FLD AUTO: 10.24 K/UL — SIGNIFICANT CHANGE UP (ref 3.8–10.5)
WBC # FLD AUTO: 10.24 K/UL — SIGNIFICANT CHANGE UP (ref 3.8–10.5)
WBC # FLD AUTO: 10.82 K/UL — HIGH (ref 3.8–10.5)
WBC # FLD AUTO: 10.82 K/UL — HIGH (ref 3.8–10.5)
WBC # FLD AUTO: 10.97 K/UL — HIGH (ref 3.8–10.5)
WBC # FLD AUTO: 10.97 K/UL — HIGH (ref 3.8–10.5)
WBC # FLD AUTO: 12.29 K/UL — HIGH (ref 3.8–10.5)
WBC # FLD AUTO: 12.29 K/UL — HIGH (ref 3.8–10.5)
WBC # FLD AUTO: 14.08 K/UL — HIGH (ref 3.8–10.5)
WBC # FLD AUTO: 14.08 K/UL — HIGH (ref 3.8–10.5)
WBC # FLD AUTO: 14.25 K/UL — HIGH (ref 3.8–10.5)
WBC # FLD AUTO: 14.25 K/UL — HIGH (ref 3.8–10.5)
WBC # FLD AUTO: 14.4 K/UL — HIGH (ref 3.8–10.5)
WBC # FLD AUTO: 14.4 K/UL — HIGH (ref 3.8–10.5)
WBC # FLD AUTO: 15.65 K/UL — HIGH (ref 3.8–10.5)
WBC # FLD AUTO: 15.65 K/UL — HIGH (ref 3.8–10.5)
WBC # FLD AUTO: 16.11 K/UL — HIGH (ref 3.8–10.5)
WBC # FLD AUTO: 16.11 K/UL — HIGH (ref 3.8–10.5)
WBC # FLD AUTO: 16.99 K/UL — HIGH (ref 3.8–10.5)
WBC # FLD AUTO: 16.99 K/UL — HIGH (ref 3.8–10.5)
WBC # FLD AUTO: 21.04 K/UL — HIGH (ref 3.8–10.5)
WBC # FLD AUTO: 21.04 K/UL — HIGH (ref 3.8–10.5)
WBC # FLD AUTO: 22.72 K/UL — HIGH (ref 3.8–10.5)
WBC # FLD AUTO: 22.72 K/UL — HIGH (ref 3.8–10.5)
WBC # FLD AUTO: 7.78 K/UL
WBC # FLD AUTO: 9.15 K/UL — SIGNIFICANT CHANGE UP (ref 3.8–10.5)
WBC # FLD AUTO: 9.15 K/UL — SIGNIFICANT CHANGE UP (ref 3.8–10.5)
WBC # FLD AUTO: 9.4 K/UL — SIGNIFICANT CHANGE UP (ref 3.8–10.5)
WBC # FLD AUTO: 9.4 K/UL — SIGNIFICANT CHANGE UP (ref 3.8–10.5)
WBC UR QL: 163 /HPF — HIGH (ref 0–5)
WBC UR QL: 163 /HPF — HIGH (ref 0–5)
WBC UR QL: 2 /HPF — SIGNIFICANT CHANGE UP (ref 0–5)
WBC UR QL: 2 /HPF — SIGNIFICANT CHANGE UP (ref 0–5)
WBC UR QL: 7 /HPF — HIGH (ref 0–5)
WBC UR QL: 7 /HPF — HIGH (ref 0–5)
WBC UR QL: >998 /HPF — HIGH (ref 0–5)
WBC UR QL: >998 /HPF — HIGH (ref 0–5)

## 2023-01-01 PROCEDURE — 99291 CRITICAL CARE FIRST HOUR: CPT | Mod: GC

## 2023-01-01 PROCEDURE — 99291 CRITICAL CARE FIRST HOUR: CPT

## 2023-01-01 PROCEDURE — 76536 US EXAM OF HEAD AND NECK: CPT | Mod: 26

## 2023-01-01 PROCEDURE — 99215 OFFICE O/P EST HI 40 MIN: CPT | Mod: 95

## 2023-01-01 PROCEDURE — 76857 US EXAM PELVIC LIMITED: CPT

## 2023-01-01 PROCEDURE — ZZZZZ: CPT

## 2023-01-01 PROCEDURE — 76641 ULTRASOUND BREAST COMPLETE: CPT | Mod: 26,50

## 2023-01-01 PROCEDURE — 99212 OFFICE O/P EST SF 10 MIN: CPT

## 2023-01-01 PROCEDURE — 99497 ADVNCD CARE PLAN 30 MIN: CPT | Mod: 25

## 2023-01-01 PROCEDURE — 76604 US EXAM CHEST: CPT | Mod: 26,GC

## 2023-01-01 PROCEDURE — 77063 BREAST TOMOSYNTHESIS BI: CPT | Mod: 26

## 2023-01-01 PROCEDURE — 99213 OFFICE O/P EST LOW 20 MIN: CPT | Mod: 95

## 2023-01-01 PROCEDURE — C9803: CPT

## 2023-01-01 PROCEDURE — 82962 GLUCOSE BLOOD TEST: CPT

## 2023-01-01 PROCEDURE — 99233 SBSQ HOSP IP/OBS HIGH 50: CPT

## 2023-01-01 PROCEDURE — 76642 ULTRASOUND BREAST LIMITED: CPT | Mod: 26,50

## 2023-01-01 PROCEDURE — 58558 HYSTEROSCOPY BIOPSY: CPT

## 2023-01-01 PROCEDURE — U0003: CPT

## 2023-01-01 PROCEDURE — 77066 DX MAMMO INCL CAD BI: CPT | Mod: 26

## 2023-01-01 PROCEDURE — 76770 US EXAM ABDO BACK WALL COMP: CPT | Mod: 26

## 2023-01-01 PROCEDURE — 76942 ECHO GUIDE FOR BIOPSY: CPT | Mod: 26,76

## 2023-01-01 PROCEDURE — 77066 DX MAMMO INCL CAD BI: CPT

## 2023-01-01 PROCEDURE — 76641 ULTRASOUND BREAST COMPLETE: CPT

## 2023-01-01 PROCEDURE — 99214 OFFICE O/P EST MOD 30 MIN: CPT

## 2023-01-01 PROCEDURE — 99291 CRITICAL CARE FIRST HOUR: CPT | Mod: GC,25

## 2023-01-01 PROCEDURE — 70551 MRI BRAIN STEM W/O DYE: CPT | Mod: 26

## 2023-01-01 PROCEDURE — 38505 NEEDLE BIOPSY LYMPH NODES: CPT | Mod: RT

## 2023-01-01 PROCEDURE — 77067 SCR MAMMO BI INCL CAD: CPT

## 2023-01-01 PROCEDURE — 77067 SCR MAMMO BI INCL CAD: CPT | Mod: 26

## 2023-01-01 PROCEDURE — A4648: CPT

## 2023-01-01 PROCEDURE — 88305 TISSUE EXAM BY PATHOLOGIST: CPT | Mod: 26

## 2023-01-01 PROCEDURE — 93308 TTE F-UP OR LMTD: CPT | Mod: 26,GC

## 2023-01-01 PROCEDURE — 36415 COLL VENOUS BLD VENIPUNCTURE: CPT

## 2023-01-01 PROCEDURE — 78803 RP LOCLZJ TUM SPECT 1 AREA: CPT | Mod: 26

## 2023-01-01 PROCEDURE — 71045 X-RAY EXAM CHEST 1 VIEW: CPT | Mod: 26

## 2023-01-01 PROCEDURE — 76536 US EXAM OF HEAD AND NECK: CPT

## 2023-01-01 PROCEDURE — 95718 EEG PHYS/QHP 2-12 HR W/VEEG: CPT

## 2023-01-01 PROCEDURE — 88305 TISSUE EXAM BY PATHOLOGIST: CPT

## 2023-01-01 PROCEDURE — 99204 OFFICE O/P NEW MOD 45 MIN: CPT

## 2023-01-01 PROCEDURE — 38505 NEEDLE BIOPSY LYMPH NODES: CPT | Mod: LT

## 2023-01-01 PROCEDURE — U0005: CPT

## 2023-01-01 PROCEDURE — 38505 NEEDLE BIOPSY LYMPH NODES: CPT

## 2023-01-01 PROCEDURE — 99292 CRITICAL CARE ADDL 30 MIN: CPT | Mod: GC,25

## 2023-01-01 PROCEDURE — 99396 PREV VISIT EST AGE 40-64: CPT | Mod: 25

## 2023-01-01 PROCEDURE — 99213 OFFICE O/P EST LOW 20 MIN: CPT | Mod: 25

## 2023-01-01 PROCEDURE — 99214 OFFICE O/P EST MOD 30 MIN: CPT | Mod: 95

## 2023-01-01 PROCEDURE — 76642 ULTRASOUND BREAST LIMITED: CPT

## 2023-01-01 PROCEDURE — 99214 OFFICE O/P EST MOD 30 MIN: CPT | Mod: 25

## 2023-01-01 PROCEDURE — 86901 BLOOD TYPING SEROLOGIC RH(D): CPT

## 2023-01-01 PROCEDURE — 95720 EEG PHY/QHP EA INCR W/VEEG: CPT

## 2023-01-01 PROCEDURE — 93306 TTE W/DOPPLER COMPLETE: CPT | Mod: 26

## 2023-01-01 PROCEDURE — 99223 1ST HOSP IP/OBS HIGH 75: CPT

## 2023-01-01 PROCEDURE — 77063 BREAST TOMOSYNTHESIS BI: CPT

## 2023-01-01 PROCEDURE — G0463: CPT

## 2023-01-01 PROCEDURE — 99386 PREV VISIT NEW AGE 40-64: CPT | Mod: 25

## 2023-01-01 PROCEDURE — 86900 BLOOD TYPING SEROLOGIC ABO: CPT

## 2023-01-01 PROCEDURE — 76942 ECHO GUIDE FOR BIOPSY: CPT

## 2023-01-01 PROCEDURE — 86850 RBC ANTIBODY SCREEN: CPT

## 2023-01-01 RX ORDER — LOSARTAN POTASSIUM 100 MG/1
1 TABLET, FILM COATED ORAL
Refills: 0 | DISCHARGE

## 2023-01-01 RX ORDER — ALBUTEROL SULFATE 90 UG/1
108 (90 BASE) INHALANT RESPIRATORY (INHALATION)
Qty: 8 | Refills: 0 | Status: DISCONTINUED | COMMUNITY
Start: 2022-08-18 | End: 2023-01-01

## 2023-01-01 RX ORDER — LOSARTAN POTASSIUM 100 MG/1
100 TABLET, FILM COATED ORAL DAILY
Qty: 90 | Refills: 1 | Status: ACTIVE | COMMUNITY
Start: 2022-09-28 | End: 1900-01-01

## 2023-01-01 RX ORDER — CEFEPIME 1 G/1
2000 INJECTION, POWDER, FOR SOLUTION INTRAMUSCULAR; INTRAVENOUS EVERY 12 HOURS
Refills: 0 | Status: DISCONTINUED | OUTPATIENT
Start: 2023-01-01 | End: 2023-01-01

## 2023-01-01 RX ORDER — ASPIRIN/CALCIUM CARB/MAGNESIUM 324 MG
1 TABLET ORAL
Refills: 0 | DISCHARGE

## 2023-01-01 RX ORDER — SODIUM CHLORIDE 9 MG/ML
1000 INJECTION, SOLUTION INTRAVENOUS
Refills: 0 | Status: COMPLETED | OUTPATIENT
Start: 2023-01-01 | End: 2023-01-01

## 2023-01-01 RX ORDER — NOREPINEPHRINE BITARTRATE/D5W 8 MG/250ML
0.05 PLASTIC BAG, INJECTION (ML) INTRAVENOUS
Qty: 8 | Refills: 0 | Status: DISCONTINUED | OUTPATIENT
Start: 2023-01-01 | End: 2023-01-01

## 2023-01-01 RX ORDER — LIDOCAINE HCL 20 MG/ML
0.2 VIAL (ML) INJECTION ONCE
Refills: 0 | Status: COMPLETED | OUTPATIENT
Start: 2023-01-01 | End: 2023-01-01

## 2023-01-01 RX ORDER — SEMAGLUTIDE 0.68 MG/ML
2.4 INJECTION, SOLUTION SUBCUTANEOUS
Refills: 0 | DISCHARGE

## 2023-01-01 RX ORDER — PIPERACILLIN AND TAZOBACTAM 4; .5 G/20ML; G/20ML
3.38 INJECTION, POWDER, LYOPHILIZED, FOR SOLUTION INTRAVENOUS EVERY 8 HOURS
Refills: 0 | Status: COMPLETED | OUTPATIENT
Start: 2023-01-01 | End: 2023-01-01

## 2023-01-01 RX ORDER — POTASSIUM PHOSPHATE, MONOBASIC POTASSIUM PHOSPHATE, DIBASIC 236; 224 MG/ML; MG/ML
15 INJECTION, SOLUTION INTRAVENOUS ONCE
Refills: 0 | Status: COMPLETED | OUTPATIENT
Start: 2023-01-01 | End: 2023-01-01

## 2023-01-01 RX ORDER — FENTANYL CITRATE 50 UG/ML
25 INJECTION INTRAVENOUS
Refills: 0 | Status: DISCONTINUED | OUTPATIENT
Start: 2023-01-01 | End: 2023-01-01

## 2023-01-01 RX ORDER — ASPIRIN/CALCIUM CARB/MAGNESIUM 324 MG
81 TABLET ORAL DAILY
Refills: 0 | Status: DISCONTINUED | OUTPATIENT
Start: 2023-01-01 | End: 2023-01-01

## 2023-01-01 RX ORDER — ONDANSETRON 8 MG/1
4 TABLET, FILM COATED ORAL ONCE
Refills: 0 | Status: DISCONTINUED | OUTPATIENT
Start: 2023-01-01 | End: 2023-01-01

## 2023-01-01 RX ORDER — HUMAN INSULIN 100 [IU]/ML
10 INJECTION, SUSPENSION SUBCUTANEOUS EVERY 6 HOURS
Refills: 0 | Status: DISCONTINUED | OUTPATIENT
Start: 2023-01-01 | End: 2023-01-01

## 2023-01-01 RX ORDER — CEFEPIME 1 G/1
1000 INJECTION, POWDER, FOR SOLUTION INTRAMUSCULAR; INTRAVENOUS EVERY 12 HOURS
Refills: 0 | Status: DISCONTINUED | OUTPATIENT
Start: 2023-01-01 | End: 2023-11-29

## 2023-01-01 RX ORDER — VANCOMYCIN HCL 1 G
1250 VIAL (EA) INTRAVENOUS EVERY 12 HOURS
Refills: 0 | Status: DISCONTINUED | OUTPATIENT
Start: 2023-01-01 | End: 2023-01-01

## 2023-01-01 RX ORDER — INSULIN HUMAN 100 [IU]/ML
7 INJECTION, SOLUTION SUBCUTANEOUS ONCE
Refills: 0 | Status: COMPLETED | OUTPATIENT
Start: 2023-01-01 | End: 2023-01-01

## 2023-01-01 RX ORDER — TIRZEPATIDE 2.5 MG/.5ML
2.5 INJECTION, SOLUTION SUBCUTANEOUS
Qty: 4 | Refills: 0 | Status: DISCONTINUED | COMMUNITY
Start: 2022-10-07

## 2023-01-01 RX ORDER — PIPERACILLIN AND TAZOBACTAM 4; .5 G/20ML; G/20ML
3.38 INJECTION, POWDER, LYOPHILIZED, FOR SOLUTION INTRAVENOUS ONCE
Refills: 0 | Status: COMPLETED | OUTPATIENT
Start: 2023-01-01 | End: 2023-01-01

## 2023-01-01 RX ORDER — IBUPROFEN 200 MG
1 TABLET ORAL
Qty: 0 | Refills: 0 | DISCHARGE

## 2023-01-01 RX ORDER — ACETAMINOPHEN 500 MG
2 TABLET ORAL
Qty: 0 | Refills: 0 | DISCHARGE

## 2023-01-01 RX ORDER — ESTRADIOL 0.1 MG/D
0.1 PATCH, EXTENDED RELEASE TRANSDERMAL
Qty: 8 | Refills: 0 | Status: DISCONTINUED | COMMUNITY
Start: 2023-01-01 | End: 2023-01-01

## 2023-01-01 RX ORDER — SEVELAMER CARBONATE 2400 MG/1
800 POWDER, FOR SUSPENSION ORAL EVERY 8 HOURS
Refills: 0 | Status: DISCONTINUED | OUTPATIENT
Start: 2023-01-01 | End: 2023-01-01

## 2023-01-01 RX ORDER — ENOXAPARIN SODIUM 100 MG/ML
40 INJECTION SUBCUTANEOUS EVERY 24 HOURS
Refills: 0 | Status: DISCONTINUED | OUTPATIENT
Start: 2023-01-01 | End: 2023-01-01

## 2023-01-01 RX ORDER — ONDANSETRON 4 MG/1
4 TABLET ORAL
Qty: 28 | Refills: 0 | Status: ACTIVE | COMMUNITY
Start: 2023-01-01 | End: 1900-01-01

## 2023-01-01 RX ORDER — VERAPAMIL HCL 240 MG
1 CAPSULE, EXTENDED RELEASE PELLETS 24 HR ORAL
Qty: 0 | Refills: 0 | DISCHARGE

## 2023-01-01 RX ORDER — SODIUM CHLORIDE 9 MG/ML
500 INJECTION, SOLUTION INTRAVENOUS ONCE
Refills: 0 | Status: COMPLETED | OUTPATIENT
Start: 2023-01-01 | End: 2023-01-01

## 2023-01-01 RX ORDER — ACETAMINOPHEN 500 MG
1000 TABLET ORAL ONCE
Refills: 0 | Status: COMPLETED | OUTPATIENT
Start: 2023-01-01 | End: 2023-01-01

## 2023-01-01 RX ORDER — POTASSIUM CHLORIDE 20 MEQ
40 PACKET (EA) ORAL ONCE
Refills: 0 | Status: COMPLETED | OUTPATIENT
Start: 2023-01-01 | End: 2023-01-01

## 2023-01-01 RX ORDER — NEOMYCIN SULFATE, POLYMYXIN B SULFATE, HYDROCORTISONE 3.5; 10000; 1 MG/ML; [USP'U]/ML; MG/ML
1 SOLUTION/ DROPS AURICULAR (OTIC) 4 TIMES DAILY
Qty: 1 | Refills: 0 | Status: ACTIVE | COMMUNITY
Start: 2023-01-01 | End: 1900-01-01

## 2023-01-01 RX ORDER — INSULIN LISPRO 100/ML
VIAL (ML) SUBCUTANEOUS EVERY 6 HOURS
Refills: 0 | Status: DISCONTINUED | OUTPATIENT
Start: 2023-01-01 | End: 2023-01-01

## 2023-01-01 RX ORDER — SODIUM CHLORIDE 9 MG/ML
1000 INJECTION, SOLUTION INTRAVENOUS
Refills: 0 | Status: DISCONTINUED | OUTPATIENT
Start: 2023-01-01 | End: 2023-01-01

## 2023-01-01 RX ORDER — TIRZEPATIDE 10 MG/.5ML
10 INJECTION, SOLUTION SUBCUTANEOUS
Qty: 4 | Refills: 2 | Status: DISCONTINUED | COMMUNITY
Start: 2022-10-07 | End: 2023-01-01

## 2023-01-01 RX ORDER — TIRZEPATIDE 15 MG/.5ML
0 INJECTION, SOLUTION SUBCUTANEOUS
Qty: 0 | Refills: 0 | DISCHARGE

## 2023-01-01 RX ORDER — POTASSIUM CHLORIDE 20 MEQ
10 PACKET (EA) ORAL
Refills: 0 | Status: COMPLETED | OUTPATIENT
Start: 2023-01-01 | End: 2023-01-01

## 2023-01-01 RX ORDER — METOCLOPRAMIDE HCL 10 MG
1 TABLET ORAL
Qty: 0 | Refills: 0 | DISCHARGE

## 2023-01-01 RX ORDER — SODIUM CHLORIDE 9 MG/ML
1000 INJECTION, SOLUTION INTRAVENOUS
Refills: 0 | Status: DISCONTINUED | OUTPATIENT
Start: 2023-01-01 | End: 2023-11-30

## 2023-01-01 RX ORDER — SODIUM CHLORIDE 9 MG/ML
1000 INJECTION, SOLUTION INTRAVENOUS ONCE
Refills: 0 | Status: COMPLETED | OUTPATIENT
Start: 2023-01-01 | End: 2023-01-01

## 2023-01-01 RX ORDER — LOSARTAN POTASSIUM 100 MG/1
1 TABLET, FILM COATED ORAL
Qty: 0 | Refills: 0 | DISCHARGE

## 2023-01-01 RX ORDER — PREDNISONE 20 MG/1
20 TABLET ORAL DAILY
Qty: 5 | Refills: 0 | Status: DISCONTINUED | COMMUNITY
Start: 2023-01-01 | End: 2023-01-01

## 2023-01-01 RX ORDER — CEFEPIME 1 G/1
2000 INJECTION, POWDER, FOR SOLUTION INTRAMUSCULAR; INTRAVENOUS ONCE
Refills: 0 | Status: COMPLETED | OUTPATIENT
Start: 2023-01-01 | End: 2023-01-01

## 2023-01-01 RX ORDER — INSULIN LISPRO 100/ML
VIAL (ML) SUBCUTANEOUS EVERY 6 HOURS
Refills: 0 | Status: DISCONTINUED | OUTPATIENT
Start: 2023-01-01 | End: 2023-12-01

## 2023-01-01 RX ORDER — MIDODRINE HYDROCHLORIDE 2.5 MG/1
10 TABLET ORAL EVERY 8 HOURS
Refills: 0 | Status: DISCONTINUED | OUTPATIENT
Start: 2023-01-01 | End: 2023-01-01

## 2023-01-01 RX ORDER — SODIUM CHLORIDE 9 MG/ML
3 INJECTION INTRAMUSCULAR; INTRAVENOUS; SUBCUTANEOUS EVERY 8 HOURS
Refills: 0 | Status: DISCONTINUED | OUTPATIENT
Start: 2023-01-01 | End: 2023-01-01

## 2023-01-01 RX ORDER — IPRATROPIUM/ALBUTEROL SULFATE 18-103MCG
3 AEROSOL WITH ADAPTER (GRAM) INHALATION EVERY 6 HOURS
Refills: 0 | Status: DISCONTINUED | OUTPATIENT
Start: 2023-01-01 | End: 2023-11-28

## 2023-01-01 RX ORDER — FERROUS SULFATE 325(65) MG
325 TABLET ORAL DAILY
Refills: 0 | Status: DISCONTINUED | OUTPATIENT
Start: 2023-01-01 | End: 2023-01-01

## 2023-01-01 RX ORDER — HUMAN INSULIN 100 [IU]/ML
5 INJECTION, SUSPENSION SUBCUTANEOUS ONCE
Refills: 0 | Status: COMPLETED | OUTPATIENT
Start: 2023-01-01 | End: 2023-01-01

## 2023-01-01 RX ORDER — SENNA PLUS 8.6 MG/1
10 TABLET ORAL AT BEDTIME
Refills: 0 | Status: DISCONTINUED | OUTPATIENT
Start: 2023-01-01 | End: 2023-01-01

## 2023-01-01 RX ORDER — CEFEPIME 1 G/1
INJECTION, POWDER, FOR SOLUTION INTRAMUSCULAR; INTRAVENOUS
Refills: 0 | Status: DISCONTINUED | OUTPATIENT
Start: 2023-01-01 | End: 2023-01-01

## 2023-01-01 RX ORDER — HEPARIN SODIUM 5000 [USP'U]/ML
5000 INJECTION INTRAVENOUS; SUBCUTANEOUS EVERY 8 HOURS
Refills: 0 | Status: DISCONTINUED | OUTPATIENT
Start: 2023-01-01 | End: 2023-01-01

## 2023-01-01 RX ORDER — LOSARTAN POTASSIUM 50 MG/1
50 TABLET, FILM COATED ORAL
Qty: 30 | Refills: 0 | Status: DISCONTINUED | COMMUNITY
Start: 2022-09-28

## 2023-01-01 RX ORDER — CHLORHEXIDINE GLUCONATE 213 G/1000ML
15 SOLUTION TOPICAL EVERY 12 HOURS
Refills: 0 | Status: DISCONTINUED | OUTPATIENT
Start: 2023-01-01 | End: 2023-12-01

## 2023-01-01 RX ORDER — SEMAGLUTIDE 2.4 MG/.75ML
2.4 INJECTION, SOLUTION SUBCUTANEOUS
Qty: 3 | Refills: 0 | Status: ACTIVE | COMMUNITY
Start: 1900-01-01 | End: 1900-01-01

## 2023-01-01 RX ORDER — TOBRAMYCIN 3 MG/ML
0.3 SOLUTION/ DROPS OPHTHALMIC
Qty: 5 | Refills: 0 | Status: DISCONTINUED | COMMUNITY
Start: 2022-08-18

## 2023-01-01 RX ORDER — PANTOPRAZOLE SODIUM 20 MG/1
40 TABLET, DELAYED RELEASE ORAL DAILY
Refills: 0 | Status: DISCONTINUED | OUTPATIENT
Start: 2023-01-01 | End: 2023-01-01

## 2023-01-01 RX ORDER — SEVELAMER CARBONATE 2400 MG/1
800 POWDER, FOR SUSPENSION ORAL
Refills: 0 | Status: DISCONTINUED | OUTPATIENT
Start: 2023-01-01 | End: 2023-01-01

## 2023-01-01 RX ORDER — ACETAMINOPHEN 500 MG
650 TABLET ORAL ONCE
Refills: 0 | Status: COMPLETED | OUTPATIENT
Start: 2023-01-01 | End: 2023-01-01

## 2023-01-01 RX ORDER — FERROUS SULFATE 325(65) MG
1 TABLET ORAL
Refills: 0 | DISCHARGE

## 2023-01-01 RX ADMIN — SEVELAMER CARBONATE 800 MILLIGRAM(S): 2400 POWDER, FOR SUSPENSION ORAL at 11:35

## 2023-01-01 RX ADMIN — SENNA PLUS 10 MILLILITER(S): 8.6 TABLET ORAL at 21:52

## 2023-01-01 RX ADMIN — Medication 3 MILLILITER(S): at 05:22

## 2023-01-01 RX ADMIN — CHLORHEXIDINE GLUCONATE 15 MILLILITER(S): 213 SOLUTION TOPICAL at 06:48

## 2023-01-01 RX ADMIN — Medication 100 MILLIEQUIVALENT(S): at 17:28

## 2023-01-01 RX ADMIN — SENNA PLUS 10 MILLILITER(S): 8.6 TABLET ORAL at 23:55

## 2023-01-01 RX ADMIN — Medication 8.17 MICROGRAM(S)/KG/MIN: at 21:33

## 2023-01-01 RX ADMIN — Medication 3 MILLILITER(S): at 17:02

## 2023-01-01 RX ADMIN — SEVELAMER CARBONATE 800 MILLIGRAM(S): 2400 POWDER, FOR SUSPENSION ORAL at 13:50

## 2023-01-01 RX ADMIN — CHLORHEXIDINE GLUCONATE 15 MILLILITER(S): 213 SOLUTION TOPICAL at 05:12

## 2023-01-01 RX ADMIN — Medication 7.49 MICROGRAM(S)/KG/MIN: at 22:08

## 2023-01-01 RX ADMIN — Medication 650 MILLIGRAM(S): at 22:00

## 2023-01-01 RX ADMIN — MIDODRINE HYDROCHLORIDE 10 MILLIGRAM(S): 2.5 TABLET ORAL at 17:32

## 2023-01-01 RX ADMIN — PANTOPRAZOLE SODIUM 40 MILLIGRAM(S): 20 TABLET, DELAYED RELEASE ORAL at 11:05

## 2023-01-01 RX ADMIN — Medication 81 MILLIGRAM(S): at 11:06

## 2023-01-01 RX ADMIN — PIPERACILLIN AND TAZOBACTAM 25 GRAM(S): 4; .5 INJECTION, POWDER, LYOPHILIZED, FOR SOLUTION INTRAVENOUS at 06:12

## 2023-01-01 RX ADMIN — CHLORHEXIDINE GLUCONATE 15 MILLILITER(S): 213 SOLUTION TOPICAL at 17:46

## 2023-01-01 RX ADMIN — CHLORHEXIDINE GLUCONATE 15 MILLILITER(S): 213 SOLUTION TOPICAL at 18:11

## 2023-01-01 RX ADMIN — CHLORHEXIDINE GLUCONATE 15 MILLILITER(S): 213 SOLUTION TOPICAL at 05:03

## 2023-01-01 RX ADMIN — Medication 81 MILLIGRAM(S): at 12:30

## 2023-01-01 RX ADMIN — Medication 3 MILLILITER(S): at 23:15

## 2023-01-01 RX ADMIN — MIDODRINE HYDROCHLORIDE 10 MILLIGRAM(S): 2.5 TABLET ORAL at 01:50

## 2023-01-01 RX ADMIN — Medication 1: at 12:16

## 2023-01-01 RX ADMIN — Medication 3 MILLILITER(S): at 17:17

## 2023-01-01 RX ADMIN — SEVELAMER CARBONATE 800 MILLIGRAM(S): 2400 POWDER, FOR SUSPENSION ORAL at 11:51

## 2023-01-01 RX ADMIN — HEPARIN SODIUM 5000 UNIT(S): 5000 INJECTION INTRAVENOUS; SUBCUTANEOUS at 18:03

## 2023-01-01 RX ADMIN — CHLORHEXIDINE GLUCONATE 15 MILLILITER(S): 213 SOLUTION TOPICAL at 06:00

## 2023-01-01 RX ADMIN — HEPARIN SODIUM 5000 UNIT(S): 5000 INJECTION INTRAVENOUS; SUBCUTANEOUS at 22:06

## 2023-01-01 RX ADMIN — CEFEPIME 100 MILLIGRAM(S): 1 INJECTION, POWDER, FOR SOLUTION INTRAMUSCULAR; INTRAVENOUS at 17:07

## 2023-01-01 RX ADMIN — SODIUM CHLORIDE 1000 MILLILITER(S): 9 INJECTION, SOLUTION INTRAVENOUS at 10:48

## 2023-01-01 RX ADMIN — HEPARIN SODIUM 5000 UNIT(S): 5000 INJECTION INTRAVENOUS; SUBCUTANEOUS at 13:02

## 2023-01-01 RX ADMIN — Medication 325 MILLIGRAM(S): at 11:50

## 2023-01-01 RX ADMIN — PIPERACILLIN AND TAZOBACTAM 25 GRAM(S): 4; .5 INJECTION, POWDER, LYOPHILIZED, FOR SOLUTION INTRAVENOUS at 21:02

## 2023-01-01 RX ADMIN — Medication 1000 MILLIGRAM(S): at 19:18

## 2023-01-01 RX ADMIN — Medication 7.49 MICROGRAM(S)/KG/MIN: at 07:46

## 2023-01-01 RX ADMIN — MIDODRINE HYDROCHLORIDE 10 MILLIGRAM(S): 2.5 TABLET ORAL at 01:00

## 2023-01-01 RX ADMIN — PIPERACILLIN AND TAZOBACTAM 25 GRAM(S): 4; .5 INJECTION, POWDER, LYOPHILIZED, FOR SOLUTION INTRAVENOUS at 22:06

## 2023-01-01 RX ADMIN — SEVELAMER CARBONATE 800 MILLIGRAM(S): 2400 POWDER, FOR SUSPENSION ORAL at 09:00

## 2023-01-01 RX ADMIN — Medication 4: at 17:37

## 2023-01-01 RX ADMIN — Medication 325 MILLIGRAM(S): at 11:49

## 2023-01-01 RX ADMIN — Medication 1000 MILLIGRAM(S): at 18:00

## 2023-01-01 RX ADMIN — INSULIN HUMAN 7 UNIT(S): 100 INJECTION, SOLUTION SUBCUTANEOUS at 01:16

## 2023-01-01 RX ADMIN — Medication 7.49 MICROGRAM(S)/KG/MIN: at 21:29

## 2023-01-01 RX ADMIN — Medication 8.17 MICROGRAM(S)/KG/MIN: at 20:58

## 2023-01-01 RX ADMIN — Medication 7.49 MICROGRAM(S)/KG/MIN: at 05:05

## 2023-01-01 RX ADMIN — Medication 3 MILLILITER(S): at 06:13

## 2023-01-01 RX ADMIN — HUMAN INSULIN 10 UNIT(S): 100 INJECTION, SUSPENSION SUBCUTANEOUS at 11:20

## 2023-01-01 RX ADMIN — HUMAN INSULIN 10 UNIT(S): 100 INJECTION, SUSPENSION SUBCUTANEOUS at 18:51

## 2023-01-01 RX ADMIN — Medication 3 MILLILITER(S): at 23:10

## 2023-01-01 RX ADMIN — HEPARIN SODIUM 5000 UNIT(S): 5000 INJECTION INTRAVENOUS; SUBCUTANEOUS at 22:14

## 2023-01-01 RX ADMIN — PANTOPRAZOLE SODIUM 40 MILLIGRAM(S): 20 TABLET, DELAYED RELEASE ORAL at 11:14

## 2023-01-01 RX ADMIN — ENOXAPARIN SODIUM 40 MILLIGRAM(S): 100 INJECTION SUBCUTANEOUS at 12:55

## 2023-01-01 RX ADMIN — HEPARIN SODIUM 5000 UNIT(S): 5000 INJECTION INTRAVENOUS; SUBCUTANEOUS at 05:32

## 2023-01-01 RX ADMIN — Medication 2: at 05:32

## 2023-01-01 RX ADMIN — CHLORHEXIDINE GLUCONATE 15 MILLILITER(S): 213 SOLUTION TOPICAL at 17:19

## 2023-01-01 RX ADMIN — POTASSIUM PHOSPHATE, MONOBASIC POTASSIUM PHOSPHATE, DIBASIC 62.5 MILLIMOLE(S): 236; 224 INJECTION, SOLUTION INTRAVENOUS at 22:04

## 2023-01-01 RX ADMIN — SODIUM CHLORIDE 1000 MILLILITER(S): 9 INJECTION, SOLUTION INTRAVENOUS at 12:33

## 2023-01-01 RX ADMIN — SODIUM CHLORIDE 3 MILLILITER(S): 9 INJECTION INTRAMUSCULAR; INTRAVENOUS; SUBCUTANEOUS at 15:07

## 2023-01-01 RX ADMIN — Medication 0: at 11:19

## 2023-01-01 RX ADMIN — Medication 81 MILLIGRAM(S): at 11:50

## 2023-01-01 RX ADMIN — Medication 3 MILLILITER(S): at 05:14

## 2023-01-01 RX ADMIN — SEVELAMER CARBONATE 800 MILLIGRAM(S): 2400 POWDER, FOR SUSPENSION ORAL at 17:04

## 2023-01-01 RX ADMIN — Medication 2: at 12:04

## 2023-01-01 RX ADMIN — Medication 3 MILLILITER(S): at 11:08

## 2023-01-01 RX ADMIN — Medication 100 MILLIEQUIVALENT(S): at 19:34

## 2023-01-01 RX ADMIN — HEPARIN SODIUM 5000 UNIT(S): 5000 INJECTION INTRAVENOUS; SUBCUTANEOUS at 14:42

## 2023-01-01 RX ADMIN — CHLORHEXIDINE GLUCONATE 15 MILLILITER(S): 213 SOLUTION TOPICAL at 17:32

## 2023-01-01 RX ADMIN — Medication 81 MILLIGRAM(S): at 11:21

## 2023-01-01 RX ADMIN — CHLORHEXIDINE GLUCONATE 15 MILLILITER(S): 213 SOLUTION TOPICAL at 17:40

## 2023-01-01 RX ADMIN — Medication 81 MILLIGRAM(S): at 12:03

## 2023-01-01 RX ADMIN — CHLORHEXIDINE GLUCONATE 15 MILLILITER(S): 213 SOLUTION TOPICAL at 17:12

## 2023-01-01 RX ADMIN — PIPERACILLIN AND TAZOBACTAM 25 GRAM(S): 4; .5 INJECTION, POWDER, LYOPHILIZED, FOR SOLUTION INTRAVENOUS at 05:13

## 2023-01-01 RX ADMIN — PIPERACILLIN AND TAZOBACTAM 25 GRAM(S): 4; .5 INJECTION, POWDER, LYOPHILIZED, FOR SOLUTION INTRAVENOUS at 22:56

## 2023-01-01 RX ADMIN — Medication 1: at 23:56

## 2023-01-01 RX ADMIN — SEVELAMER CARBONATE 800 MILLIGRAM(S): 2400 POWDER, FOR SUSPENSION ORAL at 18:51

## 2023-01-01 RX ADMIN — PANTOPRAZOLE SODIUM 40 MILLIGRAM(S): 20 TABLET, DELAYED RELEASE ORAL at 12:03

## 2023-01-01 RX ADMIN — Medication 3 MILLILITER(S): at 00:08

## 2023-01-01 RX ADMIN — HEPARIN SODIUM 5000 UNIT(S): 5000 INJECTION INTRAVENOUS; SUBCUTANEOUS at 05:14

## 2023-01-01 RX ADMIN — Medication 400 MILLIGRAM(S): at 18:03

## 2023-01-01 RX ADMIN — MIDODRINE HYDROCHLORIDE 10 MILLIGRAM(S): 2.5 TABLET ORAL at 09:01

## 2023-01-01 RX ADMIN — CHLORHEXIDINE GLUCONATE 15 MILLILITER(S): 213 SOLUTION TOPICAL at 17:16

## 2023-01-01 RX ADMIN — Medication 81 MILLIGRAM(S): at 12:54

## 2023-01-01 RX ADMIN — HEPARIN SODIUM 5000 UNIT(S): 5000 INJECTION INTRAVENOUS; SUBCUTANEOUS at 21:32

## 2023-01-01 RX ADMIN — Medication 325 MILLIGRAM(S): at 12:03

## 2023-01-01 RX ADMIN — Medication 2: at 17:17

## 2023-01-01 RX ADMIN — Medication 81 MILLIGRAM(S): at 12:15

## 2023-01-01 RX ADMIN — Medication 100 MILLIEQUIVALENT(S): at 20:40

## 2023-01-01 RX ADMIN — CEFEPIME 100 MILLIGRAM(S): 1 INJECTION, POWDER, FOR SOLUTION INTRAMUSCULAR; INTRAVENOUS at 10:21

## 2023-01-01 RX ADMIN — Medication 400 MILLIGRAM(S): at 17:32

## 2023-01-01 RX ADMIN — Medication 81 MILLIGRAM(S): at 11:58

## 2023-01-01 RX ADMIN — HUMAN INSULIN 5 UNIT(S): 100 INJECTION, SUSPENSION SUBCUTANEOUS at 06:18

## 2023-01-01 RX ADMIN — CHLORHEXIDINE GLUCONATE 15 MILLILITER(S): 213 SOLUTION TOPICAL at 05:16

## 2023-01-01 RX ADMIN — CHLORHEXIDINE GLUCONATE 15 MILLILITER(S): 213 SOLUTION TOPICAL at 17:36

## 2023-01-01 RX ADMIN — PANTOPRAZOLE SODIUM 40 MILLIGRAM(S): 20 TABLET, DELAYED RELEASE ORAL at 11:51

## 2023-01-01 RX ADMIN — MIDODRINE HYDROCHLORIDE 10 MILLIGRAM(S): 2.5 TABLET ORAL at 17:04

## 2023-01-01 RX ADMIN — MIDODRINE HYDROCHLORIDE 10 MILLIGRAM(S): 2.5 TABLET ORAL at 11:50

## 2023-01-01 RX ADMIN — Medication 325 MILLIGRAM(S): at 11:13

## 2023-01-01 RX ADMIN — Medication 81 MILLIGRAM(S): at 11:49

## 2023-01-01 RX ADMIN — Medication 2: at 05:48

## 2023-01-01 RX ADMIN — PIPERACILLIN AND TAZOBACTAM 200 GRAM(S): 4; .5 INJECTION, POWDER, LYOPHILIZED, FOR SOLUTION INTRAVENOUS at 22:04

## 2023-01-01 RX ADMIN — HEPARIN SODIUM 5000 UNIT(S): 5000 INJECTION INTRAVENOUS; SUBCUTANEOUS at 21:30

## 2023-01-01 RX ADMIN — CHLORHEXIDINE GLUCONATE 15 MILLILITER(S): 213 SOLUTION TOPICAL at 05:06

## 2023-01-01 RX ADMIN — CHLORHEXIDINE GLUCONATE 15 MILLILITER(S): 213 SOLUTION TOPICAL at 18:43

## 2023-01-01 RX ADMIN — Medication 3 MILLILITER(S): at 23:12

## 2023-01-01 RX ADMIN — Medication 81 MILLIGRAM(S): at 11:23

## 2023-01-01 RX ADMIN — CEFEPIME 100 MILLIGRAM(S): 1 INJECTION, POWDER, FOR SOLUTION INTRAMUSCULAR; INTRAVENOUS at 21:29

## 2023-01-01 RX ADMIN — HUMAN INSULIN 10 UNIT(S): 100 INJECTION, SUSPENSION SUBCUTANEOUS at 00:54

## 2023-01-01 RX ADMIN — CHLORHEXIDINE GLUCONATE 15 MILLILITER(S): 213 SOLUTION TOPICAL at 05:13

## 2023-01-01 RX ADMIN — SEVELAMER CARBONATE 800 MILLIGRAM(S): 2400 POWDER, FOR SUSPENSION ORAL at 17:17

## 2023-01-01 RX ADMIN — HUMAN INSULIN 10 UNIT(S): 100 INJECTION, SUSPENSION SUBCUTANEOUS at 17:04

## 2023-01-01 RX ADMIN — CHLORHEXIDINE GLUCONATE 15 MILLILITER(S): 213 SOLUTION TOPICAL at 17:11

## 2023-01-01 RX ADMIN — Medication 7.49 MICROGRAM(S)/KG/MIN: at 12:03

## 2023-01-01 RX ADMIN — PIPERACILLIN AND TAZOBACTAM 25 GRAM(S): 4; .5 INJECTION, POWDER, LYOPHILIZED, FOR SOLUTION INTRAVENOUS at 14:42

## 2023-01-01 RX ADMIN — HEPARIN SODIUM 5000 UNIT(S): 5000 INJECTION INTRAVENOUS; SUBCUTANEOUS at 05:31

## 2023-01-01 RX ADMIN — SODIUM CHLORIDE 75 MILLILITER(S): 9 INJECTION, SOLUTION INTRAVENOUS at 21:29

## 2023-01-01 RX ADMIN — Medication 166.67 MILLIGRAM(S): at 06:16

## 2023-01-01 RX ADMIN — PIPERACILLIN AND TAZOBACTAM 25 GRAM(S): 4; .5 INJECTION, POWDER, LYOPHILIZED, FOR SOLUTION INTRAVENOUS at 01:03

## 2023-01-01 RX ADMIN — HEPARIN SODIUM 5000 UNIT(S): 5000 INJECTION INTRAVENOUS; SUBCUTANEOUS at 13:46

## 2023-01-01 RX ADMIN — HEPARIN SODIUM 5000 UNIT(S): 5000 INJECTION INTRAVENOUS; SUBCUTANEOUS at 06:49

## 2023-01-01 RX ADMIN — CEFEPIME 100 MILLIGRAM(S): 1 INJECTION, POWDER, FOR SOLUTION INTRAMUSCULAR; INTRAVENOUS at 05:03

## 2023-01-01 RX ADMIN — MIDODRINE HYDROCHLORIDE 10 MILLIGRAM(S): 2.5 TABLET ORAL at 01:14

## 2023-01-01 RX ADMIN — HUMAN INSULIN 10 UNIT(S): 100 INJECTION, SUSPENSION SUBCUTANEOUS at 11:51

## 2023-01-01 RX ADMIN — Medication 3 MILLILITER(S): at 06:03

## 2023-01-01 RX ADMIN — CHLORHEXIDINE GLUCONATE 15 MILLILITER(S): 213 SOLUTION TOPICAL at 17:17

## 2023-01-01 RX ADMIN — Medication 3 MILLILITER(S): at 17:18

## 2023-01-01 RX ADMIN — Medication 81 MILLIGRAM(S): at 11:13

## 2023-01-01 RX ADMIN — Medication 3 MILLILITER(S): at 11:15

## 2023-01-01 RX ADMIN — Medication 1: at 17:17

## 2023-01-01 RX ADMIN — PIPERACILLIN AND TAZOBACTAM 25 GRAM(S): 4; .5 INJECTION, POWDER, LYOPHILIZED, FOR SOLUTION INTRAVENOUS at 13:03

## 2023-01-01 RX ADMIN — Medication 8: at 06:18

## 2023-01-01 RX ADMIN — CEFEPIME 100 MILLIGRAM(S): 1 INJECTION, POWDER, FOR SOLUTION INTRAMUSCULAR; INTRAVENOUS at 17:16

## 2023-01-01 RX ADMIN — CHLORHEXIDINE GLUCONATE 15 MILLILITER(S): 213 SOLUTION TOPICAL at 05:32

## 2023-01-01 RX ADMIN — HEPARIN SODIUM 5000 UNIT(S): 5000 INJECTION INTRAVENOUS; SUBCUTANEOUS at 22:04

## 2023-01-01 RX ADMIN — Medication 81 MILLIGRAM(S): at 12:04

## 2023-01-01 RX ADMIN — SODIUM CHLORIDE 100 MILLILITER(S): 9 INJECTION, SOLUTION INTRAVENOUS at 15:03

## 2023-01-01 RX ADMIN — CHLORHEXIDINE GLUCONATE 15 MILLILITER(S): 213 SOLUTION TOPICAL at 05:31

## 2023-01-01 RX ADMIN — MIDODRINE HYDROCHLORIDE 10 MILLIGRAM(S): 2.5 TABLET ORAL at 18:51

## 2023-01-01 RX ADMIN — HEPARIN SODIUM 5000 UNIT(S): 5000 INJECTION INTRAVENOUS; SUBCUTANEOUS at 13:06

## 2023-01-01 RX ADMIN — Medication 3 MILLILITER(S): at 11:23

## 2023-01-01 RX ADMIN — CEFEPIME 100 MILLIGRAM(S): 1 INJECTION, POWDER, FOR SOLUTION INTRAMUSCULAR; INTRAVENOUS at 05:06

## 2023-01-01 RX ADMIN — HEPARIN SODIUM 5000 UNIT(S): 5000 INJECTION INTRAVENOUS; SUBCUTANEOUS at 05:12

## 2023-01-01 RX ADMIN — Medication 7.49 MICROGRAM(S)/KG/MIN: at 19:00

## 2023-01-01 RX ADMIN — CEFEPIME 100 MILLIGRAM(S): 1 INJECTION, POWDER, FOR SOLUTION INTRAMUSCULAR; INTRAVENOUS at 18:47

## 2023-01-01 RX ADMIN — MIDODRINE HYDROCHLORIDE 10 MILLIGRAM(S): 2.5 TABLET ORAL at 10:20

## 2023-01-01 RX ADMIN — HEPARIN SODIUM 5000 UNIT(S): 5000 INJECTION INTRAVENOUS; SUBCUTANEOUS at 06:00

## 2023-01-01 RX ADMIN — MIDODRINE HYDROCHLORIDE 10 MILLIGRAM(S): 2.5 TABLET ORAL at 09:14

## 2023-01-01 RX ADMIN — SEVELAMER CARBONATE 800 MILLIGRAM(S): 2400 POWDER, FOR SUSPENSION ORAL at 07:45

## 2023-01-01 RX ADMIN — HUMAN INSULIN 10 UNIT(S): 100 INJECTION, SUSPENSION SUBCUTANEOUS at 01:14

## 2023-01-01 RX ADMIN — Medication 325 MILLIGRAM(S): at 11:05

## 2023-01-01 RX ADMIN — Medication 325 MILLIGRAM(S): at 11:23

## 2023-01-01 RX ADMIN — Medication 650 MILLIGRAM(S): at 21:06

## 2023-01-01 RX ADMIN — HEPARIN SODIUM 5000 UNIT(S): 5000 INJECTION INTRAVENOUS; SUBCUTANEOUS at 05:17

## 2023-01-01 RX ADMIN — SODIUM CHLORIDE 50 MILLILITER(S): 9 INJECTION, SOLUTION INTRAVENOUS at 10:48

## 2023-01-01 RX ADMIN — CHLORHEXIDINE GLUCONATE 15 MILLILITER(S): 213 SOLUTION TOPICAL at 17:03

## 2023-01-01 RX ADMIN — Medication 81 MILLIGRAM(S): at 13:01

## 2023-01-01 RX ADMIN — Medication 2: at 05:04

## 2023-01-01 RX ADMIN — SODIUM CHLORIDE 75 MILLILITER(S): 9 INJECTION, SOLUTION INTRAVENOUS at 02:25

## 2023-01-01 RX ADMIN — HEPARIN SODIUM 5000 UNIT(S): 5000 INJECTION INTRAVENOUS; SUBCUTANEOUS at 21:02

## 2023-01-01 RX ADMIN — CHLORHEXIDINE GLUCONATE 15 MILLILITER(S): 213 SOLUTION TOPICAL at 05:26

## 2023-01-01 RX ADMIN — Medication 7.49 MICROGRAM(S)/KG/MIN: at 09:00

## 2023-01-01 RX ADMIN — PIPERACILLIN AND TAZOBACTAM 25 GRAM(S): 4; .5 INJECTION, POWDER, LYOPHILIZED, FOR SOLUTION INTRAVENOUS at 05:16

## 2023-01-01 RX ADMIN — SODIUM CHLORIDE 75 MILLILITER(S): 9 INJECTION, SOLUTION INTRAVENOUS at 17:18

## 2023-01-01 RX ADMIN — SEVELAMER CARBONATE 800 MILLIGRAM(S): 2400 POWDER, FOR SUSPENSION ORAL at 05:05

## 2023-01-01 RX ADMIN — HEPARIN SODIUM 5000 UNIT(S): 5000 INJECTION INTRAVENOUS; SUBCUTANEOUS at 15:28

## 2023-01-01 RX ADMIN — HEPARIN SODIUM 5000 UNIT(S): 5000 INJECTION INTRAVENOUS; SUBCUTANEOUS at 22:56

## 2023-01-01 RX ADMIN — CHLORHEXIDINE GLUCONATE 15 MILLILITER(S): 213 SOLUTION TOPICAL at 18:51

## 2023-01-01 RX ADMIN — Medication 3 MILLILITER(S): at 11:51

## 2023-01-01 RX ADMIN — Medication 40 MILLIEQUIVALENT(S): at 12:20

## 2023-01-01 RX ADMIN — PIPERACILLIN AND TAZOBACTAM 25 GRAM(S): 4; .5 INJECTION, POWDER, LYOPHILIZED, FOR SOLUTION INTRAVENOUS at 13:47

## 2023-01-01 RX ADMIN — CHLORHEXIDINE GLUCONATE 15 MILLILITER(S): 213 SOLUTION TOPICAL at 05:47

## 2023-01-05 NOTE — PLAN
[FreeTextEntry1] : Discussed the surgical management of endometrial polyps at length with patient.  Risks/benefits and alternatives of operative hysteroscopy and polyp resection were reviewed including but not limited to risk of infection, hemorrhage and perforation with concomittant risks of injury to abdominal organs (bladder, bowel, gyn and vascular structures).  Remote risk of fluid overload reported.   Discussed the need to determine pathology of polyp to r/o malignant potential.\par To schedule in ambi.\par During this visit 30 minutes were spent face-to-face with greater than 50% of this time dedicated to counseling.\par

## 2023-01-05 NOTE — HISTORY OF PRESENT ILLNESS
[FreeTextEntry1] : 43yo  LMP 1/3/22 presenting for surgical consultation for D&C polypectomy. Sees Dr. Mae at [facility]. Was told that she had multiple polyps on SIS. Has had polypectomy before with Dr. Schaefer in . At that time cervix was noted to be flush and cervical lac occurred, difficulty was noted in entering cavity and it was not fully visualized. \par \par She is planning to undergo IVF after polypectomy. 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

## 2023-01-23 PROBLEM — Z12.39 ENCOUNTER FOR SCREENING BREAST EXAMINATION: Status: ACTIVE | Noted: 2023-01-01

## 2023-01-25 PROBLEM — Z11.1 TUBERCULOSIS SCREENING: Status: ACTIVE | Noted: 2023-01-01

## 2023-01-25 PROBLEM — Z02.89 ENCOUNTER FOR PHYSICAL EXAMINATION RELATED TO EMPLOYMENT: Status: ACTIVE | Noted: 2023-01-01

## 2023-01-25 NOTE — HISTORY OF PRESENT ILLNESS
[FreeTextEntry1] : AWE [de-identified] : Ms. YOAN JORDAN is a pleasant 42 year old female with PMH of migraines, GERD, HTN, obesity, hypothyroidism not on levothyroxine, pre-DM, HLD, thrombocytosis who comes in to the office for her AWE and job physical. She needs a TB test. 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 \par She is seeing a reproductive endocrinologist specialist, will get Intrauterine insemination. \par She needs a job physical form to be filled out\par No other complaints today.\par \par Fertility Specialist: [place]\par Previous PCP: Norm

## 2023-01-25 NOTE — PLAN
[FreeTextEntry1] : \par In regards to hypertension\par Patient's blood pressure borderline and has been like that at home per patient\par Increase Losartan from 50 mg QD to 100 mg QD \par Avoid salt\par Monitor blood pressures at home and bring the log\par \par In regards Hypothyroidism:\par Last TSH 3.32 on 09/01/2022, repeat today\par Has not been on levothyroxine for years, reports that her thyroid was controlled and can only take the Synthroid brand\par \par Obesity, HLD, pre-DM\par Lifestyle modifications discussed\par HbA1C 6.2% on 09/01/2022, repeat today\par lipids on 09/01/2022: , rest unremarkable, repeat today\par Follows up with weight medicine, now on Mounjaro\par \par Thrombocytosis, anemia\par Asymptomatic, chronic per HIE\par Saw heme/onc. On ASA 81 mg and iron\par May need clearance for her IUI procedure and pregnancy pending repeat CBC\par Monitor for bleeding symptoms\par \par Return to care: within 4 weeks for her preop evaluation and blood pressure check or earlier if needed, advised to keep her appointments and adhere to the treatment\par Call or return for any questions

## 2023-01-25 NOTE — PHYSICAL EXAM
[No Acute Distress] : no acute distress [Well Nourished] : well nourished [Well Developed] : well developed [Well-Appearing] : well-appearing [Normal Voice/Communication] : normal voice/communication [Normal Sclera/Conjunctiva] : normal sclera/conjunctiva [PERRL] : pupils equal round and reactive to light [EOMI] : extraocular movements intact [Normal Outer Ear/Nose] : the outer ears and nose were normal in appearance [Normal Oropharynx] : the oropharynx was normal [No JVD] : no jugular venous distention [No Lymphadenopathy] : no lymphadenopathy [Supple] : supple [Thyroid Normal, No Nodules] : the thyroid was normal and there were no nodules present [No Respiratory Distress] : no respiratory distress  [No Accessory Muscle Use] : no accessory muscle use [Clear to Auscultation] : lungs were clear to auscultation bilaterally [Normal Rate] : normal rate  [Regular Rhythm] : with a regular rhythm [Normal S1, S2] : normal S1 and S2 [No Murmur] : no murmur heard [No Carotid Bruits] : no carotid bruits [No Abdominal Bruit] : a ~M bruit was not heard ~T in the abdomen [No Varicosities] : no varicosities [Pedal Pulses Present] : the pedal pulses are present [No Edema] : there was no peripheral edema [No Palpable Aorta] : no palpable aorta [No Extremity Clubbing/Cyanosis] : no extremity clubbing/cyanosis [Soft] : abdomen soft [Non Tender] : non-tender [Non-distended] : non-distended [No Masses] : no abdominal mass palpated [No HSM] : no HSM [Normal Bowel Sounds] : normal bowel sounds [Normal Posterior Cervical Nodes] : no posterior cervical lymphadenopathy [Normal Anterior Cervical Nodes] : no anterior cervical lymphadenopathy [No CVA Tenderness] : no CVA  tenderness [No Spinal Tenderness] : no spinal tenderness [No Joint Swelling] : no joint swelling [Grossly Normal Strength/Tone] : grossly normal strength/tone [No Rash] : no rash [Coordination Grossly Intact] : coordination grossly intact [No Focal Deficits] : no focal deficits [Normal Gait] : normal gait [Deep Tendon Reflexes (DTR)] : deep tendon reflexes were 2+ and symmetric [Speech Grossly Normal] : speech grossly normal [Normal Affect] : the affect was normal [Normal Mood] : the mood was normal [Normal Insight/Judgement] : insight and judgment were intact

## 2023-01-25 NOTE — HEALTH RISK ASSESSMENT
[Never] : Never [Yes] : Yes [2 - 4 times a month (2 pts)] : 2-4 times a month (2 points) [1 or 2 (0 pts)] : 1 or 2 (0 points) [Never (0 pts)] : Never (0 points) [No] : In the past 12 months have you used drugs other than those required for medical reasons? 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

## 2023-02-01 NOTE — H&P PST ADULT - NSICDXPASTSURGICALHX_GEN_ALL_CORE_FT
PAST SURGICAL HISTORY:  Breast cyst, left      delivery delivered     S/P cholecystectomy 2017     PAST SURGICAL HISTORY:  Breast cyst, left      delivery delivered 2016    History of hysteroscopy     S/P cholecystectomy 2017

## 2023-02-01 NOTE — H&P PST ADULT - HISTORY OF PRESENT ILLNESS
44 yo female, PMH HTN, DM2, LMP 23, ,    Pt. presents to PST fo  cholecystectomy  hysteroscopy 2022 - with symptoms of migraines, body aches, no treatment  42 yo female, PMH HTN, migraines, reports menorrhagia and pelvic pain with menses for many years, pt. presents to [facility] for scheduled Operative Hysteroscopy, Polypectomy, Fibroid Resection on 21. Pt. denies COVID-19 infection in , denies close contact with anyone that has been ill, no fever, cough, dyspnea in past two weeks.    Pt. has COVID-19 testing scheduled on 21 at [facility] 42 yo female, PMH HTN, DM2, thrombocytosis - f/u with hematologist - no treatment,  s/p  , LMP 23, uterine polyp s/p hysteroscopy , pt. is considering having another child via artificial insemination and gyn exam revealed a new uterine polyp, pt. has cervical stenosis and presents to [facility] for scheduled D&C, Operative Hysteroscopy on 2/3/23. Pt. had COVID-19 infection 2022 with symptoms of migraines, body aches - no treatement. Denies recent fever, chills, chest pain, SOB, changes in bowel/urinary habits.    Pt. had COVID-19 testing done today at [facility]   42 yo female, PMH HTN, DM2, migraines, thrombocytosis - f/u with hematologist - no treatment,  s/p  , LMP 23, uterine polyp s/p hysteroscopy , pt. is considering having another child via artificial insemination and gyn exam revealed a new uterine polyp, pt. has cervical stenosis and presents to [facility] for scheduled D&C, Operative Hysteroscopy on 2/3/23. Pt. had COVID-19 infection 2022 with symptoms of migraines, body aches - no treatement. Denies recent fever, chills, chest pain, SOB, changes in bowel/urinary habits.    Pt. had COVID-19 testing done today at [facility]    Labs from 23 wnl in chart, plt 553 (pt.'s ranges 534-606 in the past)  HgA1c 5.5    T&S done at PST

## 2023-02-01 NOTE — H&P PST ADULT - NSICDXPASTMEDICALHX_GEN_ALL_CORE_FT
PAST MEDICAL HISTORY:  Hypertension     Hypothyroidism      PAST MEDICAL HISTORY:  COVID-19 virus infection     DM2 (diabetes mellitus, type 2)     Hypertension     Migraines

## 2023-02-01 NOTE — H&P PST ADULT - ABILITY TO HEAR (WITH HEARING AID OR HEARING APPLIANCE IF NORMALLY USED):
Addended by: Cher Erickson on: 2/1/2023 11:29 AM     Modules accepted: Orders Adequate: hears normal conversation without difficulty

## 2023-02-01 NOTE — H&P PST ADULT - PROBLEM SELECTOR PLAN 1
D&C, Operative Hysteroscopy on 2/3/23  Pre-op instructions provided - all questions answered  Labs from 1/26 in chart WNL  COVID swab done today at [facility]

## 2023-02-01 NOTE — H&P PST ADULT - ASSESSMENT
Activity: Can walk 5 blocks, 2 flights of stairs    DASI scale: 8.75 DASI scale    Mallampati: 2    Dentition: Denies loose teeth or dentures

## 2023-02-01 NOTE — H&P PST ADULT - MUSCULOSKELETAL
details… normal gait/strength 5/5 bilateral upper extremities/strength 5/5 bilateral lower extremities

## 2023-02-01 NOTE — H&P PST ADULT - SOURCE OF INFORMATION, PROFILE
Attempted to reach mom and received a message that the mailbox is full and not accepting messages.  Will call back at a later time.    patient

## 2023-02-02 PROBLEM — G43.909 MIGRAINE, UNSPECIFIED, NOT INTRACTABLE, WITHOUT STATUS MIGRAINOSUS: Chronic | Status: ACTIVE | Noted: 2023-01-01

## 2023-02-02 PROBLEM — U07.1 COVID-19: Chronic | Status: ACTIVE | Noted: 2023-01-01

## 2023-02-02 PROBLEM — E11.9 TYPE 2 DIABETES MELLITUS WITHOUT COMPLICATIONS: Chronic | Status: ACTIVE | Noted: 2023-01-01

## 2023-02-03 PROBLEM — R92.8 ABNORMAL FINDING ON BREAST IMAGING: Status: ACTIVE | Noted: 2023-01-01

## 2023-02-06 NOTE — HISTORY OF PRESENT ILLNESS
[Other Location: e.g. School (Enter Location, City,State)___] : at [unfilled], at the time of the visit. [Other Location: e.g. Home (Enter Location, City,State)___] : at [unfilled] [Verbal consent obtained from patient] : the patient, [unfilled] [FreeTextEntry1] : Here for follow up for wt management , wt down for 198 to 192 even with the holiday \par \par Breakfast: \par coffee and yogurt\par \par snack \par none \par \par \par lunch \par 2 pieces of chicken\par \par \par snack \par none \par \par Dinner:6\par chicken and squash and broccoli and spinach \par dessert: sugar free candy , \par cedric sugar free\par \par exer: \par none \par \par ETOH\par 2 drinks in the month \par \par bed: 9 - 4am when not working\par working : 9a-gets up at 1: 30 \par

## 2023-02-06 NOTE — ASSESSMENT
[FreeTextEntry1] : 1.Doing well> needs to get her constipation under control:restart the fiber pills and veg in add to the senna and colace,  and it is better  , will increase to 10 mounjaro .\par \par 2. exer as her energy returns from covid and from her utering polyps \par \par 3. labs inJan a1c = 5.5 and mariann well controlled 159/42/92/122 \par \par 4 since she does not have a dx of vidhi DM, and since the Mounjaro is not available at [facility], will change to Wegovy , start at 0.5 and see how she does. \par 5 to see endo for ? enlarged thyroid has an appointment 2/13 \par \par

## 2023-02-23 PROBLEM — Z11.8 ENCOUNTER FOR SCREENING EXAMINATION FOR CHLAMYDIAL INFECTION: Status: ACTIVE | Noted: 2023-01-01

## 2023-02-23 PROBLEM — Z11.4 ENCOUNTER FOR HUMAN IMMUNODEFICIENCY VIRUS TEST: Status: ACTIVE | Noted: 2023-01-01

## 2023-05-16 PROBLEM — R11.0 NAUSEA: Status: ACTIVE | Noted: 2021-07-22

## 2023-05-16 NOTE — HISTORY OF PRESENT ILLNESS
[Home] : at home, [unfilled] , at the time of the visit. [Medical Office: ([facility])___] : at the medical office located in  [Verbal consent obtained from patient] : the patient, [unfilled] [FreeTextEntry1] : 43F PMH class I obesity, HTN, HLD, pre-DM, cholelithiasis, migraine, who presents to weight management for follow-up.\par \par She follows with Dr. Tijerina, since 8/19/22, weight 223 lbs, BMI 33.91\par She last presented to clinic 2/6/23, weight 192 lbs, BMI 30.07\par Weight today 192 lbs\par \par She takes Wegovy 0.5 mg/wk, has taken doses. \par She previously was on mounjaro but no longer was covered after A1c improved.\par Lowest weight 188 lbs when on Mounjaro, then up to 196 lbs, at 194 lbs she started Wegovy 0.5 mg/wk, \par \par Minimal exercise since she had COVID in Feb.

## 2023-05-16 NOTE — PHYSICAL EXAM
[Obese, well nourished, in no acute distress] : obese, well nourished, in no acute distress [de-identified] : TEB visit

## 2023-05-16 NOTE — ASSESSMENT
[FreeTextEntry1] : 43F PMH class I obesity, HTN, HLD, pre-DM, cholelithiasis, migraine, who presents to weight management for follow-up.\par \par # Class I obesity c/b HTN, HLD, pre-DM: Weight today 192 lbs. Her weight at initial visit (8/19/22) was 223 lbs, BMI 33.91, she lost to 188 lbs on Mounjaro, regained some while off medical treatment, and halted further regain with some loss since starting Wegovy 0.5 mg/wk. Has mild nausea but responding well. She was prescribed up to Mounjaro 10 mg/wk. I suspect that she will have good effect from Wegovy as well, given that 0.5 mg/wk dose is significantly less, as long as she tolerates. \par - Food log\par - Eat meals, prevent more intake later on\par - prep, portion control, limit snacks at work\par - Goal to restart stationary bike\par - Increase Wegovy to 1.0 mg/wk\par - F/u in 2 months.

## 2023-05-22 NOTE — HISTORY OF PRESENT ILLNESS
[FreeTextEntry1] : Ms. Mario is presenting for Hypothyroidism. \par \par 43 year old female with PMH of BMI 30, HTN, HLD, cholelithiasis, migraines.\par \par #Hypothyroidism \par Diagnosed age 15, stopped taking Brand Synthroid around age 41, 2021. \par Jan 2023 TSH 1.66. TSH has remained normal since stopping brand synthroid. \par Feels neck swelling, throat swelling and had periods of difficulty swallowing liquids. \par Never seen by ENT. \par Mother with graves disease. \par No recent sonogram. \par Patient may be trying to conceive in the foreseeable future.

## 2023-05-22 NOTE — PHYSICAL EXAM
[Alert] : alert [Well Nourished] : well nourished [No Acute Distress] : no acute distress [Normal Hearing] : hearing was normal [No Respiratory Distress] : no respiratory distress [No Accessory Muscle Use] : no accessory muscle use [Normal Rate] : heart rate was normal [de-identified] : Slightly enlarged thyroid gland

## 2023-05-22 NOTE — ASSESSMENT
[FreeTextEntry1] : 43 year old female with PMH of BMI 30, HTN, HLD, cholelithiasis, migraines is presenting for Hypothyroidism. \par \par 1. Hypothyroidism \par -Patient is now euthyroid for ~3 years with no medication. \par -Continue to hold LT4. If patient is planning for pregnancy in future, discussed that TSH should be checked either prior to pregnancy or as soon as she is pregnant as she may require thyroid hormone. \par -Check for thyroid sonogram. If nodules found, explained that the overwhelming majority of thyroid nodules (over 90%) are benign, but FNA biopsy is recommended if nodule is over 1cm or showing suspicious features (microcalcifications, irregular borders, tall > wide, hypervascularity). \par \par Patient verbalized understanding of the above. All questions were answered to patient's satisfaction.\par Dispo: Patient will follow up as needed pending thyroid sonogram.

## 2023-07-27 PROBLEM — R59.9 ENLARGED LYMPH NODE: Status: ACTIVE | Noted: 2023-01-01

## 2023-08-01 NOTE — ASSESSMENT
[FreeTextEntry1] : 43F PMH class I obesity, HTN, HLD, pre-DM, cholelithiasis, migraine, who presents to weight management for follow-up.  # Class I obesity c/b HTN, HLD, pre-DM: Weight today 197 lbs, BMI 30.85, regained some weight off of incretin therapy, struggling with portions and cravings for calorie-dense snacks, just restarted at 1.7 mg/wk and had nausea but tolerable. Inconsistent with exercise due to energy levels. We discussed myriad of potential causes, given obesity and HTN, may be worth assessment for KYLAH with sleep study.  - Food log - Eat meals, prevent more intake later on - prep, portion control, limit snacks at work - Consider sleep eval.  - Goal to restart stationary bike - C/w Wegovy to 1.7 mg/wk - F/u in 2 months.

## 2023-08-01 NOTE — HISTORY OF PRESENT ILLNESS
[Home] : at home, [unfilled] , at the time of the visit. [Medical Office: ([facility])___] : at the medical office located in  [Verbal consent obtained from patient] : the patient, [unfilled] [FreeTextEntry1] : 43F PMH class I obesity, HTN, HLD, pre-DM, cholelithiasis, migraine, who presents to weight management for follow-up.  She follows with Dr. Tijerina, since 8/19/22, weight 223 lbs, BMI 33.91 She was seen by Dr. Tijerina on 2/6/23, and initially seen by myself 5/16/23, weight 192 lbs.  Weight today 197 lbs, BMI 30.85  Has been off of Wegovy for 1-2 months because she couldn't get it. Was previously on 1.0 mg/wk. She got her 1.7 mg/wk and she started it this past week, had nausea first few days that resolved.  She previously was on Mounjaro but no longer was covered after A1c improved. Lowest weight 188 lbs when on Mounjaro, then up to 196 lbs, at 194 lbs she started Wegovy 0.5 mg/wk,   She gained weight the month off medication due to struggling with portions. She tried to eat healthy but had large portions. Craving more processed foods because she feels "sick of" the healthier foods she's eaten regularly.  Inconsistent with exercise.

## 2023-08-01 NOTE — PHYSICAL EXAM
[Obese, well nourished, in no acute distress] : obese, well nourished, in no acute distress [de-identified] : TEB visit

## 2023-08-17 NOTE — HEALTH RISK ASSESSMENT
[0] : 2) Feeling down, depressed, or hopeless: Not at all (0) [PHQ-2 Negative - No further assessment needed] : PHQ-2 Negative - No further assessment needed [[id number]] : 0

## 2023-08-17 NOTE — PLAN
[FreeTextEntry1] : In regards to hypertension Patient's blood pressure controlled Continue Losartan 100 mg QD  Avoid salt Monitor blood pressures at home and bring the log. Knos that when she decides to get pregnant, she will tell her Gyn for a change in her anti-HTN  Obesity, HLD, pre-DM Lifestyle modifications discussed HbA1C 5.5% on Jan/2023, repeat today lipids on Jan/2023: 4HDL 2 rest unremarkable, repeat today Follows up with weight medicine, now on Mounjaro  In regards Hypothyroidism: Last TSH 1.66 on Jan/2023, continue holding off LT4 per endocrino (maty/2023) Has not been on levothyroxine for years, reports that her thyroid was controlled and can only take the Synthroid brand.  Thrombocytosis, anemia Asymptomatic, chronic per HIE Saw heme/onc. On ASA 81 mg and iron May need clearance for her IUI procedure and pregnancy pending repeat CBC Monitor for bleeding symptoms  Return to care: within 3 months for follow up Call or return for any questions

## 2023-08-17 NOTE — HISTORY OF PRESENT ILLNESS
[FreeTextEntry1] : Follow up [de-identified] : Ms. YOAN JORDAN is a pleasant 43 year old female with PMH of migraines, GERD, HTN, obesity, hypothyroidism not on levothyroxine, pre-DM, HLD, thrombocytosis who comes into the office to follow up in medical conditions. 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 Is on Mounjaro by weight medicine.  She has  anemia, thrombocytosis, saw heme/onc Dr Garcia on 07/05/2022,  is on ASA and iron.  She is seeing a reproductive endocrinologist specialist, will get Intrauterine insemination.  She needs a job physical form to be filled out No other complaints today.  Fertility Specialist: [place] Previous PCP: Norm

## 2023-10-01 PROBLEM — M54.16 LUMBAR RADICULAR PAIN: Status: ACTIVE | Noted: 2019-07-22

## 2023-10-18 PROBLEM — H60.90 OTITIS EXTERNA: Status: ACTIVE | Noted: 2023-01-01

## 2023-10-18 PROBLEM — J06.9 ACUTE URI: Status: ACTIVE | Noted: 2023-01-01

## 2023-10-23 PROBLEM — R73.03 PREDIABETES: Status: ACTIVE | Noted: 2022-06-10

## 2023-10-23 PROBLEM — I10 BENIGN ESSENTIAL HTN: Status: ACTIVE | Noted: 2021-01-25

## 2023-10-23 PROBLEM — E03.9 HYPOTHYROIDISM, UNSPECIFIED TYPE: Status: ACTIVE | Noted: 2017-09-26

## 2023-10-23 PROBLEM — E78.5 HYPERLIPIDEMIA, UNSPECIFIED HYPERLIPIDEMIA TYPE: Status: ACTIVE | Noted: 2022-06-10

## 2023-11-14 NOTE — H&P ADULT - NSHPLABSRESULTS_GEN_ALL_CORE
LABS:                         8.9    10.82 )-----------( 371      ( 2023 18:54 )             27.8     Urinalysis Basic - ( 2023 18:52 )    Color: Yellow / Appearance: Clear / S.009 / pH: x  Gluc: x / Ketone: Negative mg/dL  / Bili: Negative / Urobili: 0.2 mg/dL   Blood: x / Protein: Trace mg/dL / Nitrite: Negative   Leuk Esterase: Small / RBC: x / WBC x   Sq Epi: x / Non Sq Epi: x / Bacteria: x    RADIOLOGY, EKG & ADDITIONAL TESTS: LABS:                         8.9    10.82 )-----------( 371      ( 2023 18:54 )             27.8     Urinalysis Basic - ( 2023 18:52 )    Color: Yellow / Appearance: Clear / S.009 / pH: x  Gluc: x / Ketone: Negative mg/dL  / Bili: Negative / Urobili: 0.2 mg/dL   Blood: x / Protein: Trace mg/dL / Nitrite: Negative   Leuk Esterase: Small / RBC: x / WBC x   Sq Epi: x / Non Sq Epi: x / Bacteria: x    RADIOLOGY, EKG & ADDITIONAL TESTS: reviewed

## 2023-11-14 NOTE — H&P ADULT - HISTORY OF PRESENT ILLNESS
43F with history of pre-DM, HTN, HLD, ADHD, BENJAMIN, migraines, thrombocytosis, uterine polyps s/p polypectomy in 2/2023 presented from [facility] post cardiac arrest. Per chart review, patient was experiencing cold-like symptoms for a few days with cough and SOB. She called EMS but became unresponsive and pulseless, was intubated in the field and achieved ROSC after an unknown period. She then arrested again en route to the hospital but had ROSC after 5 min of arrival to ED. CTH at the time showed findings suggestive of diffuse cerebral and cerebellar anoxic injury. CTA chest was negative for PE or dissection but showed dense patchy airspace opacities concerning for extensive PNA.     Per sister, patient was working the night prior and currently lives with 7-year-old daughter. No smoking or vaping.  43F with history of pre-DM, HTN, HLD, ADHD, BENJAMIN, migraines, thrombocytosis, hypothyroidism not on synthroid, uterine polyps s/p polypectomy in 2/2023 presented from  post cardiac arrest. Per chart review, patient was experiencing cold-like symptoms for a few days with cough and SOB. She called EMS but became unresponsive and pulseless, was intubated in the field and achieved ROSC after an unknown period. She then arrested again en route to the hospital but had ROSC after 5 min of arrival to ED. CTH at the time showed findings suggestive of diffuse cerebral and cerebellar anoxic injury. CTA chest was negative for PE or dissection but showed dense patchy airspace opacities concerning for extensive PNA.     Patient was seen by PCP on 10/18 for URI symptoms which had been going on for 2 weeks. She was endorsing rhinorrhea, sore throat, dry cough and was prescribed a course of prednisone. No sick contacts or travel history back then.     Per sister, patient was working the night prior and currently lives with 7-year-old daughter. No smoking or vaping.

## 2023-11-14 NOTE — H&P ADULT - CRITICAL CARE ATTENDING COMMENT
43F with history of pre-DM, HTN, HLD, ADHD, BENJAMIN, migraines, thrombocytosis, hypothyroidism not on synthroid, uterine polyps s/p polypectomy in 2/2023 presented from [facility] post cardiac arrest in the field. Imaging c/f extensive PNA. Transferred to [facility] for further management.   Per report she was overbreathing the vent and was withdrawing to pain but other reflex negative. Now on my exam she has no reflexes and is only posturing to exam.  There was a perfusion scan with cerbral perfusion present.   Plan is supportive care while trying to establish GOC and determine presenece/abcense of brain death. All other organs seem to have recovered.   Live on eval, neuro eval.  GOC  Called mother to ensure sister at bedside is ok as decision maker.

## 2023-11-14 NOTE — H&P ADULT - NSICDXPASTMEDICALHX_GEN_ALL_CORE_FT
PAST MEDICAL HISTORY:  COVID-19 virus infection     DM2 (diabetes mellitus, type 2)     Hypertension     Migraines

## 2023-11-14 NOTE — H&P ADULT - NSHPPHYSICALEXAM_GEN_ALL_CORE
ICU Vital Signs Last 24 Hrs  T(C): 37.3 (14 Nov 2023 18:00), Max: 37.3 (14 Nov 2023 18:00)  T(F): 99.1 (14 Nov 2023 18:00), Max: 99.1 (14 Nov 2023 18:00)  HR: 104 (14 Nov 2023 18:13) (104 - 110)  BP: 141/88 (14 Nov 2023 18:00) (141/88 - 145/93)  BP(mean): 110 (14 Nov 2023 18:00) (110 - 113)  RR: 18 (14 Nov 2023 18:00) (18 - 18)  SpO2: 98% (14 Nov 2023 18:13) (96% - 98%)    O2 Parameters below as of 14 Nov 2023 17:59  Patient On (Oxygen Delivery Method): ventilator    O2 Concentration (%): 30    PHYSICAL EXAM:  GENERAL: NAD, well-groomed, well-developed  HEAD: Atraumatic, Normocephalic  EYES: EOMI, PERRLA, conjunctiva and sclera clear  ENMT: No tonsillar erythema, exudates, or enlargement; Moist mucous membranes, Good dentition, No lesions  NECK: Supple, No JVD, Normal thyroid  CHEST/LUNG: Clear to percussion bilaterally; No rales, rhonchi, wheezing, or rubs  HEART: Regular rate and rhythm; No murmurs, rubs, or gallops  ABDOMEN: Soft, Nontender, Nondistended; Bowel sounds present  EXTREMITIES:  2+ Peripheral Pulses, No clubbing, cyanosis, or edema  LYMPH: No lymphadenopathy noted  SKIN: No rashes or lesions  NERVOUS SYSTEM: AAOx3; Motor Strength 5/5 B/L upper and lower extremities; DTRs 2+ intact and symmetric ICU Vital Signs Last 24 Hrs  T(C): 37.3 (14 Nov 2023 18:00), Max: 37.3 (14 Nov 2023 18:00)  T(F): 99.1 (14 Nov 2023 18:00), Max: 99.1 (14 Nov 2023 18:00)  HR: 104 (14 Nov 2023 18:13) (104 - 110)  BP: 141/88 (14 Nov 2023 18:00) (141/88 - 145/93)  BP(mean): 110 (14 Nov 2023 18:00) (110 - 113)  RR: 18 (14 Nov 2023 18:00) (18 - 18)  SpO2: 98% (14 Nov 2023 18:13) (96% - 98%)    O2 Parameters below as of 14 Nov 2023 17:59  Patient On (Oxygen Delivery Method): ventilator    O2 Concentration (%): 30    PHYSICAL EXAM:  GENERAL: intubated & ventilated  HEAD: atraumatic, normocephalic  EYES: blown pupils bilaterally  NECK: supple, normal thyroid  LUNG: mechanical breath sounds present, no wheezes  HEART: +s1/s2, regular rate and rhythm, no murmurs  ABDOMEN: soft, non-distended, no rigidity  EXTREMITIES: no peripheral edema bilaterally  SKIN: no rashes or lesions  NEURO: AAOx0, no corneal reflex

## 2023-11-14 NOTE — H&P ADULT - NSICDXPASTSURGICALHX_GEN_ALL_CORE_FT
PAST SURGICAL HISTORY:  Breast cyst, left      delivery delivered 2016    History of hysteroscopy     S/P cholecystectomy 2017

## 2023-11-14 NOTE — H&P ADULT - NSHPSOCIALHISTORY_GEN_ALL_CORE
lives with daughter  no smoking or vaping history per family  works as OR nurse at [employer]

## 2023-11-14 NOTE — H&P ADULT - ASSESSMENT
43F with history of pre-DM, HTN, HLD, ADHD, BENJAMIN, migraines, thrombocytosis, hypothyroidism not on synthroid, uterine polyps s/p polypectomy in 2/2023 presented from [facility] post cardiac arrest in the field. Imaging c/f extensive PNA. Transferred to [facility] for further management.     =====NEURO=====  #Concern for Anoxic Brain Injury  - arrested for unknown period both in the field & hospital  - CTH at OSH: findings suggestive of diffuse cerebral & cerebellar injury  - current exam with dilated pupils, no corneal reflex  - will not require sedation given current mental status      =====RESPIRATORY=====  #Intubated & Ventilated  #Patchy opacities  - was experiencing SOB and cough, called EMS and became pulseless  - CT chest at OSH neg for PE but showed patchy opacities c/f extensive PNA  - intubated in the field post-arrest  - AC RR 16/Tv 450/PEEP 5/FiO2 40  - monitor serial gases  - start vancomycin & zosyn  - repeat cultures, check sputum, RVP, urine legionella & strep    =====CARDIOVASCULAR=====  #Cardiac Arrest  - unclear rhythm but pulseless requiring CPR/epi, no reported shock delivery  - unclear duration of total cardiac arrest, however, lost pulse en route to hospital  - unclear etiology - possibly hypoxia given imaging findings & sxs  - currently normotensive off pressors    =====GASTRO=====  - no active issues  - NPO for now, TF as tolerated    =====RENAL=====  #SIVA  - Cr 1.15 on presentation, baseline normal 0.7  - likely hypoperfusion iso cardiac arrest  - monitor Cr/UOP  - strict I/Os, harrington catheter    =====ID=====  #Patchy opacities on CT  - CT imaging findings as above  - repeat blood cxs, urine cx, sputum cx, RVP, MRSA  - urine legionella & strep  - empiric tx with vancomycin & zosyn    =====HEME/ONC=====  #Anemia  - chronic history of anemia but no evidence of overt bleed  - maintain active T&S, monitor CBC    #Thrombocytosis  - history of thrombocytosis, was on ASA at home    =====ENDOCRINE=====  #Hypothyroidism  - history of hypothyroid but not on synthroid as per endocrine    =====ETHICS=====  - full code pending further GOC 43F with history of pre-DM, HTN, HLD, ADHD, BENJAMIN, migraines, thrombocytosis, hypothyroidism not on synthroid, uterine polyps s/p polypectomy in 2/2023 presented from [facility] post cardiac arrest in the field. Imaging c/f extensive PNA. Transferred to [facility] for further management.     =====NEURO=====  #Concern for Anoxic Brain Injury  - arrested for unknown period both in the field & hospital  - CTH at OSH: findings suggestive of diffuse cerebral & cerebellar injury  - current exam with dilated pupils, no corneal reflex  - will not require sedation given current mental status  - neuro consult in AM      =====RESPIRATORY=====  #Intubated & Ventilated  #Patchy opacities  - was experiencing SOB and cough, called EMS and became pulseless  - CT chest at OSH neg for PE but showed patchy opacities c/f extensive PNA  - intubated in the field post-arrest  - AC RR 16/Tv 450/PEEP 5/FiO2 40  - monitor serial gases  - start vancomycin & zosyn  - repeat cultures, check sputum, RVP, urine legionella & stress  - check CXR for ?SQ gas seen on imaging OSH    =====CARDIOVASCULAR=====  #Cardiac Arrest  - unclear rhythm but pulseless requiring CPR/epi, no reported shock delivery  - unclear duration of total cardiac arrest, however, lost pulse en route to hospital  - unclear etiology - possibly hypoxia given imaging findings & sxs  - currently normotensive off pressors  - will do POCUS, check official TTE    =====GASTRO=====  - start TF, increase to goal as tolerated    =====RENAL=====  #SIVA  - Cr 1.15 on presentation, baseline normal 0.7  - likely hypoperfusion iso cardiac arrest  - monitor Cr/UOP  - strict I/Os, harrington catheter    =====ID=====  #Patchy opacities on CT  - CT imaging findings as above  - repeat blood cxs, urine cx, sputum cx, RVP, MRSA  - urine legionella & strep  - empiric tx with vancomycin & zosyn  - check procal  - removal central line    =====HEME/ONC=====  #Anemia  - chronic history of anemia but no evidence of overt bleed  - maintain active T&S, monitor CBC    #Thrombocytosis  - history of thrombocytosis, was on ASA at home  - continue ASA     =====ENDOCRINE=====  #Hypothyroidism  - history of hypothyroid but not on synthroid as per endocrine  - check TSH, T4, T3    =====ETHICS=====  - full code pending further GOC

## 2023-11-15 NOTE — PROGRESS NOTE ADULT - ASSESSMENT
43F with history of pre-DM, HTN, HLD, ADHD, BENJAMIN, migraines, thrombocytosis, hypothyroidism not on synthroid, uterine polyps s/p polypectomy in 2/2023 presented from [facility] post cardiac arrest in the field. Imaging c/f extensive PNA. Transferred to [facility] for further management.     =====NEURO=====  #Concern for Anoxic Brain Injury  - arrested for unknown period both in the field & hospital  - CTH at OSH: findings suggestive of diffuse cerebral & cerebellar injury  - current exam with dilated pupils, no corneal reflex  - will not require sedation given current mental status  - neuro consult in AM      =====RESPIRATORY=====  #Intubated & Ventilated  #Patchy opacities  - was experiencing SOB and cough, called EMS and became pulseless  - CT chest at OSH neg for PE but showed patchy opacities c/f extensive PNA  - intubated in the field post-arrest  - AC RR 16/Tv 450/PEEP 5/FiO2 40  - monitor serial gases  - start vancomycin & zosyn  - repeat cultures, check sputum, RVP, urine legionella & stress  - check CXR for ?SQ gas seen on imaging OSH    =====CARDIOVASCULAR=====  #Cardiac Arrest  - unclear rhythm but pulseless requiring CPR/epi, no reported shock delivery  - unclear duration of total cardiac arrest, however, lost pulse en route to hospital  - unclear etiology - possibly hypoxia given imaging findings & sxs  - currently normotensive off pressors  - will do POCUS, check official TTE    =====GASTRO=====  - start TF, increase to goal as tolerated    =====RENAL=====  #SIVA  - Cr 1.15 on presentation, baseline normal 0.7  - likely hypoperfusion iso cardiac arrest  - monitor Cr/UOP  - strict I/Os, harrington catheter    =====ID=====  #Patchy opacities on CT  - CT imaging findings as above  - repeat blood cxs, urine cx, sputum cx, RVP, MRSA  - urine legionella & strep  - empiric tx with vancomycin & zosyn  - check procal  - removal central line    =====HEME/ONC=====  #Anemia  - chronic history of anemia but no evidence of overt bleed  - maintain active T&S, monitor CBC    #Thrombocytosis  - history of thrombocytosis, was on ASA at home  - continue ASA     =====ENDOCRINE=====  #Hypothyroidism  - history of hypothyroid but not on synthroid as per endocrine  - check TSH, T4, T3    =====ETHICS=====  - full code pending further GOC   43F with history of pre-DM, HTN, HLD, ADHD, BENJAMIN, migraines, thrombocytosis, hypothyroidism not on synthroid, uterine polyps s/p polypectomy in 2/2023 presented from [facility] post cardiac arrest in the field. Imaging c/f extensive PNA. Transferred to [facility] for further management.     =====NEURO=====  #Concern for Anoxic Brain Injury  - arrested for unknown period both in the field & hospital  - CTH at OSH: findings suggestive of diffuse cerebral & cerebellar injury  - current exam with dilated pupils, no corneal reflex  - will not require sedation given current mental status  - neuro consult- MRI, EEG ordered  -will try to obtain collateral from [facility] re: perfusion scan      =====RESPIRATORY=====  #Intubated & Ventilated  #Patchy opacities  - was experiencing SOB and cough, called EMS and became pulseless  - CT chest at OSH neg for PE but showed patchy opacities c/f extensive PNA  - intubated in the field post-arrest  - AC RR 16/Tv 450/PEEP 5/FiO2 40  - monitor serial gases  - start vancomycin & zosyn  -11/15 vanc discontinued, MRSA neg  - repeat cultures, check sputum, RVP, urine legionella & stress  - check CXR for ?SQ gas seen on imaging OSH  -adjust vent settings for respiratory alkalosis    =====CARDIOVASCULAR=====  #Cardiac Arrest  - unclear rhythm but pulseless requiring CPR/epi, no reported shock delivery  - unclear duration of total cardiac arrest, however, lost pulse en route to hospital  - unclear etiology - possibly hypoxia given imaging findings & sxs  - currently normotensive off pressors  - will do POCUS, check official TTE  -check tarik, anca, cardiolipin, anti-phospholipid antibody    =====GASTRO=====  - start TF, increase to goal as tolerated    =====RENAL=====  #SIVA  - Cr 1.15 on presentation, baseline normal 0.7  - likely hypoperfusion iso cardiac arrest  - monitor Cr/UOP  - strict I/Os, harrington catheter    =====ID=====  #Patchy opacities on CT  - CT imaging findings as above  - repeat blood cxs, urine cx, sputum cx, RVP, MRSA  - urine legionella & strep  - empiric tx with vancomycin & zosyn  - check procal  - central line removed overnight 11/14    =====HEME/ONC=====  #Anemia  - chronic history of anemia but no evidence of overt bleed  - maintain active T&S, monitor CBC    #Thrombocytosis  - history of thrombocytosis, was on ASA at home  - continue ASA     =====ENDOCRINE=====  #Hypothyroidism  - history of hypothyroid but not on synthroid as per endocrine  - check TSH, T4, T3    =====ETHICS=====  - full code pending further GOC

## 2023-11-15 NOTE — PROGRESS NOTE ADULT - SUBJECTIVE AND OBJECTIVE BOX
INTERVAL HPI/OVERNIGHT EVENTS: Central line pulled. Patient alkalotic overnight, ventilator settings adjusted and alkalosis resolved.    SUBJECTIVE: Patient seen and examined at bedside. Unresponsive, requiring ventilator, unable to participate in exam.    ROS: All negative except as listed above.    VITAL SIGNS:  ICU Vital Signs Last 24 Hrs  T(C): 36.5 (15 Nov 2023 04:00), Max: 37.8 (14 Nov 2023 20:00)  T(F): 97.7 (15 Nov 2023 04:00), Max: 100 (14 Nov 2023 20:00)  HR: 86 (15 Nov 2023 06:00) (86 - 110)  BP: 139/82 (15 Nov 2023 06:00) (121/70 - 155/90)  BP(mean): 105 (15 Nov 2023 06:00) (90 - 113)  ABP: --  ABP(mean): --  RR: 12 (15 Nov 2023 06:00) (12 - 18)  SpO2: 97% (15 Nov 2023 06:00) (96% - 100%)    O2 Parameters below as of 14 Nov 2023 20:00  Patient On (Oxygen Delivery Method): ventilator    O2 Concentration (%): 30      Mode: AC/ CMV (Assist Control/ Continuous Mandatory Ventilation), RR (machine): 12, TV (machine): 400, FiO2: 30, PEEP: 5, ITime: 1, MAP: 8, PIP: 24  Plateau pressure:   P/F ratio:     11-14 @ 07:01  -  11-15 @ 07:00  --------------------------------------------------------  IN: 895 mL / OUT: 800 mL / NET: 95 mL      CAPILLARY BLOOD GLUCOSE          ECG: reviewed.    PHYSICAL EXAM:    GENERAL: intubated & ventilated  HEAD: atraumatic, normocephalic  EYES: blown pupils bilaterally  NECK: supple, normal thyroid  LUNG: mechanical breath sounds present, no wheezes  HEART: +s1/s2, regular rate and rhythm, no murmurs  ABDOMEN: soft, non-distended, no rigidity  EXTREMITIES: no peripheral edema bilaterally  SKIN: no rashes or lesions  NEURO: AAOx0, no corneal reflex      MEDICATIONS:  MEDICATIONS  (STANDING):  aspirin  chewable 81 milliGRAM(s) Oral daily  chlorhexidine 0.12% Liquid 15 milliLiter(s) Oral Mucosa every 12 hours  heparin   Injectable 5000 Unit(s) SubCutaneous every 8 hours  piperacillin/tazobactam IVPB.. 3.375 Gram(s) IV Intermittent every 8 hours  vancomycin  IVPB 1250 milliGRAM(s) IV Intermittent every 12 hours    MEDICATIONS  (PRN):      ALLERGIES:  Allergies    No Known Allergies    Intolerances        LABS:                        8.6    10.97 )-----------( 330      ( 15 Nov 2023 01:08 )             27.7     11-15    140  |  103  |  10  ----------------------------<  106<H>  4.2   |  20<L>  |  0.49<L>    Ca    8.8      15 Nov 2023 01:08  Phos  3.4     11-15  Mg     2.1     11-15    TPro  6.2  /  Alb  2.9<L>  /  TBili  0.2  /  DBili  x   /  AST  50<H>  /  ALT  36  /  AlkPhos  75  11-15      Urinalysis Basic - ( 15 Nov 2023 01:08 )    Color: x / Appearance: x / SG: x / pH: x  Gluc: 106 mg/dL / Ketone: x  / Bili: x / Urobili: x   Blood: x / Protein: x / Nitrite: x   Leuk Esterase: x / RBC: x / WBC x   Sq Epi: x / Non Sq Epi: x / Bacteria: x      ABG:  pH, Arterial: 7.39 (11-15-23 @ 04:49)  pCO2, Arterial: 51 mmHg (11-15-23 @ 04:49)  pO2, Arterial: 110 mmHg (11-15-23 @ 04:49)  pH, Arterial: 7.47 (11-15-23 @ 00:55)  pCO2, Arterial: 40 mmHg (11-15-23 @ 00:55)  pO2, Arterial: 102 mmHg (11-15-23 @ 00:55)  pH, Arterial: 7.52 (11-14-23 @ 18:25)  pCO2, Arterial: 35 mmHg (11-14-23 @ 18:25)  pO2, Arterial: 109 mmHg (11-14-23 @ 18:25)      vBG:  pH, Venous: 7.53 (11-14-23 @ 18:37)  pCO2, Venous: 33 mmHg (11-14-23 @ 18:37)  pO2, Venous: 142 mmHg (11-14-23 @ 18:37)  HCO3, Venous: 28 mmol/L (11-14-23 @ 18:37)    Micro:        RADIOLOGY & ADDITIONAL TESTS: Reviewed.   INTERVAL HPI/OVERNIGHT EVENTS: Central line pulled. Patient alkalotic overnight, ventilator settings adjusted and alkalosis resolved.    SUBJECTIVE: Patient seen and examined at bedside. Unresponsive, requiring ventilator, unable to participate in exam.    ROS: All negative except as listed above.    VITAL SIGNS:  ICU Vital Signs Last 24 Hrs  T(C): 36.5 (15 Nov 2023 04:00), Max: 37.8 (14 Nov 2023 20:00)  T(F): 97.7 (15 Nov 2023 04:00), Max: 100 (14 Nov 2023 20:00)  HR: 86 (15 Nov 2023 06:00) (86 - 110)  BP: 139/82 (15 Nov 2023 06:00) (121/70 - 155/90)  BP(mean): 105 (15 Nov 2023 06:00) (90 - 113)  ABP: --  ABP(mean): --  RR: 12 (15 Nov 2023 06:00) (12 - 18)  SpO2: 97% (15 Nov 2023 06:00) (96% - 100%)    O2 Parameters below as of 14 Nov 2023 20:00  Patient On (Oxygen Delivery Method): ventilator    O2 Concentration (%): 30      Mode: AC/ CMV (Assist Control/ Continuous Mandatory Ventilation), RR (machine): 12, TV (machine): 400, FiO2: 30, PEEP: 5, ITime: 1, MAP: 8, PIP: 24  Plateau pressure:   P/F ratio:     11-14 @ 07:01  -  11-15 @ 07:00  --------------------------------------------------------  IN: 895 mL / OUT: 800 mL / NET: 95 mL      CAPILLARY BLOOD GLUCOSE          ECG: reviewed.    PHYSICAL EXAM:    GENERAL: intubated & ventilated  HEAD: atraumatic, normocephalic  EYES: blown pupils bilaterally, nonreactive to light, corneal reflex absent  NECK: supple  LUNG: mechanical breath sounds present, CTA bilaterally  HEART: regular rate and rhythm, no murmurs  ABDOMEN: soft, non-distended, no rigidity  EXTREMITIES: no peripheral edema bilaterally  SKIN: no rashes or lesions  NEURO: AAOx0, no corneal reflex, patient does not withdraw to pain      MEDICATIONS:  MEDICATIONS  (STANDING):  aspirin  chewable 81 milliGRAM(s) Oral daily  chlorhexidine 0.12% Liquid 15 milliLiter(s) Oral Mucosa every 12 hours  heparin   Injectable 5000 Unit(s) SubCutaneous every 8 hours  piperacillin/tazobactam IVPB.. 3.375 Gram(s) IV Intermittent every 8 hours  vancomycin  IVPB 1250 milliGRAM(s) IV Intermittent every 12 hours    MEDICATIONS  (PRN):      ALLERGIES:  Allergies    No Known Allergies    Intolerances        LABS:                        8.6    10.97 )-----------( 330      ( 15 Nov 2023 01:08 )             27.7     11-15    140  |  103  |  10  ----------------------------<  106<H>  4.2   |  20<L>  |  0.49<L>    Ca    8.8      15 Nov 2023 01:08  Phos  3.4     11-15  Mg     2.1     11-15    TPro  6.2  /  Alb  2.9<L>  /  TBili  0.2  /  DBili  x   /  AST  50<H>  /  ALT  36  /  AlkPhos  75  11-15      Urinalysis Basic - ( 15 Nov 2023 01:08 )    Color: x / Appearance: x / SG: x / pH: x  Gluc: 106 mg/dL / Ketone: x  / Bili: x / Urobili: x   Blood: x / Protein: x / Nitrite: x   Leuk Esterase: x / RBC: x / WBC x   Sq Epi: x / Non Sq Epi: x / Bacteria: x      ABG:  pH, Arterial: 7.39 (11-15-23 @ 04:49)  pCO2, Arterial: 51 mmHg (11-15-23 @ 04:49)  pO2, Arterial: 110 mmHg (11-15-23 @ 04:49)  pH, Arterial: 7.47 (11-15-23 @ 00:55)  pCO2, Arterial: 40 mmHg (11-15-23 @ 00:55)  pO2, Arterial: 102 mmHg (11-15-23 @ 00:55)  pH, Arterial: 7.52 (11-14-23 @ 18:25)  pCO2, Arterial: 35 mmHg (11-14-23 @ 18:25)  pO2, Arterial: 109 mmHg (11-14-23 @ 18:25)      vBG:  pH, Venous: 7.53 (11-14-23 @ 18:37)  pCO2, Venous: 33 mmHg (11-14-23 @ 18:37)  pO2, Venous: 142 mmHg (11-14-23 @ 18:37)  HCO3, Venous: 28 mmol/L (11-14-23 @ 18:37)    Micro:        RADIOLOGY & ADDITIONAL TESTS: Reviewed.

## 2023-11-15 NOTE — PROGRESS NOTE ADULT - ATTENDING COMMENTS
1.Acute hypoxemic respiratory failure after cardiac arrest. Oxygenating well. Pulmonary mechanics good. Continue current AC vent settings. Repeat ABG.    2. Cardiac: S/P out of hospital cardiac arrest.  Time down unclear. ? 10 minutes from 911 call. Pt with moderately to severely reduced LV systolic function. Septal hypo-akinesis.  Hemodynamically stable. Repeat formal TTE.    3.Neuro: CT head with extensive and progressing signs of cerebral edema and white matter effacement consistent with anoxic brain injury.               On exam, pt without pupil light reflex, corneal response or withdrawal to pain.  On mechanical ventilation pt  placed on PS mode. Pt did have spontaneous respirations. Pt is NOT brain dead.    Neuro Consult.      4. ID. Continue total of 7 days of Zosyn for aspiration pneumonia seen on CT chest at out side hospital. Stop vancomycin. since MRSA nasal swab negative.    5. GI; Continue tube feeds.    6.DVT : Prophylaxis: Sq heparin.    7. GOC: Full code. Palliative care consult. 1.Acute hypoxemic respiratory failure after cardiac arrest. Oxygenating well. Pulmonary mechanics good. Continue current AC vent settings. Repeat ABG.    2. Cardiac: S/P out of hospital cardiac arrest.  Time down unclear. ? 10 minutes from 911 call. Pt with moderately to severely reduced LV systolic function. Septal hypo-akinesis.  Hemodynamically stable. Repeat formal TTE.    3.Neuro: CT head with extensive and progressing signs of cerebral edema and white matter effacement consistent with anoxic brain injury.               On exam, pt without pupil light reflex, corneal response or withdrawal to pain.  On mechanical ventilation pt  placed on PS mode. Pt did have spontaneous respirations. Pt is NOT brain dead.        Neuro Consult.      4. ID. Continue total of 7 days of Zosyn for aspiration pneumonia seen on CT chest at out side hospital. Stop vancomycin. since MRSA nasal swab negative.    5. GI; Continue tube feeds.    6.DVT : Prophylaxis: Sq heparin.    7. GOC: Full code. Palliative care consult.

## 2023-11-15 NOTE — PATIENT PROFILE ADULT - HAVE YOU RECENTLY LOST WEIGHT WITHOUT TRYING?
Continue Regimen: Hydroquinone 6%+tretinoin 0.025%+Hydrocortisone 1% +3% kojic acid apply daily Detail Level: Simple Plan: May use all over face if patient desires Detail Level: Zone Discontinue Regimen: Hydroquinone compound Initiate Treatment: Onexton gel apply at am Continue Regimen: Tretinoin 0.025% cream ( in the compound ) No (0)

## 2023-11-15 NOTE — CONSULT NOTE ADULT - ASSESSMENT
43 Woman pre-DM, HTN, HLD, ADHD, BENJAMIN, migraines, thrombocytosis, hypothyroidism not on synthroid, uterine polyps s/p polypectomy in 2/2023 had vague URI symptoms, called EMS, found down (within 10minutes of 911 call) ROSC obtained reportedly quickly by EMS. Arrested again in ambulance, ROSC again after 5mins.    Impression:   Patient with cardiac arrest reportedly 11/10 of unclear etiology, per MICU wall motion abnormalities is suggestive or cardiac event, with time until ROSC <15min (10min from 911 call + 5min during second arrest)   11/15 has occasional spontaneous breaths.   Neurologic prognosis for meaningful recovery is likely poor given lack of other brainstem reflexes, lack of improvement 5 days, reported anoxic Brain injury on CTH @ [facility] (per H&P, images not available for review by neurology team)  -One favorable factor is total time until ROSC < 20 minute    Recommendations  [] vEEG to continue to evaluate for focal slowing, epileptiform discharges, or seizures  [] MRI brain w/o contrast to evaluate for anoxic brain injury (cortical injury between day 2-7 post-cardiac arrest and subcortical injury day 5-10 post-cardiac arrest and brainstem injury day 7-14)  [] Avoid sedating agents that can significantly affect EEG, such as benzodiazepines and propofol. Precedex, fentanyl, and hydromorphone are OK  [] GOC per primary team    Case not yet discussed with attending. Recommendations not final until attested         43 Woman pre-DM, HTN, HLD, ADHD, BENJAMIN, migraines, thrombocytosis, hypothyroidism not on synthroid, uterine polyps s/p polypectomy in 2/2023 had vague URI symptoms, called EMS, found down (within 10minutes of 911 call) ROSC obtained reportedly quickly by EMS. Arrested again in ambulance, ROSC again after 5mins.    Impression:   Patient with cardiac arrest reportedly 11/10 of unclear etiology, per MICU wall motion abnormalities is suggestive or cardiac event, with time until ROSC <15min (10min from 911 call + 5min during second arrest)   11/15 has occasional spontaneous breaths.   Neurologic prognosis for meaningful recovery is likely poor given lack of other brainstem reflexes, lack of improvement 5 days, reported anoxic Brain injury on CTH @ [facility] (per H&P, images not available for review by neurology team)  -One favorable factor is total time until ROSC < 20 minute    Recommendations  [] vEEG to continue to evaluate for focal slowing, epileptiform discharges, or seizures  [] MRI brain w/o contrast to evaluate for anoxic brain injury (cortical injury between day 2-7 post-cardiac arrest and subcortical injury day 5-10 post-cardiac arrest and brainstem injury day 7-14)  [] If expect long wait for MRI, would get CTH for now   [] Avoid sedating agents that can significantly affect EEG, such as benzodiazepines and propofol. Precedex, fentanyl, and hydromorphone are OK  [] GOC per primary team    Case not yet discussed with attending. Recommendations not final until attested         43 Woman pre-DM, HTN, HLD, ADHD, BENJAMIN, migraines, thrombocytosis, hypothyroidism not on synthroid, uterine polyps s/p polypectomy in 2/2023 had vague URI symptoms, called EMS, found down (within 10minutes of 911 call) ROSC obtained reportedly quickly by EMS. Arrested again in ambulance, ROSC again after 5mins.    Impression:   Patient with cardiac arrest reportedly 11/10 of unclear etiology, per MICU wall motion abnormalities is suggestive or cardiac event, with time until ROSC <15min (10min from 911 call + 5min during second arrest)   11/15 has occasional spontaneous breaths.   Neurologic prognosis for meaningful recovery is likely poor given lack of other brainstem reflexes, lack of improvement 5 days, reported anoxic Brain injury on CTH @ [facility] (per H&P, images not available for review by neurology team)  -One favorable factor is total time until ROSC < 20 minute    Recommendations  [] vEEG to continue to evaluate for focal slowing, epileptiform discharges, or seizures  [] MRI brain w/o contrast to evaluate for anoxic brain injury (cortical injury between day 2-7 post-cardiac arrest and subcortical injury day 5-10 post-cardiac arrest and brainstem injury day 7-14)  [] If expect long wait for MRI, would get noncon CTH for now   [] Avoid sedating agents that can significantly affect EEG, such as benzodiazepines and propofol. Precedex, fentanyl, and hydromorphone are OK  [] GOC per primary team    Case not yet discussed with attending. Recommendations not final until attested

## 2023-11-15 NOTE — CHART NOTE - NSCHARTNOTEFT_GEN_A_CORE
EEG preliminary read (not final) on the initial recording hour(s) = x 1     No seizures recorded.  Severe attenuation noted.  Final report to follow tomorrow morning after completion of study.    [facility] EEG Reading Room Ph#: [phone]  Epilepsy Answering Service after 5PM and before 8:30AM: Ph#: [phone]

## 2023-11-15 NOTE — CONSULT NOTE ADULT - CRITICAL CARE ATTENDING COMMENT
HPI as per resident note, personally verified by me. Patient with out of hospital cardiac arrest on 11/10 in the setting of calling EMS for SOB. Arrived 10 minutes later and found her pulseless with relatively short time until ROSC. Had additional cardiac arrest en route to hospital ([facility]) with time until ROSC ~5 minutes. CT head done there with significant signs of hypoxic-anoxic damage. No improvement in neurologic exam and she has lost some brainstem reflexes. Now transferred to [facility] for further care. By MICU report she was posturing earlier but no longer is doing this. Remains off sedation.    GTT:  None    ROS:  Due to clinical condition unable to assess (TAHIR)    Gen - Intubated, not sedated  CV - Peripheral circulation intact, no evidence of edema  Eyes - Fundoscopy not well visualized    Neurologic exam:  MS - Intubated, not sedated, comatose, no speech output, does not follow commands. TAHIR orientation, rep/naming, attn/conc/recent and remote memory/fund of knowledge  CN - Pupils mildly to moderately dilated and not reactive b/l, (-) EOM by OCR, (-) face sens/str by corneals b/l, (+) spontaneous respirations (patient 7-8 bpm when ventilator turned down to 6 bpm), (-) cough. TAHIR VF, hearing, tongue/palate, trap/SCM  Motor - Normal bulk and flaccid tone throughout. No spontaneous movement. No grimace or withdrawal to strong tactile stimuli in any extremity  Sens - As per Motor above  DTR's - 2+ biceps b/l, 2+ BR b/l, 0+ rest, and neutral b/l plantar response  Coord - TAHIR  Gait and station - TAHIR    EEG preliminary read (not final) on the initial recording hour(s) = x 1     No seizures recorded.  Severe attenuation noted.  Final report to follow tomorrow morning after completion of study.    Albumin dec 2.9, LFT's with inc AST 50  TSH dec 0.21, T4 WNL, CPK inc 239    A/P:  Anoxic encephalopathy  HTN  Pre-DM  Hypothyroidism  PNA  SIVA (improved)    - Neurologic prognosis for meaningful recovery (mRS at least 3) is exceedingly poor based on current exam, reports of early visualization of hypoxic-anoxic damage on outside CT head, and current EEG with severe voltage suppression in the absence of sedation or hypothermia. Total time until ROSC possibly < 20 minutes. At this time neurologic prognosis is best case scenario and realistic scenario persistent vegetative state and worst case scenario further loss of brainstem reflexes  - vEEG with severe diffuse attenuation and no evidence for focal slowing, epileptiform discharges, or seizures. PLEASE STOP  - Continue to hold sedation such as benzodiazepines or propofol. Precedex, fentanyl, and hydromorphone OK  - MRI brain w/o to assess for extent of damage. Can also consider starting with CT head w/o  - No role neuron specific enolase as she is > 72 from cardiac arrest  - GOC with MICU team and family  - Continue to address above medical problems, as you are doing  - Will continue to follow patient with you

## 2023-11-15 NOTE — PATIENT PROFILE ADULT - FALL HARM RISK - RISK INTERVENTIONS

## 2023-11-15 NOTE — CHART NOTE - NSCHARTNOTEFT_GEN_A_CORE
:Eulogio fairbanks    INDICATION: cardiac arrest and pna    PROCEDURE:  [x ] LIMITED ECHO  [ x] LIMITED CHEST  [ ] LIMITED RETROPERITONEAL  [ ] LIMITED ABDOMINAL  [ ] LIMITED DVT  [ ] NEEDLE GUIDANCE VASCULAR  [ ] NEEDLE GUIDANCE THORACENTESIS  [ ] NEEDLE GUIDANCE PARACENTESIS  [ ] NEEDLE GUIDANCE PERICARDIOCENTESIS  [ ] OTHER    FINDINGS:  Right lung fields showing minimal consolidation. B/L lung fields predominantly with A lines.     Gross LVEF decreased with segmental wall abnormalities. [place] contraction is grossly normal.   LVOT VTI 17.7    INTERPRETATION:  Patient with potential CAD due to segmental wall abnormalities instead of global hypokinesis.     Images uploaded to [facility]    Time spent on the procedure was separate from the critical care time spent for patient care. :Eulogio fairbanks    INDICATION: cardiac arrest and pna    PROCEDURE:  [x ] LIMITED ECHO  [ x] LIMITED CHEST  [ ] LIMITED RETROPERITONEAL  [ ] LIMITED ABDOMINAL  [ ] LIMITED DVT  [ ] NEEDLE GUIDANCE VASCULAR  [ ] NEEDLE GUIDANCE THORACENTESIS  [ ] NEEDLE GUIDANCE PARACENTESIS  [ ] NEEDLE GUIDANCE PERICARDIOCENTESIS  [ ] OTHER    FINDINGS:  Right lung fields showing minimal consolidation at the base inferolaterally. B/L lung fields predominantly with A lines.     Gross LVEF decreased with segmental wall abnormalities, particularly in the Anteroseptal area .[place] contraction is grossly normal.   LVOT VTI 17.7     INTERPRETATION:  Patient with potential CAD due to segmental wall abnormalities instead of global hypokinesis.     predominantly normal lung aeration pattern    Images uploaded to [email or web address]    Time spent on the procedure was separate from the critical care time spent for patient care. :Eulogio fairbanks    INDICATION: cardiac arrest and pna    PROCEDURE:  [x ] LIMITED ECHO  [ x] LIMITED CHEST  [ ] LIMITED RETROPERITONEAL  [ ] LIMITED ABDOMINAL  [ ] LIMITED DVT  [ ] NEEDLE GUIDANCE VASCULAR  [ ] NEEDLE GUIDANCE THORACENTESIS  [ ] NEEDLE GUIDANCE PARACENTESIS  [ ] NEEDLE GUIDANCE PERICARDIOCENTESIS  [ ] OTHER    FINDINGS:  Right lung fields showing minimal consolidation at the base inferolaterally. B/L lung fields predominantly with A lines.     Moderately decreased  LVEF  with segmental wall abnormalities, particularly in the Anteroseptal area .[place] contraction is grossly normal.   LVOT VTI 17.7     INTERPRETATION:  Patient with potential CAD due to segmental wall abnormalities instead of global hypokinesis.     predominantly normal lung aeration pattern Mild RLL consolidation. Pneumonia vs atelectasis.    Images uploaded to [facility]    Time spent on the procedure was separate from the critical care time spent for patient care.    I have assisted and supervised entire procedure.    Sahil Chandler MD.

## 2023-11-16 NOTE — PROGRESS NOTE ADULT - SUBJECTIVE AND OBJECTIVE BOX
NEUROLOGY FOLLOW-UP CONSULT NOTE    RFC: Neurologic prognosis s/p cardiac arrest    Interval history: No acute neurologic events overnight. No abnormal movements or purposeful responses noted.    Meds:  MEDICATIONS  (STANDING):  aspirin  chewable 81 milliGRAM(s) Oral daily  chlorhexidine 0.12% Liquid 15 milliLiter(s) Oral Mucosa every 12 hours  heparin   Injectable 5000 Unit(s) SubCutaneous every 8 hours  piperacillin/tazobactam IVPB.. 3.375 Gram(s) IV Intermittent every 8 hours    MEDICATIONS  (PRN):    GTT:  None    PMHx/PSHx/FHx/SHx:  Cardiac arrest    No pertinent family history in first degree relatives    Handoff    Hypothyroidism    Hypertension    Depression    Pregnancy    DM2 (diabetes mellitus, type 2)    Migraines    COVID-19 virus infection     delivery delivered    Breast cyst, left    S/P cholecystectomy    History of hysteroscopy    CARDIAC ARREST, CAUSE UNSPECIF    [email or web address]        Allergies:  No Known Allergies      ROS: Due to clinical condition unable to assess (TAHIR)    O:  T(C): 36.2 (23 @ 08:00), Max: 36.6 (11-15-23 @ 20:00)  HR: 88 (23 @ 10:00) (84 - 93)  BP: 117/72 (23 @ 10:00) (112/66 - 186/107)  RR: 12 (23 @ 10:00) (7 - 12)  SpO2: 98% (23 @ 10:00) (96% - 100%)    Focused neurologic exam:  MS - Intubated, not sedated, comatose, no speech output, does not follow commands. TAHIR orientation, rep/naming, attn/conc/recent and remote memory/fund of knowledge  CN - Pupils moderately dilated and not reactive b/l, (-) EOM by OCR and cold calorics, (-) face sens/str by corneals b/l, (-) spontaneous respirations (when ventilator turned down to 6 bpm), (-) cough. TAHIR VF, hearing, tongue/palate, trap/SCM  Motor - Normal bulk and flaccid tone throughout. No spontaneous movement. No grimace or withdrawal to strong tactile stimuli in any extremity  Sens - As per Motor above  DTR's - 2+ biceps b/l, 2+ BR b/l, 0+ rest, and neutral b/l plantar response  Coord - TAHIR  Gait and station - TAHIR    Pertinent labs/studies:  CBC with low H/H  and dec MCV 79, otherwise essentially WNL  BMP essentially WNL  Albumin dec 2.8, LFT's with inc AST 42  Mg WNL, Phos inc 4.7  TSH dec 0.21, T4 WNL, CPK inc 239, UA (+), rheumatoid factor (-)      vEEG 11/15 -  EEG Classification / Summary:  Abnormal video-EEG in a comatose patient.  Diffuse background suppression.  No focal or epileptiform abnormalities are captured.     Clinical Impression:  Profound diffuse cerebral dysfunction is nonspecific in etiology.     In comatose patients after cardiac arrest, and in the right clinical context (including absence of confounding factors such as sedating medications, hypothermia, or other factors), a diffusely suppressed EEG background >=72 hr from ROSC (or >=72 hr from rewarming if therapeutic hypothermia is used) can be considered a moderately reliable predictor of poor functional outcome assessed at >=3 months. Level of uncertainty of prognosis may vary by clinical situation and results of other prognostic modalities. See Neurocritical Care Society "Guidelines for Neuroprognostication in Comatose Adult Survivors of Cardiac Arrest" ().

## 2023-11-16 NOTE — PROGRESS NOTE ADULT - ASSESSMENT
43F with history of pre-DM, HTN, HLD, ADHD, BENJAMIN, migraines, thrombocytosis, hypothyroidism not on synthroid, uterine polyps s/p polypectomy in 2/2023 presented from [facility] post cardiac arrest in the field. Imaging c/f extensive PNA. Transferred to [facility] for further management.     =====NEURO=====  #Concern for Anoxic Brain Injury  - arrested for unknown period both in the field & hospital  - CTH at OSH: findings suggestive of diffuse cerebral & cerebellar injury  - current exam with dilated pupils, no corneal reflex  - will not require sedation given current mental status  - neuro consult- MRI, EEG ordered  -will try to obtain collateral from [facility] re: perfusion scan      =====RESPIRATORY=====  #Intubated & Ventilated  #Patchy opacities  - was experiencing SOB and cough, called EMS and became pulseless  - CT chest at OSH neg for PE but showed patchy opacities c/f extensive PNA  - intubated in the field post-arrest  - AC RR 16/Tv 450/PEEP 5/FiO2 40  - monitor serial gases  - start vancomycin & zosyn  -11/15 vanc discontinued, MRSA neg  - repeat cultures, check sputum, RVP, urine legionella & stress  - check CXR for ?SQ gas seen on imaging OSH  -adjust vent settings for respiratory alkalosis    =====CARDIOVASCULAR=====  #Cardiac Arrest  - unclear rhythm but pulseless requiring CPR/epi, no reported shock delivery  - unclear duration of total cardiac arrest, however, lost pulse en route to hospital  - unclear etiology - possibly hypoxia given imaging findings & sxs  - currently normotensive off pressors  - will do POCUS, check official TTE  -check tarik, anca, cardiolipin, anti-phospholipid antibody    =====GASTRO=====  - start TF, increase to goal as tolerated    =====RENAL=====  #SIVA  - Cr 1.15 on presentation, baseline normal 0.7  - likely hypoperfusion iso cardiac arrest  - monitor Cr/UOP  - strict I/Os, harrington catheter    =====ID=====  #Patchy opacities on CT  - CT imaging findings as above  - repeat blood cxs, urine cx, sputum cx, RVP, MRSA  - urine legionella & strep  - empiric tx with vancomycin & zosyn  - check procal  - central line removed overnight 11/14    =====HEME/ONC=====  #Anemia  - chronic history of anemia but no evidence of overt bleed  - maintain active T&S, monitor CBC    #Thrombocytosis  - history of thrombocytosis, was on ASA at home  - continue ASA     =====ENDOCRINE=====  #Hypothyroidism  - history of hypothyroid but not on synthroid as per endocrine  - check TSH, T4, T3    =====ETHICS=====  - full code pending further GOC     43F with history of pre-DM, HTN, HLD, ADHD, BENJAMIN, migraines, thrombocytosis, hypothyroidism not on synthroid, uterine polyps s/p polypectomy in 2/2023 presented from [facility] post cardiac arrest in the field. Imaging c/f extensive PNA. Transferred to [facility] for further management.     =====NEURO=====  #Concern for Anoxic Brain Injury  - arrested for unknown period both in the field & hospital  - CTH at OSH: findings suggestive of diffuse cerebral & cerebellar injury  - current exam with dilated pupils, no corneal reflex  - will not require sedation given current mental status  - neuro consult- MRI, EEG ordered  - EEG notable for profound diffuse cerebral dysfunction, nonspecific in etiology  -MRI with findings consistent with anoxic brain injury (diffuse cortical restricted diffusion, multiple foci of restricted diffusion in brainstem)  -will try to obtain collateral from [facility] re: perfusion scan      =====RESPIRATORY=====  #Intubated & Ventilated  #Patchy opacities  - was experiencing SOB and cough, called EMS and became pulseless  - CT chest at OSH neg for PE but showed patchy opacities c/f extensive PNA  - intubated in the field post-arrest  - AC RR 16/Tv 450/PEEP 5/FiO2 40  - monitor serial gases  - start vancomycin & zosyn  -11/15 vanc discontinued, MRSA neg  - repeat cultures, check sputum, RVP, urine legionella & stress  - check CXR for ?SQ gas seen on imaging OSH  -adjust vent settings for respiratory alkalosis    =====CARDIOVASCULAR=====  #Cardiac Arrest  - unclear rhythm but pulseless requiring CPR/epi, no reported shock delivery  - unclear duration of total cardiac arrest, however, lost pulse en route to hospital  - unclear etiology - possibly hypoxia given imaging findings & sxs  - currently normotensive off pressors  - will do POCUS, check official TTE  -check tarik, anca, cardiolipin, anti-phospholipid antibody    =====GASTRO=====  - start TF, increase to goal as tolerated    =====RENAL=====  #SIVA  - Cr 1.15 on presentation, baseline normal 0.7  - likely hypoperfusion iso cardiac arrest  - monitor Cr/UOP  - strict I/Os, harrington catheter    =====ID=====  #Patchy opacities on CT  - CT imaging findings as above  - repeat blood cxs, urine cx, sputum cx, RVP, MRSA  - urine legionella & strep  - empiric tx with vancomycin & zosyn  - check procal  - central line removed overnight 11/14    =====HEME/ONC=====  #Anemia  - chronic history of anemia but no evidence of overt bleed  - maintain active T&S, monitor CBC    #Thrombocytosis  - history of thrombocytosis, was on ASA at home  - continue ASA     =====ENDOCRINE=====  #Hypothyroidism  - history of hypothyroid but not on synthroid as per endocrine  - check TSH, T4, T3    =====ETHICS=====  - full code pending further GOC     43F with history of pre-DM, HTN, HLD, ADHD, BENJAMIN, migraines, thrombocytosis, hypothyroidism not on synthroid, uterine polyps s/p polypectomy in 2/2023 presented from [facility] post cardiac arrest in the field. Imaging c/f extensive PNA. Transferred to [facility] for further management.     =====NEURO=====  #Concern for Anoxic Brain Injury  - arrested for unknown period both in the field & hospital  - CTH at OSH: findings suggestive of diffuse cerebral & cerebellar injury  - current exam with dilated pupils, no corneal reflex  - will not require sedation given current mental status  - neuro consult- MRI, EEG ordered  - EEG notable for profound diffuse cerebral dysfunction, nonspecific in etiology  -MRI with findings consistent with anoxic brain injury (diffuse cortical restricted diffusion, multiple foci of restricted diffusion in brainstem)  -will try to obtain collateral from [facility] re: perfusion scan      =====RESPIRATORY=====  #Intubated & Ventilated  #Patchy opacities  - was experiencing SOB and cough, called EMS and became pulseless  - CT chest at OSH neg for PE but showed patchy opacities c/f extensive PNA  - intubated in the field post-arrest  - AC RR 16/Tv 450/PEEP 5/FiO2 40  - monitor serial gases  - start vancomycin & zosyn  -11/15 vanc discontinued, MRSA neg  - repeat cultures, check sputum, RVP, urine legionella & stress  - check CXR for ?SQ gas seen on imaging OSH  - follow blood gas, bmp, adjust vent settings as needed for acidosis/alkalosis    =====CARDIOVASCULAR=====  #Cardiac Arrest  - unclear rhythm but pulseless requiring CPR/epi, no reported shock delivery  - unclear duration of total cardiac arrest, however, lost pulse en route to hospital  - unclear etiology - possibly hypoxia given imaging findings & sxs  - currently normotensive off pressors  - will do POCUS, check official TTE  -check tarik, anca, cardiolipin, anti-phospholipid antibody    =====GASTRO=====  - start TF, increase to goal as tolerated    =====RENAL=====  #SIVA  - Cr 1.15 on presentation, baseline normal 0.7  - likely hypoperfusion iso cardiac arrest  - monitor Cr/UOP  - strict I/Os, harrington catheter  - renal function improved, follow bmp    =====ID=====  #Patchy opacities on CT  - CT imaging findings as above  - repeat blood cxs, urine cx, sputum cx, RVP, MRSA  - urine legionella & strep  - empiric tx with vancomycin & zosyn; vanc dc'ed because MRSA negative  - procal elevated  - central line removed overnight 11/14    =====HEME/ONC=====  #Anemia  - chronic history of anemia but no evidence of overt bleed  - maintain active T&S, monitor CBC    #Thrombocytosis  - history of thrombocytosis, was on ASA at home  - continue ASA     =====ENDOCRINE=====  #Hypothyroidism  - history of hypothyroid but not on synthroid as per endocrine  - check TSH, T4, T3    =====ETHICS=====  - full code pending further GOC

## 2023-11-16 NOTE — CHART NOTE - NSCHARTNOTEFT_GEN_A_CORE
Apnea test performed. Patient began breathing spontaneously 8 minutes and 30 seconds into the test, test aborted.

## 2023-11-16 NOTE — PROGRESS NOTE ADULT - SUBJECTIVE AND OBJECTIVE BOX
INTERVAL HPI/OVERNIGHT EVENTS:    SUBJECTIVE: Patient seen and examined at bedside. Unresponsive, requiring ventilator, unable to participate in exam.      ROS: All negative except as listed above.    VITAL SIGNS:  ICU Vital Signs Last 24 Hrs  T(C): 36.3 (16 Nov 2023 04:00), Max: 36.6 (15 Nov 2023 20:00)  T(F): 97.3 (16 Nov 2023 04:00), Max: 97.8 (15 Nov 2023 20:00)  HR: 89 (16 Nov 2023 07:00) (84 - 93)  BP: 147/90 (16 Nov 2023 07:00) (112/66 - 186/107)  BP(mean): 113 (16 Nov 2023 07:00) (84 - 137)  ABP: --  ABP(mean): --  RR: 12 (16 Nov 2023 07:00) (7 - 13)  SpO2: 98% (16 Nov 2023 07:00) (95% - 100%)    O2 Parameters below as of 15 Nov 2023 20:00  Patient On (Oxygen Delivery Method): ventilator          Mode: AC/ CMV (Assist Control/ Continuous Mandatory Ventilation), RR (machine): 12, TV (machine): 400, FiO2: 30, PEEP: 5, ITime: 1, MAP: 8, PIP: 23  Plateau pressure:   P/F ratio:     11-15 @ 07:01  -  11-16 @ 07:00  --------------------------------------------------------  IN: 905 mL / OUT: 1765 mL / NET: -860 mL      CAPILLARY BLOOD GLUCOSE          ECG: reviewed.    PHYSICAL EXAM:    GENERAL: intubated & ventilated  HEAD: atraumatic, normocephalic  EYES: blown pupils bilaterally, nonreactive to light, corneal reflex absent  NECK: supple  LUNG: mechanical breath sounds present, CTA bilaterally  HEART: regular rate and rhythm, no murmurs  ABDOMEN: soft, non-distended, no rigidity  EXTREMITIES: no peripheral edema bilaterally  SKIN: no rashes or lesions  NEURO: AAOx0, no corneal reflex, patient does not withdraw to pain    MEDICATIONS:  MEDICATIONS  (STANDING):  aspirin  chewable 81 milliGRAM(s) Oral daily  chlorhexidine 0.12% Liquid 15 milliLiter(s) Oral Mucosa every 12 hours  heparin   Injectable 5000 Unit(s) SubCutaneous every 8 hours  piperacillin/tazobactam IVPB.. 3.375 Gram(s) IV Intermittent every 8 hours    MEDICATIONS  (PRN):      ALLERGIES:  Allergies    No Known Allergies    Intolerances        LABS:                        8.7    9.40  )-----------( 284      ( 16 Nov 2023 00:28 )             28.6     11-16    143  |  105  |  11  ----------------------------<  126<H>  4.1   |  28  |  0.43<L>    Ca    9.3      16 Nov 2023 00:28  Phos  4.7     11-16  Mg     1.9     11-16    TPro  6.3  /  Alb  2.8<L>  /  TBili  0.1<L>  /  DBili  x   /  AST  42<H>  /  ALT  30  /  AlkPhos  82  11-16    PTT - ( 15 Nov 2023 18:29 )  PTT:33.9 sec  Urinalysis Basic - ( 16 Nov 2023 00:28 )    Color: x / Appearance: x / SG: x / pH: x  Gluc: 126 mg/dL / Ketone: x  / Bili: x / Urobili: x   Blood: x / Protein: x / Nitrite: x   Leuk Esterase: x / RBC: x / WBC x   Sq Epi: x / Non Sq Epi: x / Bacteria: x      ABG:  pH, Arterial: 7.38 (11-16-23 @ 00:46)  pCO2, Arterial: 56 mmHg (11-16-23 @ 00:46)  pO2, Arterial: 129 mmHg (11-16-23 @ 00:46)  pH, Arterial: 7.40 (11-15-23 @ 11:10)  pCO2, Arterial: 52 mmHg (11-15-23 @ 11:10)  pO2, Arterial: 106 mmHg (11-15-23 @ 11:10)      vBG:    Micro:    Culture - Blood (collected 11-14-23 @ 18:15)  Source: .Blood Blood  Preliminary Report (11-16-23 @ 01:02):    No growth at 24 hours    Culture - Blood (collected 11-14-23 @ 18:00)  Source: .Blood Blood  Preliminary Report (11-16-23 @ 01:02):    No growth at 24 hours        Culture - Sputum (collected 11-15-23 @ 01:08)  Source: .Sputum Sputum  Gram Stain (11-15-23 @ 16:34):    Rare polymorphonuclear leukocytes per low power field    No Squamous epithelial cells per low power field    No organisms seen per oil power field  Preliminary Report (11-16-23 @ 06:45):    Normal Respiratory Jenni present        RADIOLOGY & ADDITIONAL TESTS: Reviewed.   INTERVAL HPI/OVERNIGHT EVENTS: No acute overnight events.    SUBJECTIVE: Patient seen and examined at bedside. Unresponsive, requiring ventilator, unable to participate in exam.      ROS: All negative except as listed above.    VITAL SIGNS:  ICU Vital Signs Last 24 Hrs  T(C): 36.3 (16 Nov 2023 04:00), Max: 36.6 (15 Nov 2023 20:00)  T(F): 97.3 (16 Nov 2023 04:00), Max: 97.8 (15 Nov 2023 20:00)  HR: 89 (16 Nov 2023 07:00) (84 - 93)  BP: 147/90 (16 Nov 2023 07:00) (112/66 - 186/107)  BP(mean): 113 (16 Nov 2023 07:00) (84 - 137)  ABP: --  ABP(mean): --  RR: 12 (16 Nov 2023 07:00) (7 - 13)  SpO2: 98% (16 Nov 2023 07:00) (95% - 100%)    O2 Parameters below as of 15 Nov 2023 20:00  Patient On (Oxygen Delivery Method): ventilator          Mode: AC/ CMV (Assist Control/ Continuous Mandatory Ventilation), RR (machine): 12, TV (machine): 400, FiO2: 30, PEEP: 5, ITime: 1, MAP: 8, PIP: 23  Plateau pressure:   P/F ratio:     11-15 @ 07:01  -  11-16 @ 07:00  --------------------------------------------------------  IN: 905 mL / OUT: 1765 mL / NET: -860 mL      CAPILLARY BLOOD GLUCOSE          ECG: reviewed.    PHYSICAL EXAM:    GENERAL: intubated & ventilated  HEAD: atraumatic, normocephalic  EYES: blown pupils bilaterally, nonreactive to light, corneal reflex absent  NECK: supple  LUNG: on ventilator, CTA bilaterally  HEART: regular rate and rhythm, no murmurs  ABDOMEN: soft, non-distended, no rigidity  EXTREMITIES: no peripheral edema bilaterally  SKIN: no rashes or lesions  NEURO: AAOx0, no corneal reflex, patient does not withdraw to pain    MEDICATIONS:  MEDICATIONS  (STANDING):  aspirin  chewable 81 milliGRAM(s) Oral daily  chlorhexidine 0.12% Liquid 15 milliLiter(s) Oral Mucosa every 12 hours  heparin   Injectable 5000 Unit(s) SubCutaneous every 8 hours  piperacillin/tazobactam IVPB.. 3.375 Gram(s) IV Intermittent every 8 hours    MEDICATIONS  (PRN):      ALLERGIES:  Allergies    No Known Allergies    Intolerances        LABS:                        8.7    9.40  )-----------( 284      ( 16 Nov 2023 00:28 )             28.6     11-16    143  |  105  |  11  ----------------------------<  126<H>  4.1   |  28  |  0.43<L>    Ca    9.3      16 Nov 2023 00:28  Phos  4.7     11-16  Mg     1.9     11-16    TPro  6.3  /  Alb  2.8<L>  /  TBili  0.1<L>  /  DBili  x   /  AST  42<H>  /  ALT  30  /  AlkPhos  82  11-16    PTT - ( 15 Nov 2023 18:29 )  PTT:33.9 sec  Urinalysis Basic - ( 16 Nov 2023 00:28 )    Color: x / Appearance: x / SG: x / pH: x  Gluc: 126 mg/dL / Ketone: x  / Bili: x / Urobili: x   Blood: x / Protein: x / Nitrite: x   Leuk Esterase: x / RBC: x / WBC x   Sq Epi: x / Non Sq Epi: x / Bacteria: x      ABG:  pH, Arterial: 7.38 (11-16-23 @ 00:46)  pCO2, Arterial: 56 mmHg (11-16-23 @ 00:46)  pO2, Arterial: 129 mmHg (11-16-23 @ 00:46)  pH, Arterial: 7.40 (11-15-23 @ 11:10)  pCO2, Arterial: 52 mmHg (11-15-23 @ 11:10)  pO2, Arterial: 106 mmHg (11-15-23 @ 11:10)      vBG:    Micro:    Culture - Blood (collected 11-14-23 @ 18:15)  Source: .Blood Blood  Preliminary Report (11-16-23 @ 01:02):    No growth at 24 hours    Culture - Blood (collected 11-14-23 @ 18:00)  Source: .Blood Blood  Preliminary Report (11-16-23 @ 01:02):    No growth at 24 hours        Culture - Sputum (collected 11-15-23 @ 01:08)  Source: .Sputum Sputum  Gram Stain (11-15-23 @ 16:34):    Rare polymorphonuclear leukocytes per low power field    No Squamous epithelial cells per low power field    No organisms seen per oil power field  Preliminary Report (11-16-23 @ 06:45):    Normal Respiratory Jenni present        RADIOLOGY & ADDITIONAL TESTS: Reviewed.

## 2023-11-16 NOTE — CONSULT NOTE ADULT - RESPIRATORY DISTRESS OBSERVATION: NASAL FLARING
[facility]  Transfusion Medicine  Procedure Note    SUMMARY   Therapeutic Plasma Exchange (Apheresis)  Date/Time: 3/20/2018 4:10 PM  Performed by: EMILEE OLMSTEAD  Authorized by: KASI TEJEDA         Date of Procedure: 3/20/2018     Procedure: Plasma Exchange    Provider: Emilee Olmstead MD     Pre-Procedure Diagnosis: NMO  Post-Procedure Diagnosis: NMO    Follow-up Assessment: Ms. Toth is a 32 y.o. female with a complicated medical history including SLE, APLA, CVA, transverse myelitis, NMO+, neurogenic bladder, right fibula fracture, substance abuse who presented yesterday to ED with complaint of diffuse body pain and generalized weakness.  Her last TPE session for TM/NMO was in 3/2017 in which she also received IV steroids. She has had multiple admissions since, most recently for provoked seizure after smoking cannabis and taking 4 tramadol. Current admissions shows MRI with enhancing lesion in the lower cervical and throughout thoracic cord.     TM/NMO Spectrum Disorder carries a Category II Grade 1B indication for therapeutic plasma exchange (TPE) via the 2016 Journal of Clinical Apheresis Guidelines (Sanders J et al. Journal of Clinical Apheresis 2016; 31:149-162.)     Interval History:  Today's procedure (#3 of 5) was well tolerated and without complications.  Next treatment scheduled for tomorrow, 3/21.    Pertinent Laboratory Data:   Complete Blood Count:   Lab Results   Component Value Date    HGB 9.7 (L) 03/20/2018    HCT 31.6 (L) 03/20/2018    PLT 98 (L) 03/20/2018    WBC 8.92 03/20/2018     Comprehensive Metabolic Panel:   Lab Results   Component Value Date     03/20/2018    K 3.6 03/20/2018     (H) 03/20/2018    CO2 14 (L) 03/20/2018     (H) 03/20/2018    BUN 19 03/20/2018    CREATININE 0.7 03/20/2018    CALCIUM 8.5 (L) 03/20/2018    PROT 9.4 (H) 03/17/2018    ALBUMIN 2.6 (L) 03/17/2018    BILITOT 0.3 03/17/2018    ALKPHOS 78 03/17/2018    AST 24  03/17/2018    ALT 14 03/17/2018    ANIONGAP 9 03/20/2018    EGFRNONAA >60.0 03/20/2018       Pertinent Medications: None contraindicated for TPE, Solumedrol 1gr daily x 5 days    Review of patient's allergies indicates:  No Known Allergies    Anesthesia: None     Technical Procedures Used: Plasma Exchange: Volume exchanged - 4.5 L; Replacement fluid - Albumin; Number of procedures 3 of 5; Date of next procedure 3/21.    Description of the Findings of the Procedure:   Please see Apheresis Nurse flowsheet for details.    The patient was evaluated and all clinical and laboratory data relevant to the treatment was reviewed, and a decision was made to proceed with the Apheresis procedure.    I was available to the clinical staff throughout the procedure.    Significant Surgical Tasks Conducted by the Assistant(s): Not applicable  Complications: None  Estimated Blood Loss (EBL): None  Implants: None   Specimens: None    Emilee Mcclellan MD, PhD  Section of Transfusion Medicine & Histocompatibility  Department of Pathology and Laboratory Medicine  [facility]  [phone] (HLA & Blood Bank Offices)  03/20/2018               None

## 2023-11-16 NOTE — CONSULT NOTE ADULT - SUBJECTIVE AND OBJECTIVE BOX
Neurology - Consult Note    -  Spectra: [phone] ([facility]), [phone] ([facility])  -    HPI: Patient CRIS DENIS is a 43y (1980) wo/man with a PMHx significant for Hypothyroidism, Hypertension, Depression, DM2, Migraines, Covid, ADHD, Anxiety, Uterine polyps presents as transfer from [facility] post cardiac arrest. Per chart review, patient was experiencing cold-like symptoms for a few days with cough and SOB. She called EMS but became unresponsive and pulseless, was intubated in the field and achieved ROSC after an unknown period. She then arrested again en route to the hospital but had ROSC after 5 min of arrival to ED. CTH at the time showed findings suggestive of diffuse cerebral and cerebellar anoxic injury (images not available in pacs).     Review of Systems:    Unable to obtain    Allergies:  No Known Allergies      PMHx/PSHx/Family Hx: As above, otherwise see below   Hypothyroidism  Hypertension  Depression  Pregnancy  DM2 (diabetes mellitus, type 2)  Migraines  COVID-19 virus infection      Social Hx:      Medications:  MEDICATIONS  (STANDING):  aspirin  chewable 81 milliGRAM(s) Oral daily  chlorhexidine 0.12% Liquid 15 milliLiter(s) Oral Mucosa every 12 hours  heparin   Injectable 5000 Unit(s) SubCutaneous every 8 hours  piperacillin/tazobactam IVPB.. 3.375 Gram(s) IV Intermittent every 8 hours    MEDICATIONS  (PRN):      Vitals:  T(C): 36 (11-15-23 @ 12:00), Max: 37.8 (11-14-23 @ 20:00)  HR: 84 (11-15-23 @ 13:00) (84 - 110)  BP: 128/84 (11-15-23 @ 13:00) (120/73 - 155/90)  RR: 12 (11-15-23 @ 13:00) (12 - 18)  SpO2: 98% (11-15-23 @ 13:00) (95% - 100%)    Physical Examination:   General - Intubated, no sedation  Eyes - Bulging, Fixed, Dilated  Neurologic Exam:  Mental status - Eyes closed to voice, noxious  Cranial nerves / Brainstem: No corneals, no nasociliary, no oculocephalic, pupils fixed and dilated, no cough. No clear facial asymmetry    Motor -  Decreased tone  no movement      Sensation - No response to noxious centrally nor in any extremities    DTR's -             Biceps      Triceps     Brachioradialis      Patellar    Ankle    Toes/plantar response  R             2+             0+                  2+                       0+            0+                 Mute  L              2+             0+                 2+                        0+           0+                 Mute  No clonus, nohoffman, no babinski, no triple flexion    Coordination - Unable to assess    Gait and station -  Unable to assess    Labs:                        8.6    10.97 )-----------( 330      ( 15 Nov 2023 01:08 )             27.7     11-15    140  |  103  |  10  ----------------------------<  106<H>  4.2   |  20<L>  |  0.49<L>    Ca    8.8      15 Nov 2023 01:08  Phos  3.4     11-15  Mg     2.1     11-15    TPro  6.2  /  Alb  2.9<L>  /  TBili  0.2  /  DBili  x   /  AST  50<H>  /  ALT  36  /  AlkPhos  75  11-15    CAPILLARY BLOOD GLUCOSE        LIVER FUNCTIONS - ( 15 Nov 2023 01:08 )  Alb: 2.9 g/dL / Pro: 6.2 g/dL / ALK PHOS: 75 U/L / ALT: 36 U/L / AST: 50 U/L / GGT: x               CSF:                  Radiology:    
Date of Service: 23 @ 14:59    HPI:  43F with history of pre-DM, HTN, HLD, ADHD, BENJAMIN, migraines, thrombocytosis, hypothyroidism not on synthroid, uterine polyps s/p polypectomy in 2023 presented from [facility] post cardiac arrest. Per chart review, patient was experiencing cold-like symptoms for a few days with cough and SOB. She called EMS but became unresponsive and pulseless, was intubated in the field and achieved ROSC after an unknown period. She then arrested again en route to the hospital but had ROSC after 5 min of arrival to ED. CTH at the time showed findings suggestive of diffuse cerebral and cerebellar anoxic injury. CTA chest was negative for PE or dissection but showed dense patchy airspace opacities concerning for extensive PNA.     Patient was seen by PCP on 10/18 for URI symptoms which had been going on for 2 weeks. She was endorsing rhinorrhea, sore throat, dry cough and was prescribed a course of prednisone. No sick contacts or travel history back then.     Per sister, patient was working the night prior and currently lives with 7-year-old daughter. No smoking or vaping.  (2023 18:19)    PERTINENT PM/SXH:   Hypothyroidism    Hypertension    Depression    Pregnancy    DM2 (diabetes mellitus, type 2)    Migraines    COVID-19 virus infection     delivery delivered    Breast cyst, left    S/P cholecystectomy    History of hysteroscopy    FAMILY HISTORY: Unable to obtain due to poor mentation    ITEMS NOT CHECKED ARE NOT PRESENT    SOCIAL HISTORY:   Significant other/partner[x ]  Children[x ]  Advent/Spirituality:  Substance hx:  [ ]   Tobacco hx:  [ ]   Alcohol hx: [ ]   Home Opioid hx:  [ ] I-Stop Reference No:  Living Situation: [x ]Home  [ ]Long term care  [ ]Rehab [ ]Other    ADVANCE DIRECTIVES:    DNR/MOLST  [ ]  Living Will  [ ]   DECISION MAKER(s):  [ ] Health Care Proxy(s)  [x ] Surrogate(s)  [ ] Guardian           Name(s): Phone Number(s):  Patient's mother defers to sister Ela Mario [phone]  BASELINE (I)ADL(s) (prior to admission):  Petroleum: [x ]Total  [ ] Moderate [ ]Dependent    Allergies    No Known Allergies    Intolerances    MEDICATIONS  (STANDING):  aspirin  chewable 81 milliGRAM(s) Oral daily  chlorhexidine 0.12% Liquid 15 milliLiter(s) Oral Mucosa every 12 hours  heparin   Injectable 5000 Unit(s) SubCutaneous every 8 hours  piperacillin/tazobactam IVPB.. 3.375 Gram(s) IV Intermittent every 8 hours    MEDICATIONS  (PRN):    PRESENT SYMPTOMS: [x ]Unable to self-report see CPOT, PAINADs, RDOS  Source if other than patient:  [ ]Family   [x ]Team     Pain: [ ]yes [x ]no  QOL impact -   Location -                    Aggravating factors -  Quality -  Radiation -  Timing-  Severity (0-10 scale):  Minimal acceptable level (0-10 scale):       Dyspnea:                           [ ]Mild [ ]Moderate [ ]Severe  Anxiety:                             [ ]Mild [ ]Moderate [ ]Severe  Fatigue:                             [ ]Mild [ ]Moderate [ ]Severe  Nausea:                             [ ]Mild [ ]Moderate [ ]Severe  Loss of appetite:              [ ]Mild [ ]Moderate [ ]Severe  Constipation:                    [ ]Mild [ ]Moderate [ ]Severe    PCSSQ [Palliative Care Spiritual Screening Question]   Severity (0-10):  Score of 4 or > indicate consideration of Chaplaincy referral.  Chaplaincy Referral: [x ] yes [ ] refused [ ] following    Caregiver [place]? : [ ] yes [ x] no [ ] deferred:  Social work referral [ ] Patient & Family Centered Care Referral [ ]     Anticipatory Grief Present?: [x ] yes [ ] no  [  ] deferred: Palliative Social work referral [x ]  Patient & Family Centered Care Referral [ ]       Other Symptoms:  [ ]All other review of systems negative   [x ] Unable to obtain due to poor mentation    PHYSICAL EXAM:  Vital Signs Last 24 Hrs  T(C): 36.1 (2023 12:00), Max: 36.6 (15 Nov 2023 20:00)  T(F): 97 (2023 12:00), Max: 97.8 (15 Nov 2023 20:00)  HR: 90 (2023 14:30) (84 - 93)  BP: 122/69 (2023 13:00) (103/59 - 186/107)  BP(mean): 89 (2023 13:00) (77 - 137)  RR: 12 (2023 13:00) (12 - 12)  SpO2: 97% (2023 14:30) (96% - 100%)    Parameters below as of 2023 08:00  Patient On (Oxygen Delivery Method): ventilator    O2 Concentration (%): 30 I&O's Summary    15 Nov 2023 07:01  -  2023 07:00  --------------------------------------------------------  IN: 905 mL / OUT: 1795 mL / NET: -890 mL    2023 07:01  -  2023 14:59  --------------------------------------------------------  IN: 240 mL / OUT: 220 mL / NET: 20 mL        GENERAL:  [ ]Alert  [ ]Oriented x 3  [ ]Lethargic  [ ]Cachexia  [x ]Unarousable  [ ]Verbal  [ ]Non-Verbal  Behavioral:   [ ]Anxiety  [ ]Delirium [ ]Agitation [ ]Other  HEENT:  [ ]Normal   [ ]Dry mouth   [x ]ET Tube/Trach  [ ]Oral lesions  PULMONARY:   [ ]Clear [ ]Tachypnea  [ ]Audible excessive secretions   [ ]Rhonchi        [ ]Right [ ]Left [ ]Bilateral  [ ]Crackles        [ ]Right [ ]Left [ ]Bilateral  [ ]Wheezing     [ ]Right [ ]Left [ ]Bilateral  [ ]Diminished BS [ ] Right [ ]Left [ ]Bilateral  CARDIOVASCULAR:    [x]Regular [ ]Irregular [ ]Tachy  [ ]Bill [ ]Murmur [ ]Other  GASTROINTESTINAL:  [x]Soft  [ ]Distended   [x]+BS  [x]Non tender [ ]Tender  [ ]PEG [ ]OGT/ NGT   Last BM:    GENITOURINARY:  [x]Normal [ ]Incontinent   [ ]Oliguria/Anuria   [x ]Rascon  MUSCULOSKELETAL:   [ ]Normal   [ ]Weakness  [ x]Bed/Wheelchair bound [ ]Edema  NEUROLOGIC:   [ ]No focal deficits  [x ] Cognitive impairment  [ ] Dysphagia [ ]Dysarthria [ ] Paresis [ ]Other   SKIN:   [x]Normal  [ ]Rash   [ ]Pressure ulcer(s) [ ]y [ ]n present on admission    CRITICAL CARE:  [ ] Shock Present  [ ]Septic [ ]Cardiogenic [ ]Neurologic [ ]Hypovolemic  [ ]  Vasopressors [ ]  Inotropes   [x]Respiratory failure present [x ]Mechanical ventilation [ ]Non-invasive ventilatory support [ ]High flow  Mode: AC/ CMV (Assist Control/ Continuous Mandatory Ventilation), RR (machine): 12, TV (machine): 400, FiO2: 30, PEEP: 5, ITime: 1, MAP: 8, PIP: 23  [x ]Acute  [ ]Chronic [x ]Hypoxic  [ ]Hypercarbic [ ]Other  [ ]Other organ failure     LABS:                        8.7    9.40  )-----------( 284      ( 2023 00:28 )             28.6   11-16    143  |  105  |  11  ----------------------------<  126<H>  4.1   |  28  |  0.43<L>    Ca    9.3      2023 00:28  Phos  4.7     11-16  Mg     1.9     -16    TPro  6.3  /  Alb  2.8<L>  /  TBili  0.1<L>  /  DBili  x   /  AST  42<H>  /  ALT  30  /  AlkPhos  82  11-16  PTT - ( 15 Nov 2023 18:29 )  PTT:33.9 sec    Urinalysis Basic - ( 2023 00:28 )    Color: x / Appearance: x / SG: x / pH: x  Gluc: 126 mg/dL / Ketone: x  / Bili: x / Urobili: x   Blood: x / Protein: x / Nitrite: x   Leuk Esterase: x / RBC: x / WBC x   Sq Epi: x / Non Sq Epi: x / Bacteria: x      RADIOLOGY & ADDITIONAL STUDIES:  < from: MR Head No Cont (11.15.23 @ 22:28) >    ACC: [id number] EXAM:  MR BRAIN   ORDERED BY:  FRANC PAZ     PROCEDURE DATE:  11/15/2023          INTERPRETATION:  Non-contrast MRI of the brain.    CLINICAL INDICATION: Anoxic brain injury, status post cardiac arrest with    TECHNIQUE:  Multiplanar, multisequence MR images of the brain were   obtained without the administration of intravenous contrast.    COMPARISON: MRI brain 2018    FINDINGS:    New diffuse cortical restricted diffusion following the bilateral   cerebral and cerebellar hemispheres.    Multiple foci of restricted diffusion within the brainstem.    Diffuse sulcal effacement. Diffuse effacement of the basal cisterns.    Findings are consistent with anoxic injury.    No midline shift or hydrocephalus.    No acute intracranial hemorrhage.    IMPRESSION:    Findings consistent with anoxic brain injury.    --- End of Report ---      DEANNE NOVOA MD; Attending Radiologist  This document has been electronically signed. 2023  1:08PM    < end of copied text >    PROTEIN CALORIE MALNUTRITION PRESENT: [ ]mild [ ]moderate [ ]severe [ ]underweight [ ]morbid obesity  https://www.andeal.org/vault/2440/web/files/ONC/Table_Clinical%20Characteristics%20to%20Document%20Malnutrition-White%20JV%20et%20al%2020.pdf    Height (cm): 172.7 (23 @ 17:59), 170.2 (23 @ 13:18), 170.2 (23 @ 11:44)  Weight (kg): 87.1 (23 @ 17:59), 87.1 (23 @ 13:18), 87.1 (23 @ :44)  BMI (kg/m2): 29.2 (23 @ 17:59), 30.1 (23 @ 13:18), 30.1 (23 @ :44)    [ ]PPSV2 < or = to 30% [ ]significant weight loss  [ ]poor nutritional intake  [ ]anasarca[ ]Artificial Nutrition      Other REFERRALS:  [ ]Hospice  [ ]Child Life  [ ]Social Work  [ ]Case management [ ]Holistic Therapy     Care Coordination Assessment 201 [C. Provider] (11-15-23 @ 09:36)      Palliative Performance Scale:  http://npcrc.org/files/news/palliative_performance_scale_ppsv2.pdf  (Ctrl +  left click to view)  Respiratory Distress Observation Tool:  https://homecareinformation.net/handouts/hen/Respiratory_Distress_Observation_Scale.pdf (Ctrl +  left click to view)  PAINAD Score:  http://geriatrictoolkit.[facility]/cog/painad.pdf (Ctrl +  left click to view)

## 2023-11-16 NOTE — CONSULT NOTE ADULT - PROBLEM SELECTOR RECOMMENDATION 5
Will continue to follow for ongoing GOC and psychosocial support.  Palliative SW, chaplaincy and child life following for additional support.  Case discussed with MICU team.     Germaine Loaiza MD  Geriatrics and Palliative Medicine Attending  [facility] pager: [phone]

## 2023-11-16 NOTE — PROGRESS NOTE ADULT - ASSESSMENT
Anoxic encephalopathy  HTN  Pre-DM  Hypothyroidism  PNA  SIVA (improved)    - Patient with out of hospital cardiac arrest on 11/10 in the setting of calling EMS for SOB. Arrived 10 minutes later and found her pulseless with relatively short time until ROSC. Had additional cardiac arrest en route to hospital ([facility]) with time until ROSC ~5 minutes. CT head done there with significant signs of hypoxic-anoxic damage. Neurologic prognosis for meaningful recovery (mRS at least 3) is exceedingly poor based on current exam, reports of early visualization of hypoxic-anoxic damage on outside CT head, and current EEG with severe voltage suppression in the absence of sedation or hypothermia. Total time until ROSC possibly < 20 minutes. At this time neurologic prognosis is best case scenario and realistic scenario persistent vegetative state and worst case scenario further loss of brainstem reflexes. 11/16 - No longer with evidence of spontaneous respirations. MRI brain personally reviewed by me with severe hypoxic-anoxic damage in cortical > subcortical/brainstem areas. Exam consistent with loss of all brainstem reflexes and consistent with clinical diagnosis of brain death pending confirmatory testing. Primary team and sister, at bedside, aware  - vEEG with severe diffuse attenuation and no evidence for focal slowing, epileptiform discharges, or seizures. STOPPED  - Continue to hold sedation such as benzodiazepines, barbiturates, or propofol. Precedex, fentanyl, and hydromorphone OK  - MRI brain w/o with results as above  - No role neuron specific enolase as she is > 72 from cardiac arrest  - [facility] with MICU team and family. Possible apnea test or nuclear medicine perfusion study  - Continue to address above medical problems, as you are doing  - Will continue to follow patient with you, as needed

## 2023-11-16 NOTE — CONSULT NOTE ADULT - PROBLEM SELECTOR RECOMMENDATION 2
MR head consistent with anoxic brain injury  neuro consult appreciated, exam concerning for brain death  MICU team performing brain death protocol

## 2023-11-16 NOTE — CONSULT NOTE ADULT - PROBLEM SELECTOR RECOMMENDATION 4
Discussed option for ongoing medical management vs compassionate withdrawal of care.   Surrogate is sister Ela. Family to discuss options.  Pt remains full code.

## 2023-11-16 NOTE — CHART NOTE - NSCHARTNOTEFT_GEN_A_CORE
: Eulogio Kelly    INDICATION:  cardiac arrest    PROCEDURE:  [x ] LIMITED ECHO  [x ] LIMITED CHEST  [ ] LIMITED RETROPERITONEAL  [ ] LIMITED ABDOMINAL  [ ] LIMITED DVT  [ ] NEEDLE GUIDANCE VASCULAR  [ ] NEEDLE GUIDANCE THORACENTESIS  [ ] NEEDLE GUIDANCE PARACENTESIS  [ ] NEEDLE GUIDANCE PERICARDIOCENTESIS  [ ] OTHER    FINDINGS:  Improving LVEF   IVC calculated to be 1.9.  Predominant A lines bilaterally without any effusion or consolidatoin    INTERPRETATION:  no evidence of PNA, with improving heart function  Images uploaded to [employer]    Time spent on the procedure was separate from the critical care time spent for patient care. : Sahil Szymanski     INDICATION:  cardiac arrest    PROCEDURE:  [x ] LIMITED ECHO  [x ] LIMITED CHEST  [ ] LIMITED RETROPERITONEAL  [ ] LIMITED ABDOMINAL  [ ] LIMITED DVT  [ ] NEEDLE GUIDANCE VASCULAR  [ ] NEEDLE GUIDANCE THORACENTESIS  [ ] NEEDLE GUIDANCE PARACENTESIS  [ ] NEEDLE GUIDANCE PERICARDIOCENTESIS  [ ] OTHER    FINDINGS:  Improving LVSEF Moderately decreased LV systolic function. Septum with increase contraction.  IVC calculated to be 1.9.  Predominant A lines bilaterally without any effusion or consolidation    INTERPRETATION:  no evidence of PNA, with improving heart function  Images uploaded to [employer]    Time spent on the procedure was separate from the critical care time spent for patient care.    I have assisted and supervised entire procedure.    Sahil Chandler

## 2023-11-16 NOTE — CONSULT NOTE ADULT - ASSESSMENT
43F with history of pre-DM, HTN, HLD, ADHD, BENJAMIN, migraines, thrombocytosis, hypothyroidism not on synthroid, uterine polyps s/p polypectomy in 2/2023 presented from [facility] post cardiac arrest in the field. Imaging c/f extensive PNA. Transferred to [facility] for further management. Imaging suggestive of diffuse cerebral & cerebellar injury. Palliative consulted for GOC and psychosocial support.

## 2023-11-16 NOTE — PROGRESS NOTE ADULT - ATTENDING COMMENTS
1.Acute hypoxemic respiratory failure after cardiac arrest. Oxygenating well. Pulmonary mechanics good. Continue current AC vent settings. Repeat ABG.    2. Cardiac: S/P out of hospital cardiac arrest.  Time down unclear. ? 10 minutes from 911 call. Pt with moderately to severely reduced LV systolic function. Septal hypo-akinesis.  Hemodynamically stable. Repeat formal TTE.    3.Neuro: CT head with extensive and progressing signs of cerebral edema and white matter effacement consistent with anoxic brain injury.               On exam, pt without pupil light reflex, corneal response or withdrawal to pain.  On mechanical ventilation pt  placed on PS mode. Pt did have spontaneous respirations again this am.    PS weaning again attempt this afternoon at 3pm. This time no spontaneous respiration. Apnea test performed. At 8;30 minutes pt has spontaneous respirations. Apnea test aborted.     Will reassess tomorrow am.      Neuro Consult appreciated..      4. ID. Continue total of 7 days of Zosyn for aspiration pneumonia seen on CT chest at out side hospital. Stop vancomycin. since MRSA nasal swab negative.    5. GI; Continue tube feeds.    6.DVT : Prophylaxis: Sq heparin.    7. GOC: Full code. Palliative care consult appreciated. Child life input appreciated..

## 2023-11-17 NOTE — DIETITIAN INITIAL EVALUATION ADULT - NSFNSGIIOFT_GEN_A_CORE
11-16-23 @ 07:01  -  11-17-23 @ 07:00  --------------------------------------------------------  OUT:    Rectal Tube (mL): 50 mL  Total OUT: 50 mL    Total NET: 800 mL

## 2023-11-17 NOTE — PROGRESS NOTE ADULT - ATTENDING COMMENTS
1.Acute hypoxemic respiratory failure after cardiac arrest. Oxygenating well. Pulmonary mechanics good. Continue current AC vent settings. Repeat ABG.    2. Cardiac: S/P out of hospital cardiac arrest.  Time down unclear. ? 10 minutes from 911 call. Pt with moderately to severely reduced LV systolic function. Septal hypo-akinesis.  Hemodynamically stable. Repeat formal TTE.    3.Neuro: CT head with extensive and progressing signs of cerebral edema and white matter effacement consistent with anoxic brain injury.               On exam, pt without pupil light reflex, corneal response or withdrawal to pain.  On mechanical ventilation pt  placed on PS mode this afternoon. No spontaneous respiration in for 1 minute at 1 pm or 3pm. Apnea test  performed again this evening, Pt with spontaneous respirations about 3:30 minutes.. .     Neuro Consult appreciated..      4. ID. Continue total of 7 days of Zosyn for aspiration pneumonia seen on CT chest at out side hospital. Stop vancomycin. since MRSA nasal swab negative.    5. GI; Continue tube feeds.    6.DVT : Prophylaxis: Sq heparin.    7. GOC: Full code. Palliative care consult appreciated. Child life input appreciated..

## 2023-11-17 NOTE — PROGRESS NOTE ADULT - SUBJECTIVE AND OBJECTIVE BOX
INTERVAL HPI/OVERNIGHT EVENTS: No acute overnight events.    SUBJECTIVE: Patient seen and examined at bedside. Unresponsive, requiring ventilator, unable to participate in exam.    ROS: All negative except as listed above.    VITAL SIGNS:  ICU Vital Signs Last 24 Hrs  T(C): 37 (17 Nov 2023 04:00), Max: 37.5 (17 Nov 2023 00:00)  T(F): 98.6 (17 Nov 2023 04:00), Max: 99.5 (17 Nov 2023 00:00)  HR: 86 (17 Nov 2023 07:00) (85 - 97)  BP: 112/66 (17 Nov 2023 07:00) (100/55 - 151/76)  BP(mean): 84 (17 Nov 2023 07:00) (70 - 112)  ABP: --  ABP(mean): --  RR: 14 (17 Nov 2023 07:00) (12 - 18)  SpO2: 94% (17 Nov 2023 07:00) (91% - 100%)    O2 Parameters below as of 16 Nov 2023 20:00  Patient On (Oxygen Delivery Method): ventilator          Mode: AC/ CMV (Assist Control/ Continuous Mandatory Ventilation), RR (machine): 14, TV (machine): 400, FiO2: 50, PEEP: 5, ITime: 1, MAP: 10, PIP: 20  Plateau pressure:   P/F ratio:     11-16 @ 07:01  -  11-17 @ 07:00  --------------------------------------------------------  IN: 1240 mL / OUT: 1705 mL / NET: -465 mL      CAPILLARY BLOOD GLUCOSE          ECG: reviewed.    PHYSICAL EXAM:    GENERAL: intubated & ventilated  HEAD: atraumatic, normocephalic  EYES: blown pupils bilaterally, nonreactive to light, corneal reflex absent  NECK: supple  LUNG: on ventilator, CTA bilaterally  HEART: regular rate and rhythm, no murmurs  ABDOMEN: soft, non-distended, no rigidity  EXTREMITIES: no peripheral edema bilaterally  SKIN: no rashes or lesions  NEURO: AAOx0, no corneal reflex, patient does not withdraw to pain    MEDICATIONS:  MEDICATIONS  (STANDING):  aspirin  chewable 81 milliGRAM(s) Oral daily  chlorhexidine 0.12% Liquid 15 milliLiter(s) Oral Mucosa every 12 hours  heparin   Injectable 5000 Unit(s) SubCutaneous every 8 hours  piperacillin/tazobactam IVPB.. 3.375 Gram(s) IV Intermittent every 8 hours    MEDICATIONS  (PRN):      ALLERGIES:  Allergies    No Known Allergies    Intolerances        LABS:                        8.8    9.15  )-----------( 404      ( 17 Nov 2023 00:21 )             29.5     11-17    146<H>  |  106  |  13  ----------------------------<  123<H>  4.1   |  31  |  0.57    Ca    9.4      17 Nov 2023 00:21  Phos  4.1     11-17  Mg     1.9     11-17    TPro  6.6  /  Alb  3.0<L>  /  TBili  0.1<L>  /  DBili  x   /  AST  49<H>  /  ALT  38  /  AlkPhos  108  11-17    PTT - ( 15 Nov 2023 18:29 )  PTT:33.9 sec  Urinalysis Basic - ( 17 Nov 2023 00:21 )    Color: x / Appearance: x / SG: x / pH: x  Gluc: 123 mg/dL / Ketone: x  / Bili: x / Urobili: x   Blood: x / Protein: x / Nitrite: x   Leuk Esterase: x / RBC: x / WBC x   Sq Epi: x / Non Sq Epi: x / Bacteria: x      ABG:  pH, Arterial: 7.46 (11-16-23 @ 23:58)  pCO2, Arterial: 53 mmHg (11-16-23 @ 23:58)  pO2, Arterial: 125 mmHg (11-16-23 @ 23:58)  pH, Arterial: 7.34 (11-16-23 @ 16:41)  pCO2, Arterial: 71 mmHg (11-16-23 @ 16:41)  pO2, Arterial: 311 mmHg (11-16-23 @ 16:41)  pH, Arterial: 7.55 (11-16-23 @ 16:18)  pCO2, Arterial: 42 mmHg (11-16-23 @ 16:18)  pO2, Arterial: 302 mmHg (11-16-23 @ 16:18)  pH, Arterial: 7.44 (11-16-23 @ 15:35)  pCO2, Arterial: 56 mmHg (11-16-23 @ 15:35)  pO2, Arterial: 343 mmHg (11-16-23 @ 15:35)      vBG:    Micro:    Culture - Blood (collected 11-14-23 @ 18:15)  Source: .Blood Blood  Preliminary Report (11-17-23 @ 01:02):    No growth at 48 Hours    Culture - Blood (collected 11-14-23 @ 18:00)  Source: .Blood Blood  Preliminary Report (11-17-23 @ 01:02):    No growth at 48 Hours        Culture - Sputum (collected 11-15-23 @ 01:08)  Source: .Sputum Sputum  Gram Stain (11-15-23 @ 16:34):    Rare polymorphonuclear leukocytes per low power field    No Squamous epithelial cells per low power field    No organisms seen per oil power field  Preliminary Report (11-16-23 @ 06:45):    Normal Respiratory Jenni present        RADIOLOGY & ADDITIONAL TESTS: Reviewed.

## 2023-11-17 NOTE — CHART NOTE - NSCHARTNOTEFT_GEN_A_CORE
Pt seen with palliative .   Family present at bedside. Support provided. Plan is to continue with medical management in MICU.  Will f/u Monday for ongoing GOC and support.    For acute issues over the weekend please page palliative team.    Germaine Loaiza MD  Geriatrics and Palliative Medicine Attending  [facility] pager: [phone]

## 2023-11-17 NOTE — DIETITIAN INITIAL EVALUATION ADULT - PERTINENT LABORATORY DATA
11-17    146<H>  |  106  |  13  ----------------------------<  123<H>  4.1   |  31  |  0.57    Ca    9.4      17 Nov 2023 00:21  Phos  4.1     11-17  Mg     1.9     11-17    TPro  6.6  /  Alb  3.0<L>  /  TBili  0.1<L>  /  DBili  x   /  AST  49<H>  /  ALT  38  /  AlkPhos  108  11-17

## 2023-11-17 NOTE — DIETITIAN INITIAL EVALUATION ADULT - OTHER CALCULATIONS
Needs based on daily wt 83.7 kG (11-16). Fluid needs deferred to provider. The [employer] State Equation (PSU) 2003b was used to calculate resting energy expenditure: 1684 kcals

## 2023-11-17 NOTE — DIETITIAN INITIAL EVALUATION ADULT - ENTERAL
Discussed the importance of blood sugar control in the prevention of ocular complications. Jevity 1.5 at 10 mL/hr increasing only as tolerated and electrolytes WNL to goal rate 60 mL/hr x18 hours with ProSource NoCarb TF x2 daily to provide total volume 1080 mL, 1700 kcals, 91 gm protein, and 821 mL free water. Meets 20 kcals/kG and 1.1 gm protein/kG based on daily wt 83.7 kG (11-16).

## 2023-11-17 NOTE — DIETITIAN INITIAL EVALUATION ADULT - PERTINENT MEDS FT
MEDICATIONS  (STANDING):  aspirin  chewable 81 milliGRAM(s) Oral daily  chlorhexidine 0.12% Liquid 15 milliLiter(s) Oral Mucosa every 12 hours  heparin   Injectable 5000 Unit(s) SubCutaneous every 8 hours  piperacillin/tazobactam IVPB.. 3.375 Gram(s) IV Intermittent every 8 hours    MEDICATIONS  (PRN):

## 2023-11-17 NOTE — PROGRESS NOTE ADULT - ASSESSMENT
43F with history of pre-DM, HTN, HLD, ADHD, BENJAMIN, migraines, thrombocytosis, hypothyroidism not on synthroid, uterine polyps s/p polypectomy in 2/2023 presented from [facility] post cardiac arrest in the field. Imaging c/f extensive PNA. Transferred to [facility] for further management.     =====NEURO=====  #Concern for Anoxic Brain Injury  - arrested for unknown period both in the field & hospital  - CTH at OSH: findings suggestive of diffuse cerebral & cerebellar injury  - current exam with dilated pupils, no corneal reflex  - will not require sedation given current mental status  - neuro consult- MRI, EEG ordered  - EEG notable for profound diffuse cerebral dysfunction, nonspecific in etiology  -MRI with findings consistent with anoxic brain injury (diffuse cortical restricted diffusion, multiple foci of restricted diffusion in brainstem)  -will try to obtain collateral from [facility] re: perfusion scan      =====RESPIRATORY=====  #Intubated & Ventilated  #Patchy opacities  - was experiencing SOB and cough, called EMS and became pulseless  - CT chest at OSH neg for PE but showed patchy opacities c/f extensive PNA  - intubated in the field post-arrest  - AC RR 16/Tv 450/PEEP 5/FiO2 40  - monitor serial gases  - start vancomycin & zosyn  -11/15 vanc discontinued, MRSA neg  - repeat cultures, check sputum, RVP, urine legionella & stress  - check CXR for ?SQ gas seen on imaging OSH  - follow blood gas, bmp, adjust vent settings as needed for acidosis/alkalosis    =====CARDIOVASCULAR=====  #Cardiac Arrest  - unclear rhythm but pulseless requiring CPR/epi, no reported shock delivery  - unclear duration of total cardiac arrest, however, lost pulse en route to hospital  - unclear etiology - possibly hypoxia given imaging findings & sxs  - currently normotensive off pressors  - will do POCUS, check official TTE  -check tarik, anca, cardiolipin, anti-phospholipid antibody    =====GASTRO=====  - start TF, increase to goal as tolerated    =====RENAL=====  #SIVA  - Cr 1.15 on presentation, baseline normal 0.7  - likely hypoperfusion iso cardiac arrest  - monitor Cr/UOP  - strict I/Os, harrington catheter  - renal function improved, follow bmp    =====ID=====  #Patchy opacities on CT  - CT imaging findings as above  - repeat blood cxs, urine cx, sputum cx, RVP, MRSA  - urine legionella & strep  - empiric tx with vancomycin & zosyn; vanc dc'ed because MRSA negative  - procal elevated  - central line removed overnight 11/14    =====HEME/ONC=====  #Anemia  - chronic history of anemia but no evidence of overt bleed  - maintain active T&S, monitor CBC    #Thrombocytosis  - history of thrombocytosis, was on ASA at home  - continue ASA     =====ENDOCRINE=====  #Hypothyroidism  - history of hypothyroid but not on synthroid as per endocrine  - check TSH, T4, T3    =====ETHICS=====  - full code pending further GOC   43F with history of pre-DM, HTN, HLD, ADHD, BENJAMIN, migraines, thrombocytosis, hypothyroidism not on synthroid, uterine polyps s/p polypectomy in 2/2023 presented from [facility] post cardiac arrest in the field. Imaging c/f extensive PNA. Transferred to [facility] for further management.     =====NEURO=====  #Concern for Anoxic Brain Injury  - arrested for unknown period both in the field & hospital  - CTH at OSH: findings suggestive of diffuse cerebral & cerebellar injury  - current exam with dilated pupils, no corneal reflex  - will not require sedation given current mental status  - neuro consult- MRI, EEG ordered  - EEG notable for profound diffuse cerebral dysfunction, nonspecific in etiology  -MRI with findings consistent with anoxic brain injury (diffuse cortical restricted diffusion, multiple foci of restricted diffusion in brainstem)  -will try to obtain collateral from [facility] re: perfusion scan  -11/16 apnea test- patient resumed spontaneously breathing at 8 minutes and 30 seconds, test aborted  -repeat apnea test today      =====RESPIRATORY=====  #Intubated & Ventilated  #Patchy opacities  - was experiencing SOB and cough, called EMS and became pulseless  - CT chest at OSH neg for PE but showed patchy opacities c/f extensive PNA  - intubated in the field post-arrest  - AC RR 16/Tv 450/PEEP 5/FiO2 40  - monitor serial gases  - start vancomycin & zosyn  -11/15 vanc discontinued, MRSA neg  - repeat cultures, check sputum, RVP, urine legionella & stress  - check CXR for ?SQ gas seen on imaging OSH  - follow blood gas, bmp, adjust vent settings as needed for acidosis/alkalosis    =====CARDIOVASCULAR=====  #Cardiac Arrest  - unclear rhythm but pulseless requiring CPR/epi, no reported shock delivery  - unclear duration of total cardiac arrest, however, lost pulse en route to hospital  - unclear etiology - possibly hypoxia given imaging findings & sxs  - currently normotensive off pressors  - will do POCUS, check official TTE  -check tairk, anca, cardiolipin, anti-phospholipid antibody    =====GASTRO=====  - start TF, increase to goal as tolerated    =====RENAL=====  #SIVA  - Cr 1.15 on presentation, baseline normal 0.7  - likely hypoperfusion iso cardiac arrest  - monitor Cr/UOP  - strict I/Os, harrington catheter  - renal function improved, follow bmp    =====ID=====  #Patchy opacities on CT  - CT imaging findings as above  - repeat blood cxs, urine cx, sputum cx, RVP, MRSA  - urine legionella & strep  - empiric tx with vancomycin & zosyn; vanc dc'ed because MRSA negative  - procal elevated  - central line removed overnight 11/14    =====HEME/ONC=====  #Anemia  - chronic history of anemia but no evidence of overt bleed  - maintain active T&S, monitor CBC    #Thrombocytosis  - history of thrombocytosis, was on ASA at home  - continue ASA     =====ENDOCRINE=====  #Hypothyroidism  - history of hypothyroid but not on synthroid as per endocrine  - check TSH, T4, T3    =====ETHICS=====  - full code pending further GOC

## 2023-11-17 NOTE — DIETITIAN INITIAL EVALUATION ADULT - REASON INDICATOR FOR ASSESSMENT
Consult for assessment and tube feeding  Source: Medical record and RN (MRI with findings consistent with anoxic brain injury)

## 2023-11-17 NOTE — DIETITIAN INITIAL EVALUATION ADULT - NS FNS DIET ORDER
Diet, NPO with Tube Feed:   Tube Feeding Modality: Orogastric  Nepro with Carb Steady (NEPRORTH)  Total Volume for 24 Hours (mL): 900  Continuous  Starting Tube Feed Rate {mL per Hour}: 10  Increase Tube Feed Rate by (mL): 10     Every 6 hours  Until Goal Tube Feed Rate (mL per Hour): 50  Tube Feed Duration (in Hours): 18  Tube Feed Start Time: 00:00 (11-14-23 @ 20:40)

## 2023-11-17 NOTE — DIETITIAN INITIAL EVALUATION ADULT - OTHER INFO
Wt Hx:   Dosing wt 87.1 kG/192 lbs. Daily wt 83.7 kG/184.5 lbs (11/16).   UBW unknown.    Ht: 68 inches    IBW: 140 lbs     IBW%: 132% (based on daily wt)  Wt Hx per HIE (lbs): 218 (9/28/22), 194 (1/25/23), 192 (2/6/23), 192 (5/17/23), 201 (5/23/23)  Wt from 5/23 vs daily wt indicates 8.2% wt loss x6 months (not clinically significant)     Nutrition-Related Concerns:   - Intubated PTA. Not on sedation given current mental status. (Concern for Anoxic Brain Injury)  - SIVA. Varying phosphorus levels during admission.   - Cardiac Arrest

## 2023-11-18 NOTE — PROGRESS NOTE ADULT - ASSESSMENT
43F with history of pre-DM, HTN, HLD, ADHD, BENJAMIN, migraines, thrombocytosis, hypothyroidism not on synthroid, uterine polyps s/p polypectomy in 2/2023 presented from [facility] post cardiac arrest in the field. Imaging c/f extensive PNA. Transferred to [facility] for further management.     =====NEURO=====  #Concern for Anoxic Brain Injury  - arrested for unknown period both in the field & hospital  - CTH at OSH: findings suggestive of diffuse cerebral & cerebellar injury  - current exam with dilated pupils, no corneal reflex  - will not require sedation given current mental status  - neuro consult- MRI, EEG ordered  - EEG notable for profound diffuse cerebral dysfunction, nonspecific in etiology  -MRI with findings consistent with anoxic brain injury (diffuse cortical restricted diffusion, multiple foci of restricted diffusion in brainstem)  -will try to obtain collateral from [facility] re: perfusion scan  -11/16 apnea test- patient resumed spontaneously breathing at 8 minutes and 30 seconds, test aborted  -repeat apnea test today      =====RESPIRATORY=====  #Intubated & Ventilated  #Patchy opacities  - was experiencing SOB and cough, called EMS and became pulseless  - CT chest at OSH neg for PE but showed patchy opacities c/f extensive PNA  - intubated in the field post-arrest  - AC RR 16/Tv 450/PEEP 5/FiO2 40  - monitor serial gases  - start vancomycin & zosyn  -11/15 vanc discontinued, MRSA neg  - repeat cultures, check sputum, RVP, urine legionella & stress  - check CXR for ?SQ gas seen on imaging OSH  - follow blood gas, bmp, adjust vent settings as needed for acidosis/alkalosis    =====CARDIOVASCULAR=====  #Cardiac Arrest  - unclear rhythm but pulseless requiring CPR/epi, no reported shock delivery  - unclear duration of total cardiac arrest, however, lost pulse en route to hospital  - unclear etiology - possibly hypoxia given imaging findings & sxs  - currently normotensive off pressors  - will do POCUS, check official TTE  -check tarik, anca, cardiolipin, anti-phospholipid antibody    =====GASTRO=====  - start TF, increase to goal as tolerated    =====RENAL=====  #SIVA  - Cr 1.15 on presentation, baseline normal 0.7  - likely hypoperfusion iso cardiac arrest  - monitor Cr/UOP  - strict I/Os, harrington catheter  - renal function improved, follow bmp    =====ID=====  #Patchy opacities on CT  - CT imaging findings as above  - repeat blood cxs, urine cx, sputum cx, RVP, MRSA  - urine legionella & strep  - empiric tx with vancomycin & zosyn; vanc dc'ed because MRSA negative  - procal elevated  - central line removed overnight 11/14    =====HEME/ONC=====  #Anemia  - chronic history of anemia but no evidence of overt bleed  - maintain active T&S, monitor CBC    #Thrombocytosis  - history of thrombocytosis, was on ASA at home  - continue ASA     =====ENDOCRINE=====  #Hypothyroidism  - history of hypothyroid but not on synthroid as per endocrine  - check TSH, T4, T3    =====ETHICS=====  - full code pending further GOC   43F with history of pre-DM, HTN, HLD, ADHD, BENJAMIN, migraines, thrombocytosis, hypothyroidism not on synthroid, uterine polyps s/p polypectomy in 2/2023 presented from [facility] post cardiac arrest in the field. Imaging c/f extensive PNA. Transferred to [facility] for further management.     =====NEURO=====  #Concern for Anoxic Brain Injury  - arrested for unknown period both in the field & hospital  - CTH at OSH: findings suggestive of diffuse cerebral & cerebellar injury  - current exam with dilated pupils, no corneal reflex  - will not require sedation given current mental status  - neuro consult- MRI, EEG ordered  - EEG notable for profound diffuse cerebral dysfunction, nonspecific in etiology  -MRI with findings consistent with anoxic brain injury (diffuse cortical restricted diffusion, multiple foci of restricted diffusion in brainstem)  -will try to obtain collateral from [facility] re: perfusion scan  -11/16 apnea test- patient resumed spontaneously breathing at 8 minutes and 30 seconds, test aborted  -repeat apnea test yesterday; resumed spontanoues  breathing at 3 mins 30 secsonds      =====RESPIRATORY=====  #Intubated & Ventilated  #Patchy opacities  - was experiencing SOB and cough, called EMS and became pulseless  - CT chest at OSH neg for PE but showed patchy opacities c/f extensive PNA  - intubated in the field post-arrest  - AC RR 16/Tv 450/PEEP 5/FiO2 40  - monitor serial gases  - start vancomycin & zosyn  -11/15 vanc discontinued, MRSA neg  - repeat cultures, check sputum, RVP, urine legionella & stress  - check CXR for ?SQ gas seen on imaging OSH  - follow blood gas, bmp, adjust vent settings as needed for acidosis/alkalosis    =====CARDIOVASCULAR=====  #Cardiac Arrest  - unclear rhythm but pulseless requiring CPR/epi, no reported shock delivery  - unclear duration of total cardiac arrest, however, lost pulse en route to hospital  - unclear etiology - possibly hypoxia given imaging findings & sxs  - currently normotensive off pressors  - will do POCUS, check official TTE  -check tarik, anca, cardiolipin, anti-phospholipid antibody    =====GASTRO=====  - start TF, increase to goal as tolerated    =====RENAL=====  #SIVA  - Cr 1.15 on presentation, baseline normal 0.7  - likely hypoperfusion iso cardiac arrest  - monitor Cr/UOP  - strict I/Os, harrington catheter  - renal function improved, follow bmp    =====ID=====  #Patchy opacities on CT  - CT imaging findings as above  - repeat blood cxs, urine cx, sputum cx, RVP, MRSA  - urine legionella & strep  - empiric tx with vancomycin & zosyn; vanc dc'ed because MRSA negative  - procal elevated  - central line removed overnight 11/14    =====HEME/ONC=====  #Anemia  - chronic history of anemia but no evidence of overt bleed  - maintain active T&S, monitor CBC    #Thrombocytosis  - history of thrombocytosis, was on ASA at home  - continue ASA     =====ENDOCRINE=====  #Hypothyroidism  - history of hypothyroid but not on synthroid as per endocrine  - check TSH, T4, T3    =====ETHICS=====  - full code pending further GOC

## 2023-11-18 NOTE — PROGRESS NOTE ADULT - ATTENDING COMMENTS
1.Acute hypoxemic respiratory failure after cardiac arrest. Oxygenating well. Pulmonary mechanics good. Continue current AC vent settings. Repeat ABG.    2. Cardiac: S/P out of hospital cardiac arrest.  Time down unclear. ? 10 minutes from 911 call. Pt with moderately to severely reduced LV systolic function. Septal hypo-akinesis.  Hemodynamically stable. Repeat formal TTE.    3.Neuro: CT head with extensive and progressing signs of cerebral edema and white matter effacement consistent with anoxic brain injury.               On exam, pt without pupil light reflex, corneal response or withdrawal to pain.  On mechanical ventilation pt  placed on PS mode this am. Pt with spontaneous respirations.    Neuro Consult appreciated..      4. ID.  Zosyn for aspiration pneumonia seen on CT chest at out side hospital finished. Now off antibiotics..     5. GI; Continue tube feeds.    6.DVT : Prophylaxis: Sq heparin.    7. GOC: Full code. Palliative care consult appreciated. Child life input appreciated..

## 2023-11-18 NOTE — PROGRESS NOTE ADULT - SUBJECTIVE AND OBJECTIVE BOX
INTERVAL HPI/OVERNIGHT EVENTS:    SUBJECTIVE: Patient seen and examined at bedside.     ROS: All negative except as listed above.    VITAL SIGNS:  ICU Vital Signs Last 24 Hrs  T(C): 37.5 (18 Nov 2023 04:00), Max: 38.2 (18 Nov 2023 00:00)  T(F): 99.5 (18 Nov 2023 04:00), Max: 100.8 (18 Nov 2023 00:00)  HR: 96 (18 Nov 2023 06:00) (81 - 97)  BP: 132/83 (18 Nov 2023 06:00) (110/63 - 162/83)  BP(mean): 102 (18 Nov 2023 06:00) (80 - 111)  ABP: --  ABP(mean): --  RR: 13 (18 Nov 2023 06:00) (13 - 20)  SpO2: 95% (18 Nov 2023 06:00) (94% - 100%)      Mode: AC/ CMV (Assist Control/ Continuous Mandatory Ventilation), RR (machine): 12, TV (machine): 400, FiO2: 30, PEEP: 5, ITime: 1, MAP: 8, PIP: 20  Plateau pressure:   P/F ratio:     11-17 @ 07:01  -  11-18 @ 07:00  --------------------------------------------------------  IN: 1520 mL / OUT: 2015 mL / NET: -495 mL      CAPILLARY BLOOD GLUCOSE          ECG: reviewed.    PHYSICAL EXAM:    GENERAL: NAD, lying in bed comfortably  HEAD:  Atraumatic, normocephalic  EYES: EOMI, PERRLA, conjunctiva and sclera clear  NECK: Supple, trachea midline, no JVD  HEART: Regular rate and rhythm, no murmurs, rubs, or gallops  LUNGS: Unlabored respirations.  Clear to auscultation bilaterally, no crackles, wheezing, or rhonchi  ABDOMEN: Soft, nontender, nondistended, +BS  EXTREMITIES: 2+ peripheral pulses bilaterally, cap refill<2 secs. No clubbing, cyanosis, or edema  NERVOUS SYSTEM:  A&Ox3, following commands, moving all extremities, no focal deficits   SKIN: No rashes or lesions    MEDICATIONS:  MEDICATIONS  (STANDING):  aspirin  chewable 81 milliGRAM(s) Oral daily  chlorhexidine 0.12% Liquid 15 milliLiter(s) Oral Mucosa every 12 hours  heparin   Injectable 5000 Unit(s) SubCutaneous every 8 hours    MEDICATIONS  (PRN):      ALLERGIES:  Allergies    No Known Allergies    Intolerances        LABS:                        9.4    12.29 )-----------( 483      ( 18 Nov 2023 00:24 )             30.4     11-18    148<H>  |  109<H>  |  14  ----------------------------<  135<H>  4.3   |  28  |  0.65    Ca    9.9      18 Nov 2023 00:24  Phos  3.9     11-18  Mg     2.1     11-18    TPro  7.2  /  Alb  3.2<L>  /  TBili  0.1<L>  /  DBili  x   /  AST  79<H>  /  ALT  65<H>  /  AlkPhos  137<H>  11-18      Urinalysis Basic - ( 18 Nov 2023 00:24 )    Color: x / Appearance: x / SG: x / pH: x  Gluc: 135 mg/dL / Ketone: x  / Bili: x / Urobili: x   Blood: x / Protein: x / Nitrite: x   Leuk Esterase: x / RBC: x / WBC x   Sq Epi: x / Non Sq Epi: x / Bacteria: x      ABG:  pH, Arterial: 7.50 (11-18-23 @ 00:11)  pCO2, Arterial: 42 mmHg (11-18-23 @ 00:11)  pO2, Arterial: 154 mmHg (11-18-23 @ 00:11)  pH, Arterial: 7.48 (11-17-23 @ 14:57)  pCO2, Arterial: 47 mmHg (11-17-23 @ 14:57)  pO2, Arterial: 311 mmHg (11-17-23 @ 14:57)      vBG:  pH, Venous: 7.55 (11-17-23 @ 16:24)  pCO2, Venous: 39 mmHg (11-17-23 @ 16:24)  pO2, Venous: 292 mmHg (11-17-23 @ 16:24)  HCO3, Venous: 34 mmol/L (11-17-23 @ 16:24)    Micro:    Culture - Blood (collected 11-14-23 @ 18:15)  Source: .Blood Blood  Preliminary Report (11-18-23 @ 01:01):    No growth at 72 Hours    Culture - Blood (collected 11-14-23 @ 18:00)  Source: .Blood Blood  Preliminary Report (11-18-23 @ 01:01):    No growth at 72 Hours        Culture - Sputum (collected 11-15-23 @ 01:08)  Source: .Sputum Sputum  Gram Stain (11-15-23 @ 16:34):    Rare polymorphonuclear leukocytes per low power field    No Squamous epithelial cells per low power field    No organisms seen per oil power field  Final Report (11-17-23 @ 12:04):    Normal Respiratory Jenni present        RADIOLOGY & ADDITIONAL TESTS: Reviewed.   INTERVAL HPI/OVERNIGHT EVENTS: no overnight events.    SUBJECTIVE: Patient seen and examined at bedside. Unable to obtain ROS.     ROS: All negative except as listed above.    VITAL SIGNS:  ICU Vital Signs Last 24 Hrs  T(C): 37.5 (18 Nov 2023 04:00), Max: 38.2 (18 Nov 2023 00:00)  T(F): 99.5 (18 Nov 2023 04:00), Max: 100.8 (18 Nov 2023 00:00)  HR: 96 (18 Nov 2023 06:00) (81 - 97)  BP: 132/83 (18 Nov 2023 06:00) (110/63 - 162/83)  BP(mean): 102 (18 Nov 2023 06:00) (80 - 111)  ABP: --  ABP(mean): --  RR: 13 (18 Nov 2023 06:00) (13 - 20)  SpO2: 95% (18 Nov 2023 06:00) (94% - 100%)      Mode: AC/ CMV (Assist Control/ Continuous Mandatory Ventilation), RR (machine): 12, TV (machine): 400, FiO2: 30, PEEP: 5, ITime: 1, MAP: 8, PIP: 20  Plateau pressure:   P/F ratio:     11-17 @ 07:01  -  11-18 @ 07:00  --------------------------------------------------------  IN: 1520 mL / OUT: 2015 mL / NET: -495 mL      CAPILLARY BLOOD GLUCOSE          ECG: reviewed.    PHYSICAL EXAM:    GENERAL: NAD, lying in bed comfortably, intubated  HEAD:  Atraumatic, normocephalic  EYES: pupils dilated b/l   HEART: Regular rate and rhythm, no murmurs, rubs, or gallops  LUNGS: Unlabored respirations.  Clear to auscultation bilaterally, no crackles, wheezing, or rhonchi  ABDOMEN: Soft, nontender, nondistended  EXTREMITIES: no edema  NERVOUS SYSTEM:  A&Ox3, following commands, moving all extremities, no focal deficits   SKIN: No rashes or lesions    MEDICATIONS:  MEDICATIONS  (STANDING):  aspirin  chewable 81 milliGRAM(s) Oral daily  chlorhexidine 0.12% Liquid 15 milliLiter(s) Oral Mucosa every 12 hours  heparin   Injectable 5000 Unit(s) SubCutaneous every 8 hours    MEDICATIONS  (PRN):      ALLERGIES:  Allergies    No Known Allergies    Intolerances        LABS:                        9.4    12.29 )-----------( 483      ( 18 Nov 2023 00:24 )             30.4     11-18    148<H>  |  109<H>  |  14  ----------------------------<  135<H>  4.3   |  28  |  0.65    Ca    9.9      18 Nov 2023 00:24  Phos  3.9     11-18  Mg     2.1     11-18    TPro  7.2  /  Alb  3.2<L>  /  TBili  0.1<L>  /  DBili  x   /  AST  79<H>  /  ALT  65<H>  /  AlkPhos  137<H>  11-18      Urinalysis Basic - ( 18 Nov 2023 00:24 )    Color: x / Appearance: x / SG: x / pH: x  Gluc: 135 mg/dL / Ketone: x  / Bili: x / Urobili: x   Blood: x / Protein: x / Nitrite: x   Leuk Esterase: x / RBC: x / WBC x   Sq Epi: x / Non Sq Epi: x / Bacteria: x      ABG:  pH, Arterial: 7.50 (11-18-23 @ 00:11)  pCO2, Arterial: 42 mmHg (11-18-23 @ 00:11)  pO2, Arterial: 154 mmHg (11-18-23 @ 00:11)  pH, Arterial: 7.48 (11-17-23 @ 14:57)  pCO2, Arterial: 47 mmHg (11-17-23 @ 14:57)  pO2, Arterial: 311 mmHg (11-17-23 @ 14:57)      vBG:  pH, Venous: 7.55 (11-17-23 @ 16:24)  pCO2, Venous: 39 mmHg (11-17-23 @ 16:24)  pO2, Venous: 292 mmHg (11-17-23 @ 16:24)  HCO3, Venous: 34 mmol/L (11-17-23 @ 16:24)    Micro:    Culture - Blood (collected 11-14-23 @ 18:15)  Source: .Blood Blood  Preliminary Report (11-18-23 @ 01:01):    No growth at 72 Hours    Culture - Blood (collected 11-14-23 @ 18:00)  Source: .Blood Blood  Preliminary Report (11-18-23 @ 01:01):    No growth at 72 Hours        Culture - Sputum (collected 11-15-23 @ 01:08)  Source: .Sputum Sputum  Gram Stain (11-15-23 @ 16:34):    Rare polymorphonuclear leukocytes per low power field    No Squamous epithelial cells per low power field    No organisms seen per oil power field  Final Report (11-17-23 @ 12:04):    Normal Respiratory Jenni present        RADIOLOGY & ADDITIONAL TESTS: Reviewed.   INTERVAL HPI/OVERNIGHT EVENTS: no overnight events.    SUBJECTIVE: Patient seen and examined at bedside. Unable to obtain ROS.     ROS: All negative except as listed above.    VITAL SIGNS:  ICU Vital Signs Last 24 Hrs  T(C): 37.5 (18 Nov 2023 04:00), Max: 38.2 (18 Nov 2023 00:00)  T(F): 99.5 (18 Nov 2023 04:00), Max: 100.8 (18 Nov 2023 00:00)  HR: 96 (18 Nov 2023 06:00) (81 - 97)  BP: 132/83 (18 Nov 2023 06:00) (110/63 - 162/83)  BP(mean): 102 (18 Nov 2023 06:00) (80 - 111)  ABP: --  ABP(mean): --  RR: 13 (18 Nov 2023 06:00) (13 - 20)  SpO2: 95% (18 Nov 2023 06:00) (94% - 100%)      Mode: AC/ CMV (Assist Control/ Continuous Mandatory Ventilation), RR (machine): 12, TV (machine): 400, FiO2: 30, PEEP: 5, ITime: 1, MAP: 8, PIP: 20  Plateau pressure:   P/F ratio:     11-17 @ 07:01  -  11-18 @ 07:00  --------------------------------------------------------  IN: 1520 mL / OUT: 2015 mL / NET: -495 mL      CAPILLARY BLOOD GLUCOSE          ECG: reviewed.    PHYSICAL EXAM:    GENERAL: NAD, lying in bed comfortably, intubated  HEAD:  Atraumatic, normocephalic  EYES: pupils dilated b/l   HEART: Regular rate and rhythm, no murmurs, rubs, or gallops  LUNGS: Unlabored respirations.  Clear to auscultation bilaterally, no crackles, wheezing, or rhonchi  ABDOMEN: Soft, nontender, nondistended  EXTREMITIES: no edema  NERVOUS SYSTEM:  A&Ox0  SKIN: No rashes or lesions    MEDICATIONS:  MEDICATIONS  (STANDING):  aspirin  chewable 81 milliGRAM(s) Oral daily  chlorhexidine 0.12% Liquid 15 milliLiter(s) Oral Mucosa every 12 hours  heparin   Injectable 5000 Unit(s) SubCutaneous every 8 hours    MEDICATIONS  (PRN):      ALLERGIES:  Allergies    No Known Allergies    Intolerances        LABS:                        9.4    12.29 )-----------( 483      ( 18 Nov 2023 00:24 )             30.4     11-18    148<H>  |  109<H>  |  14  ----------------------------<  135<H>  4.3   |  28  |  0.65    Ca    9.9      18 Nov 2023 00:24  Phos  3.9     11-18  Mg     2.1     11-18    TPro  7.2  /  Alb  3.2<L>  /  TBili  0.1<L>  /  DBili  x   /  AST  79<H>  /  ALT  65<H>  /  AlkPhos  137<H>  11-18      Urinalysis Basic - ( 18 Nov 2023 00:24 )    Color: x / Appearance: x / SG: x / pH: x  Gluc: 135 mg/dL / Ketone: x  / Bili: x / Urobili: x   Blood: x / Protein: x / Nitrite: x   Leuk Esterase: x / RBC: x / WBC x   Sq Epi: x / Non Sq Epi: x / Bacteria: x      ABG:  pH, Arterial: 7.50 (11-18-23 @ 00:11)  pCO2, Arterial: 42 mmHg (11-18-23 @ 00:11)  pO2, Arterial: 154 mmHg (11-18-23 @ 00:11)  pH, Arterial: 7.48 (11-17-23 @ 14:57)  pCO2, Arterial: 47 mmHg (11-17-23 @ 14:57)  pO2, Arterial: 311 mmHg (11-17-23 @ 14:57)      vBG:  pH, Venous: 7.55 (11-17-23 @ 16:24)  pCO2, Venous: 39 mmHg (11-17-23 @ 16:24)  pO2, Venous: 292 mmHg (11-17-23 @ 16:24)  HCO3, Venous: 34 mmol/L (11-17-23 @ 16:24)    Micro:    Culture - Blood (collected 11-14-23 @ 18:15)  Source: .Blood Blood  Preliminary Report (11-18-23 @ 01:01):    No growth at 72 Hours    Culture - Blood (collected 11-14-23 @ 18:00)  Source: .Blood Blood  Preliminary Report (11-18-23 @ 01:01):    No growth at 72 Hours        Culture - Sputum (collected 11-15-23 @ 01:08)  Source: .Sputum Sputum  Gram Stain (11-15-23 @ 16:34):    Rare polymorphonuclear leukocytes per low power field    No Squamous epithelial cells per low power field    No organisms seen per oil power field  Final Report (11-17-23 @ 12:04):    Normal Respiratory Jenni present        RADIOLOGY & ADDITIONAL TESTS: Reviewed.

## 2023-11-19 NOTE — CHART NOTE - NSCHARTNOTEFT_GEN_A_CORE
EEG preliminary read (not final) on the initial recording hour(s) = x 3    No seizures recorded.  Severe suppression of background.     Final report to follow tomorrow morning after completion of study.    [facility] EEG Reading Room Ph#: [phone]  Epilepsy Answering Service after 5PM and before 8:30AM: Ph#: [phone]

## 2023-11-19 NOTE — PROGRESS NOTE ADULT - ASSESSMENT
43F with history of pre-DM, HTN, HLD, ADHD, BENJAMIN, migraines, thrombocytosis, hypothyroidism not on synthroid, uterine polyps s/p polypectomy in 2/2023 presented from [facility] post cardiac arrest in the field. Imaging c/f extensive PNA. Transferred to [facility] for further management.     =====NEURO=====  #Concern for Anoxic Brain Injury  - arrested for unknown period both in the field & hospital  - CTH at OSH: findings suggestive of diffuse cerebral & cerebellar injury  - current exam with dilated pupils, no corneal reflex  - will not require sedation given current mental status  - neuro consult- MRI, EEG ordered  - EEG notable for profound diffuse cerebral dysfunction, nonspecific in etiology  -MRI with findings consistent with anoxic brain injury (diffuse cortical restricted diffusion, multiple foci of restricted diffusion in brainstem)  -will try to obtain collateral from [facility] re: perfusion scan  -11/16 apnea test- patient resumed spontaneously breathing at 8 minutes and 30 seconds, test aborted  -repeat apnea test today      =====RESPIRATORY=====  #Intubated & Ventilated  #Patchy opacities  - was experiencing SOB and cough, called EMS and became pulseless  - CT chest at OSH neg for PE but showed patchy opacities c/f extensive PNA  - intubated in the field post-arrest  - AC RR 16/Tv 450/PEEP 5/FiO2 40  - monitor serial gases  - start vancomycin & zosyn  -11/15 vanc discontinued, MRSA neg  - repeat cultures, check sputum, RVP, urine legionella & stress  - check CXR for ?SQ gas seen on imaging OSH  - follow blood gas, bmp, adjust vent settings as needed for acidosis/alkalosis    =====CARDIOVASCULAR=====  #Cardiac Arrest  - unclear rhythm but pulseless requiring CPR/epi, no reported shock delivery  - unclear duration of total cardiac arrest, however, lost pulse en route to hospital  - unclear etiology - possibly hypoxia given imaging findings & sxs  - currently normotensive off pressors  - will do POCUS, check official TTE  -check tarik, anca, cardiolipin, anti-phospholipid antibody    =====GASTRO=====  - start TF, increase to goal as tolerated    =====RENAL=====  #SIVA  - Cr 1.15 on presentation, baseline normal 0.7  - likely hypoperfusion iso cardiac arrest  - monitor Cr/UOP  - strict I/Os, harrington catheter  - renal function improved, follow bmp    =====ID=====  #Patchy opacities on CT  - CT imaging findings as above  - repeat blood cxs, urine cx, sputum cx, RVP, MRSA  - urine legionella & strep  - empiric tx with vancomycin & zosyn; vanc dc'ed because MRSA negative  - procal elevated  - central line removed overnight 11/14    =====HEME/ONC=====  #Anemia  - chronic history of anemia but no evidence of overt bleed  - maintain active T&S, monitor CBC    #Thrombocytosis  - history of thrombocytosis, was on ASA at home  - continue ASA     =====ENDOCRINE=====  #Hypothyroidism  - history of hypothyroid but not on synthroid as per endocrine  - check TSH, T4, T3    =====ETHICS=====  - full code pending further GOC   43F with history of pre-DM, HTN, HLD, ADHD, BENJAMIN, migraines, thrombocytosis, hypothyroidism not on synthroid, uterine polyps s/p polypectomy in 2/2023 presented from [facility] post cardiac arrest in the field. Imaging c/f extensive PNA. Transferred to [facility] for further management.     =====NEURO=====  #Concern for Anoxic Brain Injury  - arrested for unknown period both in the field & hospital  - CTH at OSH: findings suggestive of diffuse cerebral & cerebellar injury  - current exam with dilated pupils, no corneal reflex  - will not require sedation given current mental status  - neuro consult- MRI, EEG ordered  - EEG notable for profound diffuse cerebral dysfunction, nonspecific in etiology  -MRI with findings consistent with anoxic brain injury (diffuse cortical restricted diffusion, multiple foci of restricted diffusion in brainstem)  -will try to obtain collateral from [facility] re: perfusion scan  -11/16 apnea test- patient resumed spontaneously breathing at 8 minutes and 30 seconds, test aborted  -repeat apnea test today      =====RESPIRATORY=====  #Intubated & Ventilated  #Patchy opacities  - was experiencing SOB and cough, called EMS and became pulseless  - CT chest at OSH neg for PE but showed patchy opacities c/f extensive PNA  - intubated in the field post-arrest  - AC RR 16/Tv 450/PEEP 5/FiO2 40  - monitor serial gases  - start vancomycin & zosyn  -11/15 vanc discontinued, MRSA neg  - repeat cultures, check sputum, RVP, urine legionella & stress  - check CXR for ?SQ gas seen on imaging OSH  - follow blood gas, bmp, adjust vent settings as needed for acidosis/alkalosis    =====CARDIOVASCULAR=====  #Cardiac Arrest  - unclear rhythm but pulseless requiring CPR/epi, no reported shock delivery  - unclear duration of total cardiac arrest, however, lost pulse en route to hospital  - unclear etiology - possibly hypoxia given imaging findings & sxs  - currently normotensive off pressors  - will do POCUS, check official TTE  -check tarik, anca, cardiolipin, anti-phospholipid antibody- all negative    =====GASTRO=====  - start TF, increase to goal as tolerated    =====RENAL=====  #SIVA  - Cr 1.15 on presentation, baseline normal 0.7  - likely hypoperfusion iso cardiac arrest  - monitor Cr/UOP  - strict I/Os, harrington catheter  - renal function improved, follow bmp    =====ID=====  #Patchy opacities on CT  - CT imaging findings as above  - repeat blood cxs, urine cx, sputum cx, RVP, MRSA  - urine legionella & strep  - empiric tx with vancomycin & zosyn; vanc dc'ed because MRSA negative  - procal elevated  - central line removed overnight 11/14    =====HEME/ONC=====  #Anemia  - chronic history of anemia but no evidence of overt bleed  - maintain active T&S, monitor CBC    #Thrombocytosis  - history of thrombocytosis, was on ASA at home  - continue ASA     =====ENDOCRINE=====  #Hypothyroidism  - history of hypothyroid but not on synthroid as per endocrine  - check TSH, T4, T3    =====ETHICS=====  - full code pending further GOC

## 2023-11-19 NOTE — PROGRESS NOTE ADULT - ATTENDING COMMENTS
1.Acute hypoxemic respiratory failure after cardiac arrest. Oxygenating well. Pulmonary mechanics good. Continue current AC vent settings.  except increase RR 18. Repeat ABG. Goal for PCO2 close to 40.    2. Cardiac: S/P out of hospital cardiac arrest.  Time down unclear. ? 10 minutes from 911 call. Pt with moderately to severely reduced LV systolic function. Septal hypo-akinesis.  Hemodynamically stable. Repeat formal TTE.    3.Neuro: CT head with extensive and progressing signs of cerebral edema and white matter effacement consistent with anoxic brain injury.               On exam, pt without pupil light reflex, corneal response or withdrawal to pain.  On mechanical ventilation pt  placed on PS mode this am. Pt with spontaneous respirations.    Neuro Consult appreciated..      4. ID.  Zosyn for aspiration pneumonia seen on CT chest at out side hospital finished. Now off antibiotics..  Pt had fever yesterday . Blood cx pending. UA negative.  4A. Hypotension:  Remains on Levophed. Give additional fluid bolus 1 liter LR  5. GI; Continue tube feeds.    6.DVT : Prophylaxis: Sq heparin.    7. GOC: Full code. Palliative care consult appreciated. Child life input appreciated.  8: Hypernatremia and  hyperchloremia. No signs of DI. Will add free H20 to regimen.

## 2023-11-19 NOTE — PROGRESS NOTE ADULT - SUBJECTIVE AND OBJECTIVE BOX
INTERVAL HPI/OVERNIGHT EVENTS:    SUBJECTIVE: Patient seen and examined at bedside.     ROS: All negative except as listed above.    VITAL SIGNS:  ICU Vital Signs Last 24 Hrs  T(C): 37.6 (19 Nov 2023 04:00), Max: 38.4 (18 Nov 2023 17:00)  T(F): 99.7 (19 Nov 2023 04:00), Max: 101.1 (18 Nov 2023 17:00)  HR: 97 (19 Nov 2023 06:45) (87 - 109)  BP: 92/61 (19 Nov 2023 06:45) (73/44 - 136/83)  BP(mean): 71 (19 Nov 2023 06:45) (53 - 102)  ABP: --  ABP(mean): --  RR: 12 (19 Nov 2023 06:45) (12 - 16)  SpO2: 98% (19 Nov 2023 06:45) (91% - 100%)    O2 Parameters below as of 18 Nov 2023 20:00  Patient On (Oxygen Delivery Method): ventilator    O2 Concentration (%): 40      Mode: AC/ CMV (Assist Control/ Continuous Mandatory Ventilation), RR (machine): 12, TV (machine): 400, FiO2: 40, PEEP: 5, ITime: 0.7, MAP: 8, PIP: 23  Plateau pressure:   P/F ratio:     11-18 @ 07:01  -  11-19 @ 07:00  --------------------------------------------------------  IN: 2569.8 mL / OUT: 1590 mL / NET: 979.8 mL      CAPILLARY BLOOD GLUCOSE          ECG: reviewed.    PHYSICAL EXAM:    GENERAL: intubated & ventilated  HEAD: atraumatic, normocephalic  EYES: blown pupils bilaterally, nonreactive to light, corneal reflex absent  NECK: supple  LUNG: on ventilator, CTA bilaterally  HEART: regular rate and rhythm, no murmurs  ABDOMEN: soft, non-distended, no rigidity  EXTREMITIES: no peripheral edema bilaterally  SKIN: no rashes or lesions  NEURO: AAOx0, no corneal reflex, patient does not withdraw to pain      MEDICATIONS:  MEDICATIONS  (STANDING):  aspirin  chewable 81 milliGRAM(s) Oral daily  chlorhexidine 0.12% Liquid 15 milliLiter(s) Oral Mucosa every 12 hours  heparin   Injectable 5000 Unit(s) SubCutaneous every 8 hours  norepinephrine Infusion 0.05 MICROgram(s)/kG/Min (8.17 mL/Hr) IV Continuous <Continuous>    MEDICATIONS  (PRN):  artificial tears (preservative free) Ophthalmic Solution 1 Drop(s) Both EYES every 6 hours PRN Dry Eyes      ALLERGIES:  Allergies    No Known Allergies    Intolerances        LABS:                        9.1    14.25 )-----------( 529      ( 19 Nov 2023 00:20 )             30.7     11-19    151<H>  |  110<H>  |  20  ----------------------------<  153<H>  4.4   |  28  |  0.71    Ca    10.1      19 Nov 2023 00:19  Phos  4.4     11-19  Mg     2.1     11-19    TPro  7.9  /  Alb  3.4  /  TBili  <0.1<L>  /  DBili  x   /  AST  61<H>  /  ALT  69<H>  /  AlkPhos  138<H>  11-19      Urinalysis Basic - ( 19 Nov 2023 00:19 )    Color: x / Appearance: x / SG: x / pH: x  Gluc: 153 mg/dL / Ketone: x  / Bili: x / Urobili: x   Blood: x / Protein: x / Nitrite: x   Leuk Esterase: x / RBC: x / WBC x   Sq Epi: x / Non Sq Epi: x / Bacteria: x      ABG:  pH, Arterial: 7.36 (11-19-23 @ 00:10)  pCO2, Arterial: 59 mmHg (11-19-23 @ 00:10)  pO2, Arterial: 114 mmHg (11-19-23 @ 00:10)      vBG:    Micro:    Culture - Blood (collected 11-14-23 @ 18:15)  Source: .Blood Blood  Preliminary Report (11-19-23 @ 01:01):    No growth at 4 days    Culture - Blood (collected 11-14-23 @ 18:00)  Source: .Blood Blood  Preliminary Report (11-19-23 @ 01:01):    No growth at 4 days        Culture - Sputum (collected 11-15-23 @ 01:08)  Source: .Sputum Sputum  Gram Stain (11-15-23 @ 16:34):    Rare polymorphonuclear leukocytes per low power field    No Squamous epithelial cells per low power field    No organisms seen per oil power field  Final Report (11-17-23 @ 12:04):    Normal Respiratory Jenni present        RADIOLOGY & ADDITIONAL TESTS: Reviewed.   INTERVAL HPI/OVERNIGHT EVENTS: No acute overnight events.    SUBJECTIVE: Patient seen and examined at bedside. On Levophed 0.07, still intubated, unresponsive, unable to participate in evaluation.    ROS: All negative except as listed above.    VITAL SIGNS:  ICU Vital Signs Last 24 Hrs  T(C): 37.6 (19 Nov 2023 04:00), Max: 38.4 (18 Nov 2023 17:00)  T(F): 99.7 (19 Nov 2023 04:00), Max: 101.1 (18 Nov 2023 17:00)  HR: 97 (19 Nov 2023 06:45) (87 - 109)  BP: 92/61 (19 Nov 2023 06:45) (73/44 - 136/83)  BP(mean): 71 (19 Nov 2023 06:45) (53 - 102)  ABP: --  ABP(mean): --  RR: 12 (19 Nov 2023 06:45) (12 - 16)  SpO2: 98% (19 Nov 2023 06:45) (91% - 100%)    O2 Parameters below as of 18 Nov 2023 20:00  Patient On (Oxygen Delivery Method): ventilator    O2 Concentration (%): 40      Mode: AC/ CMV (Assist Control/ Continuous Mandatory Ventilation), RR (machine): 12, TV (machine): 400, FiO2: 40, PEEP: 5, ITime: 0.7, MAP: 8, PIP: 23  Plateau pressure:   P/F ratio:     11-18 @ 07:01  -  11-19 @ 07:00  --------------------------------------------------------  IN: 2569.8 mL / OUT: 1590 mL / NET: 979.8 mL      CAPILLARY BLOOD GLUCOSE          ECG: reviewed.    PHYSICAL EXAM:    GENERAL: intubated & ventilated  HEAD: atraumatic, normocephalic  EYES: blown pupils bilaterally, nonreactive to light, corneal reflex absent  NECK: supple  LUNG: on ventilator, CTA bilaterally  HEART: regular rate and rhythm, no murmurs  ABDOMEN: soft, non-distended, no rigidity  EXTREMITIES: no peripheral edema bilaterally  SKIN: no rashes or lesions  NEURO: AAOx0, no corneal reflex, patient does not withdraw to pain      MEDICATIONS:  MEDICATIONS  (STANDING):  aspirin  chewable 81 milliGRAM(s) Oral daily  chlorhexidine 0.12% Liquid 15 milliLiter(s) Oral Mucosa every 12 hours  heparin   Injectable 5000 Unit(s) SubCutaneous every 8 hours  norepinephrine Infusion 0.05 MICROgram(s)/kG/Min (8.17 mL/Hr) IV Continuous <Continuous>    MEDICATIONS  (PRN):  artificial tears (preservative free) Ophthalmic Solution 1 Drop(s) Both EYES every 6 hours PRN Dry Eyes      ALLERGIES:  Allergies    No Known Allergies    Intolerances        LABS:                        9.1    14.25 )-----------( 529      ( 19 Nov 2023 00:20 )             30.7     11-19    151<H>  |  110<H>  |  20  ----------------------------<  153<H>  4.4   |  28  |  0.71    Ca    10.1      19 Nov 2023 00:19  Phos  4.4     11-19  Mg     2.1     11-19    TPro  7.9  /  Alb  3.4  /  TBili  <0.1<L>  /  DBili  x   /  AST  61<H>  /  ALT  69<H>  /  AlkPhos  138<H>  11-19      Urinalysis Basic - ( 19 Nov 2023 00:19 )    Color: x / Appearance: x / SG: x / pH: x  Gluc: 153 mg/dL / Ketone: x  / Bili: x / Urobili: x   Blood: x / Protein: x / Nitrite: x   Leuk Esterase: x / RBC: x / WBC x   Sq Epi: x / Non Sq Epi: x / Bacteria: x      ABG:  pH, Arterial: 7.36 (11-19-23 @ 00:10)  pCO2, Arterial: 59 mmHg (11-19-23 @ 00:10)  pO2, Arterial: 114 mmHg (11-19-23 @ 00:10)      vBG:    Micro:    Culture - Blood (collected 11-14-23 @ 18:15)  Source: .Blood Blood  Preliminary Report (11-19-23 @ 01:01):    No growth at 4 days    Culture - Blood (collected 11-14-23 @ 18:00)  Source: .Blood Blood  Preliminary Report (11-19-23 @ 01:01):    No growth at 4 days        Culture - Sputum (collected 11-15-23 @ 01:08)  Source: .Sputum Sputum  Gram Stain (11-15-23 @ 16:34):    Rare polymorphonuclear leukocytes per low power field    No Squamous epithelial cells per low power field    No organisms seen per oil power field  Final Report (11-17-23 @ 12:04):    Normal Respiratory Jenni present        RADIOLOGY & ADDITIONAL TESTS: Reviewed.

## 2023-11-20 NOTE — PROGRESS NOTE ADULT - PROBLEM SELECTOR PLAN 2
MR head consistent with anoxic brain injury  neuro consult appreciated  pt with spontaneous breath during apnea test

## 2023-11-20 NOTE — PROGRESS NOTE ADULT - SUBJECTIVE AND OBJECTIVE BOX
Indication for Geriatrics and Palliative Care Services/INTERVAL HPI: GOC  SUBJECTIVE AND OBJECTIVE: Pt unable to provide history due to poor mentation. Sister present at bedside.    OVERNIGHT EVENTS: No acute events reported. Pt remains intubated with minimal neurologic function.     DNR on chart:  Allergies    No Known Allergies    Intolerances    MEDICATIONS  (STANDING):  aspirin  chewable 81 milliGRAM(s) Oral daily  chlorhexidine 0.12% Liquid 15 milliLiter(s) Oral Mucosa every 12 hours  heparin   Injectable 5000 Unit(s) SubCutaneous every 8 hours  norepinephrine Infusion 0.05 MICROgram(s)/kG/Min (8.17 mL/Hr) IV Continuous <Continuous>    MEDICATIONS  (PRN):  artificial tears (preservative free) Ophthalmic Solution 1 Drop(s) Both EYES every 6 hours PRN Dry Eyes    ITEMS UNCHECKED ARE NOT PRESENT    PRESENT SYMPTOMS: [x ]Unable to self-report - see [ ] CPOT [ ] PAINADS [ ] RDOS  Source if other than patient:  [ ]Family   [ x]Team     Pain:  [ ]yes [ ]no  QOL impact -   Location -                    Aggravating factors -  Quality -  Radiation -  Timing-  Severity (0-10 scale):  Minimal acceptable level (0-10 scale):     CPOT:    [email or web address]://[email or web address]/[email or web address]/Critical-Care-Pain-Observation-Tool-(CPOT)    Dyspnea:                           [ ]Mild [ ]Moderate [ ]Severe  Anxiety:                             [ ]Mild [ ]Moderate [ ]Severe  Fatigue:                             [ ]Mild [ ]Moderate [ ]Severe  Nausea:                             [ ]Mild [ ]Moderate [ ]Severe  Loss of appetite:              [ ]Mild [ ]Moderate [ ]Severe  Constipation:                    [ ]Mild [ ]Moderate [ ]Severe    PCSSQ[Palliative Care Spiritual Screening Question]   Severity (0-10):  Score of 4 or > indicate consideration of Chaplaincy referral.  Chaplaincy Referral: [ ] yes [ ] refused [ x] following [ ] Deferred     Caregiver Renick? : [ ] yes [x ] no [ ] Deferred [ ] Declined             Social work referral [ ] Patient & Family Centered Care Referral [ ]     Anticipatory Grief present?:  [x ] yes [ ] no  [ ] Deferred                  Social work referral [x ] Chaplaincy Referral[ ]      Other Symptoms:  [ ]All other review of systems negative   [ x]Unable to obtain due to poor mentation    Palliative Performance Status Version 2:    10     %      [email or web address]  PHYSICAL EXAM:  Vital Signs Last 24 Hrs  T(C): 36.6 (20 Nov 2023 11:30), Max: 37.5 (19 Nov 2023 20:00)  T(F): 97.9 (20 Nov 2023 11:30), Max: 99.5 (19 Nov 2023 20:00)  HR: 105 (20 Nov 2023 15:30) (84 - 109)  BP: 93/53 (20 Nov 2023 15:30) (84/51 - 127/71)  BP(mean): 69 (20 Nov 2023 15:30) (61 - 97)  RR: 16 (20 Nov 2023 15:30) (16 - 17)  SpO2: 98% (20 Nov 2023 15:30) (95% - 100%)    Parameters below as of 19 Nov 2023 20:00  Patient On (Oxygen Delivery Method): ventilator    O2 Concentration (%): 40 I&O's Summary    19 Nov 2023 07:01  -  20 Nov 2023 07:00  --------------------------------------------------------  IN: 2381.4 mL / OUT: 1800 mL / NET: 581.4 mL    20 Nov 2023 07:01  -  20 Nov 2023 15:54  --------------------------------------------------------  IN: 919.7 mL / OUT: 285 mL / NET: 634.7 mL       GENERAL: [ ]Cachexia    [ ]Alert  [ ]Oriented x   [ ]Lethargic  x[ ]Unarousable  [ ]Verbal  [ ]Non-Verbal  Behavioral:   [ ]Anxiety  [ ]Delirium [ ]Agitation [ ]Other  HEENT:  [ ]Normal   [ ]Dry mouth   [x ]ET Tube/Trach  [ ]Oral lesions  PULMONARY:   [x ]Clear [ ]Tachypnea  [ ]Audible excessive secretions   [ ]Rhonchi        [ ]Right [ ]Left [ ]Bilateral  [ ]Crackles        [ ]Right [ ]Left [ ]Bilateral  [ ]Wheezing     [ ]Right [ ]Left [ ]Bilateral  [ ]Diminished BS [ ] Right [ ]Left [ ]Bilateral  CARDIOVASCULAR:    [x ]Regular [ ]Irregular [ ]Tachy  [ ]Bill [ ]Murmur [ ]Other  GASTROINTESTINAL:  [ ]Soft  [ ]Distended   [ ]+BS  [ ]Non tender [ ]Tender  [ ]Other [ ]PEG [ x]OGT/ NGT   Last BM:   GENITOURINARY:  [ ]Normal [ ]Incontinent   [ ]Oliguria/Anuria   [x ]Rascon  MUSCULOSKELETAL:   [ ]Normal   [ ]Weakness  [x ]Bed/Wheelchair bound [ ]Edema  NEUROLOGIC:   [ ]No focal deficits  [x ] Cognitive impairment  [ ] Dysphagia [ ]Dysarthria [ ] Paresis [ ]Other   SKIN:   [ ]Normal  [ ]Rash  [ ]Other  [ ]Pressure ulcer(s) [ ]y [ ]n present on admission    CRITICAL CARE:  [ ]Shock Present  [ ]Septic [ ]Cardiogenic [x ]Neurologic [ ]Hypovolemic  [ ]Vasopressors [ ]Inotropes  [ x]Respiratory failure present [x ]Mechanical Ventilation [ ]Non-invasive ventilatory support [ ]High-Flow Mode: AC/ CMV (Assist Control/ Continuous Mandatory Ventilation), RR (machine): 16, TV (machine): 400, FiO2: 40, PEEP: 5, ITime: 0.72, MAP: 10, PIP: 24  [ ]Acute  [ ]Chronic [ ]Hypoxic  [ ]Hypercarbic [ ]Other  [ ]Other organ failure     LABS:                        8.4    14.08 )-----------( 473      ( 20 Nov 2023 00:36 )             28.4   11-20    150<H>  |  109<H>  |  14  ----------------------------<  162<H>  3.9   |  29  |  0.58    Ca    9.6      20 Nov 2023 00:36  Phos  3.3     11-20  Mg     1.8     11-20    TPro  7.2  /  Alb  3.2<L>  /  TBili  0.1<L>  /  DBili  x   /  AST  32  /  ALT  51<H>  /  AlkPhos  119  11-20      Urinalysis Basic - ( 20 Nov 2023 00:36 )    Color: x / Appearance: x / SG: x / pH: x  Gluc: 162 mg/dL / Ketone: x  / Bili: x / Urobili: x   Blood: x / Protein: x / Nitrite: x   Leuk Esterase: x / RBC: x / WBC x   Sq Epi: x / Non Sq Epi: x / Bacteria: x      RADIOLOGY & ADDITIONAL STUDIES:  < from: MR Head No Cont (11.15.23 @ 22:28) >    ACC: [id number] EXAM:  MR BRAIN   ORDERED BY:  FRANC PAZ     PROCEDURE DATE:  11/15/2023          INTERPRETATION:  Non-contrast MRI of the brain.    CLINICAL INDICATION: Anoxic brain injury, status post cardiac arrest with    TECHNIQUE:  Multiplanar, multisequence MR images of the brain were   obtained without the administration of intravenous contrast.    COMPARISON: MRI brain 8/17/2018    FINDINGS:    New diffuse cortical restricted diffusion following the bilateral   cerebral and cerebellar hemispheres.    Multiple foci of restricted diffusion within the brainstem.    Diffuse sulcal effacement. Diffuse effacement of the basal cisterns.    Findings are consistent with anoxic injury.    No midline shift or hydrocephalus.    No acute intracranial hemorrhage.    IMPRESSION:    Findings consistent with anoxic brain injury.    --- End of Report ---            DEANNE NOVOA MD; Attending Radiologist  This document has been electronically signed. Nov 16 2023  1:08PM    < end of copied text >      Protein Calorie Malnutrition Present: [ ]mild [ ]moderate [ ]severe [ ]underweight [ ]morbid obesity  https://www.[employer].org/vault/2440/web/files/ONC/Table_Clinical%20Characteristics%20to%20Document%20Malnutrition-White%20JV%20et%20al%202012.pdf    Height (cm): 172.7 (11-14-23 @ 17:59), 170.2 (02-03-23 @ 13:18), 170.2 (02-01-23 @ 11:44)  Weight (kg): 87.1 (11-14-23 @ 17:59), 87.1 (02-03-23 @ 13:18), 87.1 (02-01-23 @ 11:44)  BMI (kg/m2): 29.2 (11-14-23 @ 17:59), 30.1 (02-03-23 @ 13:18), 30.1 (02-01-23 @ 11:44)    [ ]PPSV2 < or = 30%  [ ]significant weight loss [ ]poor nutritional intake [ ]anasarca[ ]Artificial Nutrition    Other REFERRALS:  [ ]Hospice  [ ]Child Life  [ ]Social Work  [ ]Case management [ ]Holistic Therapy     Goals of Care Document:

## 2023-11-20 NOTE — PROGRESS NOTE ADULT - PROBLEM SELECTOR PLAN 5
Will continue to follow for ongoing GOC.  Chaplaincy, child life and palliative SW following for additional support.  Case discussed with MICU team.    For acute issues please page palliative team.    Germaine Loaiza MD  Geriatrics and Palliative Medicine Attending  [facility] pager: [phone]

## 2023-11-20 NOTE — PROGRESS NOTE ADULT - ASSESSMENT
43F with history of pre-DM, HTN, HLD, ADHD, BENJAMIN, migraines, thrombocytosis, hypothyroidism not on synthroid, uterine polyps s/p polypectomy in 2/2023 presented from [facility] post cardiac arrest in the field. Imaging c/f extensive PNA. Transferred to [facility] for further management. Imaging suggestive of diffuse cerebral & cerebellar injury. Palliative consulted for GOC and psychosocial support.

## 2023-11-20 NOTE — EEG REPORT - NS EEG TEXT BOX
CRIS DENIS [id number]     Study Date: 11-19-23 1730 11-20-23 0800   Duration x Hours: 14h 21 m  --------------------------------------------------------------------------------------------------  History:  CC/ HPI Patient is a 43y old  Female who presents with a chief complaint of Cardiac Arrest (19 Nov 2023 07:39)    MEDICATIONS  (STANDING):  aspirin  chewable 81 milliGRAM(s) Oral daily  chlorhexidine 0.12% Liquid 15 milliLiter(s) Oral Mucosa every 12 hours  heparin   Injectable 5000 Unit(s) SubCutaneous every 8 hours  norepinephrine Infusion 0.05 MICROgram(s)/kG/Min (8.17 mL/Hr) IV Continuous <Continuous>    --------------------------------------------------------------------------------------------------  Study Interpretation:    [[[Abbreviation Key:  PDR=alpha rhythm/posterior dominant rhythm. A-P=anterior posterior.  Amplitude: ‘very low’:<20; ‘low’:20-49; ‘medium’:; ‘high’:>150uV.  Persistence for periodic/rhythmic patterns (% of epoch) ‘rare’:<1%; ‘occasional’:1-10%; ‘frequent’:10-50%; ‘abundant’:50-90%; ‘continuous’:>90%.  Persistence for sporadic discharges: ‘rare’:<1/hr; ‘occasional’:1/min-1/hr; ‘frequent’:>1/min; ‘abundant’:>1/10 sec.  RPP=rhythmic and periodic patterns; GRDA=generalized rhythmic delta activity; FIRDA=frontal intermittent GRDA; LRDA=lateralized rhythmic delta activity; TIRDA=temporal intermittent rhythmic delta activity;  LPD=PLED=lateralized periodic discharges; GPD=generalized periodic discharges; BIPDs =bilateral independent periodic discharges; Mf=multifocal; SIRPDs=stimulus induced rhythmic, periodic, or ictal appearing discharges; BIRDs=brief potentially ictal rhythmic discharges >4 Hz, lasting .5-10s; PFA (paroxysmal bursts >13 Hz or =8 Hz <10s).  Modifiers: +F=with fast component; +S=with spike component; +R=with rhythmic component.  S-B=burst suppression pattern.  Max=maximal. N1-drowsy; N2-stage II sleep; N3-slow wave sleep. SSS/BETS=small sharp spikes/benign epileptiform transients of sleep. HV=hyperventilation; PS=photic stimulation]]]    Daily EEG Visual Analysis    FINDINGS:      Background: The background is continuous, symmetric with diffuse suppression. (voltage <10uV). THere was no AP gradient or PDR.     State Changes:   -Absent    Sporadic Epileptiform Discharges:    None    Rhythmic and Periodic Patterns (RPPs):  None     Electrographic and Electroclinical seizures:  None    Other Clinical Events:  None    Activation Procedures:   -Hyperventilation was not performed.    -Photic stimulation was not performed.    Artifacts:  Intermittent movement artifacts were noted.    ECG:  single channel ECG showed regular rhythm.     EEG Classification / Summary:  Abnormal  EEG in a comatose patient.  - Diffuse suppression of the background.     Clinical Impression:  - Severe diffuse cerebral dysfunction is nonspecific in etiology.   There were no epileptiform abnormalities recorded.      This is fellow preliminary read, pending attending review.  -------------------------------------------------------------------------------------------------------  [facility] EEG Reading Room Ph#: [phone]  Epilepsy Answering Service after 5PM and before 8:30AM: Ph#: [phone]    APPLE Castellon  Epilepsy Fellow CRIS DENIS [id number]     Study Date: 11-19-23 1130 11-20-23 0800   Duration x Hours: 20h 21 m  --------------------------------------------------------------------------------------------------  History:  CC/ HPI Patient is a 43y old  Female who presents with a chief complaint of Cardiac Arrest (19 Nov 2023 07:39)    MEDICATIONS  (STANDING):  aspirin  chewable 81 milliGRAM(s) Oral daily  chlorhexidine 0.12% Liquid 15 milliLiter(s) Oral Mucosa every 12 hours  heparin   Injectable 5000 Unit(s) SubCutaneous every 8 hours  norepinephrine Infusion 0.05 MICROgram(s)/kG/Min (8.17 mL/Hr) IV Continuous <Continuous>    --------------------------------------------------------------------------------------------------  Study Interpretation:    [[[Abbreviation Key:  PDR=alpha rhythm/posterior dominant rhythm. A-P=anterior posterior.  Amplitude: ‘very low’:<20; ‘low’:20-49; ‘medium’:; ‘high’:>150uV.  Persistence for periodic/rhythmic patterns (% of epoch) ‘rare’:<1%; ‘occasional’:1-10%; ‘frequent’:10-50%; ‘abundant’:50-90%; ‘continuous’:>90%.  Persistence for sporadic discharges: ‘rare’:<1/hr; ‘occasional’:1/min-1/hr; ‘frequent’:>1/min; ‘abundant’:>1/10 sec.  RPP=rhythmic and periodic patterns; GRDA=generalized rhythmic delta activity; FIRDA=frontal intermittent GRDA; LRDA=lateralized rhythmic delta activity; TIRDA=temporal intermittent rhythmic delta activity;  LPD=PLED=lateralized periodic discharges; GPD=generalized periodic discharges; BIPDs =bilateral independent periodic discharges; Mf=multifocal; SIRPDs=stimulus induced rhythmic, periodic, or ictal appearing discharges; BIRDs=brief potentially ictal rhythmic discharges >4 Hz, lasting .5-10s; PFA (paroxysmal bursts >13 Hz or =8 Hz <10s).  Modifiers: +F=with fast component; +S=with spike component; +R=with rhythmic component.  S-B=burst suppression pattern.  Max=maximal. N1-drowsy; N2-stage II sleep; N3-slow wave sleep. SSS/BETS=small sharp spikes/benign epileptiform transients of sleep. HV=hyperventilation; PS=photic stimulation]]]    Daily EEG Visual Analysis    FINDINGS:      Background: The background is continuous, symmetric with diffuse suppression. (voltage <10uV). THere was no AP gradient or PDR.     State Changes:   -Absent    Sporadic Epileptiform Discharges:    None    Rhythmic and Periodic Patterns (RPPs):  None     Electrographic and Electroclinical seizures:  None    Other Clinical Events:  None    Activation Procedures:   -Hyperventilation was not performed.    -Photic stimulation was not performed.    Artifacts:  Intermittent movement artifacts were noted.    ECG:  single channel ECG showed regular rhythm.     EEG Classification / Summary:  Abnormal  EEG in a comatose patient.  - Diffuse suppression of the background.     Clinical Impression:  - Severe diffuse cerebral dysfunction is nonspecific in etiology.   There were no epileptiform abnormalities recorded.      This is fellow preliminary read, pending attending review.  -------------------------------------------------------------------------------------------------------  [facility] EEG Reading Room Ph#: [phone]  Epilepsy Answering Service after 5PM and before 8:30AM: Ph#: [phone]    APPLE Castellon  Epilepsy Fellow CRIS DENIS [id number]     Study Date: 11-19-23 1130 11-20-23 0800   Duration x Hours: 20h 21 m  --------------------------------------------------------------------------------------------------  History:  CC/ HPI Patient is a 43y old  Female who presents with a chief complaint of Cardiac Arrest (19 Nov 2023 07:39)    MEDICATIONS  (STANDING):  aspirin  chewable 81 milliGRAM(s) Oral daily  chlorhexidine 0.12% Liquid 15 milliLiter(s) Oral Mucosa every 12 hours  heparin   Injectable 5000 Unit(s) SubCutaneous every 8 hours  norepinephrine Infusion 0.05 MICROgram(s)/kG/Min (8.17 mL/Hr) IV Continuous <Continuous>    --------------------------------------------------------------------------------------------------  Study Interpretation:    [[[Abbreviation Key:  PDR=alpha rhythm/posterior dominant rhythm. A-P=anterior posterior.  Amplitude: ‘very low’:<20; ‘low’:20-49; ‘medium’:; ‘high’:>150uV.  Persistence for periodic/rhythmic patterns (% of epoch) ‘rare’:<1%; ‘occasional’:1-10%; ‘frequent’:10-50%; ‘abundant’:50-90%; ‘continuous’:>90%.  Persistence for sporadic discharges: ‘rare’:<1/hr; ‘occasional’:1/min-1/hr; ‘frequent’:>1/min; ‘abundant’:>1/10 sec.  RPP=rhythmic and periodic patterns; GRDA=generalized rhythmic delta activity; FIRDA=frontal intermittent GRDA; LRDA=lateralized rhythmic delta activity; TIRDA=temporal intermittent rhythmic delta activity;  LPD=PLED=lateralized periodic discharges; GPD=generalized periodic discharges; BIPDs =bilateral independent periodic discharges; Mf=multifocal; SIRPDs=stimulus induced rhythmic, periodic, or ictal appearing discharges; BIRDs=brief potentially ictal rhythmic discharges >4 Hz, lasting .5-10s; PFA (paroxysmal bursts >13 Hz or =8 Hz <10s).  Modifiers: +F=with fast component; +S=with spike component; +R=with rhythmic component.  S-B=burst suppression pattern.  Max=maximal. N1-drowsy; N2-stage II sleep; N3-slow wave sleep. SSS/BETS=small sharp spikes/benign epileptiform transients of sleep. HV=hyperventilation; PS=photic stimulation]]]    Daily EEG Visual Analysis    FINDINGS:      Background: The background is continuous, symmetric with diffuse suppression. (voltage <10uV). THere was no AP gradient or PDR.     State Changes:   -Absent    Sporadic Epileptiform Discharges:    None    Rhythmic and Periodic Patterns (RPPs):  None     Electrographic and Electroclinical seizures:  None    Other Clinical Events:  None    Activation Procedures:   -Hyperventilation was not performed.    -Photic stimulation was not performed.    Artifacts:  Intermittent movement artifacts were noted.    ECG:  single channel ECG showed regular rhythm.     EEG Classification / Summary:  Abnormal  EEG in a comatose patient.  - Diffuse suppression of the background.     Clinical Impression:  Severe diffuse cerebral dysfunction.  No appreciable cerebral activity.  There were no epileptiform abnormalities recorded.      -------------------------------------------------------------------------------------------------------  [facility] EEG Reading Room Ph#: [phone]  Epilepsy Answering Service after 5PM and before 8:30AM: Ph#: [phone]    APPLE Castellon  Epilepsy Fellow CRIS DENIS [id number]     Study Date: 11-19-23 1130 11-20-23 1900   Duration x Hours: 31 h 21 m  --------------------------------------------------------------------------------------------------  History:  CC/ HPI Patient is a 43y old  Female who presents with a chief complaint of Cardiac Arrest (19 Nov 2023 07:39)    MEDICATIONS  (STANDING):  aspirin  chewable 81 milliGRAM(s) Oral daily  chlorhexidine 0.12% Liquid 15 milliLiter(s) Oral Mucosa every 12 hours  heparin   Injectable 5000 Unit(s) SubCutaneous every 8 hours  norepinephrine Infusion 0.05 MICROgram(s)/kG/Min (8.17 mL/Hr) IV Continuous <Continuous>    --------------------------------------------------------------------------------------------------  Study Interpretation:    [[[Abbreviation Key:  PDR=alpha rhythm/posterior dominant rhythm. A-P=anterior posterior.  Amplitude: ‘very low’:<20; ‘low’:20-49; ‘medium’:; ‘high’:>150uV.  Persistence for periodic/rhythmic patterns (% of epoch) ‘rare’:<1%; ‘occasional’:1-10%; ‘frequent’:10-50%; ‘abundant’:50-90%; ‘continuous’:>90%.  Persistence for sporadic discharges: ‘rare’:<1/hr; ‘occasional’:1/min-1/hr; ‘frequent’:>1/min; ‘abundant’:>1/10 sec.  RPP=rhythmic and periodic patterns; GRDA=generalized rhythmic delta activity; FIRDA=frontal intermittent GRDA; LRDA=lateralized rhythmic delta activity; TIRDA=temporal intermittent rhythmic delta activity;  LPD=PLED=lateralized periodic discharges; GPD=generalized periodic discharges; BIPDs =bilateral independent periodic discharges; Mf=multifocal; SIRPDs=stimulus induced rhythmic, periodic, or ictal appearing discharges; BIRDs=brief potentially ictal rhythmic discharges >4 Hz, lasting .5-10s; PFA (paroxysmal bursts >13 Hz or =8 Hz <10s).  Modifiers: +F=with fast component; +S=with spike component; +R=with rhythmic component.  S-B=burst suppression pattern.  Max=maximal. N1-drowsy; N2-stage II sleep; N3-slow wave sleep. SSS/BETS=small sharp spikes/benign epileptiform transients of sleep. HV=hyperventilation; PS=photic stimulation]]]    Daily EEG Visual Analysis    FINDINGS:      Background: The background is continuous, symmetric with diffuse suppression. (voltage <10uV). THere was no AP gradient or PDR.     State Changes:   -Absent    Sporadic Epileptiform Discharges:    None    Rhythmic and Periodic Patterns (RPPs):  None     Electrographic and Electroclinical seizures:  None    Other Clinical Events:  None    Activation Procedures:   -Hyperventilation was not performed.    -Photic stimulation was not performed.    Artifacts:  Intermittent movement artifacts were noted.    ECG:  single channel ECG showed regular rhythm.     EEG Classification / Summary:  Abnormal  EEG in a comatose patient.  - Diffuse suppression of the background.     Clinical Impression:  Severe diffuse cerebral dysfunction.  No appreciable cerebral activity.  There were no epileptiform abnormalities recorded.      -------------------------------------------------------------------------------------------------------  [facility] EEG Reading Room Ph#: [phone]  Epilepsy Answering Service after 5PM and before 8:30AM: Ph#: [phone]    APPLE Castellon  Epilepsy Fellow

## 2023-11-20 NOTE — PROGRESS NOTE ADULT - ATTENDING COMMENTS
CRIS DENIS is a 43y Female with PMH of  *** Admitted for ***. MICU admission on *** for ***.     # Anoxic brain injury  - Follow up EEG, dc if negative  - Discuss with family plan to repeat apnea test   # Hypernatremia,   - 350 ml FWF     #POCUS within 24hrs   #DVT ppx -   #GOC - Full code, will discuss with family         Sumit Nava MD  Interventional Pulmonology & Critical Care Medicine CRIS DENIS is a 43y Female with PMH of  DM2 HTN/HLD Admitted for post cardiac arrest care iso AHRF 2/2 pneumonia. Transferred to [facility] for ongoing care however continues to have poor neurologic recovery in the setting of 10 minutes down time    Continues to have no withdrawal to pain but demonstrated breathing on apnea test. CT head with cerebral edema     24hr event  - EEG prelim negative  - more hypernatremic      # Anoxic brain injury, clinical exam consistent with brain death  - Follow up EEG, dc if negative  - Discuss with family plan to repeat apnea test vs  NM perfusion study  # Hypernatremia, not central DI (urine output low)  - 350 ml FWF   #AHRF  - continue vent settings PCO2 goal 40    #POCUS completed on exam   #DVT ppx - scd  #GOC - Full code, will discuss with family         Sumit Nava MD  Interventional Pulmonology & Critical Care Medicine

## 2023-11-20 NOTE — PROGRESS NOTE ADULT - SUBJECTIVE AND OBJECTIVE BOX
INTERVAL HPI/OVERNIGHT EVENTS:   - Hypernatremia for free water clearance  - Rectal tube removed   - Family deciding on goals of care     SUBJECTIVE: Patient seen and examined at bedside.     ROS: All negative except as listed above.    VITAL SIGNS:  ICU Vital Signs Last 24 Hrs  T(C): 36.6 (20 Nov 2023 11:30), Max: 37.5 (19 Nov 2023 20:00)  T(F): 97.9 (20 Nov 2023 11:30), Max: 99.5 (19 Nov 2023 20:00)  HR: 104 (20 Nov 2023 13:45) (84 - 104)  BP: 91/50 (20 Nov 2023 13:45) (84/51 - 127/71)  BP(mean): 68 (20 Nov 2023 13:45) (61 - 97)  RR: 16 (20 Nov 2023 13:45) (16 - 20)  SpO2: 98% (20 Nov 2023 13:45) (95% - 100%)    O2 Parameters below as of 19 Nov 2023 20:00  Patient On (Oxygen Delivery Method): ventilator    O2 Concentration (%): 40      Mode: AC/ CMV (Assist Control/ Continuous Mandatory Ventilation), RR (machine): 16, TV (machine): 400, FiO2: 40, PEEP: 5, ITime: 0.76, MAP: 10, PIP: 24  Plateau pressure:   P/F ratio:     11-19 @ 07:01  -  11-20 @ 07:00  --------------------------------------------------------  IN: 2381.4 mL / OUT: 1800 mL / NET: 581.4 mL    11-20 @ 07:01  -  11-20 @ 14:59  --------------------------------------------------------  IN: 793.1 mL / OUT: 240 mL / NET: 553.1 mL      CAPILLARY BLOOD GLUCOSE    ECG: reviewed.    PHYSICAL EXAM:    GENERAL: Intubated off sedation, appears to be lying on bed in no apparent distress   HEAD:  Atraumatic, normocephalic  EYES: EOMI, Pupils are 4 mm BL and fixed, conjunctiva and sclera clear  NECK: Supple, trachea is midline  HEART: Regular rate and rhythm, no murmurs, rubs, or gallops  LUNGS: Unlabored respirations.  Clear to auscultation bilaterally, no crackles, wheezing, or rhonchi  ABDOMEN: Soft, nontender, nondistended, +BS  EXTREMITIES: 2+ peripheral pulses bilaterally, cap refill<2 secs. No cyanosis, edema   NERVOUS SYSTEM:  AAOx0, BL pupils are 4 mm, fixed  SKIN: No new rashes, lesions    MEDICATIONS:  MEDICATIONS  (STANDING):  aspirin  chewable 81 milliGRAM(s) Oral daily  chlorhexidine 0.12% Liquid 15 milliLiter(s) Oral Mucosa every 12 hours  heparin   Injectable 5000 Unit(s) SubCutaneous every 8 hours  norepinephrine Infusion 0.05 MICROgram(s)/kG/Min (8.17 mL/Hr) IV Continuous <Continuous>    MEDICATIONS  (PRN):  artificial tears (preservative free) Ophthalmic Solution 1 Drop(s) Both EYES every 6 hours PRN Dry Eyes    ALLERGIES:  Allergies    No Known Allergies    Intolerances    LABS:                        8.4    14.08 )-----------( 473      ( 20 Nov 2023 00:36 )             28.4     11-20    150<H>  |  109<H>  |  14  ----------------------------<  162<H>  3.9   |  29  |  0.58    Ca    9.6      20 Nov 2023 00:36  Phos  3.3     11-20  Mg     1.8     11-20    TPro  7.2  /  Alb  3.2<L>  /  TBili  0.1<L>  /  DBili  x   /  AST  32  /  ALT  51<H>  /  AlkPhos  119  11-20      Urinalysis Basic - ( 20 Nov 2023 00:36 )    Color: x / Appearance: x / SG: x / pH: x  Gluc: 162 mg/dL / Ketone: x  / Bili: x / Urobili: x   Blood: x / Protein: x / Nitrite: x   Leuk Esterase: x / RBC: x / WBC x   Sq Epi: x / Non Sq Epi: x / Bacteria: x      ABG:  pH, Arterial: 7.45 (11-20-23 @ 00:25)  pCO2, Arterial: 47 mmHg (11-20-23 @ 00:25)  pO2, Arterial: 94 mmHg (11-20-23 @ 00:25)  pH, Arterial: 7.49 (11-19-23 @ 15:24)  pCO2, Arterial: 44 mmHg (11-19-23 @ 15:24)  pO2, Arterial: 103 mmHg (11-19-23 @ 15:24)      vBG:    Micro:    Culture - Blood (collected 11-18-23 @ 17:40)  Source: .Blood Blood-Peripheral  Preliminary Report (11-19-23 @ 23:02):    No growth at 24 hours    Culture - Blood (collected 11-18-23 @ 17:30)  Source: .Blood Blood-Peripheral  Preliminary Report (11-19-23 @ 23:02):    No growth at 24 hours    Culture - Blood (collected 11-14-23 @ 18:15)  Source: .Blood Blood  Final Report (11-20-23 @ 01:00):    No growth at 5 days    Culture - Blood (collected 11-14-23 @ 18:00)  Source: .Blood Blood  Final Report (11-20-23 @ 01:00):    No growth at 5 days        Culture - Sputum (collected 11-15-23 @ 01:08)  Source: .Sputum Sputum  Gram Stain (11-15-23 @ 16:34):    Rare polymorphonuclear leukocytes per low power field    No Squamous epithelial cells per low power field    No organisms seen per oil power field  Final Report (11-17-23 @ 12:04):    Normal Respiratory Jenni present        RADIOLOGY & ADDITIONAL TESTS: Reviewed.   INTERVAL HPI/OVERNIGHT EVENTS:   - Hypernatremia for free water clearance  - Rectal tube removed   - Family deciding on goals of care with palliative on board     SUBJECTIVE: Patient seen and examined at bedside.     ROS: All negative except as listed above.    VITAL SIGNS:  ICU Vital Signs Last 24 Hrs  T(C): 36.6 (20 Nov 2023 11:30), Max: 37.5 (19 Nov 2023 20:00)  T(F): 97.9 (20 Nov 2023 11:30), Max: 99.5 (19 Nov 2023 20:00)  HR: 104 (20 Nov 2023 13:45) (84 - 104)  BP: 91/50 (20 Nov 2023 13:45) (84/51 - 127/71)  BP(mean): 68 (20 Nov 2023 13:45) (61 - 97)  RR: 16 (20 Nov 2023 13:45) (16 - 20)  SpO2: 98% (20 Nov 2023 13:45) (95% - 100%)    O2 Parameters below as of 19 Nov 2023 20:00  Patient On (Oxygen Delivery Method): ventilator    O2 Concentration (%): 40    Mode: AC/ CMV (Assist Control/ Continuous Mandatory Ventilation), RR (machine): 16, TV (machine): 400, FiO2: 40, PEEP: 5, ITime: 0.76, MAP: 10, PIP: 24  Plateau pressure:   P/F ratio:     11-19 @ 07:01  -  11-20 @ 07:00  --------------------------------------------------------  IN: 2381.4 mL / OUT: 1800 mL / NET: 581.4 mL    11-20 @ 07:01 - 11-20 @ 14:59  --------------------------------------------------------  IN: 793.1 mL / OUT: 240 mL / NET: 553.1 mL      CAPILLARY BLOOD GLUCOSE    ECG: reviewed.    PHYSICAL EXAM:    GENERAL: Intubated off sedation, appears to be lying on bed in no apparent distress   HEAD:  Atraumatic, normocephalic  EYES: EOMI, Pupils are 4 mm BL and fixed, conjunctiva and sclera clear  NECK: Supple, trachea is midline  HEART: Regular rate and rhythm, no murmurs, rubs, or gallops  LUNGS: Unlabored respirations.  Clear to auscultation bilaterally, no crackles, wheezing, or rhonchi  ABDOMEN: Soft, nontender, nondistended, +BS  EXTREMITIES: 2+ peripheral pulses bilaterally, cap refill<2 secs. No cyanosis, edema   NERVOUS SYSTEM:  AAOx0, BL pupils are 4 mm, fixed  SKIN: No new rashes, lesions    MEDICATIONS:  MEDICATIONS  (STANDING):  aspirin  chewable 81 milliGRAM(s) Oral daily  chlorhexidine 0.12% Liquid 15 milliLiter(s) Oral Mucosa every 12 hours  heparin   Injectable 5000 Unit(s) SubCutaneous every 8 hours  norepinephrine Infusion 0.05 MICROgram(s)/kG/Min (8.17 mL/Hr) IV Continuous <Continuous>    MEDICATIONS  (PRN):  artificial tears (preservative free) Ophthalmic Solution 1 Drop(s) Both EYES every 6 hours PRN Dry Eyes    ALLERGIES:  Allergies    No Known Allergies    Intolerances    LABS:                        8.4    14.08 )-----------( 473      ( 20 Nov 2023 00:36 )             28.4     11-20    150<H>  |  109<H>  |  14  ----------------------------<  162<H>  3.9   |  29  |  0.58    Ca    9.6      20 Nov 2023 00:36  Phos  3.3     11-20  Mg     1.8     11-20    TPro  7.2  /  Alb  3.2<L>  /  TBili  0.1<L>  /  DBili  x   /  AST  32  /  ALT  51<H>  /  AlkPhos  119  11-20      Urinalysis Basic - ( 20 Nov 2023 00:36 )    Color: x / Appearance: x / SG: x / pH: x  Gluc: 162 mg/dL / Ketone: x  / Bili: x / Urobili: x   Blood: x / Protein: x / Nitrite: x   Leuk Esterase: x / RBC: x / WBC x   Sq Epi: x / Non Sq Epi: x / Bacteria: x      ABG:  pH, Arterial: 7.45 (11-20-23 @ 00:25)  pCO2, Arterial: 47 mmHg (11-20-23 @ 00:25)  pO2, Arterial: 94 mmHg (11-20-23 @ 00:25)  pH, Arterial: 7.49 (11-19-23 @ 15:24)  pCO2, Arterial: 44 mmHg (11-19-23 @ 15:24)  pO2, Arterial: 103 mmHg (11-19-23 @ 15:24)      vBG:    Micro:    Culture - Blood (collected 11-18-23 @ 17:40)  Source: .Blood Blood-Peripheral  Preliminary Report (11-19-23 @ 23:02):    No growth at 24 hours    Culture - Blood (collected 11-18-23 @ 17:30)  Source: .Blood Blood-Peripheral  Preliminary Report (11-19-23 @ 23:02):    No growth at 24 hours    Culture - Blood (collected 11-14-23 @ 18:15)  Source: .Blood Blood  Final Report (11-20-23 @ 01:00):    No growth at 5 days    Culture - Blood (collected 11-14-23 @ 18:00)  Source: .Blood Blood  Final Report (11-20-23 @ 01:00):    No growth at 5 days        Culture - Sputum (collected 11-15-23 @ 01:08)  Source: .Sputum Sputum  Gram Stain (11-15-23 @ 16:34):    Rare polymorphonuclear leukocytes per low power field    No Squamous epithelial cells per low power field    No organisms seen per oil power field  Final Report (11-17-23 @ 12:04):    Normal Respiratory Jenni present        RADIOLOGY & ADDITIONAL TESTS: Reviewed.

## 2023-11-20 NOTE — PROGRESS NOTE ADULT - CONVERSATION DETAILS
Pt unable to participate in GOC discussion. Patient's mother defers to patient's sister as surrogate. Ela has a good understanding of patient's condition and states family is aware that patient's prognosis is poor and unlikely to make any meaningful recovery. Discussed option for compassionate withdrawal of care, which family is leaning towards. Per sister family is preparing patient's daughter with the assistance of child life and then family will decide when to withdraw support. All questions answered and emotional support provided.

## 2023-11-20 NOTE — PROGRESS NOTE ADULT - ASSESSMENT
43F with history of pre-DM, HTN, HLD, ADHD, BENJAMIN, migraines, thrombocytosis, hypothyroidism not on synthroid, uterine polyps s/p polypectomy in 2/2023 presented from [facility] post cardiac arrest in the field. Imaging c/f extensive PNA. Transferred to [facility] for further management.     =====NEURO=====  #Concern for Anoxic Brain Injury  - arrested for unknown period both in the field & hospital  - CTH at OSH: findings suggestive of diffuse cerebral & cerebellar injury   - EEG notable for profound diffuse cerebral dysfunction, nonspecific in etiology. Continuous EEG monitoring discontinued per neuro.   -MRI with findings consistent with anoxic brain injury (diffuse cortical restricted diffusion, multiple foci of restricted diffusion in brainstem)  - current exam with dilated pupils, no corneal reflex, - will not consider sedation given current mental status  -11/16 apnea test- patient resumed spontaneously breathing at 8 minutes and 30 seconds, test aborted  -repeat apnea test yesterday; resumed spontaneous  breathing at 3 mins 30 secsonds    =====RESPIRATORY=====  #Intubated & Ventilated  #Patchy opacities  - was experiencing SOB and cough, called EMS and became pulseless  - CT chest at OSH neg for PE but showed patchy opacities c/f extensive PNA  - intubated in the field post-arrest  - AC RR 16/Tv 450/PEEP 5/FiO2 40  - monitor serial blood gases  - Has been on vancomycin & zosyn  -11/15 vanc discontinued, MRSA is neg  - repeat cultures, check sputum, RVP, urine legionella & stress  - check CXR for ?SQ gas seen on imaging OSH    =====CARDIOVASCULAR=====  #Cardiac Arrest  - unclear rhythm but pulseless requiring CPR/epi, no reported shock delivery  - unclear duration of total cardiac arrest, however, lost pulse en route to hospital  - unclear etiology - possibly secondary to hypoxia given imaging findings & sxs  - currently normotensive, is off pressors    =====GASTRO=====  - start TF, increase to goal as tolerated    =====RENAL=====  #SIVA  - Cr 0.58 on presentation, baseline normal 0.7  - likely hypoperfusion iso cardiac arrest  - monitor Cr/UOP  - strict I/Os, harrington catheter  - renal function improved, follow bmp    =====ID=====  #Patchy opacities on CT  - CT imaging findings as above  - repeat blood cxs, urine cx, sputum cx, RVP, MRSA  - urine legionella & strep  - empiric tx with vancomycin & zosyn; vanc dc'ed because MRSA negative  - procal elevated  - central line removed overnight 11/14    =====HEME/ONC=====  #Anemia  - chronic history of anemia but no evidence of overt bleed  - maintain active T&S, monitor CBC    #Thrombocytosis  - history of thrombocytosis, was on ASA at home  - continue ASA     =====ENDOCRINE=====  #Hypothyroidism  - history of hypothyroid but not on synthroid as per endocrine  - check TSH, T4, T3    =====ETHICS=====  - full code pending further GOC   43F with history of pre-DM, HTN, HLD, ADHD, BENJAMIN, migraines, thrombocytosis, hypothyroidism not on synthroid, uterine polyps s/p polypectomy in 2/2023 presented from [facility] post cardiac arrest in the field. Imaging c/f extensive PNA. Transferred to [facility] for further management.     =====NEURO=====  #Concern for Anoxic Brain Injury  - arrested for unknown period both in the field & hospital  - CTH at OSH: findings suggestive of diffuse cerebral & cerebellar injury   - EEG notable for profound diffuse cerebral dysfunction, nonspecific in etiology. Continuous EEG monitoring discontinued per neuro.   -MRI with findings consistent with anoxic brain injury (diffuse cortical restricted diffusion, multiple foci of restricted diffusion in brainstem)  - current exam with dilated pupils, no corneal reflex, - will not consider sedation given current mental status  -11/16 apnea test- patient resumed spontaneously breathing at 8 minutes and 30 seconds, test aborted  -repeat apnea test yesterday; resumed spontaneous  breathing at 3 mins 30 secsonds    =====RESPIRATORY=====  #Intubated & Ventilated  #Patchy opacities  - was experiencing SOB and cough, called EMS and became pulseless  - CT chest at OSH neg for PE but showed patchy opacities c/f extensive PNA  - intubated in the field post-arrest  - AC RR 16/Tv 450/PEEP 5/FiO2 40  - monitor serial blood gases  - Has been on vancomycin & zosyn  -11/15 vanc discontinued, MRSA is neg    =====CARDIOVASCULAR=====  #Cardiac Arrest  - unclear rhythm but pulseless requiring CPR/epi, no reported shock delivery  - unclear duration of total cardiac arrest, however, lost pulse en route to hospital  - unclear etiology - possibly secondary to hypoxia given imaging findings & sxs  - currently on levophed    =====GASTRO=====  - start TF, increase to goal as tolerated    =====RENAL=====  #SIVA  - Cr 0.58 on presentation, baseline normal 0.7  - likely hypoperfusion iso cardiac arrest  - monitor Cr/UOP  - strict I/Os, harrington catheter  - renal function have improved, will continue to follow BMPs    =====ID=====  #Patchy opacities on CT  - CT imaging findings as above  - empiric tx with vancomycin & zosyn; vanc dc'ed because MRSA negative  - central line removed overnight 11/14    =====HEME/ONC=====  #Anemia  - chronic history of anemia but no evidence of overt bleed  - maintain active T&S, monitor CBC    #Thrombocytosis  - history of thrombocytosis, was on ASA at home  - continue ASA     =====ENDOCRINE=====  #Hypothyroidism  - history of hypothyroid but not on synthroid as per endocrine    =====ETHICS=====  - full code pending further GOC 43F with history of pre-DM, HTN, HLD, ADHD, BENJAMIN, migraines, thrombocytosis, hypothyroidism not on synthroid, uterine polyps s/p polypectomy in 2/2023 presented from [facility] post cardiac arrest in the field. Imaging c/f extensive PNA. Transferred to [facility] for further management.     =====NEURO=====  #Concern for Anoxic Brain Injury  - arrested for unknown period both in the field & hospital  - CTH at OSH: findings suggestive of diffuse cerebral & cerebellar injury   - EEG notable for profound diffuse cerebral dysfunction, nonspecific in etiology. Continuous EEG monitoring discontinued per neuro.   -MRI with findings consistent with anoxic brain injury (diffuse cortical restricted diffusion, multiple foci of restricted diffusion in brainstem)  - current exam with dilated pupils, no corneal reflex, - will not consider sedation given current mental status  -11/16 apnea test- patient resumed spontaneously breathing at 8 minutes and 30 seconds, test aborted  -repeat apnea test yesterday; resumed spontaneous  breathing at 3 mins 30 secsonds    =====RESPIRATORY=====  #Intubated & Ventilated  #Patchy opacities  - was experiencing SOB and cough, called EMS and became pulseless  - CT chest at OSH neg for PE but showed patchy opacities c/f extensive PNA  - intubated in the field post-arrest  - AC RR 16/Tv 450/PEEP 5/FiO2 40  - monitor serial blood gases  - Has been on vancomycin & zosyn  -11/15 vanc discontinued, MRSA is neg    =====CARDIOVASCULAR=====  #Cardiac Arrest  - unclear rhythm but pulseless requiring CPR/epi, no reported shock delivery  - unclear duration of total cardiac arrest, however, lost pulse en route to hospital  - unclear etiology - possibly secondary to hypoxia given imaging findings & sxs  - currently on levophed    =====GASTRO=====  - start TF, increase to goal as tolerated    =====RENAL=====  #SIVA  - Cr 0.58 on presentation, baseline normal 0.7  - monitor Cr/UOP  - strict I/Os, harrington catheter  - will continue to follow BMPs  -Hypernatremia to 150, patient on free water     =====ID=====  #Patchy opacities on CT  - CT imaging findings as above  - empiric tx with vancomycin & zosyn; vanc dc'ed because MRSA negative  - central line removed overnight 11/14    =====HEME/ONC=====  #Anemia  - chronic history of anemia but no evidence of overt bleed  - maintain active T&S, monitor CBC    #Thrombocytosis  - history of thrombocytosis, was on ASA at home  - continue ASA     =====ENDOCRINE=====  #Hypothyroidism  - history of hypothyroid but not on synthroid as per endocrine    =====ETHICS=====  - full code pending further GOC

## 2023-11-21 NOTE — PROGRESS NOTE ADULT - ATTENDING COMMENTS
CRIS DENIS is a 43y Female with PMH of  DM2 HTN/HLD Admitted for post cardiac arrest care iso AHRF 2/2 pneumonia. Transferred to [facility] for ongoing care however continues to have poor neurologic recovery in the setting of 10 minutes down time    Continues to have no withdrawal to pain but demonstrated breathing on apnea test. CT head with cerebral edema     24hr event  - family discussion yesterday about pending apnea test yesterday    # Anoxic brain injury, clinical exam consistent with brain death  - Follow up EEG, dc if negative  - Discuss with family plan to repeat apnea test vs  NM perfusion study  # Hypernatremia, not central DI (urine output low)-- improved  - 350 ml FWF,  #AHRF  - continue vent settings PCO2 goal 40    #POCUS completed on exam   #DVT ppx - scd  #GOC - Full code, will discuss with family       Sumit Nava MD  Interventional Pulmonology & Critical Care Medicine.

## 2023-11-21 NOTE — CHART NOTE - NSCHARTNOTEFT_GEN_A_CORE
Nutrition Follow Up Note  Patient seen for: length of stay follow up.     Chart reviewed, events noted.    Source: [] Patient       [x] Medical Record        [x] RN        [] Family at bedside       [] Other:    -If unable to interview patient: [x] Trach/Vent/BiPAP  [] Disoriented/confused/inappropriate to interview    Diet Order:   Diet, NPO with Tube Feed:   Tube Feeding Modality: Orogastric  Vital High Protein (VITALHP)  Total Volume for 24 Hours (mL): 1440  Continuous  Starting Tube Feed Rate {mL per Hour}: 60  Until Goal Tube Feed Rate (mL per Hour): 60  Tube Feed Duration (in Hours): 24  Tube Feed Start Time: 18:00  No Carb Prosource TF     Qty per Day:  2 (23)    EN Order + Protein Modular Provides: total volume 1440 mL, 1520 kcals, 148 gm protein, and 1204 mL free water. Meets 19 kcals/kG and 1.9 gm protein/kG based on daily wt 79.5 kG ()  Current Pump Rate: Currently on hold per MICU 18 hr policy. Was at 60 mL/hr  5-Day EN Average Provision per RN flowsheet + Protein Modular: 1164 mL, 1746 kcals, and 74 gm protein  *Based on Jevity 1.5 as was order ->    Is current diet order appropriate/adequate? See recommendations below    PO intake :   [] >75%  Adequate    [] 50-75%  Fair       [] <50%  Poor   [x] N/A    Nutrition-related concerns:  - Intubated PTA. Not on sedation given current mental status. (clinical exam consistent with brain death)  - SIVA. Varying phosphorus levels during admission. Last drawn K and Phos WNL  - Cardiac Arrest  - Hypernatremia. Free water 200 mL q 6hours     GI:  Last BM  - large loose per RN flowsheet.   Bowel Regimen? [] Yes   [x] No    Weights:   Daily Weight in k.2 (-), 79.5 (-18), 83.7 (-16)  Slight wt fluctuations noted and likely fluid shifts. RD will continue to trend as new wts available/able.     Drug Dosing Weight  Height (cm): 172.7 (2023 17:59)  Weight (kg): 80.2 (2023 04:00)  BMI (kg/m2): 26.9 (2023 04:00)    Nutritionally pertinent:   MEDICATIONS  (STANDING):  aspirin  chewable 81 milliGRAM(s) Oral daily  heparin   Injectable 5000 Unit(s) SubCutaneous every 8 hours  norepinephrine Infusion 0.05 MICROgram(s)/kG/Min (8.17 mL/Hr) IV Continuous <Continuous>    Pertinent Labs:  @ 01:03: Na 144, BUN 18, Cr 0.62, <H>, K+ 4.1, Phos 3.1, Mg 1.9, Alk Phos 114, ALT/SGPT 38, AST/SGOT 22   @ 16:08: Na 149<H>, BUN 18, Cr 0.63, <H>, K+ 3.4<L>, Phos 4.6<H>, Mg 1.9, Alk Phos 117, ALT/SGPT 43, AST/SGOT 24    Skin per nursing documentation: No pressure injuries noted.  Edema per nursing documentation: 1+ generalized    Based on daily wt 79.5 kG ()  Estimated Energy Needs: (18-23 kcals/kG) 7593-4155 kcals  Estimated Protein Needs: (1.0-1.4 gm/kG) 79.5-111 gm  Fluid needs deferred to provider.   [facility] Equation: 1633 kcals ()    Previous Nutrition Diagnosis: No active nutrition diagnosis identified at this time  Nutrition Diagnosis is: [x] ongoing  [] resolved [] not applicable     Nutrition Care Plan:  [] In Progress  [] Achieved  [x] Not applicable    New Nutrition Diagnosis: [x] Not applicable    Nutrition Interventions:     Education Provided:       [] Yes:  [x] No: Not applicable    Recommendations:      1) Jevity 1.5 at goal rate 60 mL/hr x18 hours with ProSource NoCarb TF x1 daily to provide total volume 1080 mL, 1660 kcals, 80 gm protein, and 821 mL free water. Meets 21 kcals/kG and 1.0 gm protein/kG based on daily wt 79.5 kG ().     Monitoring and Evaluation:   Continue to monitor nutritional intake, tolerance to diet prescription, weights, labs, skin integrity    RD remains available upon request and will follow up per protocol  Rose Avitia, RD, MS, CDN, CNSC, CDCES TEAMS Nutrition Follow Up Note  Patient seen for: length of stay follow up.     Chart reviewed, events noted.    Source: [] Patient       [x] Medical Record        [x] RN        [] Family at bedside       [] Other:    -If unable to interview patient: [x] Trach/Vent/BiPAP  [] Disoriented/confused/inappropriate to interview    Diet Order:   Diet, NPO with Tube Feed:   Tube Feeding Modality: Orogastric  Vital High Protein (VITALHP)  Total Volume for 24 Hours (mL): 1440  Continuous  Starting Tube Feed Rate {mL per Hour}: 60  Until Goal Tube Feed Rate (mL per Hour): 60  Tube Feed Duration (in Hours): 24  Tube Feed Start Time: 18:00  No Carb Prosource TF     Qty per Day:  2 (23)    EN Order + Protein Modular Provides: total volume 1440 mL, 1520 kcals, 148 gm protein, and 1204 mL free water. Meets 19 kcals/kG and 1.9 gm protein/kG based on daily wt 79.5 kG ()  Current Pump Rate: Currently on hold per MICU 18 hr policy. Was at 60 mL/hr  5-Day EN Average Provision per RN flowsheet + Protein Modular: 1164 mL, 1746 kcals, and 74 gm protein  *Based on Jevity 1.5 as was order ->    Is current diet order appropriate/adequate? See recommendations below    PO intake :   [] >75%  Adequate    [] 50-75%  Fair       [] <50%  Poor   [x] N/A    Nutrition-related concerns:  - Intubated PTA. Not on sedation given current mental status. (clinical exam consistent with brain death)  - SIVA. Varying phosphorus levels during admission. Last drawn K and Phos WNL  - Cardiac Arrest  - Hypernatremia. Free water 200 mL q 6hours     GI:  Last BM  - large loose per RN flowsheet.   Bowel Regimen? [] Yes   [x] No    Weights:   Daily Weight in k.2 (-), 79.5 (-18), 83.7 (-16)  Slight wt fluctuations noted and likely fluid shifts. RD will continue to trend as new wts available/able.     Drug Dosing Weight  Height (cm): 172.7 (2023 17:59)  Weight (kg): 80.2 (2023 04:00)  BMI (kg/m2): 26.9 (2023 04:00)    Nutritionally pertinent:   MEDICATIONS  (STANDING):  aspirin  chewable 81 milliGRAM(s) Oral daily  heparin   Injectable 5000 Unit(s) SubCutaneous every 8 hours  norepinephrine Infusion 0.05 MICROgram(s)/kG/Min (8.17 mL/Hr) IV Continuous <Continuous>    Pertinent Labs:  @ 01:03: Na 144, BUN 18, Cr 0.62, <H>, K+ 4.1, Phos 3.1, Mg 1.9, Alk Phos 114, ALT/SGPT 38, AST/SGOT 22   @ 16:08: Na 149<H>, BUN 18, Cr 0.63, <H>, K+ 3.4<L>, Phos 4.6<H>, Mg 1.9, Alk Phos 117, ALT/SGPT 43, AST/SGOT 24    Skin per nursing documentation: No pressure injuries noted.  Edema per nursing documentation: 1+ generalized    Based on daily wt 79.5 kG ()  Estimated Energy Needs: (18-23 kcals/kG) 5516-2811 kcals  Estimated Protein Needs: (1.0-1.4 gm/kG) 79.5-111 gm  Fluid needs deferred to provider.   [facility] Equation: 1633 kcals ()    Previous Nutrition Diagnosis: No active nutrition diagnosis identified at this time  Nutrition Diagnosis is: [x] ongoing  [] resolved [] not applicable     Nutrition Care Plan:  [x] In Progress  [] Achieved  [] Not applicable    New Nutrition Diagnosis: [x] Not applicable    Nutrition Interventions:     Education Provided:       [] Yes:  [x] No: Not applicable    Recommendations:      1) Jevity 1.5 at goal rate 60 mL/hr x18 hours with ProSource NoCarb TF x1 daily to provide total volume 1080 mL, 1660 kcals, 80 gm protein, and 821 mL free water. Meets 21 kcals/kG and 1.0 gm protein/kG based on daily wt 79.5 kG ().     Monitoring and Evaluation:   Continue to monitor nutritional intake, tolerance to diet prescription, weights, labs, skin integrity    RD remains available upon request and will follow up per protocol  Rose Avitia, RD, MS, CDN, CNSC, CDCES TEAMS

## 2023-11-21 NOTE — PROGRESS NOTE ADULT - ASSESSMENT
43F with history of pre-DM, HTN, HLD, ADHD, BENJAMIN, migraines, thrombocytosis, hypothyroidism not on synthroid, uterine polyps s/p polypectomy in 2/2023 presented from [facility] post cardiac arrest in the field. Imaging c/f extensive PNA. Transferred to [facility] for further management.     =====NEURO=====  #Concern for Anoxic Brain Injury  - arrested for unknown period both in the field & hospital  - CTH at OSH: findings suggestive of diffuse cerebral & cerebellar injury   - EEG notable for profound diffuse cerebral dysfunction, nonspecific in etiology. Continuous EEG monitoring discontinued per neuro.   -MRI with findings consistent with anoxic brain injury (diffuse cortical restricted diffusion, multiple foci of restricted diffusion in brainstem)  - current exam with dilated pupils, no corneal reflex, - will not consider sedation given current mental status  -11/16 apnea test- patient resumed spontaneously breathing at 8 minutes and 30 seconds, test aborted  -repeat apnea test yesterday; resumed spontaneous  breathing at 3 mins 30 secsonds    =====RESPIRATORY=====  #Intubated & Ventilated  #Patchy opacities  - was experiencing SOB and cough, called EMS and became pulseless  - CT chest at OSH neg for PE but showed patchy opacities c/f extensive PNA  - intubated in the field post-arrest  - AC RR 16/Tv 450/PEEP 5/FiO2 40  - monitor serial blood gases  - Has been on vancomycin & zosyn  -11/15 vanc discontinued, MRSA is neg    =====CARDIOVASCULAR=====  #Cardiac Arrest  - unclear rhythm but pulseless requiring CPR/epi, no reported shock delivery  - unclear duration of total cardiac arrest, however, lost pulse en route to hospital  - unclear etiology - possibly secondary to hypoxia given imaging findings & sxs  - currently on levophed    =====GASTRO=====  - start TF, increase to goal as tolerated    =====RENAL=====  #SIVA  - Cr 0.58 on presentation, baseline normal 0.7  - monitor Cr/UOP  - strict I/Os, harrington catheter  - will continue to follow BMPs  -Hypernatremia to 150, patient on free water     =====ID=====  #Patchy opacities on CT  - CT imaging findings as above  - empiric tx with vancomycin & zosyn; vanc dc'ed because MRSA negative  - central line removed overnight 11/14    =====HEME/ONC=====  #Anemia  - chronic history of anemia but no evidence of overt bleed  - maintain active T&S, monitor CBC    #Thrombocytosis  - history of thrombocytosis, was on ASA at home  - continue ASA     =====ENDOCRINE=====  #Hypothyroidism  - history of hypothyroid but not on synthroid as per endocrine    =====ETHICS=====  - full code pending further GOC 43F with history of pre-DM, HTN, HLD, ADHD, BENJAMIN, migraines, thrombocytosis, hypothyroidism not on synthroid, uterine polyps s/p polypectomy in 2/2023 presented from [facility] post cardiac arrest in the field. Imaging c/f extensive PNA. Transferred to [facility] for further management.     =====NEURO=====  #Concern for Anoxic Brain Injury  - arrested for unknown period both in the field & hospital  - CTH at OSH: findings suggestive of diffuse cerebral & cerebellar injury   - EEG notable for profound diffuse cerebral dysfunction, nonspecific in etiology. Continuous EEG monitoring discontinued as no epileptiform activity.   -MRI with findings consistent with anoxic brain injury (diffuse cortical restricted diffusion, multiple foci of restricted diffusion in brainstem)  - current exam with dilated pupils, no corneal reflex  -11/16 apnea test- patient resumed spontaneously breathing at 8 minutes and 30 seconds, test aborted  -11/19 apnea test - resumed spontaneous  breathing at 3 mins 30 seconds  -Will attempt to obtain outside test results for perfusion scan    =====RESPIRATORY=====  #Intubated & Ventilated  #Patchy opacities  - was experiencing SOB and cough, called EMS and became pulseless  - CT chest at OSH neg for PE but showed patchy opacities c/f extensive PNA  - intubated in the field post-arrest  - AC RR 14/Tv 400/PEEP 5/FiO2 21  - monitor serial blood gases  - 11/15 vanc discontinued, MRSA is neg  - Zosyn completed    =====CARDIOVASCULAR=====  #Cardiac Arrest  - unclear rhythm but pulseless requiring CPR/epi, no reported shock delivery  - unclear duration of total cardiac arrest, however, lost pulse en route to hospital  - unclear etiology - possibly secondary to hypoxia given imaging findings & sxs  - currently on levophed    =====GASTRO=====  - On tube feeding thru OG tube, tolerating normally     =====RENAL=====  #SIVA  - Cr 0.6 on presentation, baseline normal 0.7  - monitor Cr/UOP  - strict I/Os, harrington catheter  - will continue to follow BMPs  -Hypernatremia to 150, patient on free water increased to 350 Q6H, repeat sodium 144, free water 200 Q6     =====ID=====  #Patchy opacities on CT  - CT imaging findings as above  - empiric tx with vancomycin & zosyn; vanc dc'ed because MRSA negative  - central line removed on 11/14  - No fever    =====HEME/ONC=====  #Anemia  - chronic history of anemia but no evidence of overt bleed  - monitor CBC    #Thrombocytosis  - history of thrombocytosis, was on ASA at home  - continue with ASA     #Leucocytosis   -no fever, Blood CS 11/18 NGTD, Urine/Sputum CS 11/15 NGTD    =====ENDOCRINE=====  #Hypothyroidism  - history of hypothyroid but not on synthroid as per endocrine    =====ETHICS=====  - full code pending further GOC  - Will consider repeating Apnea test    43F with history of pre-DM, HTN, HLD, ADHD, BENJAMIN, migraines, thrombocytosis, hypothyroidism not on synthroid, uterine polyps s/p polypectomy in 2/2023 presented from [facility] post cardiac arrest in the field. Imaging c/f extensive PNA. Transferred to [facility] for further management.     =====NEURO=====  #Concern for Anoxic Brain Injury  - arrested for unknown period both in the field & hospital  - CTH at OSH: findings suggestive of diffuse cerebral & cerebellar injury   - EEG notable for profound diffuse cerebral dysfunction, nonspecific in etiology. Continuous EEG monitoring discontinued as no epileptiform activity.   -MRI with findings consistent with anoxic brain injury (diffuse cortical restricted diffusion, multiple foci of restricted diffusion in brainstem)  - current exam with dilated pupils, no corneal reflex  -11/16 apnea test- patient resumed spontaneously breathing at 8 minutes and 30 seconds, test aborted  -11/19 apnea test - resumed spontaneous  breathing at 3 mins 30 seconds  -Cerebral viability study at OSH shows regional blood flow    =====RESPIRATORY=====  #Intubated & Ventilated  #Patchy opacities  - was experiencing SOB and cough, called EMS and became pulseless  - CT chest at OSH neg for PE but showed patchy opacities c/f extensive PNA  - intubated in the field post-arrest  - AC RR 14/Tv 400/PEEP 5/FiO2 21  - monitor serial blood gases  - 11/15 vanc discontinued, MRSA is neg  - Zosyn completed    =====CARDIOVASCULAR=====  #Cardiac Arrest  - unclear rhythm but pulseless requiring CPR/epi, no reported shock delivery  - unclear duration of total cardiac arrest, however, lost pulse en route to hospital  - unclear etiology - possibly secondary to hypoxia given imaging findings & sxs  - currently on levophed    =====GASTRO=====  - On tube feeding thru OG tube, tolerating normally     =====RENAL=====  #SIVA  - Cr 0.6 on presentation, baseline normal 0.7  - monitor Cr/UOP  - strict I/Os, harrington catheter  - will continue to follow BMPs  -Hypernatremia to 150, patient on free water increased to 350 Q6H, repeat sodium 144, free water 200 Q6     =====ID=====  #Patchy opacities on CT  - CT imaging findings as above  - empiric tx with vancomycin & zosyn; vanc dc'ed because MRSA negative  - central line removed on 11/14  - No fever    =====HEME/ONC=====  #Anemia  - chronic history of anemia but no evidence of overt bleed  - monitor CBC    #Thrombocytosis  - history of thrombocytosis, was on ASA at home  - continue with ASA     #Leucocytosis   -no fever, Blood CS 11/18 NGTD, Urine/Sputum CS 11/15 NGTD    =====ENDOCRINE=====  #Hypothyroidism  - history of hypothyroid but not on synthroid as per endocrine    =====ETHICS=====  - full code pending further GOC  - Will consider repeating Apnea test

## 2023-11-21 NOTE — PROGRESS NOTE ADULT - SUBJECTIVE AND OBJECTIVE BOX
***************************************************************  Eliza Adams, PGY-2  Internal Medicine   ***************************************************************    INTERVAL HPI/OVERNIGHT EVENTS:    SUBJECTIVE: Patient seen and examined at bedside.       VITAL SIGNS:  ICU Vital Signs Last 24 Hrs  T(C): 37.4 (21 Nov 2023 07:00), Max: 37.5 (21 Nov 2023 00:00)  T(F): 99.3 (21 Nov 2023 07:00), Max: 99.5 (21 Nov 2023 00:00)  HR: 102 (21 Nov 2023 08:00) (86 - 109)  BP: 104/61 (21 Nov 2023 08:00) (88/53 - 119/66)  BP(mean): 77 (21 Nov 2023 08:00) (64 - 90)  ABP: --  ABP(mean): --  RR: 14 (21 Nov 2023 08:00) (14 - 31)  SpO2: 95% (21 Nov 2023 08:00) (90% - 100%)    O2 Parameters below as of 20 Nov 2023 20:00  Patient On (Oxygen Delivery Method): ventilator          Mode: AC/ CMV (Assist Control/ Continuous Mandatory Ventilation), RR (machine): 14, TV (machine): 400, FiO2: 21, PEEP: 5, ITime: 0.8, MAP: 9, PIP: 22  Plateau pressure:   P/F ratio:     11-20 @ 07:01  -  11-21 @ 07:00  --------------------------------------------------------  IN: 3240.9 mL / OUT: 1145 mL / NET: 2095.9 mL    11-21 @ 07:01  -  11-21 @ 08:32  --------------------------------------------------------  IN: 60 mL / OUT: 100 mL / NET: -40 mL      CAPILLARY BLOOD GLUCOSE        ECG:    PHYSICAL EXAM:  General: NAD, resting in bed, on RA  HEENT: AT/NC, EOMI, PERRLA, clear conjunctiva and sclera  Lungs: clear to auscultation in anterior lung fields, no wheezes/rhonchi/rales  Heart: +s1/s2, RRR, no murmurs/gallops/rubs  Abdomen: soft, non-distended, non-tender to palpation, no rebound/guarding/rigidity, bowel sounds present  Extremities: +DP pulse bilaterally, no cyanosis/clubbing/edema  Neuro: AAOx3, no focal deficits    MEDICATIONS:  MEDICATIONS  (STANDING):  aspirin  chewable 81 milliGRAM(s) Oral daily  chlorhexidine 0.12% Liquid 15 milliLiter(s) Oral Mucosa every 12 hours  heparin   Injectable 5000 Unit(s) SubCutaneous every 8 hours  norepinephrine Infusion 0.05 MICROgram(s)/kG/Min (8.17 mL/Hr) IV Continuous <Continuous>    MEDICATIONS  (PRN):  artificial tears (preservative free) Ophthalmic Solution 1 Drop(s) Both EYES every 6 hours PRN Dry Eyes      ALLERGIES:  Allergies    No Known Allergies    Intolerances        LABS:                        7.3    15.65 )-----------( 446      ( 21 Nov 2023 01:03 )             24.5     11-21    144  |  107  |  18  ----------------------------<  134<H>  4.1   |  26  |  0.62    Ca    9.4      21 Nov 2023 01:03  Phos  3.1     11-21  Mg     1.9     11-21    TPro  6.6  /  Alb  2.5<L>  /  TBili  0.2  /  DBili  x   /  AST  22  /  ALT  38  /  AlkPhos  114  11-21      Urinalysis Basic - ( 21 Nov 2023 01:03 )    Color: x / Appearance: x / SG: x / pH: x  Gluc: 134 mg/dL / Ketone: x  / Bili: x / Urobili: x   Blood: x / Protein: x / Nitrite: x   Leuk Esterase: x / RBC: x / WBC x   Sq Epi: x / Non Sq Epi: x / Bacteria: x        RADIOLOGY & ADDITIONAL TESTS: Reviewed. INTERVAL HPI/OVERNIGHT EVENTS: None reported     SUBJECTIVE: Patient seen and examined at bedside.     VITAL SIGNS:  ICU Vital Signs Last 24 Hrs  T(C): 37.4 (21 Nov 2023 07:00), Max: 37.5 (21 Nov 2023 00:00)  T(F): 99.3 (21 Nov 2023 07:00), Max: 99.5 (21 Nov 2023 00:00)  HR: 102 (21 Nov 2023 08:00) (86 - 109)  BP: 104/61 (21 Nov 2023 08:00) (88/53 - 119/66)  BP(mean): 77 (21 Nov 2023 08:00) (64 - 90)  RR: 14 (21 Nov 2023 08:00) (14 - 31)  SpO2: 95% (21 Nov 2023 08:00) (90% - 100%)    O2 Parameters below as of 20 Nov 2023 20:00  Patient On (Oxygen Delivery Method): ventilator    Mode: AC/ CMV (Assist Control/ Continuous Mandatory Ventilation), RR (machine): 14, TV (machine): 400, FiO2: 21, PEEP: 5, ITime: 0.8, MAP: 9, PIP: 22  Plateau pressure:   P/F ratio:     11-20 @ 07:01  -  11-21 @ 07:00  --------------------------------------------------------  IN: 3240.9 mL / OUT: 1145 mL / NET: 2095.9 mL    11-21 @ 07:01  -  11-21 @ 08:32  --------------------------------------------------------  IN: 60 mL / OUT: 100 mL / NET: -40 mL      CAPILLARY BLOOD GLUCOSE    ECG:    PGENERAL: Intubated off sedation, appears to be lying in bed with no apparent distress   HEAD:  Normocephalic  EYES: Pupils are 4 mm BL and fixed, conjunctiva and sclera clear  NECK: Supple, trachea is midline  HEART: Regular rate and rhythm, no murmurs, rubs, or gallops  LUNGS: Unlabored respirations. Clear to auscultation bilaterally, no crackles, wheezing, or rhonchi  ABDOMEN: Soft, nontender, nondistended, +BS  EXTREMITIES: 2+ peripheral pulses bilaterally, cap refill<2 secs. No cyanosis, edema   NERVOUS SYSTEM:  AAOx0, BL pupils are 4 mm, fixed  SKIN: No new rashes, lesions    MEDICATIONS:  MEDICATIONS  (STANDING):  aspirin  chewable 81 milliGRAM(s) Oral daily  chlorhexidine 0.12% Liquid 15 milliLiter(s) Oral Mucosa every 12 hours  heparin   Injectable 5000 Unit(s) SubCutaneous every 8 hours  norepinephrine Infusion 0.05 MICROgram(s)/kG/Min (8.17 mL/Hr) IV Continuous <Continuous>    MEDICATIONS  (PRN):  artificial tears (preservative free) Ophthalmic Solution 1 Drop(s) Both EYES every 6 hours PRN Dry Eyes      ALLERGIES:  Allergies    No Known Allergies    Intolerances        LABS:                        7.3    15.65 )-----------( 446      ( 21 Nov 2023 01:03 )             24.5     11-21    144  |  107  |  18  ----------------------------<  134<H>  4.1   |  26  |  0.62    Ca    9.4      21 Nov 2023 01:03  Phos  3.1     11-21  Mg     1.9     11-21    TPro  6.6  /  Alb  2.5<L>  /  TBili  0.2  /  DBili  x   /  AST  22  /  ALT  38  /  AlkPhos  114  11-21      Urinalysis Basic - ( 21 Nov 2023 01:03 )    Color: x / Appearance: x / SG: x / pH: x  Gluc: 134 mg/dL / Ketone: x  / Bili: x / Urobili: x   Blood: x / Protein: x / Nitrite: x   Leuk Esterase: x / RBC: x / WBC x   Sq Epi: x / Non Sq Epi: x / Bacteria: x      RADIOLOGY & ADDITIONAL TESTS: Reviewed.

## 2023-11-22 NOTE — PROGRESS NOTE ADULT - ASSESSMENT
43F with history of pre-DM, HTN, HLD, ADHD, BENJAMIN, migraines, thrombocytosis, hypothyroidism not on synthroid, uterine polyps s/p polypectomy in 2/2023 presented from [facility] post cardiac arrest in the field. Imaging c/f extensive PNA. Transferred to [facility] for further management.     =====NEURO=====  #Concern for Anoxic Brain Injury  - arrested for unknown period both in the field & hospital  - CTH at OSH: findings suggestive of diffuse cerebral & cerebellar injury   - EEG notable for profound diffuse cerebral dysfunction, nonspecific in etiology. Continuous EEG monitoring discontinued as no epileptiform activity.   -MRI with findings consistent with anoxic brain injury (diffuse cortical restricted diffusion, multiple foci of restricted diffusion in brainstem)  - current exam with dilated pupils, no corneal reflex  -11/16 apnea test- patient resumed spontaneously breathing at 8 minutes and 30 seconds, test aborted  -11/19 apnea test - resumed spontaneous  breathing at 3 mins 30 seconds  -Cerebral viability study at OSH shows regional blood flow    =====RESPIRATORY=====  #Intubated & Ventilated  #Patchy opacities  - was experiencing SOB and cough, called EMS and became pulseless  - CT chest at OSH neg for PE but showed patchy opacities c/f extensive PNA  - intubated in the field post-arrest  - AC RR 14/Tv 400/PEEP 5/FiO2 21  - monitor serial blood gases  - 11/15 vanc discontinued, MRSA is neg  - Zosyn completed    =====CARDIOVASCULAR=====  #Cardiac Arrest  - unclear rhythm but pulseless requiring CPR/epi, no reported shock delivery  - unclear duration of total cardiac arrest, however, lost pulse en route to hospital  - unclear etiology - possibly secondary to hypoxia given imaging findings & sxs    =====GASTRO=====  - On tube feeding thru OG tube, tolerating normally. No bowel movements past shift.      =====RENAL=====  #SIVA  - Cr 0.6 on presentation, baseline normal 0.7  - monitor Cr/UOP  - strict I/Os, harrington catheter  - will continue to follow BMPs  -Sodium 150, patient on free water increased to 350 Q6H, repeat sodium 144, free water 200 Q6, repeat Sodium 147     =====ID=====  #Patchy opacities on CT  - CT imaging findings as above  - empiric tx with vancomycin & zosyn; vanc dc'ed because MRSA negative  - central line removed on 11/14  - No fevers    =====HEME/ONC=====  #Anemia  - Hb 6.8 transf. PRC, Total Fe. is 7.0 on FeSO4    #Thrombocytosis  - history of thrombocytosis, was on ASA at home  - continue with ASA     #Leucocytosis   -no fever, Blood CS 11/18 NGTD, Urine/Sputum CS 11/15 NGTD    =====ENDOCRINE=====  #Hypothyroidism  - history of hypothyroid but not on synthroid as per endocrine    =====ETHICS=====  - On going discussion for GOC, palliative on board, plan to extubate over weekend

## 2023-11-22 NOTE — PROGRESS NOTE ADULT - ATTENDING COMMENTS
CRIS DENIS is a 43y Female with PMH of  DM2 HTN/HLD Admitted for post cardiac arrest care iso AHRF 2/2 pneumonia. Transferred to [facility] for ongoing care however continues to have poor neurologic recovery in the setting of 10 minutes down time    - 1u pRBC overnight  -       # Anoxic brain injury, clinical exam consistent with brain death, NM perfusion at OSH with normal perfusion  - Follow up EEG, dc if negative  - At request of family will repeat apnea test Friday  # Hypernatremia, not central DI (urine output low)-- improved  - Increase FWF,  #AHRF  - continue vent settings PCO2 goal 40  #POCUS completed on exam   #DVT ppx - scd  #GOC - Full code, will discuss with family       Sumit Nava MD  Interventional Pulmonology & Critical Care Medicine. CRIS DENIS is a 43y Female with PMH of  DM2 HTN/HLD Admitted for post cardiac arrest care iso AHRF 2/2 pneumonia. Transferred to [facility] for ongoing care however continues to have poor neurologic recovery in the setting of 10 minutes down time    - 1u pRBC overnight      At families request repeated brain death exam which remains consistent with previous exams. Apnea test was not performed at family request as thanksgiving is tomorrow. Will discuss further on Friday     # Anoxic brain injury, clinical exam consistent with brain death, NM perfusion at OSH with normal perfusion  - Follow up EEG, dc if negative  - At request of family will repeat apnea test Friday  # Hypernatremia, not central DI (urine output low)-- improved  - Increase FWF,  #AHRF  - continue vent settings PCO2 goal 40  #POCUS completed on exam   #DVT ppx - scd  #GOC - Full code, will discuss with family       Sumit Nava MD  Interventional Pulmonology & Critical Care Medicine.

## 2023-11-22 NOTE — PROGRESS NOTE ADULT - SUBJECTIVE AND OBJECTIVE BOX
INTERVAL HPI/OVERNIGHT EVENTS: Hb pf 6.8, transfused 1 PRC, Fe 7.0, put on FeSO4, Na 147 on FWF    SUBJECTIVE: Patient seen and examined at bedside.     ROS: All negative except as listed above.    VITAL SIGNS:  ICU Vital Signs Last 24 Hrs  T(C): 36.7 (22 Nov 2023 12:00), Max: 37.9 (21 Nov 2023 20:00)  T(F): 98 (22 Nov 2023 12:00), Max: 100.2 (21 Nov 2023 20:00)  HR: 86 (22 Nov 2023 12:00) (84 - 119)  BP: 105/58 (22 Nov 2023 12:00) (99/56 - 114/62)  BP(mean): 75 (22 Nov 2023 12:00) (72 - 81)  RR: 14 (22 Nov 2023 12:00) (14 - 14)  SpO2: 92% (22 Nov 2023 12:00) (91% - 99%)    O2 Parameters below as of 22 Nov 2023 08:00  Patient On (Oxygen Delivery Method): ventilator    O2 Concentration (%): 25    Mode: AC/ CMV (Assist Control/ Continuous Mandatory Ventilation), RR (machine): 14, TV (machine): 400, FiO2: 21, PEEP: 5, ITime: 0.75, MAP: 10, PIP: 23  Plateau pressure:   P/F ratio:     11-21 @ 07:01  -  11-22 @ 07:00  --------------------------------------------------------  IN: 2310 mL / OUT: 1725 mL / NET: 585 mL    11-22 @ 07:01  -  11-22 @ 14:21  --------------------------------------------------------  IN: 200 mL / OUT: 150 mL / NET: 50 mL      CAPILLARY BLOOD GLUCOSE    ECG: reviewed.    PHYSICAL EXAM:    GENERAL: NAD, lying in bed comfortably  HEAD:  Atraumatic, normocephalic  EYES: EOMI, PERRLA, conjunctiva and sclera clear  NECK: Supple, trachea midline, no JVD  HEART: Regular rate and rhythm, no murmurs, rubs, or gallops  LUNGS: Unlabored respirations.  Clear to auscultation bilaterally, no crackles, wheezing, or rhonchi  ABDOMEN: Soft, nontender, nondistended, +BS  EXTREMITIES: 2+ peripheral pulses bilaterally, cap refill<2 secs. No clubbing, cyanosis, or edema  NERVOUS SYSTEM:  A&Ox3, following commands, moving all extremities, no focal deficits   SKIN: No rashes or lesions    MEDICATIONS:  MEDICATIONS  (STANDING):  aspirin  chewable 81 milliGRAM(s) Oral daily  chlorhexidine 0.12% Liquid 15 milliLiter(s) Oral Mucosa every 12 hours  ferrous    sulfate 325 milliGRAM(s) Oral daily    MEDICATIONS  (PRN):  artificial tears (preservative free) Ophthalmic Solution 1 Drop(s) Both EYES every 6 hours PRN Dry Eyes    ALLERGIES:  Allergies    No Known Allergies    Intolerances    LABS:                        6.8    16.99 )-----------( 446      ( 21 Nov 2023 23:55 )             22.5     11-21    147<H>  |  107  |  15  ----------------------------<  166<H>  3.5   |  30  |  0.64    Ca    9.5      21 Nov 2023 23:55  Phos  3.4     11-21  Mg     1.9     11-21    TPro  6.8  /  Alb  2.6<L>  /  TBili  0.1<L>  /  DBili  x   /  AST  15  /  ALT  27  /  AlkPhos  126<H>  11-21      Urinalysis Basic - ( 21 Nov 2023 23:55 )    Color: x / Appearance: x / SG: x / pH: x  Gluc: 166 mg/dL / Ketone: x  / Bili: x / Urobili: x   Blood: x / Protein: x / Nitrite: x   Leuk Esterase: x / RBC: x / WBC x   Sq Epi: x / Non Sq Epi: x / Bacteria: x      ABG:  pH, Arterial: 7.40 (11-21-23 @ 23:54)  pCO2, Arterial: 51 mmHg (11-21-23 @ 23:54)  pO2, Arterial: 93 mmHg (11-21-23 @ 23:54)      vBG:    Micro:    Culture - Blood (collected 11-18-23 @ 17:40)  Source: .Blood Blood-Peripheral  Preliminary Report (11-21-23 @ 23:01):    No growth at 72 Hours    Culture - Blood (collected 11-18-23 @ 17:30)  Source: .Blood Blood-Peripheral  Preliminary Report (11-21-23 @ 23:01):    No growth at 72 Hours    Culture - Blood (collected 11-14-23 @ 18:15)  Source: .Blood Blood  Final Report (11-20-23 @ 01:00):    No growth at 5 days    Culture - Blood (collected 11-14-23 @ 18:00)  Source: .Blood Blood  Final Report (11-20-23 @ 01:00):    No growth at 5 days        Culture - Sputum (collected 11-15-23 @ 01:08)  Source: .Sputum Sputum  Gram Stain (11-15-23 @ 16:34):    Rare polymorphonuclear leukocytes per low power field    No Squamous epithelial cells per low power field    No organisms seen per oil power field  Final Report (11-17-23 @ 12:04):    Normal Respiratory Jenni present        RADIOLOGY & ADDITIONAL TESTS: Reviewed.

## 2023-11-23 NOTE — PROGRESS NOTE ADULT - SUBJECTIVE AND OBJECTIVE BOX
INTERVAL HPI/OVERNIGHT EVENTS:    SUBJECTIVE: Patient seen and examined at bedside.     ROS: All negative except as listed above.    VITAL SIGNS:  ICU Vital Signs Last 24 Hrs  T(C): 37.3 (23 Nov 2023 04:00), Max: 38.3 (22 Nov 2023 20:00)  T(F): 99.1 (23 Nov 2023 04:00), Max: 100.9 (22 Nov 2023 20:00)  HR: 90 (23 Nov 2023 07:00) (84 - 104)  BP: 95/52 (23 Nov 2023 07:00) (95/52 - 119/73)  BP(mean): 69 (23 Nov 2023 07:00) (69 - 91)  ABP: --  ABP(mean): --  RR: 14 (23 Nov 2023 07:00) (14 - 16)  SpO2: 92% (23 Nov 2023 07:00) (90% - 97%)    O2 Parameters below as of 23 Nov 2023 08:00  Patient On (Oxygen Delivery Method): ventilator    O2 Concentration (%): 21      Mode: AC/ CMV (Assist Control/ Continuous Mandatory Ventilation), RR (machine): 14, TV (machine): 400, FiO2: 40, PEEP: 5, ITime: 1, MAP: 8, PIP: 22  Plateau pressure:   P/F ratio:     11-22 @ 07:01  -  11-23 @ 07:00  --------------------------------------------------------  IN: 3020 mL / OUT: 3410 mL / NET: -390 mL      CAPILLARY BLOOD GLUCOSE      POCT Blood Glucose.: 220 mg/dL (23 Nov 2023 05:31)      ECG: reviewed.    PHYSICAL EXAM:    GENERAL: NAD, lying in bed comfortably  HEAD:  Atraumatic, normocephalic  EYES: EOMI, PERRLA, conjunctiva and sclera clear  NECK: Supple, trachea midline, no JVD  HEART: Regular rate and rhythm, no murmurs, rubs, or gallops  LUNGS: Unlabored respirations.  Clear to auscultation bilaterally, no crackles, wheezing, or rhonchi  ABDOMEN: Soft, nontender, nondistended, +BS  EXTREMITIES: 2+ peripheral pulses bilaterally, cap refill<2 secs. No clubbing, cyanosis, or edema  NERVOUS SYSTEM:  A&Ox3, following commands, moving all extremities, no focal deficits   SKIN: No rashes or lesions    MEDICATIONS:  MEDICATIONS  (STANDING):  aspirin  chewable 81 milliGRAM(s) Oral daily  chlorhexidine 0.12% Liquid 15 milliLiter(s) Oral Mucosa every 12 hours  ferrous    sulfate 325 milliGRAM(s) Oral daily  insulin lispro (ADMELOG) corrective regimen sliding scale   SubCutaneous every 6 hours  lactated ringers. 1000 milliLiter(s) (75 mL/Hr) IV Continuous <Continuous>  senna Syrup 10 milliLiter(s) Oral at bedtime    MEDICATIONS  (PRN):  artificial tears (preservative free) Ophthalmic Solution 1 Drop(s) Both EYES every 6 hours PRN Dry Eyes      ALLERGIES:  Allergies    No Known Allergies    Intolerances        LABS:                        8.4    14.40 )-----------( 533      ( 23 Nov 2023 00:32 )             28.4     11-23    156<H>  |  115<H>  |  19  ----------------------------<  308<H>  4.0   |  29  |  0.91    Ca    10.5      23 Nov 2023 00:32  Phos  5.7     11-23  Mg     2.6     11-23    TPro  7.6  /  Alb  2.8<L>  /  TBili  0.1<L>  /  DBili  x   /  AST  19  /  ALT  27  /  AlkPhos  159<H>  11-23      Urinalysis Basic - ( 23 Nov 2023 00:32 )    Color: x / Appearance: x / SG: x / pH: x  Gluc: 308 mg/dL / Ketone: x  / Bili: x / Urobili: x   Blood: x / Protein: x / Nitrite: x   Leuk Esterase: x / RBC: x / WBC x   Sq Epi: x / Non Sq Epi: x / Bacteria: x      ABG:  pH, Arterial: 7.35 (11-23-23 @ 00:20)  pCO2, Arterial: 59 mmHg (11-23-23 @ 00:20)  pO2, Arterial: 112 mmHg (11-23-23 @ 00:20)      vBG:    Micro:    Culture - Blood (collected 11-18-23 @ 17:40)  Source: .Blood Blood-Peripheral  Preliminary Report (11-22-23 @ 23:00):    No growth at 4 days    Culture - Blood (collected 11-18-23 @ 17:30)  Source: .Blood Blood-Peripheral  Preliminary Report (11-22-23 @ 23:00):    No growth at 4 days    Culture - Blood (collected 11-14-23 @ 18:15)  Source: .Blood Blood  Final Report (11-20-23 @ 01:00):    No growth at 5 days    Culture - Blood (collected 11-14-23 @ 18:00)  Source: .Blood Blood  Final Report (11-20-23 @ 01:00):    No growth at 5 days        Culture - Sputum (collected 11-15-23 @ 01:08)  Source: .Sputum Sputum  Gram Stain (11-15-23 @ 16:34):    Rare polymorphonuclear leukocytes per low power field    No Squamous epithelial cells per low power field    No organisms seen per oil power field  Final Report (11-17-23 @ 12:04):    Normal Respiratory Jenni present        RADIOLOGY & ADDITIONAL TESTS: Reviewed.   INTERVAL HPI/OVERNIGHT EVENTS:    SUBJECTIVE: Patient seen and examined at bedside.     ROS: All negative except as listed above.    VITAL SIGNS:  ICU Vital Signs Last 24 Hrs  T(C): 37.3 (23 Nov 2023 04:00), Max: 38.3 (22 Nov 2023 20:00)  T(F): 99.1 (23 Nov 2023 04:00), Max: 100.9 (22 Nov 2023 20:00)  HR: 90 (23 Nov 2023 07:00) (84 - 104)  BP: 95/52 (23 Nov 2023 07:00) (95/52 - 119/73)  BP(mean): 69 (23 Nov 2023 07:00) (69 - 91)  RR: 14 (23 Nov 2023 07:00) (14 - 16)  SpO2: 92% (23 Nov 2023 07:00) (90% - 97%)    O2 Parameters below as of 23 Nov 2023 08:00  Patient On (Oxygen Delivery Method): ventilator    O2 Concentration (%): 21      Mode: AC/ CMV (Assist Control/ Continuous Mandatory Ventilation), RR (machine): 14, TV (machine): 400, FiO2: 40, PEEP: 5, ITime: 1, MAP: 8, PIP: 22  Plateau pressure:   P/F ratio:     11-22 @ 07:01  -  11-23 @ 07:00  --------------------------------------------------------  IN: 3020 mL / OUT: 3410 mL / NET: -390 mL      CAPILLARY BLOOD GLUCOSE      POCT Blood Glucose.: 220 mg/dL (23 Nov 2023 05:31)    ECG: reviewed.    PHYSICAL EXAM:    GENERAL: NAD, lying in bed comfortably  HEAD:  Atraumatic, normocephalic  EYES: EOMI, fixed dilated pupils 4 mm, conjunctival chemosis  NECK: Supple, trachea midline  HEART: Regular rate and rhythm, no murmurs, rubs, or gallops  LUNGS: Unlabored respirations.  Clear to auscultation bilaterally, no crackles, wheezing, or rhonchi  ABDOMEN: Soft, nontender, nondistended, +BS, has bowel movements  EXTREMITIES: 2+ peripheral pulses bilaterally, cap refill<2 secs. No clubbing, cyanosis, or edema  NERVOUS SYSTEM:  Symm dilated/fixed pupils, no corneal reflex, no withdrawal to pain   SKIN: No rashes or lesions    MEDICATIONS:  MEDICATIONS  (STANDING):  aspirin  chewable 81 milliGRAM(s) Oral daily  chlorhexidine 0.12% Liquid 15 milliLiter(s) Oral Mucosa every 12 hours  ferrous    sulfate 325 milliGRAM(s) Oral daily  insulin lispro (ADMELOG) corrective regimen sliding scale   SubCutaneous every 6 hours  lactated ringers. 1000 milliLiter(s) (75 mL/Hr) IV Continuous <Continuous>  senna Syrup 10 milliLiter(s) Oral at bedtime    MEDICATIONS  (PRN):  artificial tears (preservative free) Ophthalmic Solution 1 Drop(s) Both EYES every 6 hours PRN Dry Eyes    ALLERGIES:  Allergies    No Known Allergies    Intolerances    LABS:                        8.4    14.40 )-----------( 533      ( 23 Nov 2023 00:32 )             28.4     11-23    156<H>  |  115<H>  |  19  ----------------------------<  308<H>  4.0   |  29  |  0.91    Ca    10.5      23 Nov 2023 00:32  Phos  5.7     11-23  Mg     2.6     11-23    TPro  7.6  /  Alb  2.8<L>  /  TBili  0.1<L>  /  DBili  x   /  AST  19  /  ALT  27  /  AlkPhos  159<H>  11-23      Urinalysis Basic - ( 23 Nov 2023 00:32 )    Color: x / Appearance: x / SG: x / pH: x  Gluc: 308 mg/dL / Ketone: x  / Bili: x / Urobili: x   Blood: x / Protein: x / Nitrite: x   Leuk Esterase: x / RBC: x / WBC x   Sq Epi: x / Non Sq Epi: x / Bacteria: x      ABG:  pH, Arterial: 7.35 (11-23-23 @ 00:20)  pCO2, Arterial: 59 mmHg (11-23-23 @ 00:20)  pO2, Arterial: 112 mmHg (11-23-23 @ 00:20)      vBG:    Micro:    Culture - Blood (collected 11-18-23 @ 17:40)  Source: .Blood Blood-Peripheral  Preliminary Report (11-22-23 @ 23:00):    No growth at 4 days    Culture - Blood (collected 11-18-23 @ 17:30)  Source: .Blood Blood-Peripheral  Preliminary Report (11-22-23 @ 23:00):    No growth at 4 days    Culture - Blood (collected 11-14-23 @ 18:15)  Source: .Blood Blood  Final Report (11-20-23 @ 01:00):    No growth at 5 days    Culture - Blood (collected 11-14-23 @ 18:00)  Source: .Blood Blood  Final Report (11-20-23 @ 01:00):    No growth at 5 days    Culture - Sputum (collected 11-15-23 @ 01:08)  Source: .Sputum Sputum  Gram Stain (11-15-23 @ 16:34):    Rare polymorphonuclear leukocytes per low power field    No Squamous epithelial cells per low power field    No organisms seen per oil power field  Final Report (11-17-23 @ 12:04):    Normal Respiratory Jenni present        RADIOLOGY & ADDITIONAL TESTS: Reviewed.   INTERVAL HPI/OVERNIGHT EVENTS: Repeat sodium 156, started on LR, increased  Q6    SUBJECTIVE: Patient seen and examined at bedside.     ROS: All negative except as listed above.    VITAL SIGNS:  ICU Vital Signs Last 24 Hrs  T(C): 37.3 (23 Nov 2023 04:00), Max: 38.3 (22 Nov 2023 20:00)  T(F): 99.1 (23 Nov 2023 04:00), Max: 100.9 (22 Nov 2023 20:00)  HR: 90 (23 Nov 2023 07:00) (84 - 104)  BP: 95/52 (23 Nov 2023 07:00) (95/52 - 119/73)  BP(mean): 69 (23 Nov 2023 07:00) (69 - 91)  RR: 14 (23 Nov 2023 07:00) (14 - 16)  SpO2: 92% (23 Nov 2023 07:00) (90% - 97%)    O2 Parameters below as of 23 Nov 2023 08:00  Patient On (Oxygen Delivery Method): ventilator    O2 Concentration (%): 21    Mode: AC/ CMV (Assist Control/ Continuous Mandatory Ventilation), RR (machine): 14, TV (machine): 400, FiO2: 40, PEEP: 5, ITime: 1, MAP: 8, PIP: 22  Plateau pressure:   P/F ratio:     11-22 @ 07:01  -  11-23 @ 07:00  --------------------------------------------------------  IN: 3020 mL / OUT: 3410 mL / NET: -390 mL    CAPILLARY BLOOD GLUCOSE    POCT Blood Glucose.: 220 mg/dL (23 Nov 2023 05:31)    ECG: reviewed.    PHYSICAL EXAM:    GENERAL: NAD, lying in bed comfortably  HEAD:  Atraumatic, normocephalic  EYES: EOMI, fixed dilated pupils 4 mm, conjunctival chemosis  NECK: Supple, trachea midline  HEART: Regular rate and rhythm, no murmurs, rubs, or gallops  LUNGS: Unlabored respirations.  Clear to auscultation bilaterally, no crackles, wheezing, or rhonchi  ABDOMEN: Soft, nontender, nondistended, +BS, has bowel movements. Rascon in place, dilute urine.   EXTREMITIES: 2+ peripheral pulses bilaterally, cap refill<2 secs. No clubbing, cyanosis, or edema  NERVOUS SYSTEM:  Symm. dilated/fixed pupils, no corneal reflex, no withdrawal to pain   SKIN: No rashes or lesions    MEDICATIONS:  MEDICATIONS  (STANDING):  aspirin  chewable 81 milliGRAM(s) Oral daily  chlorhexidine 0.12% Liquid 15 milliLiter(s) Oral Mucosa every 12 hours  ferrous    sulfate 325 milliGRAM(s) Oral daily  insulin lispro (ADMELOG) corrective regimen sliding scale   SubCutaneous every 6 hours  lactated ringers. 1000 milliLiter(s) (75 mL/Hr) IV Continuous <Continuous>  senna Syrup 10 milliLiter(s) Oral at bedtime    MEDICATIONS  (PRN):  artificial tears (preservative free) Ophthalmic Solution 1 Drop(s) Both EYES every 6 hours PRN Dry Eyes    ALLERGIES:  Allergies    No Known Allergies    Intolerances    LABS:                        8.4    14.40 )-----------( 533      ( 23 Nov 2023 00:32 )             28.4     11-23    156<H>  |  115<H>  |  19  ----------------------------<  308<H>  4.0   |  29  |  0.91    Ca    10.5      23 Nov 2023 00:32  Phos  5.7     11-23  Mg     2.6     11-23    TPro  7.6  /  Alb  2.8<L>  /  TBili  0.1<L>  /  DBili  x   /  AST  19  /  ALT  27  /  AlkPhos  159<H>  11-23      Urinalysis Basic - ( 23 Nov 2023 00:32 )    Color: x / Appearance: x / SG: x / pH: x  Gluc: 308 mg/dL / Ketone: x  / Bili: x / Urobili: x   Blood: x / Protein: x / Nitrite: x   Leuk Esterase: x / RBC: x / WBC x   Sq Epi: x / Non Sq Epi: x / Bacteria: x      ABG:  pH, Arterial: 7.35 (11-23-23 @ 00:20)  pCO2, Arterial: 59 mmHg (11-23-23 @ 00:20)  pO2, Arterial: 112 mmHg (11-23-23 @ 00:20)      vBG:    Micro:    Culture - Blood (collected 11-18-23 @ 17:40)  Source: .Blood Blood-Peripheral  Preliminary Report (11-22-23 @ 23:00):    No growth at 4 days    Culture - Blood (collected 11-18-23 @ 17:30)  Source: .Blood Blood-Peripheral  Preliminary Report (11-22-23 @ 23:00):    No growth at 4 days    Culture - Blood (collected 11-14-23 @ 18:15)  Source: .Blood Blood  Final Report (11-20-23 @ 01:00):    No growth at 5 days    Culture - Blood (collected 11-14-23 @ 18:00)  Source: .Blood Blood  Final Report (11-20-23 @ 01:00):    No growth at 5 days    Culture - Sputum (collected 11-15-23 @ 01:08)  Source: .Sputum Sputum  Gram Stain (11-15-23 @ 16:34):    Rare polymorphonuclear leukocytes per low power field    No Squamous epithelial cells per low power field    No organisms seen per oil power field  Final Report (11-17-23 @ 12:04):    Normal Respiratory Jenni present        RADIOLOGY & ADDITIONAL TESTS: Reviewed.

## 2023-11-23 NOTE — PROGRESS NOTE ADULT - ASSESSMENT
43F with history of pre-DM, HTN, HLD, ADHD, BENJAMIN, migraines, thrombocytosis, hypothyroidism not on synthroid, uterine polyps s/p polypectomy in 2/2023 presented from [facility] post cardiac arrest in the field. Imaging c/f extensive PNA. Transferred to [facility] for further management.     =====NEURO=====  #Concern for Anoxic Brain Injury  - arrested for unknown period both in the field & hospital  - CTH at OSH: findings suggestive of diffuse cerebral & cerebellar injury   - EEG notable for profound diffuse cerebral dysfunction, nonspecific in etiology. Continuous EEG monitoring discontinued as no epileptiform activity.   -MRI with findings consistent with anoxic brain injury (diffuse cortical restricted diffusion, multiple foci of restricted diffusion in brainstem)  - current exam with dilated pupils, no corneal reflex  -11/16 apnea test- patient resumed spontaneously breathing at 8 minutes and 30 seconds, test aborted  -11/19 apnea test - resumed spontaneous  breathing at 3 mins 30 seconds  -Cerebral viability study at OSH shows regional blood flow    =====RESPIRATORY=====  #Intubated & Ventilated  #Patchy opacities  - was experiencing SOB and cough, called EMS and became pulseless  - CT chest at OSH neg for PE but showed patchy opacities c/f extensive PNA  - intubated in the field post-arrest  - AC RR 14/Tv 400/PEEP 5/FiO2 21  - monitor serial blood gases  - 11/15 vanc discontinued, MRSA is neg  - Zosyn completed    =====CARDIOVASCULAR=====  #Cardiac Arrest  - unclear rhythm but pulseless requiring CPR/epi, no reported shock delivery  - unclear duration of total cardiac arrest, however, lost pulse en route to hospital  - unclear etiology - possibly secondary to hypoxia given imaging findings & sxs    =====GASTRO=====  - On tube feeding thru OG tube, tolerating normally. No bowel movements past shift.      =====RENAL=====  #SIVA  - Cr 0.6 on presentation, baseline normal 0.7  - monitor Cr/UOP  - strict I/Os, harrington catheter  - will continue to follow BMPs  -Sodium 150, patient on free water increased to 350 Q6H, repeat sodium 144, free water 200 Q6, repeat Sodium 147     =====ID=====  #Patchy opacities on CT  - CT imaging findings as above  - empiric tx with vancomycin & zosyn; vanc dc'ed because MRSA negative  - central line removed on 11/14  - No fevers    =====HEME/ONC=====  #Anemia  - Hb 6.8 transf. PRC, Total Fe. is 7.0 on FeSO4    #Thrombocytosis  - history of thrombocytosis, was on ASA at home  - continue with ASA     #Leucocytosis   -no fever, Blood CS 11/18 NGTD, Urine/Sputum CS 11/15 NGTD    =====ENDOCRINE=====  #Hypothyroidism  - history of hypothyroid but not on synthroid as per endocrine    =====ETHICS=====  - On going discussion for GOC, palliative on board, plan to extubate over weekend 43F with history of pre-DM, HTN, HLD, ADHD, BENJAMIN, migraines, thrombocytosis, hypothyroidism not on synthroid, uterine polyps s/p polypectomy in 2/2023 presented from [facility] post cardiac arrest in the field. Imaging c/f extensive PNA. Transferred to [facility] for further management.     =====NEURO=====  #Concern for Anoxic Brain Injury  - arrested for unknown period both in the field & hospital  - CTH at OSH: findings suggestive of diffuse cerebral & cerebellar injury   - EEG notable for profound diffuse cerebral dysfunction, nonspecific in etiology. Continuous EEG monitoring discontinued as no epileptiform activity.   -MRI with findings consistent with anoxic brain injury (diffuse cortical restricted diffusion, multiple foci of restricted diffusion in brainstem)  - current exam with dilated pupils, no corneal reflex  -11/16 apnea test- patient resumed spontaneously breathing at 8 minutes and 30 seconds, test aborted  -11/19 apnea test - resumed spontaneous  breathing at 3 mins 30 seconds  -Cerebral viability study 11/13 at OSH shows regional blood flow    =====RESPIRATORY=====  #Intubated & Ventilated  #Patchy opacities  - was experiencing SOB and cough, called EMS and became pulseless  - CT chest at OSH neg for PE but showed patchy opacities c/f extensive PNA  - intubated in the field post-arrest  - AC RR 14/Tv 400/PEEP 5/FiO2 40  - monitor serial blood gases  - 11/15 vanc discontinued, MRSA is neg  - Zosyn completed    =====CARDIOVASCULAR=====  #Cardiac Arrest  - unclear rhythm but pulseless requiring CPR/epi, no reported shock delivery  - unclear duration of total cardiac arrest, however, lost pulse en route to hospital  - unclear etiology - possibly secondary to hypoxia given imaging findings & sxs    =====GASTRO=====  - On tube feeding thru OG tube, tolerating normally. No bowel movements past shift.      =====RENAL=====  #SIVA  - Cr 0.6 on presentation, baseline normal 0.7  - monitor Cr/UOP  - strict I/Os, harrington catheter  - will continue to follow BMPs  -Sodium 150 -> 144, -> 156 on FW to 350 Q6     =====ID=====  #Patchy opacities on CT  - CT imaging findings as above  - empiric tx with vancomycin & zosyn; vanc dc'ed because MRSA negative  - central line removed on 11/14  - No fevers    =====HEME/ONC=====  #Anemia  - Hb 6.8 -> 8.4 s/p transf. PRC   - Total Fe. is 7.0 on FeSO4    #Thrombocytosis  - history of thrombocytosis, was on ASA at home  - continue with ASA     #Leucocytosis   -no fever, Blood CS 11/18 NGTD, Urine/Sputum CS 11/15 NGTD    =====ENDOCRINE=====  #Hypothyroidism  - history of hypothyroid but not on synthroid as per endocrine    =====ETHICS=====  - On going discussion for GOC, palliative on board, plan to extubate over weekend 43F with history of pre-DM, HTN, HLD, ADHD, BENJAMIN, migraines, thrombocytosis, hypothyroidism not on synthroid, uterine polyps s/p polypectomy in 2/2023 presented from [facility] post cardiac arrest in the field. Imaging c/f extensive PNA. Transferred to [facility] for further management.     =====NEURO=====  #Concern for Anoxic Brain Injury  - arrested for unknown period both in the field & hospital  - CTH at OSH: findings suggestive of diffuse cerebral & cerebellar injury   - EEG notable for profound diffuse cerebral dysfunction, nonspecific in etiology. Continuous EEG monitoring discontinued as no epileptiform activity.   -MRI with findings consistent with anoxic brain injury (diffuse cortical restricted diffusion, multiple foci of restricted diffusion in brainstem)  - current exam with dilated pupils, no corneal reflex  -11/16 apnea test- patient resumed spontaneously breathing at 8 minutes and 30 seconds, test aborted  -11/19 apnea test - resumed spontaneous  breathing at 3 mins 30 seconds  -Cerebral viability study 11/13 at OSH shows regional blood flow    =====RESPIRATORY=====  #Intubated & Ventilated  #Patchy opacities  - was experiencing SOB and cough, called EMS and became pulseless  - CT chest at OSH neg for PE but showed patchy opacities c/f extensive PNA  - intubated in the field post-arrest  - AC RR 14/Tv 400/PEEP 5/FiO2 40  - monitor serial blood gases  - 11/15 vanc discontinued, MRSA is neg  - Zosyn completed    =====CARDIOVASCULAR=====  #Cardiac Arrest  - unclear rhythm but pulseless requiring CPR/epi, no reported shock delivery  - unclear duration of total cardiac arrest, however, lost pulse en route to hospital  - unclear etiology - possibly secondary to hypoxia given imaging findings & sxs    =====GASTRO=====  - On tube feeding thru OG tube, tolerating normally. Had 1xbowel movement past shift.      =====RENAL=====  #SIVA  - Cr 0.6 on presentation, baseline normal 0.7  - monitor Cr/UOP  - Sodium 150 -> 144, -> 156 on FW to 350 Q6   - Will repeat BMP at 12PM   - Making dilute urine, will continue to monitor I/O    =====ID=====  #Patchy opacities on CT  - CT imaging findings as above  - empiric tx with vancomycin & zosyn; vanc dc'ed because MRSA negative  - central line removed on 11/14  - No fevers    =====HEME/ONC=====  #Anemia  - Hb 6.8 -> 8.4 s/p transf. PRC   - Total Fe. is 7.0 on FeSO4    #Thrombocytosis  - history of thrombocytosis, was on ASA at home  - continue with ASA     #Leucocytosis   -no fever, Blood CS 11/18 NGTD, Urine/Sputum CS 11/15 NGTD    =====ENDOCRINE=====  #Hypothyroidism  - history of hypothyroid but not on synthroid as per endocrine    =====ETHICS=====  - On going discussion for GOC, palliative on board, plan to extubate over weekend

## 2023-11-23 NOTE — PROGRESS NOTE ADULT - ATTENDING COMMENTS
CRIS DENIS is a 43y Female with PMH of  DM2 HTN/HLD Admitted for post cardiac arrest care iso AHRF 2/2 pneumonia. Transferred to [facility] for ongoing care however continues to have poor neurologic recovery in the setting of 10 minutes down time    Hypernatremia overnight, FWF increased.   Exam notable for increased swelling    # Anoxic brain injury, clinical exam consistent with brain death, NM perfusion at OSH with normal perfusion, Neurologic exam without reflexes 11/22.  - At request of family will repeat apnea test Friday  # Hypernatremia, not central DI (urine output low)-- improved  - Increase FWF,  #AHRF  - continue vent settings PCO2 goal 40  # Hypernatremia  - FWF   #POCUS completed on exam   #DVT ppx - scd  #GOC - Full code, will discuss with family       Sumit Nava MD  Interventional Pulmonology & Critical Care Medicine. CRIS DENIS is a 43y Female with PMH of  DM2 HTN/HLD Admitted for post cardiac arrest care iso AHRF 2/2 pneumonia. Transferred to [facility] for ongoing care however continues to have poor neurologic recovery in the setting of 10 minutes down time    Hypernatremia overnight, FWF increased.   Exam notable for increased swelling    # Anoxic brain injury, clinical exam consistent with brain death, NM perfusion at OSH with normal perfusion, Neurologic exam without reflexes 11/22.  - At request of family will repeat apnea test Friday  # Hypernatremia, not central DI (urine output low)-- improved  - Increase FWF,  #AHRF  - continue vent settings PCO2 goal 40  # Hypernatremia  - FWF   #POCUS completed on exam   #DVT ppx - scd  #GOC - Full code, will discuss with family      Sumit Nava MD  Interventional Pulmonology & Critical Care Medicine.

## 2023-11-24 NOTE — CHART NOTE - NSCHARTNOTEFT_GEN_A_CORE
Met with patient's sister, Ela, at bedside. She shared that family continues to focus on patient's daughter and explaining to her what is going on with her mother before making further decisions. They have a meeting planned with child life team on Monday.  Agreeable to planning a family meeting for some time next week after they meet with child life.  Will f/u early next week.   All questions answered and extensive support provided.     Germaine Loaiza MD  Geriatrics and Palliative Medicine Attending  [facility] pager: [phone]

## 2023-11-24 NOTE — PROGRESS NOTE ADULT - SUBJECTIVE AND OBJECTIVE BOX
INTERVAL HPI/OVERNIGHT EVENTS: Patient desaturated, was taken on Ambu bagging, req. suctioning, Vent settings modified to PEEP 8,FiO2 60%    SUBJECTIVE: Patient seen and examined at bedside.     ROS: All negative except as listed above.    VITAL SIGNS:  ICU Vital Signs Last 24 Hrs  T(C): 37.5 (24 Nov 2023 11:00), Max: 37.9 (23 Nov 2023 16:00)  T(F): 99.5 (24 Nov 2023 11:00), Max: 100.2 (23 Nov 2023 16:00)  HR: 107 (24 Nov 2023 14:00) (83 - 108)  BP: 151/72 (24 Nov 2023 14:00) (69/33 - 151/72)  BP(mean): 103 (24 Nov 2023 14:00) (46 - 103)  RR: 18 (24 Nov 2023 14:00) (13 - 19)  SpO2: 100% (24 Nov 2023 14:00) (89% - 100%)    O2 Parameters below as of 24 Nov 2023 11:15  Patient On (Oxygen Delivery Method): ventilator    Mode: AC/ CMV (Assist Control/ Continuous Mandatory Ventilation), RR (machine): 14, TV (machine): 400, FiO2: 40, PEEP: 8, ITime: 0.76, MAP: 11, PIP: 28  Plateau pressure:   P/F ratio:     11-23 @ 07:01  -  11-24 @ 07:00  --------------------------------------------------------  IN: 3547 mL / OUT: 1450 mL / NET: 2097 mL    11-24 @ 07:01  -  11-24 @ 15:42  --------------------------------------------------------  IN: 1065 mL / OUT: 50 mL / NET: 1015 mL      CAPILLARY BLOOD GLUCOSE      POCT Blood Glucose.: 232 mg/dL (24 Nov 2023 12:02)      ECG: reviewed.    PHYSICAL EXAM:    HEAD:  Atraumatic, normocephalic  EYES: EOMI, fixed dilated pupils 4 mm, conjunctival chemosis  NECK: Supple, trachea midline  HEART: Regular rate and rhythm, no murmurs, rubs, or gallops  LUNGS: Unlabored respirations.  Clear to auscultation bilaterally, no crackles, wheezing, or rhonchi  ABDOMEN: Soft, nontender, nondistended, +BS, has bowel movements. Rascon in place, dilute urine.   EXTREMITIES: 2+ peripheral pulses bilaterally, cap refill<2 secs. No clubbing, cyanosis, or edema  NERVOUS SYSTEM:  Symm. dilated/fixed pupils, no corneal reflex, no withdrawal to pain   SKIN: No rashes or lesions    MEDICATIONS:  MEDICATIONS  (STANDING):  albuterol/ipratropium for Nebulization 3 milliLiter(s) Nebulizer every 6 hours  aspirin  chewable 81 milliGRAM(s) Oral daily  cefepime   IVPB      cefepime   IVPB 2000 milliGRAM(s) IV Intermittent every 12 hours  chlorhexidine 0.12% Liquid 15 milliLiter(s) Oral Mucosa every 12 hours  ferrous    sulfate 325 milliGRAM(s) Oral daily  insulin lispro (ADMELOG) corrective regimen sliding scale   SubCutaneous every 6 hours  midodrine 10 milliGRAM(s) Oral every 8 hours  norepinephrine Infusion 0.05 MICROgram(s)/kG/Min (7.49 mL/Hr) IV Continuous <Continuous>  pantoprazole  Injectable 40 milliGRAM(s) IV Push daily    MEDICATIONS  (PRN):  artificial tears (preservative free) Ophthalmic Solution 1 Drop(s) Both EYES every 6 hours PRN Dry Eyes      ALLERGIES:  Allergies    No Known Allergies    Intolerances    LABS:                        8.7    22.72 )-----------( 565      ( 24 Nov 2023 00:34 )             29.3     11-24    151<H>  |  110<H>  |  44<H>  ----------------------------<  315<H>  5.0   |  28  |  2.57<H>    Ca    10.7<H>      24 Nov 2023 13:35  Phos  6.6     11-24  Mg     2.1     11-24    TPro  7.7  /  Alb  2.6<L>  /  TBili  0.3  /  DBili  x   /  AST  18  /  ALT  23  /  AlkPhos  193<H>  11-24      Urinalysis Basic - ( 24 Nov 2023 13:35 )    Color: x / Appearance: x / SG: x / pH: x  Gluc: 315 mg/dL / Ketone: x  / Bili: x / Urobili: x   Blood: x / Protein: x / Nitrite: x   Leuk Esterase: x / RBC: x / WBC x   Sq Epi: x / Non Sq Epi: x / Bacteria: x      ABG:  pH, Arterial: 7.48 (11-24-23 @ 14:51)  pCO2, Arterial: 39 mmHg (11-24-23 @ 14:51)  pO2, Arterial: 262 mmHg (11-24-23 @ 14:51)  pH, Arterial: 7.38 (11-24-23 @ 14:04)  pCO2, Arterial: 51 mmHg (11-24-23 @ 14:04)  pO2, Arterial: 231 mmHg (11-24-23 @ 14:04)  pH, Arterial: 7.36 (11-24-23 @ 13:08)  pCO2, Arterial: 55 mmHg (11-24-23 @ 13:08)  pO2, Arterial: 203 mmHg (11-24-23 @ 13:08)  pH, Arterial: 7.33 (11-24-23 @ 04:50)  pCO2, Arterial: 59 mmHg (11-24-23 @ 04:50)  pO2, Arterial: 117 mmHg (11-24-23 @ 04:50)      vBG:  pH, Venous: 7.33 (11-24-23 @ 00:29)  pCO2, Venous: 64 mmHg (11-24-23 @ 00:29)  pO2, Venous: 49 mmHg (11-24-23 @ 00:29)  HCO3, Venous: 34 mmol/L (11-24-23 @ 00:29)    Micro:    Culture - Blood (collected 11-23-23 @ 00:52)  Source: .Blood Blood-Peripheral  Preliminary Report (11-24-23 @ 11:01):    No growth at 24 hours    Culture - Blood (collected 11-23-23 @ 00:52)  Source: .Blood Blood-Venous  Preliminary Report (11-24-23 @ 11:01):    No growth at 24 hours    Culture - Blood (collected 11-18-23 @ 17:40)  Source: .Blood Blood-Peripheral  Final Report (11-23-23 @ 23:01):    No growth at 5 days    Culture - Blood (collected 11-18-23 @ 17:30)  Source: .Blood Blood-Peripheral  Final Report (11-23-23 @ 23:01):    No growth at 5 days    Culture - Blood (collected 11-14-23 @ 18:15)  Source: .Blood Blood  Final Report (11-20-23 @ 01:00):    No growth at 5 days    Culture - Blood (collected 11-14-23 @ 18:00)  Source: .Blood Blood  Final Report (11-20-23 @ 01:00):    No growth at 5 days        Culture - Sputum (collected 11-15-23 @ 01:08)  Source: .Sputum Sputum  Gram Stain (11-15-23 @ 16:34):    Rare polymorphonuclear leukocytes per low power field    No Squamous epithelial cells per low power field    No organisms seen per oil power field  Final Report (11-17-23 @ 12:04):    Normal Respiratory Jenni present        RADIOLOGY & ADDITIONAL TESTS: Reviewed.

## 2023-11-24 NOTE — PROGRESS NOTE ADULT - ATTENDING COMMENTS
CRIS DENIS is a 43y Female with PMH of  DM2 HTN/HLD Admitted for post cardiac arrest care iso AHRF 2/2 pneumonia. Transferred to [facility] for ongoing care however continues to have poor neurologic recovery in the setting of 10 minutes down time    - Mucous plug overnight and desaturation, new pressors started  -     #VAP, unable to get cultures  - Cefepime  - mini bal    - Stop senna  - start PPI   # Anoxic brain injury, clinical exam consistent with brain death, NM perfusion at OSH with normal perfusion, Neurologic exam without reflexes 11/22.  - At request of family will repeat apnea test Friday  # Hypernatremia, not central DI (urine output low)-- improved  - Increase FWF,  #AHRF  - continue vent settings PCO2 goal 40  # Hypernatremia  - FWF   #POCUS completed on exam   #DVT ppx - scd  #GOC - Full code, will discuss with family      Sumit Nava MD  Interventional Pulmonology & Critical Care Medicine. CRIS DENIS is a 43y Female with PMH of  DM2 HTN/HLD Admitted for post cardiac arrest care iso AHRF 2/2 pneumonia. Transferred to [facility] for ongoing care however continues to have poor neurologic recovery in the setting of 10 minutes down time    - Mucous plug overnight and desaturation, new pressors started  - Plan for apnea test today per family,     #VAP, unable to get cultures  - Cefepime  - mini bal    - Stop senna  - start PPI   # Anoxic brain injury, clinical exam consistent with brain death, NM perfusion at OSH with normal perfusion, Neurologic exam without reflexes 11/22.  - At request of family will repeat apnea test Friday  # Hypernatremia, not central DI (urine output low)-- improved  - Increase FWF,  #AHRF  - continue vent settings PCO2 goal 40  # Hypernatremia  - FWF   #POCUS completed on exam   #DVT ppx - scd  #GOC - Full code, will discuss with family      Sumit Nava MD  Interventional Pulmonology & Critical Care Medicine.

## 2023-11-24 NOTE — PHARMACOTHERAPY INTERVENTION NOTE - COMMENTS
CRIS DENIS is a 43y Female with PMH of DM2, HTN/HLD. Admitted for post cardiac arrest care iso AHRF 2/2 pneumonia. Patient has been mechanically ventilated for >48h.    Recommendations:  -Would recommend initiating protonix 40mg daily for ulcer prophylaxis  -WBC is uptrending to 22, pt was previously treated with zosyn, recommend initiating cefepime 2g q12 (renally adjusted dose) for empiric coverage    Communicated with ICU team.    Yandel Cisse, PharmD  PGY1 Pharmacy Resident  Available on Teams

## 2023-11-24 NOTE — PROVIDER CONTACT NOTE (OTHER) - ACTION/TREATMENT ORDERED:
Pt suction inline, orally, ambu-lavage suctioning. Pt repositioned. PEEP increased to 8 & FiO2 on ventilator increased to 70% at 0400. ABG obtained 30 minutes post change. Chest X-Ray performed.

## 2023-11-24 NOTE — PROVIDER CONTACT NOTE (OTHER) - ASSESSMENT
Pt with decreased pulse oximetry, despite repositioning, and suctioning. Pulse oximetry intermittently increases with temporary ventilator FiO2 increase to 100%, but drops when pt returns to ordered 40% on ventilator. Respiratory Therapists at bedside assisting with suctioning. Pt blood pressure decreasing, no overt signs of bleeding, heart rate max 103 bpms.

## 2023-11-24 NOTE — PROGRESS NOTE ADULT - ASSESSMENT
43F with history of pre-DM, HTN, HLD, ADHD, BENJAMIN, migraines, thrombocytosis, hypothyroidism not on synthroid, uterine polyps s/p polypectomy in 2/2023 presented from [facility] post cardiac arrest in the field. Imaging c/f extensive PNA. Transferred to [facility] for further management.     =====NEURO=====  #Concern for Anoxic Brain Injury  - arrested for unknown period both in the field & hospital  - CTH at OSH: findings suggestive of diffuse cerebral & cerebellar injury   - EEG notable for profound diffuse cerebral dysfunction, nonspecific in etiology. Continuous EEG monitoring discontinued per neuro.   -MRI with findings consistent with anoxic brain injury (diffuse cortical restricted diffusion, multiple foci of restricted diffusion in brainstem)  - current exam with dilated pupils, no corneal reflex, - will not consider sedation given current mental status  -11/16 apnea test- patient resumed spontaneously breathing at 8 minutes and 30 seconds, test aborted  -repeat apnea test yesterday; resumed spontaneous  breathing at 3 mins 30 secsonds  -will consider repeating Apnea test orders today     =====RESPIRATORY=====  #Intubated & Ventilated  #Patchy opacities  - was experiencing SOB and cough, called EMS and became pulseless  - CT chest at OSH neg for PE but showed patchy opacities c/f extensive PNA  - intubated in the field post-arrest  - AC RR 16/Tv 450/PEEP 5/FiO2 40  - monitor serial blood gases  - Has been on vancomycin & zosyn  -11/15 vanc discontinued, MRSA is neg  -Desaturated overnight, req. Ambu bagging and tracheal suctioning, PEEP to 8, FiO2 60%, CXR left basal atelactasis, will restart zosyn, vanc. Send mini BAL.     =====CARDIOVASCULAR=====  #Cardiac Arrest  - unclear rhythm but pulseless requiring CPR/epi, no reported shock delivery  - unclear duration of total cardiac arrest, however, lost pulse en route to hospital  - unclear etiology - possibly secondary to hypoxia given imaging findings & sxs  - currently on levophed    =====GASTRO=====  -tolerating TF, loose bowel movements overnight, rectal tube placed  -will stop senna and observe  -if persistent diarrhea, shall consider C-diff colitis     =====RENAL=====  #SIVA  - Cr 0.58 on presentation, now to 1.67  - will monitor Cr/UOP  - strict I/Os, harrington catheter  - will continue to follow BMPs  -Hypernatremia to 154, patient on free water     =====ID=====  #Patchy opacities on CT  - CT imaging findings as above  - empiric tx with vancomycin & zosyn; vanc dc'ed because MRSA negative  - central line removed overnight 11/14  -repeat CXR shows left basal atelactasis  - will consider starting zosyn, vanc     =====HEME/ONC=====  #Anemia  - chronic history of anemia but no evidence of overt bleed  - maintain active T&S, monitor CBC    #leucocytosis  -in setting of left basal atelactasis  - will consider starting zosyn, vanc     #Thrombocytosis  - history of thrombocytosis, was on ASA at home  - continue ASA     =====ENDOCRINE=====  #Hypothyroidism  - history of hypothyroid but not on synthroid as per endocrine    =====ETHICS=====  -full code. Palliation team consulted.

## 2023-11-24 NOTE — PROVIDER CONTACT NOTE (OTHER) - BACKGROUND
Pt had multiple bowel movements, and after turns, pulse oximetry and blood pressure decreased. Norepinephrine titrated per orders.

## 2023-11-25 NOTE — PROGRESS NOTE ADULT - ATTENDING COMMENTS
. .CRIS DENIS is a 43y Female with PMH of  DM2 HTN/HLD Admitted for post cardiac arrest care iso AHRF 2/2 pneumonia. Transferred to [facility] for ongoing care however continues to have poor neurologic recovery in the setting of 10 minutes down time    - Apnea test on 11/24 consistent with respiratory movement at 1 minute  - NM spec test with no cerebral blood flow completed on 11/24 PM  - Family meeting today    #VAP   - Cefepime  (empiric treatment   # Anoxic brain injury, clinical exam consistent with brain death, NM perfusion at OSH with normal perfusion, Neurologic exam without reflexes 11/22.  - At request of family will repeat apnea test Friday  # Hypernatremia, not central DI (urine output low)-- improved  - Increase FWF,  #AHRF  - continue vent settings PCO2 goal 40  #SIVA,   - send UA, Matt   - monitor urine output  - monitor electrolytes   #DVT ppx - scd  #GOC - Remains full code, discussion of result today.       Sumit Nava MD  Interventional Pulmonology & Critical Care Medicine.

## 2023-11-25 NOTE — PROGRESS NOTE ADULT - SUBJECTIVE AND OBJECTIVE BOX
INTERVAL HPI/OVERNIGHT EVENTS:    SUBJECTIVE: Patient seen and examined at bedside.       VITAL SIGNS:  ICU Vital Signs Last 24 Hrs  T(C): 37.6 (2023 00:00), Max: 38.5 (2023 20:00)  T(F): 99.7 (2023 00:00), Max: 101.3 (2023 20:00)  HR: 84 (2023 06:18) (84 - 113)  BP: 97/52 (2023 03:45) (73/41 - 165/82)  BP(mean): 69 (2023 03:45) (51 - 116)  ABP: --  ABP(mean): --  RR: 18 (2023 03:45) (14 - 25)  SpO2: 100% (2023 06:18) (97% - 100%)    O2 Parameters below as of 2023 06:18  Patient On (Oxygen Delivery Method): ventilator          Mode: AC/ CMV (Assist Control/ Continuous Mandatory Ventilation), RR (machine): 18, TV (machine): 400, FiO2: 40, PEEP: 5, ITime: 1, MAP: 9, PIP: 22  Plateau pressure:   P/F ratio:     11-23 @ :  -   @ 07:00  --------------------------------------------------------  IN: 3547 mL / OUT: 1450 mL / NET: 2097 mL     @ 07:  -   @ 06:49  --------------------------------------------------------  IN: 3598 mL / OUT: 50 mL / NET: 3548 mL      CAPILLARY BLOOD GLUCOSE      POCT Blood Glucose.: 310 mg/dL (2023 06:16)    ECG:    PHYSICAL EXAM:    General:   HEENT:   Neck:   Respiratory:   Cardiovascular:   Abdomen:   Extremities:  Neurological:    MEDICATIONS:  MEDICATIONS  (STANDING):  albuterol/ipratropium for Nebulization 3 milliLiter(s) Nebulizer every 6 hours  aspirin  chewable 81 milliGRAM(s) Oral daily  cefepime   IVPB 1000 milliGRAM(s) IV Intermittent every 12 hours  chlorhexidine 0.12% Liquid 15 milliLiter(s) Oral Mucosa every 12 hours  ferrous    sulfate 325 milliGRAM(s) Oral daily  insulin lispro (ADMELOG) corrective regimen sliding scale   SubCutaneous every 6 hours  insulin NPH human recombinant 10 Unit(s) SubCutaneous every 6 hours  midodrine 10 milliGRAM(s) Oral every 8 hours  norepinephrine Infusion 0.05 MICROgram(s)/kG/Min (7.49 mL/Hr) IV Continuous <Continuous>  pantoprazole  Injectable 40 milliGRAM(s) IV Push daily  sevelamer carbonate Powder 800 milliGRAM(s) Oral three times a day with meals    MEDICATIONS  (PRN):  artificial tears (preservative free) Ophthalmic Solution 1 Drop(s) Both EYES every 6 hours PRN Dry Eyes      ALLERGIES:  Allergies    No Known Allergies    Intolerances        LABS:                        7.0    .04 )-----------( 626      ( 2023 00:14 )             23.6         145  |  107  |  54<H>  ----------------------------<  370<H>  4.8   |  21<L>  |  3.18<H>    Ca    9.9      2023 00:14  Phos  6.4       Mg     2.2         TPro  6.9  /  Alb  1.9<L>  /  TBili  0.4  /  DBili  x   /  AST  18  /  ALT  18  /  AlkPhos  176<H>        Urinalysis Basic - ( 2023 04:56 )    Color: Dark Yellow / Appearance: Turbid / S.013 / pH: x  Gluc: x / Ketone: Negative mg/dL  / Bili: Negative / Urobili: 1.0 mg/dL   Blood: x / Protein: 300 mg/dL / Nitrite: Negative   Leuk Esterase: Large / RBC: 24 /HPF / WBC >998 /HPF   Sq Epi: x / Non Sq Epi: 6 /HPF / Bacteria: Many /HPF        RADIOLOGY & ADDITIONAL TESTS: Reviewed.   INTERVAL HPI/OVERNIGHT EVENTS: No acute events overnight.     SUBJECTIVE: Patient seen and examined at bedside, and did not appear to be in acute distress. Remains on a ventilator.       VITAL SIGNS:  ICU Vital Signs Last 24 Hrs  T(C): 37.6 (2023 00:00), Max: 38.5 (2023 20:00)  T(F): 99.7 (2023 00:00), Max: 101.3 (2023 20:00)  HR: 84 (2023 06:18) (84 - 113)  BP: 97/52 (2023 03:45) (73/41 - 165/82)  BP(mean): 69 (2023 03:45) (51 - 116)  ABP: --  ABP(mean): --  RR: 18 (2023 03:45) (14 - 25)  SpO2: 100% (2023 06:18) (97% - 100%)    O2 Parameters below as of 2023 06:18  Patient On (Oxygen Delivery Method): ventilator          Mode: AC/ CMV (Assist Control/ Continuous Mandatory Ventilation), RR (machine): 18, TV (machine): 400, FiO2: 40, PEEP: 5, ITime: 1, MAP: 9, PIP: 22  Plateau pressure:   P/F ratio:     11-23 @ 07:  -   @ 07:00  --------------------------------------------------------  IN: 3547 mL / OUT: 1450 mL / NET: 2097 mL     @ 07:  -   @ 06:49  --------------------------------------------------------  IN: 3598 mL / OUT: 50 mL / NET: 3548 mL      CAPILLARY BLOOD GLUCOSE      POCT Blood Glucose.: 310 mg/dL (2023 06:16)    ECG:    PHYSICAL EXAM:    GENERAL: NAD  HEAD:  Atraumatic, Normocephalic  EYES: fixed pupils  ENT: Moist mucous membranes  NECK: Supple, No elevated JVP.  CHEST/LUNG: Mechanical ventilation   HEART: Regular rate and rhythm; No murmurs, rubs, or gallops  ABDOMEN: Bowel sounds present; Soft, nontender, nondistended  EXTREMITIES:  2+ Peripheral Pulses, brisk capillary refill. No clubbing, cyanosis, or edema  NERVOUS SYSTEM:  Patient not responsive   SKIN: No rashes or lesions    MEDICATIONS:  MEDICATIONS  (STANDING):  albuterol/ipratropium for Nebulization 3 milliLiter(s) Nebulizer every 6 hours  aspirin  chewable 81 milliGRAM(s) Oral daily  cefepime   IVPB 1000 milliGRAM(s) IV Intermittent every 12 hours  chlorhexidine 0.12% Liquid 15 milliLiter(s) Oral Mucosa every 12 hours  ferrous    sulfate 325 milliGRAM(s) Oral daily  insulin lispro (ADMELOG) corrective regimen sliding scale   SubCutaneous every 6 hours  insulin NPH human recombinant 10 Unit(s) SubCutaneous every 6 hours  midodrine 10 milliGRAM(s) Oral every 8 hours  norepinephrine Infusion 0.05 MICROgram(s)/kG/Min (7.49 mL/Hr) IV Continuous <Continuous>  pantoprazole  Injectable 40 milliGRAM(s) IV Push daily  sevelamer carbonate Powder 800 milliGRAM(s) Oral three times a day with meals    MEDICATIONS  (PRN):  artificial tears (preservative free) Ophthalmic Solution 1 Drop(s) Both EYES every 6 hours PRN Dry Eyes      ALLERGIES:  Allergies    No Known Allergies    Intolerances        LABS:                        7.0    21.04 )-----------( 626      ( 2023 00:14 )             23.6         145  |  107  |  54<H>  ----------------------------<  370<H>  4.8   |  21<L>  |  3.18<H>    Ca    9.9      2023 00:14  Phos  6.4       Mg     2.2         TPro  6.9  /  Alb  1.9<L>  /  TBili  0.4  /  DBili  x   /  AST  18  /  ALT  18  /  AlkPhos  176<H>        Urinalysis Basic - ( 2023 04:56 )    Color: Dark Yellow / Appearance: Turbid / S.013 / pH: x  Gluc: x / Ketone: Negative mg/dL  / Bili: Negative / Urobili: 1.0 mg/dL   Blood: x / Protein: 300 mg/dL / Nitrite: Negative   Leuk Esterase: Large / RBC: 24 /HPF / WBC >998 /HPF   Sq Epi: x / Non Sq Epi: 6 /HPF / Bacteria: Many /HPF        RADIOLOGY & ADDITIONAL TESTS: Reviewed.

## 2023-11-25 NOTE — PROGRESS NOTE ADULT - ASSESSMENT
43F with history of pre-DM, HTN, HLD, ADHD, BENJAMIN, migraines, thrombocytosis, hypothyroidism not on synthroid, uterine polyps s/p polypectomy in 2/2023 presented from [facility] post cardiac arrest in the field. Imaging c/f extensive PNA. Transferred to [facility] for further management.     =====NEURO=====  #Concern for Anoxic Brain Injury  - arrested for unknown period both in the field & hospital  - CTH at OSH: findings suggestive of diffuse cerebral & cerebellar injury   - EEG notable for profound diffuse cerebral dysfunction, nonspecific in etiology. Continuous EEG monitoring discontinued per neuro.   -MRI with findings consistent with anoxic brain injury (diffuse cortical restricted diffusion, multiple foci of restricted diffusion in brainstem)  - current exam with dilated pupils, no corneal reflex, - will not consider sedation given current mental status  -11/16 apnea test- patient resumed spontaneously breathing at 8 minutes and 30 seconds, test aborted  -repeat apnea test yesterday; resumed spontaneous  breathing at 3 mins 30 secsonds  -will consider repeating Apnea test orders today     =====RESPIRATORY=====  #Intubated & Ventilated  #Patchy opacities  - was experiencing SOB and cough, called EMS and became pulseless  - CT chest at OSH neg for PE but showed patchy opacities c/f extensive PNA  - intubated in the field post-arrest  - AC RR 16/Tv 450/PEEP 5/FiO2 40  - monitor serial blood gases  - Has been on vancomycin & zosyn  -11/15 vanc discontinued, MRSA is neg  -Desaturated overnight, req. Ambu bagging and tracheal suctioning, PEEP to 8, FiO2 60%, CXR left basal atelactasis, will restart zosyn, vanc. Send mini BAL.     =====CARDIOVASCULAR=====  #Cardiac Arrest  - unclear rhythm but pulseless requiring CPR/epi, no reported shock delivery  - unclear duration of total cardiac arrest, however, lost pulse en route to hospital  - unclear etiology - possibly secondary to hypoxia given imaging findings & sxs  - currently on levophed    =====GASTRO=====  -tolerating TF, loose bowel movements overnight, rectal tube placed  -will stop senna and observe  -if persistent diarrhea, shall consider C-diff colitis     =====RENAL=====  #SIVA  - Cr 0.58 on presentation, now to 1.67  - will monitor Cr/UOP  - strict I/Os, harrington catheter  - will continue to follow BMPs  -Hypernatremia to 154, patient on free water     =====ID=====  #Patchy opacities on CT  - CT imaging findings as above  - empiric tx with vancomycin & zosyn; vanc dc'ed because MRSA negative  - central line removed overnight 11/14  -repeat CXR shows left basal atelactasis  - will consider starting zosyn, vanc     =====HEME/ONC=====  #Anemia  - chronic history of anemia but no evidence of overt bleed  - maintain active T&S, monitor CBC    #leucocytosis  -in setting of left basal atelactasis  - will consider starting zosyn, vanc     #Thrombocytosis  - history of thrombocytosis, was on ASA at home  - continue ASA     =====ENDOCRINE=====  #Hypothyroidism  - history of hypothyroid but not on synthroid as per endocrine    =====ETHICS=====  -full code. Palliation team consulted.  43F with history of pre-DM, HTN, HLD, ADHD, BENJAMIN, migraines, thrombocytosis, hypothyroidism not on synthroid, uterine polyps s/p polypectomy in 2/2023 presented from [facility] post cardiac arrest in the field. Imaging c/f extensive PNA. Transferred to [facility] for further management. Patient remains on a ventilator with signs of anoxic brain injury.     =====NEURO=====  #Concern for Anoxic Brain Injury  - arrested for unknown period both in the field & hospital  - CTH at OSH: findings suggestive of diffuse cerebral & cerebellar injury   - EEG notable for profound diffuse cerebral dysfunction, nonspecific in etiology. Continuous EEG monitoring discontinued per neuro.   -MRI with findings consistent with anoxic brain injury (diffuse cortical restricted diffusion, multiple foci of restricted diffusion in brainstem)  - current exam with dilated pupils, no corneal reflex, - will not consider sedation given current mental status  -11/16 apnea test- patient resumed spontaneously breathing at 8 minutes and 30 seconds, test aborted  -repeat apnea test yesterday; resumed spontaneous  breathing at 3 mins 30 secsonds  -will consider repeating Apnea test orders today     =====RESPIRATORY=====  #Intubated & Ventilated  #Patchy opacities  - was experiencing SOB and cough, called EMS and became pulseless  - CT chest at OSH neg for PE but showed patchy opacities c/f extensive PNA  - intubated in the field post-arrest  - AC RR 16/Tv 450/PEEP 5/FiO2 40  - monitor serial blood gases  - Has been on vancomycin & zosyn  -11/15 vanc discontinued, MRSA is neg  -Desaturated overnight, req. Ambu bagging and tracheal suctioning, PEEP to 8, FiO2 60%, CXR left basal atelactasis, will restart zosyn, vanc. Send mini BAL.     =====CARDIOVASCULAR=====  #Cardiac Arrest  - unclear rhythm but pulseless requiring CPR/epi, no reported shock delivery  - unclear duration of total cardiac arrest, however, lost pulse en route to hospital  - unclear etiology - possibly secondary to hypoxia given imaging findings & sxs  - currently on levophed    =====GASTRO=====  -tolerating TF, loose bowel movements overnight, rectal tube placed  -will stop senna and observe  -if persistent diarrhea, shall consider C-diff colitis     =====RENAL=====  #SIVA  - Cr 0.58 on presentation, now to 1.67  - will monitor Cr/UOP  - strict I/Os, harrington catheter  - will continue to follow BMPs  -Hypernatremia to 154, patient on free water     =====ID=====  #Patchy opacities on CT  - CT imaging findings as above  - empiric tx with vancomycin & zosyn; vanc dc'ed because MRSA negative  - central line removed overnight 11/14  -repeat CXR shows left basal atelactasis  - will consider starting zosyn, vanc     =====HEME/ONC=====  #Anemia  - chronic history of anemia but no evidence of overt bleed  - maintain active T&S, monitor CBC    #leucocytosis  -in setting of left basal atelactasis  - will consider starting zosyn, vanc     #Thrombocytosis  - history of thrombocytosis, was on ASA at home  - continue ASA     =====ENDOCRINE=====  #Hypothyroidism  - history of hypothyroid but not on synthroid as per endocrine    =====ETHICS=====  -full code. Palliation team consulted.  43F with history of pre-DM, HTN, HLD, ADHD, BENJAMIN, migraines, thrombocytosis, hypothyroidism not on synthroid, uterine polyps s/p polypectomy in 2/2023 presented from [facility] post cardiac arrest in the field. Imaging c/f extensive PNA. Transferred to [facility] for further management. Patient remains on a ventilator with signs of anoxic brain injury.     =====NEURO=====  #Concern for Anoxic Brain Injury  - arrested for unknown period both in the field & hospital  - CTH at OSH: findings suggestive of diffuse cerebral & cerebellar injury   - EEG notable for profound diffuse cerebral dysfunction, nonspecific in etiology. Continuous EEG monitoring discontinued per neuro.   -MRI with findings consistent with anoxic brain injury (diffuse cortical restricted diffusion, multiple foci of restricted diffusion in brainstem)  - current exam with dilated pupils, no corneal reflex, - will not consider sedation given current mental status  -11/16 apnea test- patient resumed spontaneously breathing at 8 minutes and 30 seconds, test aborted  -repeat apnea test yesterday; resumed spontaneous  breathing at 3 mins 30 secsonds  -will consider repeating Apnea test orders today     =====RESPIRATORY=====  #Intubated & Ventilated  #Patchy opacities  -Still on Ventilator   -Continue Cefepime    =====CARDIOVASCULAR=====  #Cardiac Arrest  - unclear rhythm but pulseless requiring CPR/epi, no reported shock delivery  - unclear duration of total cardiac arrest, however, lost pulse en route to hospital  - unclear etiology - possibly secondary to hypoxia given imaging findings & sxs  - currently on levophed- wean as tolerated     =====GASTRO=====  -On tube feeds    =====RENAL=====  #SIVA  -Etiology post renal obstruction VS ATN. Also component of multi organ failure  - Cr 0.58 on presentation, now to 3.18  - will monitor Cr/UOP  -Ordered urine studies   -Home bladder scan and monitor for retention   - will continue to follow BMPs    -#Hypernatremia   -Continue Free water flushes 250cc Q6 Hours    =====ID=====  #Patchy opacities on CT  -Continue Cefepime  -BC pending     =====HEME/ONC=====  #Anemia  - chronic history of anemia but no evidence of overt bleed  - maintain active T&S, monitor CBC    #leucocytosis  -in setting of left basal atelactasis  - will consider starting zosyn, vanc     #Thrombocytosis  - history of thrombocytosis, was on ASA at home  - continue ASA     =====ENDOCRINE=====  #Hypothyroidism  - history of hypothyroid but not on synthroid as per endocrine    =====ETHICS=====  -full code. Palliation team consulted.

## 2023-11-26 NOTE — PROGRESS NOTE ADULT - NSPROGADDITIONALINFOA_GEN_ALL_CORE
CRIS DENIS is a 43y Female with PMH of  DM2 HTN/HLD Admitted for post cardiac arrest care iso AHRF 2/2 pneumonia. Transferred to [facility] for ongoing care however continues to have poor neurologic recovery in the setting of 10 minutes down time    - Apnea test on 11/24 consistent with respiratory movement at 1 minute  - NM spec test with no cerebral blood flow completed on 11/24 PM  - Family meeting today  #MRSE  - likely contaminant, repeat bcx today  - continue cefepime  #VAP   - Cefepime  (empiric treatment   # Anoxic brain injury, clinical exam consistent with brain death, NM perfusion at OSH with normal perfusion, Neurologic exam without reflexes 11/22.\  - At request of family will repeat apnea test Friday  # Hypernatremia, not central DI (urine output low)-- improved  - Increase FWF - 400 q6hr  - renal ultrasound  #AHRF  - continue vent settings PCO2 goal 40  #SIVA,   - send ucx  - monitor urine output  - monitor electrolytes   #DVT ppx - scd  #GOC - Remains full code, discussion of result today.

## 2023-11-26 NOTE — PROGRESS NOTE ADULT - CRITICAL CARE ATTENDING COMMENT
review of laboratory data, radiology results, discussion with primary team\patient, discussion with consulting services, and monitoring for potential decompensation. Interventions were performed as documented above.

## 2023-11-26 NOTE — PROGRESS NOTE ADULT - SUBJECTIVE AND OBJECTIVE BOX
INTERVAL HPI/OVERNIGHT EVENTS:    SUBJECTIVE: Patient seen and examined at bedside.     ROS: All negative except as listed above.    VITAL SIGNS:  ICU Vital Signs Last 24 Hrs  T(C): 36.9 (26 Nov 2023 08:00), Max: 37.7 (25 Nov 2023 11:00)  T(F): 98.4 (26 Nov 2023 08:00), Max: 99.9 (25 Nov 2023 11:00)  HR: 94 (26 Nov 2023 09:44) (86 - 99)  BP: 92/53 (26 Nov 2023 09:15) (81/43 - 142/79)  BP(mean): 69 (26 Nov 2023 09:15) (53 - 103)  ABP: --  ABP(mean): --  RR: 19 (26 Nov 2023 09:15) (0 - 24)  SpO2: 97% (26 Nov 2023 09:44) (95% - 100%)    O2 Parameters below as of 26 Nov 2023 08:00  Patient On (Oxygen Delivery Method): ventilator          Mode: AC/ CMV (Assist Control/ Continuous Mandatory Ventilation), RR (machine): 18, TV (machine): 400, FiO2: 40, PEEP: 5, ITime: 0.75, MAP: 10, PIP: 23  Plateau pressure:   P/F ratio:     11-25 @ 07:01  -  11-26 @ 07:00  --------------------------------------------------------  IN: 2558.5 mL / OUT: 2440 mL / NET: 118.5 mL    11-26 @ 07:01  -  11-26 @ 10:01  --------------------------------------------------------  IN: 12 mL / OUT: 0 mL / NET: 12 mL      CAPILLARY BLOOD GLUCOSE      POCT Blood Glucose.: 165 mg/dL (26 Nov 2023 05:02)      ECG: reviewed.    PHYSICAL EXAM:    GENERAL: NAD  HEAD:  Atraumatic, Normocephalic  EYES: fixed pupils  ENT: Moist mucous membranes  NECK: Supple, No elevated JVP.  CHEST/LUNG: Mechanical ventilation   HEART: Regular rate and rhythm; No murmurs, rubs, or gallops  ABDOMEN: Bowel sounds present; Soft, nontender, nondistended  EXTREMITIES:  2+ Peripheral Pulses, brisk capillary refill. No clubbing, cyanosis, or edema  NERVOUS SYSTEM:  Patient not responsive   SKIN: No rashes or lesions    MEDICATIONS  (STANDING):  albuterol/ipratropium for Nebulization 3 milliLiter(s) Nebulizer every 6 hours  aspirin  chewable 81 milliGRAM(s) Oral daily  cefepime   IVPB 1000 milliGRAM(s) IV Intermittent every 12 hours  chlorhexidine 0.12% Liquid 15 milliLiter(s) Oral Mucosa every 12 hours  ferrous    sulfate 325 milliGRAM(s) Oral daily  insulin lispro (ADMELOG) corrective regimen sliding scale   SubCutaneous every 6 hours  insulin NPH human recombinant 10 Unit(s) SubCutaneous every 6 hours  midodrine 10 milliGRAM(s) Oral every 8 hours  norepinephrine Infusion 0.05 MICROgram(s)/kG/Min (7.49 mL/Hr) IV Continuous <Continuous>  pantoprazole  Injectable 40 milliGRAM(s) IV Push daily  sevelamer carbonate Powder 800 milliGRAM(s) Oral three times a day with meals    MEDICATIONS  (PRN):  artificial tears (preservative free) Ophthalmic Solution 1 Drop(s) Both EYES every 6 hours PRN Dry Eyes      ALLERGIES:  Allergies    No Known Allergies    Intolerances        LABS:                        7.4    16.11 )-----------( 675      ( 26 Nov 2023 00:36 )             24.3     11-26    152<H>  |  114<H>  |  77<H>  ----------------------------<  152<H>  4.2   |  23  |  3.80<H>    Ca    10.4      26 Nov 2023 00:36  Phos  6.7     11-26  Mg     2.7     11-26    TPro  7.1  /  Alb  2.0<L>  /  TBili  0.5  /  DBili  x   /  AST  16  /  ALT  13  /  AlkPhos  168<H>  11-26      Urinalysis Basic - ( 26 Nov 2023 00:36 )    Color: x / Appearance: x / SG: x / pH: x  Gluc: 152 mg/dL / Ketone: x  / Bili: x / Urobili: x   Blood: x / Protein: x / Nitrite: x   Leuk Esterase: x / RBC: x / WBC x   Sq Epi: x / Non Sq Epi: x / Bacteria: x      ABG:  pH, Arterial: 7.37 (11-26-23 @ 00:25)  pCO2, Arterial: 46 mmHg (11-26-23 @ 00:25)  pO2, Arterial: 82 mmHg (11-26-23 @ 00:25)      vBG:    Micro:    Culture - Blood (collected 11-24-23 @ 23:58)  Source: .Blood Blood-Peripheral  Preliminary Report (11-26-23 @ 04:01):    No growth at 24 hours    Culture - Blood (collected 11-24-23 @ 17:20)  Source: .Blood Blood-Peripheral  Gram Stain (11-26-23 @ 01:54):    Growth in aerobic bottle: Gram Positive Cocci in Clusters  Preliminary Report (11-26-23 @ 01:54):    Growth in aerobic bottle: Gram Positive Cocci in Clusters    Direct identification is available within approximately 3-5    hours either by Blood Panel Multiplexed PCR or Direct    MALDI-TOF. Details: [email or web address]  Organism: Blood Culture PCR (11-26-23 @ 02:11)  Organism: Blood Culture PCR (11-26-23 @ 02:11)      Method Type: PCR      -  Staphylococcus epidermidis, Methicillin resistant: Detec    Culture - Blood (collected 11-23-23 @ 00:52)  Source: .Blood Blood-Peripheral  Preliminary Report (11-25-23 @ 11:01):    No growth at 48 Hours    Culture - Blood (collected 11-23-23 @ 00:52)  Source: .Blood Blood-Venous  Preliminary Report (11-25-23 @ 11:01):    No growth at 48 Hours    Culture - Blood (collected 11-18-23 @ 17:40)  Source: .Blood Blood-Peripheral  Final Report (11-23-23 @ 23:01):    No growth at 5 days    Culture - Blood (collected 11-18-23 @ 17:30)  Source: .Blood Blood-Peripheral  Final Report (11-23-23 @ 23:01):    No growth at 5 days    Culture - Blood (collected 11-14-23 @ 18:15)  Source: .Blood Blood  Final Report (11-20-23 @ 01:00):    No growth at 5 days    Culture - Blood (collected 11-14-23 @ 18:00)  Source: .Blood Blood  Final Report (11-20-23 @ 01:00):    No growth at 5 days      Culture - Sputum (collected 11-24-23 @ 13:37)  Source: ET Tube ET Tube  Gram Stain (11-24-23 @ 23:23):    Moderate polymorphonuclear leukocytes per low power field    No Squamous epithelial cells per low power field    Rare Gram positive cocci in pairs per oil power field  Preliminary Report (11-25-23 @ 17:17):    Normal Respiratory Jenni present    Culture - Sputum (collected 11-15-23 @ 01:08)  Source: .Sputum Sputum  Gram Stain (11-15-23 @ 16:34):    Rare polymorphonuclear leukocytes per low power field    No Squamous epithelial cells per low power field    No organisms seen per oil power field  Final Report (11-17-23 @ 12:04):    Normal Respiratory Jenni present        RADIOLOGY & ADDITIONAL TESTS: Reviewed.

## 2023-11-26 NOTE — PROGRESS NOTE ADULT - ASSESSMENT
43F with history of pre-DM, HTN, HLD, ADHD, BENJAMIN, migraines, thrombocytosis, hypothyroidism not on synthroid, uterine polyps s/p polypectomy in 2/2023 presented from [facility] post cardiac arrest in the field. Imaging c/f extensive PNA. Transferred to [facility] for further management. Patient remains on a ventilator with signs of anoxic brain injury.     =====NEURO=====  #Concern for Anoxic Brain Injury  - arrested for unknown period both in the field & hospital  - CTH at OSH: findings suggestive of diffuse cerebral & cerebellar injury   - EEG notable for profound diffuse cerebral dysfunction, nonspecific in etiology. Continuous EEG monitoring discontinued per neuro.   -MRI with findings consistent with anoxic brain injury (diffuse cortical restricted diffusion, multiple foci of restricted diffusion in brainstem)  - current exam with dilated pupils, no corneal reflex, - will not consider sedation given current mental status  -Failing apnea testg   -NM perfusion 11/24 no blood flow     =====RESPIRATORY=====  #Intubated & Ventilated  #Patchy opacities  -Still on Ventilator   -Continue Cefepime, dosed per RFTs if CrCl <11 ml/min then modify to 500 mg Q24    =====CARDIOVASCULAR=====  #Cardiac Arrest  - unclear rhythm but pulseless requiring CPR/epi, no reported shock delivery  - unclear duration of total cardiac arrest, however, lost pulse en route to hospital  - unclear etiology - possibly secondary to hypoxia given imaging findings & sxs  - currently on levophed- wean as tolerated     =====GASTRO=====  -On tube feeds    =====RENAL=====  #SIVA  -Etiology post renal obstruction VS ATN. Also component of multi organ failure  - Cr 0.58 on presentation, now to 3.8  - will monitor Cr/UOP  -Ordered/pending urine CS  -Home bladder scan and monitor for retention   - will continue to follow BMPs, order renal US    -#Hypernatremia   -Continue Free water flushes 250cc Q6 Hours    =====ID=====  #Patchy opacities on CT  -Continue Cefepime  -BC 11/25 Gm +ve Cocci     =====HEME/ONC=====  #Anemia  - chronic history of anemia but no evidence of overt bleed  - maintain active T&S, monitor CBC    #leucocytosis  -in setting of left basal atelactasis  - will continue Cefepime     #Thrombocytosis  - history of thrombocytosis, was on ASA at home  - continue ASA     =====ENDOCRINE=====  #Hypothyroidism  - history of hypothyroid but not on synthroid as per endocrine    =====ETHICS=====  -full code. Palliation team consulted. Family meeting on Monday.

## 2023-11-26 NOTE — PROGRESS NOTE ADULT - ATTENDING COMMENTS
CRIS DENIS is a 43y Female with PMH of  DM2 HTN/HLD Admitted for post cardiac arrest care iso AHRF 2/2 pneumonia. Transferred to [facility] for ongoing care however continues to have poor neurologic recovery in the setting of 10 minutes down time    - Apnea test on 11/24 consistent with respiratory movement at 1 minute  - NM spec test with no cerebral blood flow completed on 11/24 PM  - Family meeting today    #VAP   - Cefepime  (empiric treatment   # Anoxic brain injury, clinical exam consistent with brain death, NM perfusion at OSH with normal perfusion, Neurologic exam without reflexes 11/22.  - At request of family will repeat apnea test Friday  # Hypernatremia, not central DI (urine output low)-- improved  - Increase FWF,  #AHRF  - continue vent settings PCO2 goal 40  #SIVA,   - send UA, Matt   - monitor urine output  - monitor electrolytes   #DVT ppx - scd  #GOC - Remains full code, discussion of result today.       Sumit Nava MD  Interventional Pulmonology & Critical Care Medicine. CRIS DENIS is a 43y Female with PMH of  DM2 HTN/HLD Admitted for post cardiac arrest care iso AHRF 2/2 pneumonia. Transferred to [facility] for ongoing care however continues to have poor neurologic recovery in the setting of 10 minutes down time    - Apnea test on 11/24 consistent with respiratory movement at 1 minute  - NM spec test with no cerebral blood flow completed on 11/24 PM  - Family meeting today  #MRSE  - likely contaminant, repeat bcx today  - continue cefepime  #VAP   - Cefepime  (empiric treatment   # Anoxic brain injury, clinical exam consistent with brain death, NM perfusion at OSH with normal perfusion, Neurologic exam without reflexes 11/22.\  - At request of family will repeat apnea test Friday  # Hypernatremia, not central DI (urine output low)-- improved  - Increase FWF - 400 q6hr  - renal ultrasound  #AHRF  - continue vent settings PCO2 goal 40  #SIVA,   - send ucx  - monitor urine output  - monitor electrolytes   #DVT ppx - scd  #GOC - Remains full code, discussion of result today.            #VAP   - Cefepime  (empiric treatment   # Anoxic brain injury, clinical exam consistent with brain death, NM perfusion at OSH with normal perfusion, Neurologic exam without reflexes 11/22.  - At request of family will repeat apnea test Friday  # Hypernatremia, not central DI (urine output low)-- improved  - Increase FWF,  #AHRF  - continue vent settings PCO2 goal 40  #SIVA,   - send UA, Matt   - monitor urine output  - monitor electrolytes   #DVT ppx - scd  #GOC - Remains full code, discussion of result today.       Sumit Nava MD  Interventional Pulmonology & Critical Care Medicine.

## 2023-11-27 NOTE — PROGRESS NOTE ADULT - ASSESSMENT
43F with history of pre-DM, HTN, HLD, ADHD, BENJAMIN, migraines, thrombocytosis, hypothyroidism not on synthroid, uterine polyps s/p polypectomy in 2/2023 presented from [facility] post cardiac arrest in the field. Imaging c/f extensive PNA. Transferred to [facility] for further management. Patient remains on a ventilator with signs of anoxic brain injury.     =====NEURO=====  #Concern for Anoxic Brain Injury  - arrested for unknown period both in the field & hospital  - CTH at OSH: findings suggestive of diffuse cerebral & cerebellar injury   - EEG notable for profound diffuse cerebral dysfunction, nonspecific in etiology. Continuous EEG monitoring discontinued per neuro.   -MRI with findings consistent with anoxic brain injury (diffuse cortical restricted diffusion, multiple foci of restricted diffusion in brainstem)  - current exam with dilated pupils, no corneal reflex, - will not consider sedation given current mental status  -Failing apnea test   -NM perfusion 11/24 no blood flow   -Will take Neurology on board for brain death confirmation     =====RESPIRATORY=====  #Intubated & Ventilated  #Patchy opacities  -Concern for VAP  -Continue Cefepime, dosed per RFTs if CrCl <11 ml/min then modify to 500 mg Q24    =====CARDIOVASCULAR=====  #Cardiac Arrest  - unclear rhythm but pulseless requiring CPR/epi, no reported shock delivery  - unclear duration of total cardiac arrest, however, lost pulse en route to hospital  - unclear etiology - possibly secondary to hypoxia given imaging findings & sxs  - currently on levophed- wean as tolerated     =====GASTRO=====  -Tolerating tube feeds    =====RENAL=====  #SIVA  -Etiology post renal obstruction VS ATN. Also component of multi organ failure  - Cr 0.58 on presentation, now to 3.8  - will monitor Cr/UOP  -Ordered/pending urine CS  -Home bladder scan and monitor for retention   - will continue to follow BMPs, order renal US    -#Hypernatremia   -Continue Free water flushes 400cc Q6 Hours  -Start D5 @ 50 ml/hr     =====ID=====  #Patchy opacities on CT  -Continue Cefepime  -BC 11/25 Gm +ve Cocci - S-epidermidis     =====HEME/ONC=====  #Anemia  - chronic history of anemia but no evidence of overt bleed  - maintain active T&S, monitor CBC    #leucocytosis  -in setting of left basal atelactasis  - will continue Cefepime     #Thrombocytosis  - history of thrombocytosis, was on ASA at home  - continue ASA     =====ENDOCRINE=====  #Hypothyroidism  - history of hypothyroid but not on synthroid as per endocrine    =====ETHICS=====  -full code. Palliation team consulted. Family meeting on Monday.    43F with history of pre-DM, HTN, HLD, ADHD, BENJAMIN, migraines, thrombocytosis, hypothyroidism not on synthroid, uterine polyps s/p polypectomy in 2/2023 presented from [facility] post cardiac arrest in the field. Imaging c/f extensive PNA. Transferred to [facility] for further management. Patient remains on a ventilator with signs of anoxic brain injury.     =====NEURO=====  #Concern for Anoxic Brain Injury  - arrested for unknown period both in the field & hospital  - CTH at OSH: findings suggestive of diffuse cerebral & cerebellar injury   - EEG notable for profound diffuse cerebral dysfunction, nonspecific in etiology. Continuous EEG monitoring discontinued per neuro.   -MRI with findings consistent with anoxic brain injury (diffuse cortical restricted diffusion, multiple foci of restricted diffusion in brainstem)  - current exam with dilated pupils, no corneal reflex, - will not consider sedation given current mental status  -Failing apnea test   -NM perfusion 11/24 no blood flow   -Will take Neurology on board for brain death confirmation     =====RESPIRATORY=====  #Intubated & Ventilated  #Patchy opacities  -Concern for VAP  -Continue Cefepime, dosed per RFTs if CrCl <11 ml/min then modify to 500 mg Q24    =====CARDIOVASCULAR=====  #Cardiac Arrest  - unclear rhythm but pulseless requiring CPR/epi, no reported shock delivery  - unclear duration of total cardiac arrest, however, lost pulse en route to hospital  - unclear etiology - possibly secondary to hypoxia given imaging findings & sxs  - currently on levophed- wean as tolerated     =====GASTRO=====  -Tolerating tube feeds    =====RENAL=====  #SIVA  -Etiology post renal obstruction VS ATN. Also component of multi organ failure  - Cr 0.58 on presentation, now to 4.16  -US renal no hydronephrosis  -Urine CS -ve    - will monitor Cr/UOP  - will continue to follow BMPs    -#Hypernatremia   -Sodium 153, continue Free water flushes 400cc Q6 Hours  -Start D5 @ 50 ml/hr     =====ID=====  #Patchy opacities on CT  -Continue Cefepime  -BC 11/25 Gm +ve Cocci - S-epidermidis     =====HEME/ONC=====  #Anemia  - chronic history of anemia but no evidence of overt bleed  - maintain active T&S, monitor CBC    #leucocytosis  -in setting of left basal atelactasis  - will continue Cefepime     #Thrombocytosis  - history of thrombocytosis, was on ASA at home  - continue ASA     =====ENDOCRINE=====  #Hypothyroidism  - history of hypothyroid but not on synthroid as per endocrine    =====ETHICS=====  -full code. Palliation team consulted. Family meeting today

## 2023-11-27 NOTE — PROGRESS NOTE ADULT - SUBJECTIVE AND OBJECTIVE BOX
INTERVAL HPI/OVERNIGHT EVENTS:    SUBJECTIVE: Patient seen and examined at bedside.     ROS: All negative except as listed above.    VITAL SIGNS:  ICU Vital Signs Last 24 Hrs  T(C): 36 (27 Nov 2023 08:00), Max: 37.1 (26 Nov 2023 12:00)  T(F): 96.8 (27 Nov 2023 08:00), Max: 98.8 (26 Nov 2023 12:00)  HR: 81 (27 Nov 2023 08:00) (81 - 100)  BP: 97/53 (27 Nov 2023 08:00) (75/38 - 134/82)  BP(mean): 69 (27 Nov 2023 08:00) (55 - 104)  ABP: --  ABP(mean): --  RR: 18 (27 Nov 2023 08:00) (18 - 22)  SpO2: 100% (27 Nov 2023 08:00) (94% - 100%)    O2 Parameters below as of 27 Nov 2023 07:00  Patient On (Oxygen Delivery Method): ventilator    O2 Concentration (%): 40      Mode: AC/ CMV (Assist Control/ Continuous Mandatory Ventilation), RR (machine): 18, TV (machine): 400, FiO2: 40, PEEP: 5, ITime: 1, MAP: 10, PIP: 25  Plateau pressure:   P/F ratio:     11-26 @ 07:01  -  11-27 @ 07:00  --------------------------------------------------------  IN: 3086.5 mL / OUT: 1700 mL / NET: 1386.5 mL    11-27 @ 07:01  -  11-27 @ 08:26  --------------------------------------------------------  IN: 15 mL / OUT: 0 mL / NET: 15 mL      CAPILLARY BLOOD GLUCOSE      POCT Blood Glucose.: 140 mg/dL (27 Nov 2023 05:05)      ECG: reviewed.    PHYSICAL EXAM:    GENERAL: NAD, lying in bed comfortably  HEAD:  Atraumatic, normocephalic  EYES: EOMI, PERRLA, conjunctiva and sclera clear  NECK: Supple, trachea midline, no JVD  HEART: Regular rate and rhythm, no murmurs, rubs, or gallops  LUNGS: Unlabored respirations.  Clear to auscultation bilaterally, no crackles, wheezing, or rhonchi  ABDOMEN: Soft, nontender, nondistended, +BS  EXTREMITIES: 2+ peripheral pulses bilaterally, cap refill<2 secs. No clubbing, cyanosis, or edema  NERVOUS SYSTEM:  A&Ox3, following commands, moving all extremities, no focal deficits   SKIN: No rashes or lesions    MEDICATIONS:  MEDICATIONS  (STANDING):  albuterol/ipratropium for Nebulization 3 milliLiter(s) Nebulizer every 6 hours  aspirin  chewable 81 milliGRAM(s) Oral daily  cefepime   IVPB 1000 milliGRAM(s) IV Intermittent every 12 hours  chlorhexidine 0.12% Liquid 15 milliLiter(s) Oral Mucosa every 12 hours  ferrous    sulfate 325 milliGRAM(s) Oral daily  insulin lispro (ADMELOG) corrective regimen sliding scale   SubCutaneous every 6 hours  insulin NPH human recombinant 10 Unit(s) SubCutaneous every 6 hours  midodrine 10 milliGRAM(s) Oral every 8 hours  norepinephrine Infusion 0.05 MICROgram(s)/kG/Min (7.49 mL/Hr) IV Continuous <Continuous>  pantoprazole  Injectable 40 milliGRAM(s) IV Push daily  sevelamer carbonate Powder 800 milliGRAM(s) Oral three times a day with meals    MEDICATIONS  (PRN):  artificial tears (preservative free) Ophthalmic Solution 1 Drop(s) Both EYES every 6 hours PRN Dry Eyes      ALLERGIES:  Allergies    No Known Allergies    Intolerances        LABS:                        7.5    10.24 )-----------( 660      ( 27 Nov 2023 00:21 )             24.1     11-27    153<H>  |  115<H>  |  101<H>  ----------------------------<  132<H>  4.1   |  24  |  4.16<H>    Ca    11.0<H>      27 Nov 2023 00:22  Phos  7.3     11-27  Mg     3.2     11-27    TPro  7.4  /  Alb  2.2<L>  /  TBili  0.4  /  DBili  x   /  AST  19  /  ALT  11  /  AlkPhos  152<H>  11-27      Urinalysis Basic - ( 27 Nov 2023 00:22 )    Color: x / Appearance: x / SG: x / pH: x  Gluc: 132 mg/dL / Ketone: x  / Bili: x / Urobili: x   Blood: x / Protein: x / Nitrite: x   Leuk Esterase: x / RBC: x / WBC x   Sq Epi: x / Non Sq Epi: x / Bacteria: x      ABG:      vBG:  pH, Venous: 7.39 (11-27-23 @ 00:15)  pCO2, Venous: 45 mmHg (11-27-23 @ 00:15)  pO2, Venous: 59 mmHg (11-27-23 @ 00:15)  HCO3, Venous: 27 mmol/L (11-27-23 @ 00:15)    Micro:    Culture - Blood (collected 11-24-23 @ 23:58)  Source: .Blood Blood-Peripheral  Preliminary Report (11-27-23 @ 04:01):    No growth at 48 Hours    Culture - Blood (collected 11-24-23 @ 17:20)  Source: .Blood Blood-Peripheral  Gram Stain (11-26-23 @ 22:29):    Growth in aerobic bottle: Gram Positive Cocci in Clusters    Growth in anaerobic bottle: Gram Positive Cocci in Clusters  Final Report (11-26-23 @ 22:29):    Growth in aerobic bottle: Staphylococcus epidermidis    Coagulase Negative Staphylococci isolated from a single blood culture set    may represent contamination.    Contact the Microbiology Department at [phone] if susceptibility    testing is clinically indicated.    Direct identification is available within approximately 3-5    hours either by Blood Panel Multiplexed PCR or Direct    MALDI-TOF. Details: [email or web address]    ***Add Freetext*** blood culture bottle turned positive after the final    report was released.    Growth in anaerobic bottle: Gram Positive Cocci in Clusters  Organism: Blood Culture PCR (11-26-23 @ 22:29)      Method Type: PCR      -  Staphylococcus epidermidis, Methicillin resistant: Detec  Organism: Blood Culture PCR (11-26-23 @ 19:23)    Culture - Blood (collected 11-23-23 @ 00:52)  Source: .Blood Blood-Peripheral  Preliminary Report (11-26-23 @ 11:00):    No growth at 72 Hours    Culture - Blood (collected 11-23-23 @ 00:52)  Source: .Blood Blood-Venous  Preliminary Report (11-26-23 @ 11:00):    No growth at 72 Hours    Culture - Blood (collected 11-18-23 @ 17:40)  Source: .Blood Blood-Peripheral  Final Report (11-23-23 @ 23:01):    No growth at 5 days    Culture - Blood (collected 11-18-23 @ 17:30)  Source: .Blood Blood-Peripheral  Final Report (11-23-23 @ 23:01):    No growth at 5 days    Culture - Blood (collected 11-14-23 @ 18:15)  Source: .Blood Blood  Final Report (11-20-23 @ 01:00):    No growth at 5 days    Culture - Blood (collected 11-14-23 @ 18:00)  Source: .Blood Blood  Final Report (11-20-23 @ 01:00):    No growth at 5 days        Culture - Sputum (collected 11-24-23 @ 13:37)  Source: ET Tube ET Tube  Gram Stain (11-24-23 @ 23:23):    Moderate polymorphonuclear leukocytes per low power field    No Squamous epithelial cells per low power field    Rare Gram positive cocci in pairs per oil power field  Final Report (11-26-23 @ 21:19):    Normal Respiratory Jenni present    Culture - Sputum (collected 11-15-23 @ 01:08)  Source: .Sputum Sputum  Gram Stain (11-15-23 @ 16:34):    Rare polymorphonuclear leukocytes per low power field    No Squamous epithelial cells per low power field    No organisms seen per oil power field  Final Report (11-17-23 @ 12:04):    Normal Respiratory Jenni present        RADIOLOGY & ADDITIONAL TESTS: Reviewed. INTERVAL HPI/OVERNIGHT EVENTS: no active issues     SUBJECTIVE: Patient seen and examined at bedside.     ROS: All negative except as listed above.    VITAL SIGNS:  ICU Vital Signs Last 24 Hrs  T(C): 36 (27 Nov 2023 08:00), Max: 37.1 (26 Nov 2023 12:00)  T(F): 96.8 (27 Nov 2023 08:00), Max: 98.8 (26 Nov 2023 12:00)  HR: 81 (27 Nov 2023 08:00) (81 - 100)  BP: 97/53 (27 Nov 2023 08:00) (75/38 - 134/82)  BP(mean): 69 (27 Nov 2023 08:00) (55 - 104)  RR: 18 (27 Nov 2023 08:00) (18 - 22)  SpO2: 100% (27 Nov 2023 08:00) (94% - 100%)    O2 Parameters below as of 27 Nov 2023 07:00  Patient On (Oxygen Delivery Method): ventilator    O2 Concentration (%): 40    Mode: AC/ CMV (Assist Control/ Continuous Mandatory Ventilation), RR (machine): 18, TV (machine): 400, FiO2: 40, PEEP: 5, ITime: 1, MAP: 10, PIP: 25  Plateau pressure:   P/F ratio:     11-26 @ 07:01 - 11-27 @ 07:00  --------------------------------------------------------  IN: 3086.5 mL / OUT: 1700 mL / NET: 1386.5 mL    11-27 @ 07:01  -  11-27 @ 08:26  --------------------------------------------------------  IN: 15 mL / OUT: 0 mL / NET: 15 mL      CAPILLARY BLOOD GLUCOSE      POCT Blood Glucose.: 140 mg/dL (27 Nov 2023 05:05)    ECG: reviewed.    PHYSICAL EXAM:    HEAD:  Atraumatic, Normocephalic  EYES: fixed pupils  ENT: Moist mucous membranes  NECK: Supple, No elevated JVP.  CHEST/LUNG: Mechanical ventilation   HEART: Regular rate and rhythm; No murmurs, rubs, or gallops  ABDOMEN: Bowel sounds present; Soft, nontender, nondistended  EXTREMITIES:  2+ Peripheral Pulses, brisk capillary refill. No clubbing, cyanosis, or edema  NERVOUS SYSTEM:  Patient not responsive   SKIN: No rashes or lesions    MEDICATIONS:  MEDICATIONS  (STANDING):  albuterol/ipratropium for Nebulization 3 milliLiter(s) Nebulizer every 6 hours  aspirin  chewable 81 milliGRAM(s) Oral daily  cefepime   IVPB 1000 milliGRAM(s) IV Intermittent every 12 hours  chlorhexidine 0.12% Liquid 15 milliLiter(s) Oral Mucosa every 12 hours  ferrous    sulfate 325 milliGRAM(s) Oral daily  insulin lispro (ADMELOG) corrective regimen sliding scale   SubCutaneous every 6 hours  insulin NPH human recombinant 10 Unit(s) SubCutaneous every 6 hours  midodrine 10 milliGRAM(s) Oral every 8 hours  norepinephrine Infusion 0.05 MICROgram(s)/kG/Min (7.49 mL/Hr) IV Continuous <Continuous>  pantoprazole  Injectable 40 milliGRAM(s) IV Push daily  sevelamer carbonate Powder 800 milliGRAM(s) Oral three times a day with meals    MEDICATIONS  (PRN):  artificial tears (preservative free) Ophthalmic Solution 1 Drop(s) Both EYES every 6 hours PRN Dry Eyes    ALLERGIES:  Allergies    No Known Allergies    Intolerances    LABS:                        7.5    10.24 )-----------( 660      ( 27 Nov 2023 00:21 )             24.1     11-27    153<H>  |  115<H>  |  101<H>  ----------------------------<  132<H>  4.1   |  24  |  4.16<H>    Ca    11.0<H>      27 Nov 2023 00:22  Phos  7.3     11-27  Mg     3.2     11-27    TPro  7.4  /  Alb  2.2<L>  /  TBili  0.4  /  DBili  x   /  AST  19  /  ALT  11  /  AlkPhos  152<H>  11-27      Urinalysis Basic - ( 27 Nov 2023 00:22 )    Color: x / Appearance: x / SG: x / pH: x  Gluc: 132 mg/dL / Ketone: x  / Bili: x / Urobili: x   Blood: x / Protein: x / Nitrite: x   Leuk Esterase: x / RBC: x / WBC x   Sq Epi: x / Non Sq Epi: x / Bacteria: x      ABG:      vBG:  pH, Venous: 7.39 (11-27-23 @ 00:15)  pCO2, Venous: 45 mmHg (11-27-23 @ 00:15)  pO2, Venous: 59 mmHg (11-27-23 @ 00:15)  HCO3, Venous: 27 mmol/L (11-27-23 @ 00:15)    Micro:    Culture - Blood (collected 11-24-23 @ 23:58)  Source: .Blood Blood-Peripheral  Preliminary Report (11-27-23 @ 04:01):    No growth at 48 Hours    Culture - Blood (collected 11-24-23 @ 17:20)  Source: .Blood Blood-Peripheral  Gram Stain (11-26-23 @ 22:29):    Growth in aerobic bottle: Gram Positive Cocci in Clusters    Growth in anaerobic bottle: Gram Positive Cocci in Clusters  Final Report (11-26-23 @ 22:29):    Growth in aerobic bottle: Staphylococcus epidermidis    Coagulase Negative Staphylococci isolated from a single blood culture set    may represent contamination.    Contact the Microbiology Department at [phone] if susceptibility    testing is clinically indicated.    Direct identification is available within approximately 3-5    hours either by Blood Panel Multiplexed PCR or Direct    MALDI-TOF. Details: [email or web address]    ***Add Freetext*** blood culture bottle turned positive after the final    report was released.    Growth in anaerobic bottle: Gram Positive Cocci in Clusters  Organism: Blood Culture PCR (11-26-23 @ 22:29)      Method Type: PCR      -  Staphylococcus epidermidis, Methicillin resistant: Detec  Organism: Blood Culture PCR (11-26-23 @ 19:23)    Culture - Blood (collected 11-23-23 @ 00:52)  Source: .Blood Blood-Peripheral  Preliminary Report (11-26-23 @ 11:00):    No growth at 72 Hours    Culture - Blood (collected 11-23-23 @ 00:52)  Source: .Blood Blood-Venous  Preliminary Report (11-26-23 @ 11:00):    No growth at 72 Hours    Culture - Blood (collected 11-18-23 @ 17:40)  Source: .Blood Blood-Peripheral  Final Report (11-23-23 @ 23:01):    No growth at 5 days    Culture - Blood (collected 11-18-23 @ 17:30)  Source: .Blood Blood-Peripheral  Final Report (11-23-23 @ 23:01):    No growth at 5 days    Culture - Blood (collected 11-14-23 @ 18:15)  Source: .Blood Blood  Final Report (11-20-23 @ 01:00):    No growth at 5 days    Culture - Blood (collected 11-14-23 @ 18:00)  Source: .Blood Blood  Final Report (11-20-23 @ 01:00):    No growth at 5 days        Culture - Sputum (collected 11-24-23 @ 13:37)  Source: ET Tube ET Tube  Gram Stain (11-24-23 @ 23:23):    Moderate polymorphonuclear leukocytes per low power field    No Squamous epithelial cells per low power field    Rare Gram positive cocci in pairs per oil power field  Final Report (11-26-23 @ 21:19):    Normal Respiratory Jenni present    Culture - Sputum (collected 11-15-23 @ 01:08)  Source: .Sputum Sputum  Gram Stain (11-15-23 @ 16:34):    Rare polymorphonuclear leukocytes per low power field    No Squamous epithelial cells per low power field    No organisms seen per oil power field  Final Report (11-17-23 @ 12:04):    Normal Respiratory Jenni present        RADIOLOGY & ADDITIONAL TESTS: Reviewed.

## 2023-11-27 NOTE — PROGRESS NOTE ADULT - ATTENDING COMMENTS
1. Acute hypoxemic respiratory  failure after at home cardiac arrest. . Pt currently being treated for pneumonia on cefepime. Decrease FIO2 to 35%.  2. Neuro. Severe anoxic brain injury. With brain perfusion scan with no flow pt meets criteria for brain death. Family confused because apparently pt moved toes to pain this weekend. ???reflexive.  Will have neurology to assess for brain death.    On my exam pt meets all criteria for brain death. Except have not done cold calorics this time. Weeks ago cold caloric test showed no response.      3. Hypernatremia: 153. Continue free water 400ml q 6 hrs. Add D5W at 50cc/hr. Repeat  Na in afternoon.    4. Renal . Acute  non oliguric renal failure. ATN Will give additional 500ml LR to try to decrease pressors.    5. Hypotension.? sepsis vs brain death. Continues on cefepime and Levophed.     6. DVT prophylaxis> Sq heparin    7. DNR 1. Acute hypoxemic respiratory  failure after at home cardiac arrest. . Pt currently being treated for pneumonia on cefepime. Decrease FIO2 to 35%.  2. Neuro. Severe anoxic brain injury. With brain perfusion scan with no flow pt meets criteria for brain death. Family confused because apparently pt moved toes to pain this weekend. ???reflexive.  Will have neurology to assess for brain death.    On my exam pt meets all criteria for brain death. Except have not done cold calorics this time. Weeks ago cold caloric test showed no response.      3. Hypernatremia: 153. Continue free water 400ml q 6 hrs. Add D5W at 50cc/hr. Repeat  Na in afternoon.    4. Renal . Acute  non oliguric renal failure. ATN Will give additional 500ml LR to try to decrease pressors.    5. Hypotension.? sepsis vs brain death. Continues on cefepime and Levophed.     6. DVT prophylaxis> Sq heparin    7.GOC: Full code

## 2023-11-28 LAB
ALBUMIN SERPL ELPH-MCNC: 2.1 G/DL — LOW (ref 3.3–5)
ALBUMIN SERPL ELPH-MCNC: 2.1 G/DL — LOW (ref 3.3–5)
ALP SERPL-CCNC: 142 U/L — HIGH (ref 40–120)
ALP SERPL-CCNC: 142 U/L — HIGH (ref 40–120)
ALT FLD-CCNC: 17 U/L — SIGNIFICANT CHANGE UP (ref 10–45)
ALT FLD-CCNC: 17 U/L — SIGNIFICANT CHANGE UP (ref 10–45)
ANION GAP SERPL CALC-SCNC: 13 MMOL/L — SIGNIFICANT CHANGE UP (ref 5–17)
ANION GAP SERPL CALC-SCNC: 13 MMOL/L — SIGNIFICANT CHANGE UP (ref 5–17)
AST SERPL-CCNC: 46 U/L — HIGH (ref 10–40)
AST SERPL-CCNC: 46 U/L — HIGH (ref 10–40)
BASE EXCESS BLDV CALC-SCNC: -0.9 MMOL/L — SIGNIFICANT CHANGE UP (ref -2–3)
BASE EXCESS BLDV CALC-SCNC: -0.9 MMOL/L — SIGNIFICANT CHANGE UP (ref -2–3)
BASOPHILS # BLD AUTO: 0.03 K/UL — SIGNIFICANT CHANGE UP (ref 0–0.2)
BASOPHILS # BLD AUTO: 0.03 K/UL — SIGNIFICANT CHANGE UP (ref 0–0.2)
BASOPHILS NFR BLD AUTO: 0.4 % — SIGNIFICANT CHANGE UP (ref 0–2)
BASOPHILS NFR BLD AUTO: 0.4 % — SIGNIFICANT CHANGE UP (ref 0–2)
BILIRUB SERPL-MCNC: 0.4 MG/DL — SIGNIFICANT CHANGE UP (ref 0.2–1.2)
BILIRUB SERPL-MCNC: 0.4 MG/DL — SIGNIFICANT CHANGE UP (ref 0.2–1.2)
BUN SERPL-MCNC: 116 MG/DL — HIGH (ref 7–23)
BUN SERPL-MCNC: 116 MG/DL — HIGH (ref 7–23)
CA-I SERPL-SCNC: 1.45 MMOL/L — HIGH (ref 1.15–1.33)
CA-I SERPL-SCNC: 1.45 MMOL/L — HIGH (ref 1.15–1.33)
CALCIUM SERPL-MCNC: 11.4 MG/DL — HIGH (ref 8.4–10.5)
CALCIUM SERPL-MCNC: 11.4 MG/DL — HIGH (ref 8.4–10.5)
CHLORIDE BLDV-SCNC: 114 MMOL/L — HIGH (ref 96–108)
CHLORIDE BLDV-SCNC: 114 MMOL/L — HIGH (ref 96–108)
CHLORIDE SERPL-SCNC: 113 MMOL/L — HIGH (ref 96–108)
CHLORIDE SERPL-SCNC: 113 MMOL/L — HIGH (ref 96–108)
CO2 BLDV-SCNC: 27 MMOL/L — HIGH (ref 22–26)
CO2 BLDV-SCNC: 27 MMOL/L — HIGH (ref 22–26)
CO2 SERPL-SCNC: 23 MMOL/L — SIGNIFICANT CHANGE UP (ref 22–31)
CO2 SERPL-SCNC: 23 MMOL/L — SIGNIFICANT CHANGE UP (ref 22–31)
CREAT SERPL-MCNC: 4.28 MG/DL — HIGH (ref 0.5–1.3)
CREAT SERPL-MCNC: 4.28 MG/DL — HIGH (ref 0.5–1.3)
CULTURE RESULTS: SIGNIFICANT CHANGE UP
EGFR: 12 ML/MIN/1.73M2 — LOW
EGFR: 12 ML/MIN/1.73M2 — LOW
EOSINOPHIL # BLD AUTO: 0.29 K/UL — SIGNIFICANT CHANGE UP (ref 0–0.5)
EOSINOPHIL # BLD AUTO: 0.29 K/UL — SIGNIFICANT CHANGE UP (ref 0–0.5)
EOSINOPHIL NFR BLD AUTO: 3.5 % — SIGNIFICANT CHANGE UP (ref 0–6)
EOSINOPHIL NFR BLD AUTO: 3.5 % — SIGNIFICANT CHANGE UP (ref 0–6)
GAS PNL BLDA: SIGNIFICANT CHANGE UP
GAS PNL BLDV: 146 MMOL/L — HIGH (ref 136–145)
GAS PNL BLDV: 146 MMOL/L — HIGH (ref 136–145)
GAS PNL BLDV: SIGNIFICANT CHANGE UP
GLUCOSE BLDC GLUCOMTR-MCNC: 122 MG/DL — HIGH (ref 70–99)
GLUCOSE BLDC GLUCOMTR-MCNC: 122 MG/DL — HIGH (ref 70–99)
GLUCOSE BLDC GLUCOMTR-MCNC: 128 MG/DL — HIGH (ref 70–99)
GLUCOSE BLDC GLUCOMTR-MCNC: 128 MG/DL — HIGH (ref 70–99)
GLUCOSE BLDC GLUCOMTR-MCNC: 174 MG/DL — HIGH (ref 70–99)
GLUCOSE BLDC GLUCOMTR-MCNC: 174 MG/DL — HIGH (ref 70–99)
GLUCOSE BLDV-MCNC: 235 MG/DL — HIGH (ref 70–99)
GLUCOSE BLDV-MCNC: 235 MG/DL — HIGH (ref 70–99)
GLUCOSE SERPL-MCNC: 206 MG/DL — HIGH (ref 70–99)
GLUCOSE SERPL-MCNC: 206 MG/DL — HIGH (ref 70–99)
HCO3 BLDV-SCNC: 25 MMOL/L — SIGNIFICANT CHANGE UP (ref 22–29)
HCO3 BLDV-SCNC: 25 MMOL/L — SIGNIFICANT CHANGE UP (ref 22–29)
HCT VFR BLD CALC: 22.5 % — LOW (ref 34.5–45)
HCT VFR BLD CALC: 22.5 % — LOW (ref 34.5–45)
HCT VFR BLDA CALC: 22 % — LOW (ref 34.5–46.5)
HCT VFR BLDA CALC: 22 % — LOW (ref 34.5–46.5)
HGB BLD CALC-MCNC: 7.3 G/DL — LOW (ref 11.7–16.1)
HGB BLD CALC-MCNC: 7.3 G/DL — LOW (ref 11.7–16.1)
HGB BLD-MCNC: 6.9 G/DL — CRITICAL LOW (ref 11.5–15.5)
HGB BLD-MCNC: 6.9 G/DL — CRITICAL LOW (ref 11.5–15.5)
HOROWITZ INDEX BLDV+IHG-RTO: 35 — SIGNIFICANT CHANGE UP
HOROWITZ INDEX BLDV+IHG-RTO: 35 — SIGNIFICANT CHANGE UP
IMM GRANULOCYTES NFR BLD AUTO: 2.8 % — HIGH (ref 0–0.9)
IMM GRANULOCYTES NFR BLD AUTO: 2.8 % — HIGH (ref 0–0.9)
LACTATE BLDV-MCNC: 1.4 MMOL/L — SIGNIFICANT CHANGE UP (ref 0.5–2)
LACTATE BLDV-MCNC: 1.4 MMOL/L — SIGNIFICANT CHANGE UP (ref 0.5–2)
LYMPHOCYTES # BLD AUTO: 1.37 K/UL — SIGNIFICANT CHANGE UP (ref 1–3.3)
LYMPHOCYTES # BLD AUTO: 1.37 K/UL — SIGNIFICANT CHANGE UP (ref 1–3.3)
LYMPHOCYTES # BLD AUTO: 16.7 % — SIGNIFICANT CHANGE UP (ref 13–44)
LYMPHOCYTES # BLD AUTO: 16.7 % — SIGNIFICANT CHANGE UP (ref 13–44)
MAGNESIUM SERPL-MCNC: 3.4 MG/DL — HIGH (ref 1.6–2.6)
MAGNESIUM SERPL-MCNC: 3.4 MG/DL — HIGH (ref 1.6–2.6)
MCHC RBC-ENTMCNC: 24.2 PG — LOW (ref 27–34)
MCHC RBC-ENTMCNC: 24.2 PG — LOW (ref 27–34)
MCHC RBC-ENTMCNC: 30.7 GM/DL — LOW (ref 32–36)
MCHC RBC-ENTMCNC: 30.7 GM/DL — LOW (ref 32–36)
MCV RBC AUTO: 78.9 FL — LOW (ref 80–100)
MCV RBC AUTO: 78.9 FL — LOW (ref 80–100)
MONOCYTES # BLD AUTO: 0.86 K/UL — SIGNIFICANT CHANGE UP (ref 0–0.9)
MONOCYTES # BLD AUTO: 0.86 K/UL — SIGNIFICANT CHANGE UP (ref 0–0.9)
MONOCYTES NFR BLD AUTO: 10.5 % — SIGNIFICANT CHANGE UP (ref 2–14)
MONOCYTES NFR BLD AUTO: 10.5 % — SIGNIFICANT CHANGE UP (ref 2–14)
NEUTROPHILS # BLD AUTO: 5.44 K/UL — SIGNIFICANT CHANGE UP (ref 1.8–7.4)
NEUTROPHILS # BLD AUTO: 5.44 K/UL — SIGNIFICANT CHANGE UP (ref 1.8–7.4)
NEUTROPHILS NFR BLD AUTO: 66.1 % — SIGNIFICANT CHANGE UP (ref 43–77)
NEUTROPHILS NFR BLD AUTO: 66.1 % — SIGNIFICANT CHANGE UP (ref 43–77)
NRBC # BLD: 0 /100 WBCS — SIGNIFICANT CHANGE UP (ref 0–0)
NRBC # BLD: 0 /100 WBCS — SIGNIFICANT CHANGE UP (ref 0–0)
PCO2 BLDV: 49 MMHG — HIGH (ref 39–42)
PCO2 BLDV: 49 MMHG — HIGH (ref 39–42)
PH BLDV: 7.32 — SIGNIFICANT CHANGE UP (ref 7.32–7.43)
PH BLDV: 7.32 — SIGNIFICANT CHANGE UP (ref 7.32–7.43)
PHOSPHATE SERPL-MCNC: 8.6 MG/DL — HIGH (ref 2.5–4.5)
PHOSPHATE SERPL-MCNC: 8.6 MG/DL — HIGH (ref 2.5–4.5)
PLATELET # BLD AUTO: 786 K/UL — HIGH (ref 150–400)
PLATELET # BLD AUTO: 786 K/UL — HIGH (ref 150–400)
PO2 BLDV: 55 MMHG — HIGH (ref 25–45)
PO2 BLDV: 55 MMHG — HIGH (ref 25–45)
POTASSIUM BLDV-SCNC: 5.4 MMOL/L — HIGH (ref 3.5–5.1)
POTASSIUM BLDV-SCNC: 5.4 MMOL/L — HIGH (ref 3.5–5.1)
POTASSIUM SERPL-MCNC: 5.3 MMOL/L — SIGNIFICANT CHANGE UP (ref 3.5–5.3)
POTASSIUM SERPL-MCNC: 5.3 MMOL/L — SIGNIFICANT CHANGE UP (ref 3.5–5.3)
POTASSIUM SERPL-SCNC: 5.3 MMOL/L — SIGNIFICANT CHANGE UP (ref 3.5–5.3)
POTASSIUM SERPL-SCNC: 5.3 MMOL/L — SIGNIFICANT CHANGE UP (ref 3.5–5.3)
PROT SERPL-MCNC: 7.3 G/DL — SIGNIFICANT CHANGE UP (ref 6–8.3)
PROT SERPL-MCNC: 7.3 G/DL — SIGNIFICANT CHANGE UP (ref 6–8.3)
RBC # BLD: 2.85 M/UL — LOW (ref 3.8–5.2)
RBC # BLD: 2.85 M/UL — LOW (ref 3.8–5.2)
RBC # FLD: 18.8 % — HIGH (ref 10.3–14.5)
RBC # FLD: 18.8 % — HIGH (ref 10.3–14.5)
SAO2 % BLDV: 82.2 % — SIGNIFICANT CHANGE UP (ref 67–88)
SAO2 % BLDV: 82.2 % — SIGNIFICANT CHANGE UP (ref 67–88)
SODIUM SERPL-SCNC: 149 MMOL/L — HIGH (ref 135–145)
SODIUM SERPL-SCNC: 149 MMOL/L — HIGH (ref 135–145)
SPECIMEN SOURCE: SIGNIFICANT CHANGE UP
WBC # BLD: 8.22 K/UL — SIGNIFICANT CHANGE UP (ref 3.8–10.5)
WBC # BLD: 8.22 K/UL — SIGNIFICANT CHANGE UP (ref 3.8–10.5)
WBC # FLD AUTO: 8.22 K/UL — SIGNIFICANT CHANGE UP (ref 3.8–10.5)
WBC # FLD AUTO: 8.22 K/UL — SIGNIFICANT CHANGE UP (ref 3.8–10.5)

## 2023-11-28 PROCEDURE — 71045 X-RAY EXAM CHEST 1 VIEW: CPT | Mod: 26

## 2023-11-28 PROCEDURE — 99291 CRITICAL CARE FIRST HOUR: CPT | Mod: GC

## 2023-11-28 RX ORDER — IPRATROPIUM/ALBUTEROL SULFATE 18-103MCG
3 AEROSOL WITH ADAPTER (GRAM) INHALATION ONCE
Refills: 0 | Status: COMPLETED | OUTPATIENT
Start: 2023-11-28 | End: 2023-11-28

## 2023-11-28 RX ORDER — SODIUM CHLORIDE 9 MG/ML
250 INJECTION, SOLUTION INTRAVENOUS ONCE
Refills: 0 | Status: COMPLETED | OUTPATIENT
Start: 2023-11-28 | End: 2023-11-28

## 2023-11-28 RX ORDER — SODIUM CHLORIDE 9 MG/ML
200 INJECTION, SOLUTION INTRAVENOUS
Refills: 0 | Status: COMPLETED | OUTPATIENT
Start: 2023-11-28 | End: 2023-11-28

## 2023-11-28 RX ADMIN — Medication 7.49 MICROGRAM(S)/KG/MIN: at 07:47

## 2023-11-28 RX ADMIN — CEFEPIME 100 MILLIGRAM(S): 1 INJECTION, POWDER, FOR SOLUTION INTRAMUSCULAR; INTRAVENOUS at 06:24

## 2023-11-28 RX ADMIN — SODIUM CHLORIDE 50 MILLILITER(S): 9 INJECTION, SOLUTION INTRAVENOUS at 16:10

## 2023-11-28 RX ADMIN — Medication 3 MILLILITER(S): at 11:09

## 2023-11-28 RX ADMIN — SODIUM CHLORIDE 50 MILLILITER(S): 9 INJECTION, SOLUTION INTRAVENOUS at 07:48

## 2023-11-28 RX ADMIN — CHLORHEXIDINE GLUCONATE 15 MILLILITER(S): 213 SOLUTION TOPICAL at 17:24

## 2023-11-28 RX ADMIN — SODIUM CHLORIDE 50 MILLILITER(S): 9 INJECTION, SOLUTION INTRAVENOUS at 17:25

## 2023-11-28 RX ADMIN — SEVELAMER CARBONATE 800 MILLIGRAM(S): 2400 POWDER, FOR SUSPENSION ORAL at 11:38

## 2023-11-28 RX ADMIN — CHLORHEXIDINE GLUCONATE 15 MILLILITER(S): 213 SOLUTION TOPICAL at 06:24

## 2023-11-28 RX ADMIN — SODIUM CHLORIDE 50 MILLILITER(S): 9 INJECTION, SOLUTION INTRAVENOUS at 11:01

## 2023-11-28 RX ADMIN — Medication 2: at 06:22

## 2023-11-28 RX ADMIN — SEVELAMER CARBONATE 800 MILLIGRAM(S): 2400 POWDER, FOR SUSPENSION ORAL at 07:47

## 2023-11-28 RX ADMIN — SEVELAMER CARBONATE 800 MILLIGRAM(S): 2400 POWDER, FOR SUSPENSION ORAL at 17:24

## 2023-11-28 RX ADMIN — Medication 3 MILLILITER(S): at 05:44

## 2023-11-28 RX ADMIN — Medication 7.49 MICROGRAM(S)/KG/MIN: at 17:25

## 2023-11-28 RX ADMIN — Medication 325 MILLIGRAM(S): at 11:17

## 2023-11-28 RX ADMIN — Medication 3 MILLILITER(S): at 02:55

## 2023-11-28 RX ADMIN — CEFEPIME 100 MILLIGRAM(S): 1 INJECTION, POWDER, FOR SOLUTION INTRAMUSCULAR; INTRAVENOUS at 17:24

## 2023-11-28 RX ADMIN — PANTOPRAZOLE SODIUM 40 MILLIGRAM(S): 20 TABLET, DELAYED RELEASE ORAL at 11:17

## 2023-11-28 RX ADMIN — Medication 81 MILLIGRAM(S): at 11:17

## 2023-11-28 RX ADMIN — Medication 4: at 01:03

## 2023-11-28 NOTE — PROGRESS NOTE ADULT - SUBJECTIVE AND OBJECTIVE BOX
INTERVAL HPI/OVERNIGHT EVENTS: Event of desaturation overnight, improved on manual bagging, FiO2 increased to 60%, repeat CXR wnl.      SUBJECTIVE: Patient seen and examined at bedside.     ROS: All negative except as listed above.    VITAL SIGNS:  ICU Vital Signs Last 24 Hrs  T(C): 36.2 (28 Nov 2023 08:00), Max: 37.6 (27 Nov 2023 16:00)  T(F): 97.2 (28 Nov 2023 08:00), Max: 99.7 (27 Nov 2023 16:00)  HR: 80 (28 Nov 2023 08:00) (79 - 885)  BP: 100/53 (28 Nov 2023 08:00) (77/38 - 122/62)  BP(mean): 75 (28 Nov 2023 08:00) (53 - 87)  ABP: --  ABP(mean): --  RR: 20 (28 Nov 2023 08:00) (13 - 33)  SpO2: 100% (28 Nov 2023 08:00) (87% - 100%)    O2 Parameters below as of 28 Nov 2023 08:00  Patient On (Oxygen Delivery Method): ventilator    O2 Concentration (%): 60      Mode: AC/ CMV (Assist Control/ Continuous Mandatory Ventilation), RR (machine): 18, TV (machine): 400, FiO2: 70, PEEP: 5, ITime: 1, MAP: 10, PIP: 24  Plateau pressure:   P/F ratio:     11-27 @ 07:01  -  11-28 @ 07:00  --------------------------------------------------------  IN: 4748.5 mL / OUT: 1900 mL / NET: 2848.5 mL    11-28 @ 07:01 - 11-28 @ 08:40  --------------------------------------------------------  IN: 119.5 mL / OUT: 0 mL / NET: 119.5 mL      CAPILLARY BLOOD GLUCOSE      POCT Blood Glucose.: 174 mg/dL (28 Nov 2023 06:21)      ECG: reviewed.    PHYSICAL EXAM:    GENERAL: NAD, lying in bed comfortably  HEAD:  Atraumatic, normocephalic  EYES: EOMI, PERRLA, conjunctiva and sclera clear  NECK: Supple, trachea midline, no JVD  HEART: Regular rate and rhythm, no murmurs, rubs, or gallops  LUNGS: Unlabored respirations.  Clear to auscultation bilaterally, no crackles, wheezing, or rhonchi  ABDOMEN: Soft, nontender, nondistended, +BS  EXTREMITIES: 2+ peripheral pulses bilaterally, cap refill<2 secs. No clubbing, cyanosis, or edema  NERVOUS SYSTEM:  A&Ox3, following commands, moving all extremities, no focal deficits   SKIN: No rashes or lesions    MEDICATIONS:  MEDICATIONS  (STANDING):  albuterol/ipratropium for Nebulization 3 milliLiter(s) Nebulizer every 6 hours  aspirin  chewable 81 milliGRAM(s) Oral daily  cefepime   IVPB 1000 milliGRAM(s) IV Intermittent every 12 hours  chlorhexidine 0.12% Liquid 15 milliLiter(s) Oral Mucosa every 12 hours  dextrose 5%. 1000 milliLiter(s) (50 mL/Hr) IV Continuous <Continuous>  ferrous    sulfate 325 milliGRAM(s) Oral daily  insulin lispro (ADMELOG) corrective regimen sliding scale   SubCutaneous every 6 hours  norepinephrine Infusion 0.05 MICROgram(s)/kG/Min (7.49 mL/Hr) IV Continuous <Continuous>  pantoprazole  Injectable 40 milliGRAM(s) IV Push daily  sevelamer carbonate Powder 800 milliGRAM(s) Oral three times a day with meals    MEDICATIONS  (PRN):  artificial tears (preservative free) Ophthalmic Solution 1 Drop(s) Both EYES every 6 hours PRN Dry Eyes      ALLERGIES:  Allergies    No Known Allergies    Intolerances        LABS:                        6.9    8.22  )-----------( 786      ( 28 Nov 2023 00:14 )             22.5     11-28    149<H>  |  113<H>  |  116<H>  ----------------------------<  206<H>  5.3   |  23  |  4.28<H>    Ca    11.4<H>      28 Nov 2023 00:14  Phos  8.6     11-28  Mg     3.4     11-28    TPro  7.3  /  Alb  2.1<L>  /  TBili  0.4  /  DBili  x   /  AST  46<H>  /  ALT  17  /  AlkPhos  142<H>  11-28      Urinalysis Basic - ( 28 Nov 2023 00:14 )    Color: x / Appearance: x / SG: x / pH: x  Gluc: 206 mg/dL / Ketone: x  / Bili: x / Urobili: x   Blood: x / Protein: x / Nitrite: x   Leuk Esterase: x / RBC: x / WBC x   Sq Epi: x / Non Sq Epi: x / Bacteria: x      ABG:  pH, Arterial: 7.26 (11-28-23 @ 02:55)  pCO2, Arterial: 54 mmHg (11-28-23 @ 02:55)  pO2, Arterial: 71 mmHg (11-28-23 @ 02:55)      vBG:  pH, Venous: 7.32 (11-28-23 @ 00:00)  pCO2, Venous: 49 mmHg (11-28-23 @ 00:00)  pO2, Venous: 55 mmHg (11-28-23 @ 00:00)  HCO3, Venous: 25 mmol/L (11-28-23 @ 00:00)    Micro:    Culture - Blood (collected 11-24-23 @ 23:58)  Source: .Blood Blood-Peripheral  Preliminary Report (11-28-23 @ 04:01):    No growth at 72 Hours    Culture - Blood (collected 11-24-23 @ 17:20)  Source: .Blood Blood-Peripheral  Gram Stain (11-26-23 @ 22:29):    Growth in aerobic bottle: Gram Positive Cocci in Clusters    Growth in anaerobic bottle: Gram Positive Cocci in Clusters  Final Report (11-27-23 @ 19:05):    Growth in aerobic and anaerobic bottles: Staphylococcus epidermidis    Coagulase Negative Staphylococci isolated from a single blood culture set    may represent contamination.    Contact the Microbiology Department at [phone] if susceptibility    testing is clinically indicated.    Direct identification is available within approximately 3-5    hours either by Blood Panel Multiplexed PCR or Direct    MALDI-TOF. Details: [email or web address]    ***Add Freetext*** blood culture bottle turned positive after the final    report was released.  Organism: Blood Culture PCR (11-26-23 @ 22:29)      Method Type: PCR      -  Staphylococcus epidermidis, Methicillin resistant: Detec  Organism: Blood Culture PCR (11-26-23 @ 19:23)    Culture - Blood (collected 11-23-23 @ 00:52)  Source: .Blood Blood-Peripheral  Preliminary Report (11-27-23 @ 11:06):    No growth at 4 days    Culture - Blood (collected 11-23-23 @ 00:52)  Source: .Blood Blood-Venous  Preliminary Report (11-27-23 @ 11:06):    No growth at 4 days    Culture - Blood (collected 11-18-23 @ 17:40)  Source: .Blood Blood-Peripheral  Final Report (11-23-23 @ 23:01):    No growth at 5 days    Culture - Blood (collected 11-18-23 @ 17:30)  Source: .Blood Blood-Peripheral  Final Report (11-23-23 @ 23:01):    No growth at 5 days    Culture - Blood (collected 11-14-23 @ 18:15)  Source: .Blood Blood  Final Report (11-20-23 @ 01:00):    No growth at 5 days    Culture - Blood (collected 11-14-23 @ 18:00)  Source: .Blood Blood  Final Report (11-20-23 @ 01:00):    No growth at 5 days        Culture - Sputum (collected 11-24-23 @ 13:37)  Source: ET Tube ET Tube  Gram Stain (11-24-23 @ 23:23):    Moderate polymorphonuclear leukocytes per low power field    No Squamous epithelial cells per low power field    Rare Gram positive cocci in pairs per oil power field  Final Report (11-26-23 @ 21:19):    Normal Respiratory Jenni present    Culture - Sputum (collected 11-15-23 @ 01:08)  Source: .Sputum Sputum  Gram Stain (11-15-23 @ 16:34):    Rare polymorphonuclear leukocytes per low power field    No Squamous epithelial cells per low power field    No organisms seen per oil power field  Final Report (11-17-23 @ 12:04):    Normal Respiratory Jenni present        RADIOLOGY & ADDITIONAL TESTS: Reviewed. INTERVAL HPI/OVERNIGHT EVENTS: Event of desaturation overnight, improved on manual bagging, FiO2 increased to 60%, repeat CXR wnl. Hb 6.9 transfused PRC x1.      SUBJECTIVE: Patient seen and examined at bedside.     ROS: All negative except as listed above.    VITAL SIGNS:  ICU Vital Signs Last 24 Hrs  T(C): 36.2 (28 Nov 2023 08:00), Max: 37.6 (27 Nov 2023 16:00)  T(F): 97.2 (28 Nov 2023 08:00), Max: 99.7 (27 Nov 2023 16:00)  HR: 80 (28 Nov 2023 08:00) (79 - 885)  BP: 100/53 (28 Nov 2023 08:00) (77/38 - 122/62)  BP(mean): 75 (28 Nov 2023 08:00) (53 - 87)  RR: 20 (28 Nov 2023 08:00) (13 - 33)  SpO2: 100% (28 Nov 2023 08:00) (87% - 100%)    O2 Parameters below as of 28 Nov 2023 08:00  Patient On (Oxygen Delivery Method): ventilator    O2 Concentration (%): 60    Mode: AC/ CMV (Assist Control/ Continuous Mandatory Ventilation), RR (machine): 18, TV (machine): 400, FiO2: 70, PEEP: 5, ITime: 1, MAP: 10, PIP: 24  Plateau pressure:   P/F ratio:     11-27 @ 07:01  -  11-28 @ 07:00  --------------------------------------------------------  IN: 4748.5 mL / OUT: 1900 mL / NET: 2848.5 mL    11-28 @ 07:01 - 11-28 @ 08:40  --------------------------------------------------------  IN: 119.5 mL / OUT: 0 mL / NET: 119.5 mL      CAPILLARY BLOOD GLUCOSE      POCT Blood Glucose.: 174 mg/dL (28 Nov 2023 06:21)      ECG: reviewed.    PHYSICAL EXAM:    GENERAL: NAD, lying in bed comfortably  HEAD:  Atraumatic, normocephalic  EYES: EOMI, PERRLA, conjunctiva and sclera clear  NECK: Supple, trachea midline, no JVD  HEART: Regular rate and rhythm, no murmurs, rubs, or gallops  LUNGS: Unlabored respirations.  Clear to auscultation bilaterally, no crackles, wheezing, or rhonchi  ABDOMEN: Soft, nontender, nondistended, +BS  EXTREMITIES: 2+ peripheral pulses bilaterally, cap refill<2 secs. No clubbing, cyanosis, or edema  NERVOUS SYSTEM:  A&Ox3, following commands, moving all extremities, no focal deficits   SKIN: No rashes or lesions    MEDICATIONS:  MEDICATIONS  (STANDING):  albuterol/ipratropium for Nebulization 3 milliLiter(s) Nebulizer every 6 hours  aspirin  chewable 81 milliGRAM(s) Oral daily  cefepime   IVPB 1000 milliGRAM(s) IV Intermittent every 12 hours  chlorhexidine 0.12% Liquid 15 milliLiter(s) Oral Mucosa every 12 hours  dextrose 5%. 1000 milliLiter(s) (50 mL/Hr) IV Continuous <Continuous>  ferrous    sulfate 325 milliGRAM(s) Oral daily  insulin lispro (ADMELOG) corrective regimen sliding scale   SubCutaneous every 6 hours  norepinephrine Infusion 0.05 MICROgram(s)/kG/Min (7.49 mL/Hr) IV Continuous <Continuous>  pantoprazole  Injectable 40 milliGRAM(s) IV Push daily  sevelamer carbonate Powder 800 milliGRAM(s) Oral three times a day with meals    MEDICATIONS  (PRN):  artificial tears (preservative free) Ophthalmic Solution 1 Drop(s) Both EYES every 6 hours PRN Dry Eyes      ALLERGIES:  Allergies    No Known Allergies    Intolerances        LABS:                        6.9    8.22  )-----------( 786      ( 28 Nov 2023 00:14 )             22.5     11-28    149<H>  |  113<H>  |  116<H>  ----------------------------<  206<H>  5.3   |  23  |  4.28<H>    Ca    11.4<H>      28 Nov 2023 00:14  Phos  8.6     11-28  Mg     3.4     11-28    TPro  7.3  /  Alb  2.1<L>  /  TBili  0.4  /  DBili  x   /  AST  46<H>  /  ALT  17  /  AlkPhos  142<H>  11-28      Urinalysis Basic - ( 28 Nov 2023 00:14 )    Color: x / Appearance: x / SG: x / pH: x  Gluc: 206 mg/dL / Ketone: x  / Bili: x / Urobili: x   Blood: x / Protein: x / Nitrite: x   Leuk Esterase: x / RBC: x / WBC x   Sq Epi: x / Non Sq Epi: x / Bacteria: x      ABG:  pH, Arterial: 7.26 (11-28-23 @ 02:55)  pCO2, Arterial: 54 mmHg (11-28-23 @ 02:55)  pO2, Arterial: 71 mmHg (11-28-23 @ 02:55)      vBG:  pH, Venous: 7.32 (11-28-23 @ 00:00)  pCO2, Venous: 49 mmHg (11-28-23 @ 00:00)  pO2, Venous: 55 mmHg (11-28-23 @ 00:00)  HCO3, Venous: 25 mmol/L (11-28-23 @ 00:00)    Micro:    Culture - Blood (collected 11-24-23 @ 23:58)  Source: .Blood Blood-Peripheral  Preliminary Report (11-28-23 @ 04:01):    No growth at 72 Hours    Culture - Blood (collected 11-24-23 @ 17:20)  Source: .Blood Blood-Peripheral  Gram Stain (11-26-23 @ 22:29):    Growth in aerobic bottle: Gram Positive Cocci in Clusters    Growth in anaerobic bottle: Gram Positive Cocci in Clusters  Final Report (11-27-23 @ 19:05):    Growth in aerobic and anaerobic bottles: Staphylococcus epidermidis    Coagulase Negative Staphylococci isolated from a single blood culture set    may represent contamination.    Contact the Microbiology Department at [phone] if susceptibility    testing is clinically indicated.    Direct identification is available within approximately 3-5    hours either by Blood Panel Multiplexed PCR or Direct    MALDI-TOF. Details: [email or web address]    ***Add Freetext*** blood culture bottle turned positive after the final    report was released.  Organism: Blood Culture PCR (11-26-23 @ 22:29)      Method Type: PCR      -  Staphylococcus epidermidis, Methicillin resistant: Detec  Organism: Blood Culture PCR (11-26-23 @ 19:23)    Culture - Blood (collected 11-23-23 @ 00:52)  Source: .Blood Blood-Peripheral  Preliminary Report (11-27-23 @ 11:06):    No growth at 4 days    Culture - Blood (collected 11-23-23 @ 00:52)  Source: .Blood Blood-Venous  Preliminary Report (11-27-23 @ 11:06):    No growth at 4 days    Culture - Blood (collected 11-18-23 @ 17:40)  Source: .Blood Blood-Peripheral  Final Report (11-23-23 @ 23:01):    No growth at 5 days    Culture - Blood (collected 11-18-23 @ 17:30)  Source: .Blood Blood-Peripheral  Final Report (11-23-23 @ 23:01):    No growth at 5 days    Culture - Blood (collected 11-14-23 @ 18:15)  Source: .Blood Blood  Final Report (11-20-23 @ 01:00):    No growth at 5 days    Culture - Blood (collected 11-14-23 @ 18:00)  Source: .Blood Blood  Final Report (11-20-23 @ 01:00):    No growth at 5 days        Culture - Sputum (collected 11-24-23 @ 13:37)  Source: ET Tube ET Tube  Gram Stain (11-24-23 @ 23:23):    Moderate polymorphonuclear leukocytes per low power field    No Squamous epithelial cells per low power field    Rare Gram positive cocci in pairs per oil power field  Final Report (11-26-23 @ 21:19):    Normal Respiratory Jenni present    Culture - Sputum (collected 11-15-23 @ 01:08)  Source: .Sputum Sputum  Gram Stain (11-15-23 @ 16:34):    Rare polymorphonuclear leukocytes per low power field    No Squamous epithelial cells per low power field    No organisms seen per oil power field  Final Report (11-17-23 @ 12:04):    Normal Respiratory Jenni present        RADIOLOGY & ADDITIONAL TESTS: Reviewed. INTERVAL HPI/OVERNIGHT EVENTS: Event of desaturation overnight, improved on manual bagging, FiO2 increased to 60%, repeat CXR wnl. Hb 6.9 transfused PRC x1.      SUBJECTIVE: Patient seen and examined at bedside.     ROS: All negative except as listed above.    VITAL SIGNS:  ICU Vital Signs Last 24 Hrs  T(C): 36.2 (28 Nov 2023 08:00), Max: 37.6 (27 Nov 2023 16:00)  T(F): 97.2 (28 Nov 2023 08:00), Max: 99.7 (27 Nov 2023 16:00)  HR: 80 (28 Nov 2023 08:00) (79 - 885)  BP: 100/53 (28 Nov 2023 08:00) (77/38 - 122/62)  BP(mean): 75 (28 Nov 2023 08:00) (53 - 87)  RR: 20 (28 Nov 2023 08:00) (13 - 33)  SpO2: 100% (28 Nov 2023 08:00) (87% - 100%)    O2 Parameters below as of 28 Nov 2023 08:00  Patient On (Oxygen Delivery Method): ventilator    O2 Concentration (%): 60    Mode: AC/ CMV (Assist Control/ Continuous Mandatory Ventilation), RR (machine): 18, TV (machine): 400, FiO2: 70, PEEP: 5, ITime: 1, MAP: 10, PIP: 24  Plateau pressure:   P/F ratio:     11-27 @ 07:01  -  11-28 @ 07:00  --------------------------------------------------------  IN: 4748.5 mL / OUT: 1900 mL / NET: 2848.5 mL    11-28 @ 07:01 - 11-28 @ 08:40  --------------------------------------------------------  IN: 119.5 mL / OUT: 0 mL / NET: 119.5 mL      CAPILLARY BLOOD GLUCOSE      POCT Blood Glucose.: 174 mg/dL (28 Nov 2023 06:21)      ECG: reviewed.    PHYSICAL EXAM:    HEAD:  Atraumatic, Normocephalic  EYES: fixed pupils  ENT: Moist mucous membranes  NECK: Supple, No elevated JVP.  CHEST/LUNG: Mechanical ventilation   HEART: Regular rate and rhythm; No murmurs, rubs, or gallops  ABDOMEN: Bowel sounds present; Soft, nontender, nondistended  EXTREMITIES:  2+ Peripheral Pulses, brisk capillary refill. No clubbing, cyanosis, or edema  NERVOUS SYSTEM:  Patient not responsive   SKIN: No rashes or lesions    MEDICATIONS:  MEDICATIONS  (STANDING):  albuterol/ipratropium for Nebulization 3 milliLiter(s) Nebulizer every 6 hours  aspirin  chewable 81 milliGRAM(s) Oral daily  cefepime   IVPB 1000 milliGRAM(s) IV Intermittent every 12 hours  chlorhexidine 0.12% Liquid 15 milliLiter(s) Oral Mucosa every 12 hours  dextrose 5%. 1000 milliLiter(s) (50 mL/Hr) IV Continuous <Continuous>  ferrous    sulfate 325 milliGRAM(s) Oral daily  insulin lispro (ADMELOG) corrective regimen sliding scale   SubCutaneous every 6 hours  norepinephrine Infusion 0.05 MICROgram(s)/kG/Min (7.49 mL/Hr) IV Continuous <Continuous>  pantoprazole  Injectable 40 milliGRAM(s) IV Push daily  sevelamer carbonate Powder 800 milliGRAM(s) Oral three times a day with meals    MEDICATIONS  (PRN):  artificial tears (preservative free) Ophthalmic Solution 1 Drop(s) Both EYES every 6 hours PRN Dry Eyes      ALLERGIES:  Allergies    No Known Allergies    Intolerances        LABS:                        6.9    8.22  )-----------( 786      ( 28 Nov 2023 00:14 )             22.5     11-28    149<H>  |  113<H>  |  116<H>  ----------------------------<  206<H>  5.3   |  23  |  4.28<H>    Ca    11.4<H>      28 Nov 2023 00:14  Phos  8.6     11-28  Mg     3.4     11-28    TPro  7.3  /  Alb  2.1<L>  /  TBili  0.4  /  DBili  x   /  AST  46<H>  /  ALT  17  /  AlkPhos  142<H>  11-28      Urinalysis Basic - ( 28 Nov 2023 00:14 )    Color: x / Appearance: x / SG: x / pH: x  Gluc: 206 mg/dL / Ketone: x  / Bili: x / Urobili: x   Blood: x / Protein: x / Nitrite: x   Leuk Esterase: x / RBC: x / WBC x   Sq Epi: x / Non Sq Epi: x / Bacteria: x      ABG:  pH, Arterial: 7.26 (11-28-23 @ 02:55)  pCO2, Arterial: 54 mmHg (11-28-23 @ 02:55)  pO2, Arterial: 71 mmHg (11-28-23 @ 02:55)      vBG:  pH, Venous: 7.32 (11-28-23 @ 00:00)  pCO2, Venous: 49 mmHg (11-28-23 @ 00:00)  pO2, Venous: 55 mmHg (11-28-23 @ 00:00)  HCO3, Venous: 25 mmol/L (11-28-23 @ 00:00)    Micro:    Culture - Blood (collected 11-24-23 @ 23:58)  Source: .Blood Blood-Peripheral  Preliminary Report (11-28-23 @ 04:01):    No growth at 72 Hours    Culture - Blood (collected 11-24-23 @ 17:20)  Source: .Blood Blood-Peripheral  Gram Stain (11-26-23 @ 22:29):    Growth in aerobic bottle: Gram Positive Cocci in Clusters    Growth in anaerobic bottle: Gram Positive Cocci in Clusters  Final Report (11-27-23 @ 19:05):    Growth in aerobic and anaerobic bottles: Staphylococcus epidermidis    Coagulase Negative Staphylococci isolated from a single blood culture set    may represent contamination.    Contact the Microbiology Department at [phone] if susceptibility    testing is clinically indicated.    Direct identification is available within approximately 3-5    hours either by Blood Panel Multiplexed PCR or Direct    MALDI-TOF. Details: [email or web address]    ***Add Freetext*** blood culture bottle turned positive after the final    report was released.  Organism: Blood Culture PCR (11-26-23 @ 22:29)      Method Type: PCR      -  Staphylococcus epidermidis, Methicillin resistant: Detec  Organism: Blood Culture PCR (11-26-23 @ 19:23)    Culture - Blood (collected 11-23-23 @ 00:52)  Source: .Blood Blood-Peripheral  Preliminary Report (11-27-23 @ 11:06):    No growth at 4 days    Culture - Blood (collected 11-23-23 @ 00:52)  Source: .Blood Blood-Venous  Preliminary Report (11-27-23 @ 11:06):    No growth at 4 days    Culture - Blood (collected 11-18-23 @ 17:40)  Source: .Blood Blood-Peripheral  Final Report (11-23-23 @ 23:01):    No growth at 5 days    Culture - Blood (collected 11-18-23 @ 17:30)  Source: .Blood Blood-Peripheral  Final Report (11-23-23 @ 23:01):    No growth at 5 days    Culture - Blood (collected 11-14-23 @ 18:15)  Source: .Blood Blood  Final Report (11-20-23 @ 01:00):    No growth at 5 days    Culture - Blood (collected 11-14-23 @ 18:00)  Source: .Blood Blood  Final Report (11-20-23 @ 01:00):    No growth at 5 days        Culture - Sputum (collected 11-24-23 @ 13:37)  Source: ET Tube ET Tube  Gram Stain (11-24-23 @ 23:23):    Moderate polymorphonuclear leukocytes per low power field    No Squamous epithelial cells per low power field    Rare Gram positive cocci in pairs per oil power field  Final Report (11-26-23 @ 21:19):    Normal Respiratory Jenni present    Culture - Sputum (collected 11-15-23 @ 01:08)  Source: .Sputum Sputum  Gram Stain (11-15-23 @ 16:34):    Rare polymorphonuclear leukocytes per low power field    No Squamous epithelial cells per low power field    No organisms seen per oil power field  Final Report (11-17-23 @ 12:04):    Normal Respiratory Jenni present        RADIOLOGY & ADDITIONAL TESTS: Reviewed.

## 2023-11-28 NOTE — PROGRESS NOTE ADULT - ASSESSMENT
43F with history of pre-DM, HTN, HLD, ADHD, BENJAMIN, migraines, thrombocytosis, hypothyroidism not on synthroid, uterine polyps s/p polypectomy in 2/2023 presented from [facility] post cardiac arrest in the field. Imaging c/f extensive PNA. Transferred to [facility] for further management. Patient remains on a ventilator with signs of anoxic brain injury.     =====NEURO=====  #Concern for Anoxic Brain Injury  - arrested for unknown period both in the field & hospital  - CTH at OSH: findings suggestive of diffuse cerebral & cerebellar injury   - EEG notable for profound diffuse cerebral dysfunction, nonspecific in etiology. Continuous EEG monitoring discontinued per neuro.   -MRI with findings consistent with anoxic brain injury (diffuse cortical restricted diffusion, multiple foci of restricted diffusion in brainstem)  - current exam with dilated pupils, no corneal reflex, - will not consider sedation given current mental status  -Failing apnea test   -NM perfusion 11/24 no blood flow   -Will take Neurology on board for brain death confirmation     =====RESPIRATORY=====  #Intubated & Ventilated  #Patchy opacities  -Concern for VAP  -Continue Cefepime, dosed per RFTs if CrCl <11 ml/min then modify to 500 mg Q24    =====CARDIOVASCULAR=====  #Cardiac Arrest  - unclear rhythm but pulseless requiring CPR/epi, no reported shock delivery  - unclear duration of total cardiac arrest, however, lost pulse en route to hospital  - unclear etiology - possibly secondary to hypoxia given imaging findings & sxs  - currently on levophed- wean as tolerated     =====GASTRO=====  -Tolerating tube feeds    =====RENAL=====  #SIVA  -Etiology post renal obstruction VS ATN. Also component of multi organ failure  - Cr 0.58 on presentation, now to 4.16  -US renal no hydronephrosis  -Urine CS -ve    - will monitor Cr/UOP  - will continue to follow BMPs    -#Hypernatremia   -Sodium 153, continue Free water flushes 400cc Q6 Hours  -Start D5 @ 50 ml/hr     =====ID=====  #Patchy opacities on CT  -Continue Cefepime  -BC 11/25 Gm +ve Cocci - S-epidermidis     =====HEME/ONC=====  #Anemia  - chronic history of anemia but no evidence of overt bleed  - maintain active T&S, monitor CBC    #leucocytosis  -in setting of left basal atelactasis  - will continue Cefepime     #Thrombocytosis  - history of thrombocytosis, was on ASA at home  - continue ASA     =====ENDOCRINE=====  #Hypothyroidism  - history of hypothyroid but not on synthroid as per endocrine    =====ETHICS=====  -full code. Palliation team consulted. Family meeting today   43F with history of pre-DM, HTN, HLD, ADHD, BENJAMIN, migraines, thrombocytosis, hypothyroidism not on synthroid, uterine polyps s/p polypectomy in 2/2023 presented from [facility] post cardiac arrest in the field. Imaging c/f extensive PNA. Transferred to [facility] for further management. Patient remains on a ventilator with signs of anoxic brain injury.     =====NEURO=====  #Concern for Anoxic Brain Injury  - arrested for unknown period both in the field & hospital  - CTH at OSH: findings suggestive of diffuse cerebral & cerebellar injury   - EEG notable for profound diffuse cerebral dysfunction, nonspecific in etiology. Continuous EEG monitoring discontinued per neuro.   -MRI with findings consistent with anoxic brain injury (diffuse cortical restricted diffusion, multiple foci of restricted diffusion in brainstem)  - current exam with dilated pupils, no corneal reflex, - will not consider sedation given current mental status  -Failing apnea test   -NM perfusion 11/24 no blood flow   -Neurology reviewed patient and declared brain death     =====RESPIRATORY=====  #Intubated & Ventilated  #Patchy opacities  -Concern for VAP  -Continue Cefepime, dosed per RFTs if CrCl <11 ml/min then modify to 500 mg Q24    =====CARDIOVASCULAR=====  #Cardiac Arrest  - unclear rhythm but pulseless requiring CPR/epi, no reported shock delivery  - unclear duration of total cardiac arrest, however, lost pulse en route to hospital  - unclear etiology - possibly secondary to hypoxia given imaging findings & sxs  - currently on levophed- wean as tolerated     =====GASTRO=====  -Tolerating tube feeds    =====RENAL=====  #SIVA  -Etiology post renal obstruction VS ATN. Also component of multi organ failure  - Cr 0.58 on presentation, now to 4.16  -US renal no hydronephrosis  -Urine CS -ve    - will monitor Cr/UOP  - will continue to follow BMPs    -#Hypernatremia   -Sodium 149, continue Free water flushes 400cc Q6 Hours  -on D5 @ 50 ml/hr   -will CTM     =====ID=====  #Patchy opacities on CT  -On Cefepime for VAP   -BC 11/25 Gm +ve Cocci - S-epidermidis     =====HEME/ONC=====  #Anemia  - chronic history of anemia  -req. PRCx1 transf. overnight on Hb of 6.9  - maintain active T&S, monitor CBC    #leucocytosis  -in setting of left basal atelactasis  - will continue Cefepime     #Thrombocytosis  - history of thrombocytosis, was on ASA at home  - continue ASA     =====ENDOCRINE=====  #Hypothyroidism  - history of hypothyroid but not on synthroid as per endocrine    =====ETHICS=====  -Discussion ongoing with family.

## 2023-11-28 NOTE — CHART NOTE - NSCHARTNOTEFT_GEN_A_CORE
Nutrition Follow Up Note  Patient seen for: length of stay follow up.     Chart reviewed, events noted.    Source: [] Patient       [x] Medical Record        [x] RN        [] Family at bedside       [] Other:    -If unable to interview patient: [x] Trach/Vent/BiPAP  [] Disoriented/confused/inappropriate to interview    Diet Order:   Diet, NPO with Tube Feed:   Tube Feeding Modality: Orogastric  Nepro with Carb Steady (NEPRORTH)  Total Volume for 24 Hours (mL): 1080  Continuous  Until Goal Tube Feed Rate (mL per Hour): 60  Tube Feed Duration (in Hours): 18  Tube Feed Start Time: 00:00  No Carb Prosource TF     Qty per Day:  1 (23 @ 01:10)    EN Order + Protein Modular Provides: total volume 1080 mL, 1984 kcals, 98 gm protein, and 785 mL free water. Meets 25 kcals/kG and 1.2 gm protein/kG based on daily wt 79.5 kG (-18)  Current Pump Rate: Currently on hold per MICU 18 hr policy. Was at 60 mL/hr  5-Day EN Average Provision per RN flowsheet + Protein Modular*: 1152 mL, 2114 kcals, and 104 gm protein  *Based on Nepro as was order ->present    Is current diet order appropriate/adequate? See recommendations below    PO intake :   [] >75%  Adequate    [] 50-75%  Fair       [] <50%  Poor   [x] N/A    Nutrition-related concerns:  - Intubated PTA. Not on sedation. Pending neurology for brain death confirmation   - Pressor: norepinephrine at 0.12 micrograms/kG/min   - SIVA. Phosphorus now trending up; ordered for Phoslo  - Cardiac Arrest  - Hypernatremia. Free water 400 mL q 6hours   - Ordered ferrous sulfate     GI:  Last BM .   Bowel Regimen? [] Yes   [x] No  -> Ordered for pantoprazole     IV: dextrose 5% at 50 mL/hr & lactated ringer bolus    Weights:   Daily Weight in k.6 (-24), 80.2 (-21), 79.5 (11-18), 83.7 (11-16)  Slight wt fluctuations noted and likely fluid shifts. RD will continue to trend as new wts available/able.     Drug Dosing Weight  Height (cm): 172.7 (2023 17:59)  Weight (kg): 80.2 (2023 04:00)  BMI (kg/m2): 26.9 (2023 04:00)    Nutritionally Pertinent:   MEDICATIONS  (STANDING):  aspirin  chewable 81 milliGRAM(s) Oral daily  cefepime   IVPB 1000 milliGRAM(s) IV Intermittent every 12 hours  dextrose 5%. 1000 milliLiter(s) (50 mL/Hr) IV Continuous <Continuous>  ferrous    sulfate 325 milliGRAM(s) Oral daily  insulin lispro (ADMELOG) corrective regimen sliding scale   SubCutaneous every 6 hours  lactated ringers Bolus 250 milliLiter(s) IV Bolus once  norepinephrine Infusion 0.05 MICROgram(s)/kG/Min (7.49 mL/Hr) IV Continuous <Continuous>  pantoprazole  Injectable 40 milliGRAM(s) IV Push daily  sevelamer carbonate Powder 800 milliGRAM(s) Oral three times a day with meals    Pertinent Labs:  @ 00:14: Na 149<H>, <H>, Cr 4.28<H>, <H>, K+ 5.3, Phos 8.6<H>, Mg 3.4<H>, Alk Phos 142<H>, ALT/SGPT 17, AST/SGOT 46<H>   @ 13:58: Na 152<H>, <H>, Cr 4.24<H>, <H>, K+ 4.7, Alk Phos 148<H>, ALT/SGPT 13, AST/SGOT 28    Finger Sticks:  POCT Blood Glucose.: 174 mg/dL ( @ 06:21)  POCT Blood Glucose.: 156 mg/dL ( @ 16:26)  POCT Blood Glucose.: 148 mg/dL ( @ 13:47)  POCT Blood Glucose.: 125 mg/dL ( @ 11:03)    Skin per nursing documentation: No pressure injuries noted.  Edema per nursing documentation: 1+ generalized    Based on daily wt 79.5 kG (11-18)  Estimated Energy Needs: (18-23 kcals/kG) 1725-5070 kcals  Estimated Protein Needs: (1.0-1.4 gm/kG) 79.5-111 gm  Fluid needs deferred to provider.   [facility] Equation: 1727 kcals ()    Previous Nutrition Diagnosis: No active nutrition diagnosis identified at this time  Nutrition Diagnosis is: [x] ongoing  [] resolved [] not applicable     Nutrition Care Plan:  [x] In Progress  [] Achieved  [] Not applicable    New Nutrition Diagnosis: [x] Not applicable    Nutrition Interventions:     Education Provided:       [] Yes:  [x] No: Not applicable    Recommendations:      1) Given elevated phosphorus, continue Nepro with goal rate 55 mL/hr x18 hours to provide total volume 990 mL, 1782 kcals, 80 gm protein, and 720 mL free water. Meets 22 kcals/kG and 1.0 gm protein/kG based on daily wt 79.5 kG (11-18).   -> Trend pressor requirements and bowel movements.    Monitoring and Evaluation:   Continue to monitor nutritional intake, tolerance to diet prescription, weights, labs, skin integrity    RD remains available upon request and will follow up per protocol  Rose Avitia RD, MS, CDN, CNSC, CDCES TEAMS

## 2023-11-28 NOTE — PROGRESS NOTE ADULT - ATTENDING COMMENTS
1. Acute hypoxemic respiratory  failure after at home cardiac arrest. . Pt currently being treated for pneumonia on cefepime. FIO2 up to 60%. Episode of desaturation last night. ? mucous plug CXR WNL.  2. Neuro. Severe anoxic brain injury. With brain perfusion scan with no flow pt meets criteria for brain death. Neurology input appreciated. Neuro agrees that patient is brain death. However, patents sister does not believe this since she took one breath on Friday 4-5 hours before the perfusion scan. Apnea test was done again today at the request of patients sister. Apnea test performed. No respiration at 10 min. ABG at 7 min 30 seconds revealed PCO2 went up from base line 45 to 70.  I again told patient's sister that now there is no conflicting evidence of brain death. She is still having problems accepting this fact. I talked with family physician DR. Luo of the situation. He is aware and will talk with family today. Pt's young daughter Is supposed to visit patient tomorrow.    On my exam pt meets all criteria for brain death. Cols calorics done yesterday and this am.  No response both times.    3. Hypernatremia: 153. Continue free water 400ml q 6 hrs. Add D5W at 50cc/hr. Repeat  Na in afternoon.    4. Renal . Acute  non oliguric renal failure. ATN . Creatinine increasing     5. Hypotension.? sepsis vs brain death. Continues on cefepime and Levophed.     6. DVT prophylaxis> Sq heparin    7.GOC: Full code

## 2023-11-29 LAB
ALBUMIN SERPL ELPH-MCNC: 2.1 G/DL — LOW (ref 3.3–5)
ALBUMIN SERPL ELPH-MCNC: 2.1 G/DL — LOW (ref 3.3–5)
ALP SERPL-CCNC: 156 U/L — HIGH (ref 40–120)
ALP SERPL-CCNC: 156 U/L — HIGH (ref 40–120)
ALT FLD-CCNC: 19 U/L — SIGNIFICANT CHANGE UP (ref 10–45)
ALT FLD-CCNC: 19 U/L — SIGNIFICANT CHANGE UP (ref 10–45)
ANION GAP SERPL CALC-SCNC: 14 MMOL/L — SIGNIFICANT CHANGE UP (ref 5–17)
ANION GAP SERPL CALC-SCNC: 14 MMOL/L — SIGNIFICANT CHANGE UP (ref 5–17)
AST SERPL-CCNC: 52 U/L — HIGH (ref 10–40)
AST SERPL-CCNC: 52 U/L — HIGH (ref 10–40)
BASOPHILS # BLD AUTO: 0 K/UL — SIGNIFICANT CHANGE UP (ref 0–0.2)
BASOPHILS # BLD AUTO: 0 K/UL — SIGNIFICANT CHANGE UP (ref 0–0.2)
BASOPHILS # BLD AUTO: 0.04 K/UL — SIGNIFICANT CHANGE UP (ref 0–0.2)
BASOPHILS # BLD AUTO: 0.04 K/UL — SIGNIFICANT CHANGE UP (ref 0–0.2)
BASOPHILS NFR BLD AUTO: 0 % — SIGNIFICANT CHANGE UP (ref 0–2)
BASOPHILS NFR BLD AUTO: 0 % — SIGNIFICANT CHANGE UP (ref 0–2)
BASOPHILS NFR BLD AUTO: 0.4 % — SIGNIFICANT CHANGE UP (ref 0–2)
BASOPHILS NFR BLD AUTO: 0.4 % — SIGNIFICANT CHANGE UP (ref 0–2)
BILIRUB SERPL-MCNC: 0.4 MG/DL — SIGNIFICANT CHANGE UP (ref 0.2–1.2)
BILIRUB SERPL-MCNC: 0.4 MG/DL — SIGNIFICANT CHANGE UP (ref 0.2–1.2)
BUN SERPL-MCNC: 121 MG/DL — HIGH (ref 7–23)
BUN SERPL-MCNC: 121 MG/DL — HIGH (ref 7–23)
CALCIUM SERPL-MCNC: 11.5 MG/DL — HIGH (ref 8.4–10.5)
CALCIUM SERPL-MCNC: 11.5 MG/DL — HIGH (ref 8.4–10.5)
CHLORIDE SERPL-SCNC: 112 MMOL/L — HIGH (ref 96–108)
CHLORIDE SERPL-SCNC: 112 MMOL/L — HIGH (ref 96–108)
CO2 SERPL-SCNC: 22 MMOL/L — SIGNIFICANT CHANGE UP (ref 22–31)
CO2 SERPL-SCNC: 22 MMOL/L — SIGNIFICANT CHANGE UP (ref 22–31)
CREAT SERPL-MCNC: 4.52 MG/DL — HIGH (ref 0.5–1.3)
CREAT SERPL-MCNC: 4.52 MG/DL — HIGH (ref 0.5–1.3)
EGFR: 12 ML/MIN/1.73M2 — LOW
EGFR: 12 ML/MIN/1.73M2 — LOW
EOSINOPHIL # BLD AUTO: 0.24 K/UL — SIGNIFICANT CHANGE UP (ref 0–0.5)
EOSINOPHIL # BLD AUTO: 0.24 K/UL — SIGNIFICANT CHANGE UP (ref 0–0.5)
EOSINOPHIL # BLD AUTO: 0.35 K/UL — SIGNIFICANT CHANGE UP (ref 0–0.5)
EOSINOPHIL # BLD AUTO: 0.35 K/UL — SIGNIFICANT CHANGE UP (ref 0–0.5)
EOSINOPHIL NFR BLD AUTO: 2.6 % — SIGNIFICANT CHANGE UP (ref 0–6)
EOSINOPHIL NFR BLD AUTO: 2.6 % — SIGNIFICANT CHANGE UP (ref 0–6)
EOSINOPHIL NFR BLD AUTO: 3.1 % — SIGNIFICANT CHANGE UP (ref 0–6)
EOSINOPHIL NFR BLD AUTO: 3.1 % — SIGNIFICANT CHANGE UP (ref 0–6)
GIANT PLATELETS BLD QL SMEAR: PRESENT — SIGNIFICANT CHANGE UP
GIANT PLATELETS BLD QL SMEAR: PRESENT — SIGNIFICANT CHANGE UP
GLUCOSE BLDC GLUCOMTR-MCNC: 118 MG/DL — HIGH (ref 70–99)
GLUCOSE BLDC GLUCOMTR-MCNC: 118 MG/DL — HIGH (ref 70–99)
GLUCOSE BLDC GLUCOMTR-MCNC: 119 MG/DL — HIGH (ref 70–99)
GLUCOSE BLDC GLUCOMTR-MCNC: 119 MG/DL — HIGH (ref 70–99)
GLUCOSE BLDC GLUCOMTR-MCNC: 126 MG/DL — HIGH (ref 70–99)
GLUCOSE BLDC GLUCOMTR-MCNC: 126 MG/DL — HIGH (ref 70–99)
GLUCOSE BLDC GLUCOMTR-MCNC: 131 MG/DL — HIGH (ref 70–99)
GLUCOSE BLDC GLUCOMTR-MCNC: 131 MG/DL — HIGH (ref 70–99)
GLUCOSE SERPL-MCNC: 172 MG/DL — HIGH (ref 70–99)
GLUCOSE SERPL-MCNC: 172 MG/DL — HIGH (ref 70–99)
HCT VFR BLD CALC: 23.8 % — LOW (ref 34.5–45)
HCT VFR BLD CALC: 23.8 % — LOW (ref 34.5–45)
HCT VFR BLD CALC: 24.4 % — LOW (ref 34.5–45)
HCT VFR BLD CALC: 24.4 % — LOW (ref 34.5–45)
HGB BLD-MCNC: 7.3 G/DL — LOW (ref 11.5–15.5)
HGB BLD-MCNC: 7.3 G/DL — LOW (ref 11.5–15.5)
HGB BLD-MCNC: 7.6 G/DL — LOW (ref 11.5–15.5)
HGB BLD-MCNC: 7.6 G/DL — LOW (ref 11.5–15.5)
IMM GRANULOCYTES NFR BLD AUTO: 4.5 % — HIGH (ref 0–0.9)
IMM GRANULOCYTES NFR BLD AUTO: 4.5 % — HIGH (ref 0–0.9)
LYMPHOCYTES # BLD AUTO: 1.06 K/UL — SIGNIFICANT CHANGE UP (ref 1–3.3)
LYMPHOCYTES # BLD AUTO: 1.06 K/UL — SIGNIFICANT CHANGE UP (ref 1–3.3)
LYMPHOCYTES # BLD AUTO: 1.63 K/UL — SIGNIFICANT CHANGE UP (ref 1–3.3)
LYMPHOCYTES # BLD AUTO: 1.63 K/UL — SIGNIFICANT CHANGE UP (ref 1–3.3)
LYMPHOCYTES # BLD AUTO: 11.4 % — LOW (ref 13–44)
LYMPHOCYTES # BLD AUTO: 11.4 % — LOW (ref 13–44)
LYMPHOCYTES # BLD AUTO: 14.4 % — SIGNIFICANT CHANGE UP (ref 13–44)
LYMPHOCYTES # BLD AUTO: 14.4 % — SIGNIFICANT CHANGE UP (ref 13–44)
MAGNESIUM SERPL-MCNC: 3.1 MG/DL — HIGH (ref 1.6–2.6)
MAGNESIUM SERPL-MCNC: 3.1 MG/DL — HIGH (ref 1.6–2.6)
MANUAL SMEAR VERIFICATION: SIGNIFICANT CHANGE UP
MANUAL SMEAR VERIFICATION: SIGNIFICANT CHANGE UP
MCHC RBC-ENTMCNC: 24.7 PG — LOW (ref 27–34)
MCHC RBC-ENTMCNC: 30.7 GM/DL — LOW (ref 32–36)
MCHC RBC-ENTMCNC: 30.7 GM/DL — LOW (ref 32–36)
MCHC RBC-ENTMCNC: 31.1 GM/DL — LOW (ref 32–36)
MCHC RBC-ENTMCNC: 31.1 GM/DL — LOW (ref 32–36)
MCV RBC AUTO: 79.2 FL — LOW (ref 80–100)
MCV RBC AUTO: 79.2 FL — LOW (ref 80–100)
MCV RBC AUTO: 80.7 FL — SIGNIFICANT CHANGE UP (ref 80–100)
MCV RBC AUTO: 80.7 FL — SIGNIFICANT CHANGE UP (ref 80–100)
MONOCYTES # BLD AUTO: 0.74 K/UL — SIGNIFICANT CHANGE UP (ref 0–0.9)
MONOCYTES # BLD AUTO: 0.74 K/UL — SIGNIFICANT CHANGE UP (ref 0–0.9)
MONOCYTES # BLD AUTO: 1.18 K/UL — HIGH (ref 0–0.9)
MONOCYTES # BLD AUTO: 1.18 K/UL — HIGH (ref 0–0.9)
MONOCYTES NFR BLD AUTO: 10.4 % — SIGNIFICANT CHANGE UP (ref 2–14)
MONOCYTES NFR BLD AUTO: 10.4 % — SIGNIFICANT CHANGE UP (ref 2–14)
MONOCYTES NFR BLD AUTO: 7.9 % — SIGNIFICANT CHANGE UP (ref 2–14)
MONOCYTES NFR BLD AUTO: 7.9 % — SIGNIFICANT CHANGE UP (ref 2–14)
MYELOCYTES NFR BLD: 1.8 % — HIGH (ref 0–0)
MYELOCYTES NFR BLD: 1.8 % — HIGH (ref 0–0)
NEUTROPHILS # BLD AUTO: 7.12 K/UL — SIGNIFICANT CHANGE UP (ref 1.8–7.4)
NEUTROPHILS # BLD AUTO: 7.12 K/UL — SIGNIFICANT CHANGE UP (ref 1.8–7.4)
NEUTROPHILS # BLD AUTO: 7.64 K/UL — HIGH (ref 1.8–7.4)
NEUTROPHILS # BLD AUTO: 7.64 K/UL — HIGH (ref 1.8–7.4)
NEUTROPHILS NFR BLD AUTO: 67.2 % — SIGNIFICANT CHANGE UP (ref 43–77)
NEUTROPHILS NFR BLD AUTO: 67.2 % — SIGNIFICANT CHANGE UP (ref 43–77)
NEUTROPHILS NFR BLD AUTO: 76.3 % — SIGNIFICANT CHANGE UP (ref 43–77)
NEUTROPHILS NFR BLD AUTO: 76.3 % — SIGNIFICANT CHANGE UP (ref 43–77)
NRBC # BLD: 0 /100 WBCS — SIGNIFICANT CHANGE UP (ref 0–0)
NRBC # BLD: 0 /100 WBCS — SIGNIFICANT CHANGE UP (ref 0–0)
NRBC # BLD: 1 /100 — HIGH (ref 0–0)
NRBC # BLD: 1 /100 — HIGH (ref 0–0)
PHOSPHATE SERPL-MCNC: 9.1 MG/DL — HIGH (ref 2.5–4.5)
PHOSPHATE SERPL-MCNC: 9.1 MG/DL — HIGH (ref 2.5–4.5)
PLAT MORPH BLD: NORMAL — SIGNIFICANT CHANGE UP
PLAT MORPH BLD: NORMAL — SIGNIFICANT CHANGE UP
PLATELET # BLD AUTO: 713 K/UL — HIGH (ref 150–400)
PLATELET # BLD AUTO: 713 K/UL — HIGH (ref 150–400)
PLATELET # BLD AUTO: 763 K/UL — HIGH (ref 150–400)
PLATELET # BLD AUTO: 763 K/UL — HIGH (ref 150–400)
POTASSIUM SERPL-MCNC: 3.9 MMOL/L — SIGNIFICANT CHANGE UP (ref 3.5–5.3)
POTASSIUM SERPL-MCNC: 3.9 MMOL/L — SIGNIFICANT CHANGE UP (ref 3.5–5.3)
POTASSIUM SERPL-SCNC: 3.9 MMOL/L — SIGNIFICANT CHANGE UP (ref 3.5–5.3)
POTASSIUM SERPL-SCNC: 3.9 MMOL/L — SIGNIFICANT CHANGE UP (ref 3.5–5.3)
PROT SERPL-MCNC: 6.6 G/DL — SIGNIFICANT CHANGE UP (ref 6–8.3)
PROT SERPL-MCNC: 6.6 G/DL — SIGNIFICANT CHANGE UP (ref 6–8.3)
RBC # BLD: 2.95 M/UL — LOW (ref 3.8–5.2)
RBC # BLD: 2.95 M/UL — LOW (ref 3.8–5.2)
RBC # BLD: 3.08 M/UL — LOW (ref 3.8–5.2)
RBC # BLD: 3.08 M/UL — LOW (ref 3.8–5.2)
RBC # FLD: 18.6 % — HIGH (ref 10.3–14.5)
RBC BLD AUTO: SIGNIFICANT CHANGE UP
RBC BLD AUTO: SIGNIFICANT CHANGE UP
SODIUM SERPL-SCNC: 148 MMOL/L — HIGH (ref 135–145)
SODIUM SERPL-SCNC: 148 MMOL/L — HIGH (ref 135–145)
WBC # BLD: 11.35 K/UL — HIGH (ref 3.8–10.5)
WBC # BLD: 11.35 K/UL — HIGH (ref 3.8–10.5)
WBC # BLD: 9.33 K/UL — SIGNIFICANT CHANGE UP (ref 3.8–10.5)
WBC # BLD: 9.33 K/UL — SIGNIFICANT CHANGE UP (ref 3.8–10.5)
WBC # FLD AUTO: 11.35 K/UL — HIGH (ref 3.8–10.5)
WBC # FLD AUTO: 11.35 K/UL — HIGH (ref 3.8–10.5)
WBC # FLD AUTO: 9.33 K/UL — SIGNIFICANT CHANGE UP (ref 3.8–10.5)
WBC # FLD AUTO: 9.33 K/UL — SIGNIFICANT CHANGE UP (ref 3.8–10.5)

## 2023-11-29 PROCEDURE — 99291 CRITICAL CARE FIRST HOUR: CPT | Mod: GC

## 2023-11-29 RX ADMIN — Medication 325 MILLIGRAM(S): at 11:38

## 2023-11-29 RX ADMIN — Medication 2: at 00:14

## 2023-11-29 RX ADMIN — Medication 81 MILLIGRAM(S): at 11:38

## 2023-11-29 RX ADMIN — SEVELAMER CARBONATE 800 MILLIGRAM(S): 2400 POWDER, FOR SUSPENSION ORAL at 11:39

## 2023-11-29 RX ADMIN — CHLORHEXIDINE GLUCONATE 15 MILLILITER(S): 213 SOLUTION TOPICAL at 17:26

## 2023-11-29 RX ADMIN — Medication 7.49 MICROGRAM(S)/KG/MIN: at 08:50

## 2023-11-29 RX ADMIN — PANTOPRAZOLE SODIUM 40 MILLIGRAM(S): 20 TABLET, DELAYED RELEASE ORAL at 11:38

## 2023-11-29 RX ADMIN — SEVELAMER CARBONATE 800 MILLIGRAM(S): 2400 POWDER, FOR SUSPENSION ORAL at 08:48

## 2023-11-29 RX ADMIN — Medication 7.49 MICROGRAM(S)/KG/MIN: at 21:35

## 2023-11-29 RX ADMIN — SODIUM CHLORIDE 50 MILLILITER(S): 9 INJECTION, SOLUTION INTRAVENOUS at 08:50

## 2023-11-29 RX ADMIN — CEFEPIME 100 MILLIGRAM(S): 1 INJECTION, POWDER, FOR SOLUTION INTRAMUSCULAR; INTRAVENOUS at 05:26

## 2023-11-29 RX ADMIN — SODIUM CHLORIDE 50 MILLILITER(S): 9 INJECTION, SOLUTION INTRAVENOUS at 21:35

## 2023-11-29 RX ADMIN — SEVELAMER CARBONATE 800 MILLIGRAM(S): 2400 POWDER, FOR SUSPENSION ORAL at 17:24

## 2023-11-29 RX ADMIN — CHLORHEXIDINE GLUCONATE 15 MILLILITER(S): 213 SOLUTION TOPICAL at 05:26

## 2023-11-29 NOTE — PROGRESS NOTE ADULT - ATTENDING COMMENTS
1. Acute hypoxemic respiratory  failure after at home cardiac arrest. . Pt currently being treated for pneumonia on cefepime. FIO2 up to 60%. Episode of desaturation last night. ? mucous plug CXR WNL.  2. Neuro. Severe anoxic brain injury. With brain perfusion scan with no flow pt meets criteria for brain death. Neurology input appreciated. Neuro agrees that patient is brain death. However, patents sister does not believe this since she took one breath on Friday 4-5 hours before the perfusion scan. Apnea test was done again today at the request of patients sister. Apnea test performed. No respiration at 10 min. ABG at 7 min 30 seconds revealed PCO2 went up from base line 45 to 70.  I again told patient's sister that now there is no conflicting evidence of brain death. She is still having problems accepting this fact. I talked with family physician DR. Luo of the situation. He is aware and will talk with family today. Pt's young daughter Is supposed to visit patient today.    On my exam pt meets all criteria for brain death. Cols calorics done yesterday and this am.  No response both times.    3. Hypernatremia: 153. Continue free water 400ml q 6 hrs. Add D5W at 50cc/hr. Repeat  Na in afternoon.    4. Renal . Acute  non oliguric renal failure. ATN . Creatinine increasing     5. Hypotension.? sepsis vs brain death. Continues on cefepime and Levophed.     6. DVT prophylaxis> Sq heparin    7.GOC: Full code    8 Pt has been contacted by [facility] for organ donation. Awaiting family decision about organ donation.

## 2023-11-29 NOTE — PROGRESS NOTE ADULT - ASSESSMENT
43F with history of pre-DM, HTN, HLD, ADHD, BENJAMIN, migraines, thrombocytosis, hypothyroidism not on synthroid, uterine polyps s/p polypectomy in 2/2023 presented from [facility] post cardiac arrest in the field. Imaging c/f extensive PNA. Transferred to [facility] for further management. Patient remains on a ventilator with signs of anoxic brain injury.     =====NEURO=====  #Concern for Anoxic Brain Injury  - arrested for unknown period both in the field & hospital  - CTH at OSH: findings suggestive of diffuse cerebral & cerebellar injury   - EEG notable for profound diffuse cerebral dysfunction, nonspecific in etiology. Continuous EEG monitoring discontinued per neuro.   -MRI with findings consistent with anoxic brain injury (diffuse cortical restricted diffusion, multiple foci of restricted diffusion in brainstem)  - current exam with dilated pupils, no corneal reflex, - will not consider sedation given current mental status  -Failing apnea test   -NM perfusion 11/24 no blood flow   -Neurology reviewed patient and declared brain death     =====RESPIRATORY=====  #Intubated & Ventilated  #Patchy opacities  -Concern for VAP  -Continue Cefepime, dosed per RFTs if CrCl <11 ml/min then modify to 500 mg Q24    =====CARDIOVASCULAR=====  #Cardiac Arrest  - unclear rhythm but pulseless requiring CPR/epi, no reported shock delivery  - unclear duration of total cardiac arrest, however, lost pulse en route to hospital  - unclear etiology - possibly secondary to hypoxia given imaging findings & sxs  - currently on levophed- wean as tolerated     =====GASTRO=====  -Tolerating tube feeds    =====RENAL=====  #SIVA  -Etiology post renal obstruction VS ATN. Also component of multi organ failure  - Cr 0.58 on presentation, now to 4.16  -US renal no hydronephrosis  -Urine CS -ve    - will monitor Cr/UOP  - will continue to follow BMPs    -#Hypernatremia   -Sodium 149, continue Free water flushes 400cc Q6 Hours  -on D5 @ 50 ml/hr   -will CTM     =====ID=====  #Patchy opacities on CT  -On Cefepime for VAP   -BC 11/25 Gm +ve Cocci - S-epidermidis     =====HEME/ONC=====  #Anemia  - chronic history of anemia  -req. PRCx1 transf. overnight on Hb of 6.9  - maintain active T&S, monitor CBC    #leucocytosis  -in setting of left basal atelactasis  - will continue Cefepime     #Thrombocytosis  - history of thrombocytosis, was on ASA at home  - continue ASA     =====ENDOCRINE=====  #Hypothyroidism  - history of hypothyroid but not on synthroid as per endocrine    =====ETHICS=====  -Discussion ongoing with family.  43F with history of pre-DM, HTN, HLD, ADHD, BENJAMIN, migraines, thrombocytosis, hypothyroidism not on synthroid, uterine polyps s/p polypectomy in 2/2023 presented from [facility] post cardiac arrest in the field. Imaging c/f extensive PNA. Transferred to [facility] for further management. Patient remains on a ventilator with signs of anoxic brain injury.     =====NEURO=====  #Concern for Anoxic Brain Injury  - arrested for unknown period both in the field & hospital  - CTH at OSH: findings suggestive of diffuse cerebral & cerebellar injury   - EEG notable for profound diffuse cerebral dysfunction, nonspecific in etiology. Continuous EEG monitoring discontinued per neuro.   -MRI with findings consistent with anoxic brain injury (diffuse cortical restricted diffusion, multiple foci of restricted diffusion in brainstem)  - current exam with dilated pupils, no corneal reflex, - will not consider sedation given current mental status  -NM perfusion 11/24 no blood flow   -Neurology reviewed patient and declared brain death   -Apnea test passed, family to decide GOC     =====RESPIRATORY=====  #Intubated & Ventilated  #Patchy opacities  -Concern for VAP  -Afebrile, will D/C cefipime     =====CARDIOVASCULAR=====  #Cardiac Arrest  - unclear rhythm but pulseless requiring CPR/epi, no reported shock delivery  - unclear duration of total cardiac arrest, however, lost pulse en route to hospital  - unclear etiology - possibly secondary to hypoxia given imaging findings & sxs  - currently on levophed- will wean as tolerated     =====GASTRO=====  -Tolerating tube feeds, normal bowel movements     =====RENAL=====  #SIVA  -Etiology post renal obstruction VS ATN. Also component of multi organ failure  - Cr 0.58 on presentation, now to 4.16  -US renal no hydronephrosis  -Urine CS -ve    - will monitor Cr/UOP  - will continue to follow BMPs    -#Hypernatremia   -Sodium 149, continue Free water flushes 400cc Q6 Hours  -on D5 @ 50 ml/hr   -will CTM     =====ID=====  #Patchy opacities on CT  -Concern for VAP  -BC 11/25 Gm +ve Cocci - S-epidermidis   -will D/C Cefipime     =====HEME/ONC=====  #Anemia  - chronic history of anemia  -req. PRCx1 transf. overnight on Hb of 6.9  - maintain active T&S, monitor CBC    #leucocytosis  -resolving in setting of left basal atelactasis    #Thrombocytosis  - history of thrombocytosis, was on ASA at home  - continue ASA     =====ENDOCRINE=====  #Hypothyroidism  - history of hypothyroid but not on synthroid as per endocrine    =====ETHICS=====  -Family to decide GOC

## 2023-11-29 NOTE — CHART NOTE - NSCHARTNOTEFT_GEN_A_CORE
DEATH NOTE    Patient admitted to MICU after suffering cardiac arrest of unknown downtime.   Patient was being treated for Aspiration PNA, electrolyte imbalance (hypernatremia), SIVA on CKD.   NM Perfusion scan performed 11/24 did not show flow.   Brain death confirmation performed on 11/27 by NEUROLOGY, MICU.   Death declared on 11/27 at 15:55 HRS

## 2023-11-29 NOTE — PROGRESS NOTE ADULT - SUBJECTIVE AND OBJECTIVE BOX
INTERVAL HPI/OVERNIGHT EVENTS:    SUBJECTIVE: Patient seen and examined at bedside.     ROS: All negative except as listed above.    VITAL SIGNS:  ICU Vital Signs Last 24 Hrs  T(C): 37.2 (29 Nov 2023 04:00), Max: 37.2 (29 Nov 2023 04:00)  T(F): 99 (29 Nov 2023 04:00), Max: 99 (29 Nov 2023 04:00)  HR: 72 (29 Nov 2023 06:45) (71 - 85)  BP: 109/57 (29 Nov 2023 06:45) (79/44 - 122/63)  BP(mean): 77 (29 Nov 2023 06:45) (57 - 87)  ABP: --  ABP(mean): --  RR: 20 (29 Nov 2023 06:45) (0 - 26)  SpO2: 100% (29 Nov 2023 06:45) (95% - 100%)    O2 Parameters below as of 28 Nov 2023 20:00  Patient On (Oxygen Delivery Method): ventilator    O2 Concentration (%): 60      Mode: AC/ CMV (Assist Control/ Continuous Mandatory Ventilation), RR (machine): 20, TV (machine): 400, FiO2: 60, PEEP: 5, ITime: 1, MAP: 10, PIP: 25  Plateau pressure:   P/F ratio:     11-28 @ 07:01  -  11-29 @ 07:00  --------------------------------------------------------  IN: 4381.5 mL / OUT: 2050 mL / NET: 2331.5 mL      CAPILLARY BLOOD GLUCOSE      POCT Blood Glucose.: 131 mg/dL (29 Nov 2023 05:21)      ECG: reviewed.    PHYSICAL EXAM:    HEAD:  Atraumatic, Normocephalic  EYES: fixed pupils  ENT: Moist mucous membranes  NECK: Supple, No elevated JVP.  CHEST/LUNG: Mechanical ventilation   HEART: Regular rate and rhythm; No murmurs, rubs, or gallops  ABDOMEN: Bowel sounds present; Soft, nontender, nondistended  EXTREMITIES:  2+ Peripheral Pulses, brisk capillary refill. No clubbing, cyanosis, or edema  NERVOUS SYSTEM:  Patient not responsive   SKIN: No rashes or lesions    MEDICATIONS:  MEDICATIONS  (STANDING):  aspirin  chewable 81 milliGRAM(s) Oral daily  cefepime   IVPB 1000 milliGRAM(s) IV Intermittent every 12 hours  chlorhexidine 0.12% Liquid 15 milliLiter(s) Oral Mucosa every 12 hours  dextrose 5%. 1000 milliLiter(s) (50 mL/Hr) IV Continuous <Continuous>  ferrous    sulfate 325 milliGRAM(s) Oral daily  insulin lispro (ADMELOG) corrective regimen sliding scale   SubCutaneous every 6 hours  norepinephrine Infusion 0.05 MICROgram(s)/kG/Min (7.49 mL/Hr) IV Continuous <Continuous>  pantoprazole  Injectable 40 milliGRAM(s) IV Push daily  sevelamer carbonate Powder 800 milliGRAM(s) Oral three times a day with meals    MEDICATIONS  (PRN):  artificial tears (preservative free) Ophthalmic Solution 1 Drop(s) Both EYES every 6 hours PRN Dry Eyes      ALLERGIES:  Allergies    No Known Allergies    Intolerances        LABS:                        7.3    9.33  )-----------( 713      ( 28 Nov 2023 23:55 )             23.8     11-28    148<H>  |  112<H>  |  121<H>  ----------------------------<  172<H>  3.9   |  22  |  4.52<H>    Ca    11.5<H>      28 Nov 2023 23:55  Phos  9.1     11-28  Mg     3.1     11-28    TPro  6.6  /  Alb  2.1<L>  /  TBili  0.4  /  DBili  x   /  AST  52<H>  /  ALT  19  /  AlkPhos  156<H>  11-28      Urinalysis Basic - ( 28 Nov 2023 23:55 )    Color: x / Appearance: x / SG: x / pH: x  Gluc: 172 mg/dL / Ketone: x  / Bili: x / Urobili: x   Blood: x / Protein: x / Nitrite: x   Leuk Esterase: x / RBC: x / WBC x   Sq Epi: x / Non Sq Epi: x / Bacteria: x      ABG:  pH, Arterial: 7.33 (11-28-23 @ 23:45)  pCO2, Arterial: 44 mmHg (11-28-23 @ 23:45)  pO2, Arterial: 94 mmHg (11-28-23 @ 23:45)  pH, Arterial: 7.21 (11-28-23 @ 15:25)  pCO2, Arterial: 61 mmHg (11-28-23 @ 15:25)  pO2, Arterial: 75 mmHg (11-28-23 @ 15:25)  pH, Arterial: 7.18 (11-28-23 @ 15:15)  pCO2, Arterial: 70 mmHg (11-28-23 @ 15:15)  pO2, Arterial: 208 mmHg (11-28-23 @ 15:15)  pH, Arterial: 7.34 (11-28-23 @ 14:15)  pCO2, Arterial: 45 mmHg (11-28-23 @ 14:15)  pO2, Arterial: 209 mmHg (11-28-23 @ 14:15)      vBG:    Micro:    Culture - Blood (collected 11-28-23 @ 00:15)  Source: .Blood Blood-Peripheral  Preliminary Report (11-29-23 @ 03:03):    No growth at 24 hours    Culture - Blood (collected 11-28-23 @ 00:05)  Source: .Blood Blood-Peripheral  Preliminary Report (11-29-23 @ 03:03):    No growth at 24 hours    Culture - Blood (collected 11-24-23 @ 23:58)  Source: .Blood Blood-Peripheral  Preliminary Report (11-29-23 @ 04:01):    No growth at 4 days    Culture - Blood (collected 11-24-23 @ 17:20)  Source: .Blood Blood-Peripheral  Gram Stain (11-26-23 @ 22:29):    Growth in aerobic bottle: Gram Positive Cocci in Clusters    Growth in anaerobic bottle: Gram Positive Cocci in Clusters  Final Report (11-27-23 @ 19:05):    Growth in aerobic and anaerobic bottles: Staphylococcus epidermidis    Coagulase Negative Staphylococci isolated from a single blood culture set    may represent contamination.    Contact the Microbiology Department at [phone] if susceptibility    testing is clinically indicated.    Direct identification is available within approximately 3-5    hours either by Blood Panel Multiplexed PCR or Direct    MALDI-TOF. Details: [email or web address]    ***Add Freetext*** blood culture bottle turned positive after the final    report was released.  Organism: Blood Culture PCR (11-26-23 @ 22:29)      -  Staphylococcus epidermidis, Methicillin resistant: Detec      Method Type: PCR  Organism: Blood Culture PCR (11-26-23 @ 19:23)    Culture - Blood (collected 11-23-23 @ 00:52)  Source: .Blood Blood-Venous  Final Report (11-28-23 @ 11:00):    No growth at 5 days    Culture - Blood (collected 11-23-23 @ 00:52)  Source: .Blood Blood-Peripheral  Final Report (11-28-23 @ 11:00):    No growth at 5 days    Culture - Blood (collected 11-18-23 @ 17:40)  Source: .Blood Blood-Peripheral  Final Report (11-23-23 @ 23:01):    No growth at 5 days    Culture - Blood (collected 11-18-23 @ 17:30)  Source: .Blood Blood-Peripheral  Final Report (11-23-23 @ 23:01):    No growth at 5 days    Culture - Blood (collected 11-14-23 @ 18:15)  Source: .Blood Blood  Final Report (11-20-23 @ 01:00):    No growth at 5 days    Culture - Blood (collected 11-14-23 @ 18:00)  Source: .Blood Blood  Final Report (11-20-23 @ 01:00):    No growth at 5 days        Culture - Sputum (collected 11-24-23 @ 13:37)  Source: ET Tube ET Tube  Gram Stain (11-24-23 @ 23:23):    Moderate polymorphonuclear leukocytes per low power field    No Squamous epithelial cells per low power field    Rare Gram positive cocci in pairs per oil power field  Final Report (11-26-23 @ 21:19):    Normal Respiratory Jenni present    Culture - Sputum (collected 11-15-23 @ 01:08)  Source: .Sputum Sputum  Gram Stain (11-15-23 @ 16:34):    Rare polymorphonuclear leukocytes per low power field    No Squamous epithelial cells per low power field    No organisms seen per oil power field  Final Report (11-17-23 @ 12:04):    Normal Respiratory Jenni present        RADIOLOGY & ADDITIONAL TESTS: Reviewed. INTERVAL HPI/OVERNIGHT EVENTS: No active issues.    SUBJECTIVE: Patient seen and examined at bedside.     ROS: All negative except as listed above.    VITAL SIGNS:  ICU Vital Signs Last 24 Hrs  T(C): 37.2 (29 Nov 2023 04:00), Max: 37.2 (29 Nov 2023 04:00)  T(F): 99 (29 Nov 2023 04:00), Max: 99 (29 Nov 2023 04:00)  HR: 72 (29 Nov 2023 06:45) (71 - 85)  BP: 109/57 (29 Nov 2023 06:45) (79/44 - 122/63)  BP(mean): 77 (29 Nov 2023 06:45) (57 - 87)  ABP: --  ABP(mean): --  RR: 20 (29 Nov 2023 06:45) (0 - 26)  SpO2: 100% (29 Nov 2023 06:45) (95% - 100%)    O2 Parameters below as of 28 Nov 2023 20:00  Patient On (Oxygen Delivery Method): ventilator    O2 Concentration (%): 60      Mode: AC/ CMV (Assist Control/ Continuous Mandatory Ventilation), RR (machine): 20, TV (machine): 400, FiO2: 60, PEEP: 5, ITime: 1, MAP: 10, PIP: 25  Plateau pressure:   P/F ratio:     11-28 @ 07:01  -  11-29 @ 07:00  --------------------------------------------------------  IN: 4381.5 mL / OUT: 2050 mL / NET: 2331.5 mL      CAPILLARY BLOOD GLUCOSE      POCT Blood Glucose.: 131 mg/dL (29 Nov 2023 05:21)      ECG: reviewed.    PHYSICAL EXAM:    HEAD:  Atraumatic, Normocephalic  EYES: fixed pupils  ENT: Moist mucous membranes  NECK: Supple, No elevated JVP.  CHEST/LUNG: Mechanical ventilation   HEART: Regular rate and rhythm; No murmurs, rubs, or gallops  ABDOMEN: Bowel sounds present; Soft, nontender, nondistended  EXTREMITIES:  2+ Peripheral Pulses, brisk capillary refill. No clubbing, cyanosis, or edema  NERVOUS SYSTEM:  Patient not responsive   SKIN: No rashes or lesions    MEDICATIONS:  MEDICATIONS  (STANDING):  aspirin  chewable 81 milliGRAM(s) Oral daily  cefepime   IVPB 1000 milliGRAM(s) IV Intermittent every 12 hours  chlorhexidine 0.12% Liquid 15 milliLiter(s) Oral Mucosa every 12 hours  dextrose 5%. 1000 milliLiter(s) (50 mL/Hr) IV Continuous <Continuous>  ferrous    sulfate 325 milliGRAM(s) Oral daily  insulin lispro (ADMELOG) corrective regimen sliding scale   SubCutaneous every 6 hours  norepinephrine Infusion 0.05 MICROgram(s)/kG/Min (7.49 mL/Hr) IV Continuous <Continuous>  pantoprazole  Injectable 40 milliGRAM(s) IV Push daily  sevelamer carbonate Powder 800 milliGRAM(s) Oral three times a day with meals    MEDICATIONS  (PRN):  artificial tears (preservative free) Ophthalmic Solution 1 Drop(s) Both EYES every 6 hours PRN Dry Eyes      ALLERGIES:  Allergies    No Known Allergies    Intolerances        LABS:                        7.3    9.33  )-----------( 713      ( 28 Nov 2023 23:55 )             23.8     11-28    148<H>  |  112<H>  |  121<H>  ----------------------------<  172<H>  3.9   |  22  |  4.52<H>    Ca    11.5<H>      28 Nov 2023 23:55  Phos  9.1     11-28  Mg     3.1     11-28    TPro  6.6  /  Alb  2.1<L>  /  TBili  0.4  /  DBili  x   /  AST  52<H>  /  ALT  19  /  AlkPhos  156<H>  11-28      Urinalysis Basic - ( 28 Nov 2023 23:55 )    Color: x / Appearance: x / SG: x / pH: x  Gluc: 172 mg/dL / Ketone: x  / Bili: x / Urobili: x   Blood: x / Protein: x / Nitrite: x   Leuk Esterase: x / RBC: x / WBC x   Sq Epi: x / Non Sq Epi: x / Bacteria: x      ABG:  pH, Arterial: 7.33 (11-28-23 @ 23:45)  pCO2, Arterial: 44 mmHg (11-28-23 @ 23:45)  pO2, Arterial: 94 mmHg (11-28-23 @ 23:45)  pH, Arterial: 7.21 (11-28-23 @ 15:25)  pCO2, Arterial: 61 mmHg (11-28-23 @ 15:25)  pO2, Arterial: 75 mmHg (11-28-23 @ 15:25)  pH, Arterial: 7.18 (11-28-23 @ 15:15)  pCO2, Arterial: 70 mmHg (11-28-23 @ 15:15)  pO2, Arterial: 208 mmHg (11-28-23 @ 15:15)  pH, Arterial: 7.34 (11-28-23 @ 14:15)  pCO2, Arterial: 45 mmHg (11-28-23 @ 14:15)  pO2, Arterial: 209 mmHg (11-28-23 @ 14:15)      vBG:    Micro:    Culture - Blood (collected 11-28-23 @ 00:15)  Source: .Blood Blood-Peripheral  Preliminary Report (11-29-23 @ 03:03):    No growth at 24 hours    Culture - Blood (collected 11-28-23 @ 00:05)  Source: .Blood Blood-Peripheral  Preliminary Report (11-29-23 @ 03:03):    No growth at 24 hours    Culture - Blood (collected 11-24-23 @ 23:58)  Source: .Blood Blood-Peripheral  Preliminary Report (11-29-23 @ 04:01):    No growth at 4 days    Culture - Blood (collected 11-24-23 @ 17:20)  Source: .Blood Blood-Peripheral  Gram Stain (11-26-23 @ 22:29):    Growth in aerobic bottle: Gram Positive Cocci in Clusters    Growth in anaerobic bottle: Gram Positive Cocci in Clusters  Final Report (11-27-23 @ 19:05):    Growth in aerobic and anaerobic bottles: Staphylococcus epidermidis    Coagulase Negative Staphylococci isolated from a single blood culture set    may represent contamination.    Contact the Microbiology Department at [phone] if susceptibility    testing is clinically indicated.    Direct identification is available within approximately 3-5    hours either by Blood Panel Multiplexed PCR or Direct    MALDI-TOF. Details: [email or web address]    ***Add Freetext*** blood culture bottle turned positive after the final    report was released.  Organism: Blood Culture PCR (11-26-23 @ 22:29)      -  Staphylococcus epidermidis, Methicillin resistant: Detec      Method Type: PCR  Organism: Blood Culture PCR (11-26-23 @ 19:23)    Culture - Blood (collected 11-23-23 @ 00:52)  Source: .Blood Blood-Venous  Final Report (11-28-23 @ 11:00):    No growth at 5 days    Culture - Blood (collected 11-23-23 @ 00:52)  Source: .Blood Blood-Peripheral  Final Report (11-28-23 @ 11:00):    No growth at 5 days    Culture - Blood (collected 11-18-23 @ 17:40)  Source: .Blood Blood-Peripheral  Final Report (11-23-23 @ 23:01):    No growth at 5 days    Culture - Blood (collected 11-18-23 @ 17:30)  Source: .Blood Blood-Peripheral  Final Report (11-23-23 @ 23:01):    No growth at 5 days    Culture - Blood (collected 11-14-23 @ 18:15)  Source: .Blood Blood  Final Report (11-20-23 @ 01:00):    No growth at 5 days    Culture - Blood (collected 11-14-23 @ 18:00)  Source: .Blood Blood  Final Report (11-20-23 @ 01:00):    No growth at 5 days        Culture - Sputum (collected 11-24-23 @ 13:37)  Source: ET Tube ET Tube  Gram Stain (11-24-23 @ 23:23):    Moderate polymorphonuclear leukocytes per low power field    No Squamous epithelial cells per low power field    Rare Gram positive cocci in pairs per oil power field  Final Report (11-26-23 @ 21:19):    Normal Respiratory Jenni present    Culture - Sputum (collected 11-15-23 @ 01:08)  Source: .Sputum Sputum  Gram Stain (11-15-23 @ 16:34):    Rare polymorphonuclear leukocytes per low power field    No Squamous epithelial cells per low power field    No organisms seen per oil power field  Final Report (11-17-23 @ 12:04):    Normal Respiratory Jenni present        RADIOLOGY & ADDITIONAL TESTS: Reviewed. INTERVAL HPI/OVERNIGHT EVENTS: No active issues.    SUBJECTIVE: Patient seen and examined at bedside.     ROS: All negative except as listed above.    VITAL SIGNS:  ICU Vital Signs Last 24 Hrs  T(C): 37.2 (29 Nov 2023 04:00), Max: 37.2 (29 Nov 2023 04:00)  T(F): 99 (29 Nov 2023 04:00), Max: 99 (29 Nov 2023 04:00)  HR: 72 (29 Nov 2023 06:45) (71 - 85)  BP: 109/57 (29 Nov 2023 06:45) (79/44 - 122/63)  BP(mean): 77 (29 Nov 2023 06:45) (57 - 87)  RR: 20 (29 Nov 2023 06:45) (0 - 26)  SpO2: 100% (29 Nov 2023 06:45) (95% - 100%)    O2 Parameters below as of 28 Nov 2023 20:00  Patient On (Oxygen Delivery Method): ventilator    O2 Concentration (%): 60      Mode: AC/ CMV (Assist Control/ Continuous Mandatory Ventilation), RR (machine): 20, TV (machine): 400, FiO2: 60, PEEP: 5, ITime: 1, MAP: 10, PIP: 25  Plateau pressure:   P/F ratio:     11-28 @ 07:01  -  11-29 @ 07:00  --------------------------------------------------------  IN: 4381.5 mL / OUT: 2050 mL / NET: 2331.5 mL      CAPILLARY BLOOD GLUCOSE      POCT Blood Glucose.: 131 mg/dL (29 Nov 2023 05:21)      ECG: reviewed.    PHYSICAL EXAM:    HEAD:  Atraumatic, Normocephalic  EYES: fixed pupils  ENT: Moist mucous membranes  NECK: Supple, No elevated JVP.  CHEST/LUNG: On mechanical ventilation, CTA BL   HEART: Regular rate and rhythm; No murmurs, rubs, or gallops  ABDOMEN: Bowel sounds present; Soft, nontender, nondistended  EXTREMITIES:  2+ Peripheral Pulses, brisk capillary refill. No clubbing, cyanosis, or edema  NERVOUS SYSTEM:  Patient not responsive   SKIN: No rashes or lesions    MEDICATIONS:  MEDICATIONS  (STANDING):  aspirin  chewable 81 milliGRAM(s) Oral daily  cefepime   IVPB 1000 milliGRAM(s) IV Intermittent every 12 hours  chlorhexidine 0.12% Liquid 15 milliLiter(s) Oral Mucosa every 12 hours  dextrose 5%. 1000 milliLiter(s) (50 mL/Hr) IV Continuous <Continuous>  ferrous    sulfate 325 milliGRAM(s) Oral daily  insulin lispro (ADMELOG) corrective regimen sliding scale   SubCutaneous every 6 hours  norepinephrine Infusion 0.05 MICROgram(s)/kG/Min (7.49 mL/Hr) IV Continuous <Continuous>  pantoprazole  Injectable 40 milliGRAM(s) IV Push daily  sevelamer carbonate Powder 800 milliGRAM(s) Oral three times a day with meals    MEDICATIONS  (PRN):  artificial tears (preservative free) Ophthalmic Solution 1 Drop(s) Both EYES every 6 hours PRN Dry Eyes      ALLERGIES:  Allergies    No Known Allergies    Intolerances        LABS:                        7.3    9.33  )-----------( 713      ( 28 Nov 2023 23:55 )             23.8     11-28    148<H>  |  112<H>  |  121<H>  ----------------------------<  172<H>  3.9   |  22  |  4.52<H>    Ca    11.5<H>      28 Nov 2023 23:55  Phos  9.1     11-28  Mg     3.1     11-28    TPro  6.6  /  Alb  2.1<L>  /  TBili  0.4  /  DBili  x   /  AST  52<H>  /  ALT  19  /  AlkPhos  156<H>  11-28      Urinalysis Basic - ( 28 Nov 2023 23:55 )    Color: x / Appearance: x / SG: x / pH: x  Gluc: 172 mg/dL / Ketone: x  / Bili: x / Urobili: x   Blood: x / Protein: x / Nitrite: x   Leuk Esterase: x / RBC: x / WBC x   Sq Epi: x / Non Sq Epi: x / Bacteria: x      ABG:  pH, Arterial: 7.33 (11-28-23 @ 23:45)  pCO2, Arterial: 44 mmHg (11-28-23 @ 23:45)  pO2, Arterial: 94 mmHg (11-28-23 @ 23:45)  pH, Arterial: 7.21 (11-28-23 @ 15:25)  pCO2, Arterial: 61 mmHg (11-28-23 @ 15:25)  pO2, Arterial: 75 mmHg (11-28-23 @ 15:25)  pH, Arterial: 7.18 (11-28-23 @ 15:15)  pCO2, Arterial: 70 mmHg (11-28-23 @ 15:15)  pO2, Arterial: 208 mmHg (11-28-23 @ 15:15)  pH, Arterial: 7.34 (11-28-23 @ 14:15)  pCO2, Arterial: 45 mmHg (11-28-23 @ 14:15)  pO2, Arterial: 209 mmHg (11-28-23 @ 14:15)      vBG:    Micro:    Culture - Blood (collected 11-28-23 @ 00:15)  Source: .Blood Blood-Peripheral  Preliminary Report (11-29-23 @ 03:03):    No growth at 24 hours    Culture - Blood (collected 11-28-23 @ 00:05)  Source: .Blood Blood-Peripheral  Preliminary Report (11-29-23 @ 03:03):    No growth at 24 hours    Culture - Blood (collected 11-24-23 @ 23:58)  Source: .Blood Blood-Peripheral  Preliminary Report (11-29-23 @ 04:01):    No growth at 4 days    Culture - Blood (collected 11-24-23 @ 17:20)  Source: .Blood Blood-Peripheral  Gram Stain (11-26-23 @ 22:29):    Growth in aerobic bottle: Gram Positive Cocci in Clusters    Growth in anaerobic bottle: Gram Positive Cocci in Clusters  Final Report (11-27-23 @ 19:05):    Growth in aerobic and anaerobic bottles: Staphylococcus epidermidis    Coagulase Negative Staphylococci isolated from a single blood culture set    may represent contamination.    Contact the Microbiology Department at [phone] if susceptibility    testing is clinically indicated.    Direct identification is available within approximately 3-5    hours either by Blood Panel Multiplexed PCR or Direct    MALDI-TOF. Details: [email or web address]    ***Add Freetext*** blood culture bottle turned positive after the final    report was released.  Organism: Blood Culture PCR (11-26-23 @ 22:29)      -  Staphylococcus epidermidis, Methicillin resistant: Detec      Method Type: PCR  Organism: Blood Culture PCR (11-26-23 @ 19:23)    Culture - Blood (collected 11-23-23 @ 00:52)  Source: .Blood Blood-Venous  Final Report (11-28-23 @ 11:00):    No growth at 5 days    Culture - Blood (collected 11-23-23 @ 00:52)  Source: .Blood Blood-Peripheral  Final Report (11-28-23 @ 11:00):    No growth at 5 days    Culture - Blood (collected 11-18-23 @ 17:40)  Source: .Blood Blood-Peripheral  Final Report (11-23-23 @ 23:01):    No growth at 5 days    Culture - Blood (collected 11-18-23 @ 17:30)  Source: .Blood Blood-Peripheral  Final Report (11-23-23 @ 23:01):    No growth at 5 days    Culture - Blood (collected 11-14-23 @ 18:15)  Source: .Blood Blood  Final Report (11-20-23 @ 01:00):    No growth at 5 days    Culture - Blood (collected 11-14-23 @ 18:00)  Source: .Blood Blood  Final Report (11-20-23 @ 01:00):    No growth at 5 days        Culture - Sputum (collected 11-24-23 @ 13:37)  Source: ET Tube ET Tube  Gram Stain (11-24-23 @ 23:23):    Moderate polymorphonuclear leukocytes per low power field    No Squamous epithelial cells per low power field    Rare Gram positive cocci in pairs per oil power field  Final Report (11-26-23 @ 21:19):    Normal Respiratory Jenni present    Culture - Sputum (collected 11-15-23 @ 01:08)  Source: .Sputum Sputum  Gram Stain (11-15-23 @ 16:34):    Rare polymorphonuclear leukocytes per low power field    No Squamous epithelial cells per low power field    No organisms seen per oil power field  Final Report (11-17-23 @ 12:04):    Normal Respiratory Jenni present        RADIOLOGY & ADDITIONAL TESTS: Reviewed.

## 2023-11-29 NOTE — DISCHARGE NOTE FOR THE EXPIRED PATIENT - HOSPITAL COURSE
43F with past medical history of pre-DM, HTN, HLD, ADHD, BENJAMIN, migraines, thrombocytosis, hypothyroidism, uterine polyps s/p polypectomy transferred from [facility] after having cardiac arrest. Per details, patient was having preceeding cold-like symptoms. She called EMS but became unresponsive/pulseless for unknown downtime, was received by EMS on field, was intubated and ROSC was acheived. She arrested again en-route to the hospital and had ROSC after 5 min of arrival to ED.     In [facility] ED, a CT of head was performed suggestive of diffuse cerebral and cerebellar anoxic injury. CTA of chest was negative for PE or dissection but showed dense patchy airspace opacities concerning for extensive PNA. [facility]     [facility] patient was received intubated, on vasopressor agent. Patient was managed for aspiration PNA (with antibiotics for 5 days). An MR Brain scan was performed which showed restricted diffusion in BL cerebral and cerebellar hemispheres. EEG showed diffuse cortical dysfunction. She remained off sedation, had BL fixed pupils, -ve corneal reflexes, no withdrawal to pain. Apnea test performed twice (11/16, 11/17) failed. Patient developed hypernatremia and acute kidney injury and was managed with IV fluids. On 11/22, had a drop in hemoglobin with no overt sources of bleeding, requiring PRC x 1. NM perfusion scan was repeated on 11/24 which did not show flow. On same day, patient an episode of desaturation secondary to mucous plugging resolved on manual bagging, patient started on antibiotics for concern of VAP. On repeat assessments, patient continued to remain off sedation without neurological recovery. On 11/27, brain death confirmation was performed and completed by neurology/MICU team. Death was declared  on 11/27 at 15:55 HRS.

## 2023-11-30 LAB
ALBUMIN SERPL ELPH-MCNC: 1.9 G/DL — LOW (ref 3.3–5)
ALBUMIN SERPL ELPH-MCNC: 1.9 G/DL — LOW (ref 3.3–5)
ALBUMIN SERPL ELPH-MCNC: 2 G/DL — LOW (ref 3.3–5)
ALBUMIN SERPL ELPH-MCNC: 2 G/DL — LOW (ref 3.3–5)
ALP SERPL-CCNC: 158 U/L — HIGH (ref 40–120)
ALP SERPL-CCNC: 158 U/L — HIGH (ref 40–120)
ALP SERPL-CCNC: 167 U/L — HIGH (ref 40–120)
ALP SERPL-CCNC: 167 U/L — HIGH (ref 40–120)
ALT FLD-CCNC: 17 U/L — SIGNIFICANT CHANGE UP (ref 10–45)
ALT FLD-CCNC: 17 U/L — SIGNIFICANT CHANGE UP (ref 10–45)
ALT FLD-CCNC: 19 U/L — SIGNIFICANT CHANGE UP (ref 10–45)
ALT FLD-CCNC: 19 U/L — SIGNIFICANT CHANGE UP (ref 10–45)
AMYLASE P1 CFR SERPL: 79 U/L — SIGNIFICANT CHANGE UP (ref 25–125)
AMYLASE P1 CFR SERPL: 79 U/L — SIGNIFICANT CHANGE UP (ref 25–125)
ANION GAP SERPL CALC-SCNC: 16 MMOL/L — SIGNIFICANT CHANGE UP (ref 5–17)
ANISOCYTOSIS BLD QL: SLIGHT — SIGNIFICANT CHANGE UP
ANISOCYTOSIS BLD QL: SLIGHT — SIGNIFICANT CHANGE UP
APPEARANCE UR: CLEAR — SIGNIFICANT CHANGE UP
APPEARANCE UR: CLEAR — SIGNIFICANT CHANGE UP
AST SERPL-CCNC: 44 U/L — HIGH (ref 10–40)
AST SERPL-CCNC: 44 U/L — HIGH (ref 10–40)
AST SERPL-CCNC: 47 U/L — HIGH (ref 10–40)
AST SERPL-CCNC: 47 U/L — HIGH (ref 10–40)
B PERT IGG+IGM PNL SER: ABNORMAL
B PERT IGG+IGM PNL SER: ABNORMAL
BASE EXCESS BLDV CALC-SCNC: -5.5 MMOL/L — LOW (ref -2–3)
BASE EXCESS BLDV CALC-SCNC: -5.5 MMOL/L — LOW (ref -2–3)
BASE EXCESS BLDV CALC-SCNC: -5.8 MMOL/L — LOW (ref -2–3)
BASE EXCESS BLDV CALC-SCNC: -5.8 MMOL/L — LOW (ref -2–3)
BASOPHILS # BLD AUTO: 0.11 K/UL — SIGNIFICANT CHANGE UP (ref 0–0.2)
BASOPHILS # BLD AUTO: 0.11 K/UL — SIGNIFICANT CHANGE UP (ref 0–0.2)
BASOPHILS NFR BLD AUTO: 0.9 % — SIGNIFICANT CHANGE UP (ref 0–2)
BASOPHILS NFR BLD AUTO: 0.9 % — SIGNIFICANT CHANGE UP (ref 0–2)
BILIRUB DIRECT SERPL-MCNC: 0.3 MG/DL — SIGNIFICANT CHANGE UP (ref 0–0.3)
BILIRUB DIRECT SERPL-MCNC: 0.3 MG/DL — SIGNIFICANT CHANGE UP (ref 0–0.3)
BILIRUB INDIRECT FLD-MCNC: 0.2 MG/DL — SIGNIFICANT CHANGE UP (ref 0.2–1)
BILIRUB INDIRECT FLD-MCNC: 0.2 MG/DL — SIGNIFICANT CHANGE UP (ref 0.2–1)
BILIRUB SERPL-MCNC: 0.5 MG/DL — SIGNIFICANT CHANGE UP (ref 0.2–1.2)
BILIRUB UR-MCNC: NEGATIVE — SIGNIFICANT CHANGE UP
BILIRUB UR-MCNC: NEGATIVE — SIGNIFICANT CHANGE UP
BUN SERPL-MCNC: 129 MG/DL — HIGH (ref 7–23)
BUN SERPL-MCNC: 129 MG/DL — HIGH (ref 7–23)
BUN SERPL-MCNC: 134 MG/DL — HIGH (ref 7–23)
BUN SERPL-MCNC: 134 MG/DL — HIGH (ref 7–23)
BURR CELLS BLD QL SMEAR: PRESENT — SIGNIFICANT CHANGE UP
BURR CELLS BLD QL SMEAR: PRESENT — SIGNIFICANT CHANGE UP
CA-I SERPL-SCNC: 1.57 MMOL/L — HIGH (ref 1.15–1.33)
CA-I SERPL-SCNC: 1.57 MMOL/L — HIGH (ref 1.15–1.33)
CA-I SERPL-SCNC: 1.62 MMOL/L — CRITICAL HIGH (ref 1.15–1.33)
CA-I SERPL-SCNC: 1.62 MMOL/L — CRITICAL HIGH (ref 1.15–1.33)
CALCIUM SERPL-MCNC: 11.6 MG/DL — HIGH (ref 8.4–10.5)
CALCIUM SERPL-MCNC: 11.6 MG/DL — HIGH (ref 8.4–10.5)
CALCIUM SERPL-MCNC: 12.1 MG/DL — HIGH (ref 8.4–10.5)
CALCIUM SERPL-MCNC: 12.1 MG/DL — HIGH (ref 8.4–10.5)
CHLORIDE BLDV-SCNC: 106 MMOL/L — SIGNIFICANT CHANGE UP (ref 96–108)
CHLORIDE BLDV-SCNC: 106 MMOL/L — SIGNIFICANT CHANGE UP (ref 96–108)
CHLORIDE BLDV-SCNC: 107 MMOL/L — SIGNIFICANT CHANGE UP (ref 96–108)
CHLORIDE BLDV-SCNC: 107 MMOL/L — SIGNIFICANT CHANGE UP (ref 96–108)
CHLORIDE SERPL-SCNC: 105 MMOL/L — SIGNIFICANT CHANGE UP (ref 96–108)
CHLORIDE SERPL-SCNC: 105 MMOL/L — SIGNIFICANT CHANGE UP (ref 96–108)
CHLORIDE SERPL-SCNC: 108 MMOL/L — SIGNIFICANT CHANGE UP (ref 96–108)
CHLORIDE SERPL-SCNC: 108 MMOL/L — SIGNIFICANT CHANGE UP (ref 96–108)
CK MB BLD-MCNC: 0.7 % — SIGNIFICANT CHANGE UP (ref 0–3.5)
CK MB BLD-MCNC: 0.7 % — SIGNIFICANT CHANGE UP (ref 0–3.5)
CK MB CFR SERPL CALC: 2.7 NG/ML — SIGNIFICANT CHANGE UP (ref 0–3.8)
CK MB CFR SERPL CALC: 2.7 NG/ML — SIGNIFICANT CHANGE UP (ref 0–3.8)
CK SERPL-CCNC: 410 U/L — HIGH (ref 25–170)
CK SERPL-CCNC: 410 U/L — HIGH (ref 25–170)
CO2 BLDV-SCNC: 22 MMOL/L — SIGNIFICANT CHANGE UP (ref 22–26)
CO2 SERPL-SCNC: 18 MMOL/L — LOW (ref 22–31)
CO2 SERPL-SCNC: 18 MMOL/L — LOW (ref 22–31)
CO2 SERPL-SCNC: 21 MMOL/L — LOW (ref 22–31)
CO2 SERPL-SCNC: 21 MMOL/L — LOW (ref 22–31)
COLOR FLD: SIGNIFICANT CHANGE UP
COLOR FLD: SIGNIFICANT CHANGE UP
COLOR SPEC: YELLOW — SIGNIFICANT CHANGE UP
COLOR SPEC: YELLOW — SIGNIFICANT CHANGE UP
COMMENT - URINE: SIGNIFICANT CHANGE UP
COMMENT - URINE: SIGNIFICANT CHANGE UP
CREAT SERPL-MCNC: 4.79 MG/DL — HIGH (ref 0.5–1.3)
CREAT SERPL-MCNC: 4.79 MG/DL — HIGH (ref 0.5–1.3)
CREAT SERPL-MCNC: 5.2 MG/DL — HIGH (ref 0.5–1.3)
CREAT SERPL-MCNC: 5.2 MG/DL — HIGH (ref 0.5–1.3)
CULTURE RESULTS: SIGNIFICANT CHANGE UP
CULTURE RESULTS: SIGNIFICANT CHANGE UP
DIFF PNL FLD: ABNORMAL
DIFF PNL FLD: ABNORMAL
EGFR: 10 ML/MIN/1.73M2 — LOW
EGFR: 10 ML/MIN/1.73M2 — LOW
EGFR: 11 ML/MIN/1.73M2 — LOW
EGFR: 11 ML/MIN/1.73M2 — LOW
ELLIPTOCYTES BLD QL SMEAR: SLIGHT — SIGNIFICANT CHANGE UP
ELLIPTOCYTES BLD QL SMEAR: SLIGHT — SIGNIFICANT CHANGE UP
EOSINOPHIL # BLD AUTO: 0.56 K/UL — HIGH (ref 0–0.5)
EOSINOPHIL # BLD AUTO: 0.56 K/UL — HIGH (ref 0–0.5)
EOSINOPHIL # FLD: 5 % — SIGNIFICANT CHANGE UP
EOSINOPHIL # FLD: 5 % — SIGNIFICANT CHANGE UP
EOSINOPHIL NFR BLD AUTO: 4.4 % — SIGNIFICANT CHANGE UP (ref 0–6)
EOSINOPHIL NFR BLD AUTO: 4.4 % — SIGNIFICANT CHANGE UP (ref 0–6)
FLUID INTAKE SUBSTANCE CLASS: SIGNIFICANT CHANGE UP
FLUID INTAKE SUBSTANCE CLASS: SIGNIFICANT CHANGE UP
GAS PNL BLDA: SIGNIFICANT CHANGE UP
GAS PNL BLDA: SIGNIFICANT CHANGE UP
GAS PNL BLDV: 138 MMOL/L — SIGNIFICANT CHANGE UP (ref 136–145)
GAS PNL BLDV: 138 MMOL/L — SIGNIFICANT CHANGE UP (ref 136–145)
GAS PNL BLDV: 140 MMOL/L — SIGNIFICANT CHANGE UP (ref 136–145)
GAS PNL BLDV: 140 MMOL/L — SIGNIFICANT CHANGE UP (ref 136–145)
GAS PNL BLDV: SIGNIFICANT CHANGE UP
GLUCOSE BLDC GLUCOMTR-MCNC: 107 MG/DL — HIGH (ref 70–99)
GLUCOSE BLDC GLUCOMTR-MCNC: 107 MG/DL — HIGH (ref 70–99)
GLUCOSE BLDC GLUCOMTR-MCNC: 164 MG/DL — HIGH (ref 70–99)
GLUCOSE BLDC GLUCOMTR-MCNC: 164 MG/DL — HIGH (ref 70–99)
GLUCOSE BLDV-MCNC: 102 MG/DL — HIGH (ref 70–99)
GLUCOSE BLDV-MCNC: 102 MG/DL — HIGH (ref 70–99)
GLUCOSE BLDV-MCNC: 113 MG/DL — HIGH (ref 70–99)
GLUCOSE BLDV-MCNC: 113 MG/DL — HIGH (ref 70–99)
GLUCOSE SERPL-MCNC: 106 MG/DL — HIGH (ref 70–99)
GLUCOSE SERPL-MCNC: 106 MG/DL — HIGH (ref 70–99)
GLUCOSE SERPL-MCNC: 128 MG/DL — HIGH (ref 70–99)
GLUCOSE SERPL-MCNC: 128 MG/DL — HIGH (ref 70–99)
GLUCOSE UR QL: NEGATIVE — SIGNIFICANT CHANGE UP
GLUCOSE UR QL: NEGATIVE — SIGNIFICANT CHANGE UP
HBV SURFACE AG SER-ACNC: SIGNIFICANT CHANGE UP
HBV SURFACE AG SER-ACNC: SIGNIFICANT CHANGE UP
HCO3 BLDV-SCNC: 21 MMOL/L — LOW (ref 22–29)
HCT VFR BLD CALC: 22.6 % — LOW (ref 34.5–45)
HCT VFR BLD CALC: 22.6 % — LOW (ref 34.5–45)
HCT VFR BLDA CALC: 23 % — LOW (ref 34.5–46.5)
HGB BLD CALC-MCNC: 7.6 G/DL — LOW (ref 11.7–16.1)
HGB BLD-MCNC: 7.1 G/DL — LOW (ref 11.5–15.5)
HGB BLD-MCNC: 7.1 G/DL — LOW (ref 11.5–15.5)
INR BLD: 1.33 RATIO — HIGH (ref 0.85–1.18)
INR BLD: 1.33 RATIO — HIGH (ref 0.85–1.18)
KETONES UR-MCNC: NEGATIVE — SIGNIFICANT CHANGE UP
KETONES UR-MCNC: NEGATIVE — SIGNIFICANT CHANGE UP
LACTATE BLDV-MCNC: 0.9 MMOL/L — SIGNIFICANT CHANGE UP (ref 0.5–2)
LACTATE BLDV-MCNC: 0.9 MMOL/L — SIGNIFICANT CHANGE UP (ref 0.5–2)
LACTATE BLDV-MCNC: 1.1 MMOL/L — SIGNIFICANT CHANGE UP (ref 0.5–2)
LACTATE BLDV-MCNC: 1.1 MMOL/L — SIGNIFICANT CHANGE UP (ref 0.5–2)
LEUKOCYTE ESTERASE UR-ACNC: ABNORMAL
LEUKOCYTE ESTERASE UR-ACNC: ABNORMAL
LIDOCAIN IGE QN: 113 U/L — HIGH (ref 7–60)
LIDOCAIN IGE QN: 113 U/L — HIGH (ref 7–60)
LYMPHOCYTES # BLD AUTO: 1.92 K/UL — SIGNIFICANT CHANGE UP (ref 1–3.3)
LYMPHOCYTES # BLD AUTO: 1.92 K/UL — SIGNIFICANT CHANGE UP (ref 1–3.3)
LYMPHOCYTES # BLD AUTO: 15.1 % — SIGNIFICANT CHANGE UP (ref 13–44)
LYMPHOCYTES # BLD AUTO: 15.1 % — SIGNIFICANT CHANGE UP (ref 13–44)
LYMPHOCYTES # FLD: 12 % — SIGNIFICANT CHANGE UP
LYMPHOCYTES # FLD: 12 % — SIGNIFICANT CHANGE UP
MAGNESIUM SERPL-MCNC: 3.1 MG/DL — HIGH (ref 1.6–2.6)
MANUAL SMEAR VERIFICATION: SIGNIFICANT CHANGE UP
MANUAL SMEAR VERIFICATION: SIGNIFICANT CHANGE UP
MCHC RBC-ENTMCNC: 25 PG — LOW (ref 27–34)
MCHC RBC-ENTMCNC: 25 PG — LOW (ref 27–34)
MCHC RBC-ENTMCNC: 31.4 GM/DL — LOW (ref 32–36)
MCHC RBC-ENTMCNC: 31.4 GM/DL — LOW (ref 32–36)
MCV RBC AUTO: 79.6 FL — LOW (ref 80–100)
MCV RBC AUTO: 79.6 FL — LOW (ref 80–100)
MICROCYTES BLD QL: SLIGHT — SIGNIFICANT CHANGE UP
MICROCYTES BLD QL: SLIGHT — SIGNIFICANT CHANGE UP
MONOCYTES # BLD AUTO: 0.56 K/UL — SIGNIFICANT CHANGE UP (ref 0–0.9)
MONOCYTES # BLD AUTO: 0.56 K/UL — SIGNIFICANT CHANGE UP (ref 0–0.9)
MONOCYTES NFR BLD AUTO: 4.4 % — SIGNIFICANT CHANGE UP (ref 2–14)
MONOCYTES NFR BLD AUTO: 4.4 % — SIGNIFICANT CHANGE UP (ref 2–14)
MONOS+MACROS # FLD: 15 % — SIGNIFICANT CHANGE UP
MONOS+MACROS # FLD: 15 % — SIGNIFICANT CHANGE UP
MYELOCYTES NFR BLD: 3.5 % — HIGH (ref 0–0)
MYELOCYTES NFR BLD: 3.5 % — HIGH (ref 0–0)
NEUTROPHILS # BLD AUTO: 9.13 K/UL — HIGH (ref 1.8–7.4)
NEUTROPHILS # BLD AUTO: 9.13 K/UL — HIGH (ref 1.8–7.4)
NEUTROPHILS NFR BLD AUTO: 69.9 % — SIGNIFICANT CHANGE UP (ref 43–77)
NEUTROPHILS NFR BLD AUTO: 69.9 % — SIGNIFICANT CHANGE UP (ref 43–77)
NEUTROPHILS-BODY FLUID: 68 % — SIGNIFICANT CHANGE UP
NEUTROPHILS-BODY FLUID: 68 % — SIGNIFICANT CHANGE UP
NEUTS BAND # BLD: 1.8 % — SIGNIFICANT CHANGE UP (ref 0–8)
NEUTS BAND # BLD: 1.8 % — SIGNIFICANT CHANGE UP (ref 0–8)
NITRITE UR-MCNC: NEGATIVE — SIGNIFICANT CHANGE UP
NITRITE UR-MCNC: NEGATIVE — SIGNIFICANT CHANGE UP
OSMOLALITY UR: 241 MOS/KG — LOW (ref 300–900)
OSMOLALITY UR: 241 MOS/KG — LOW (ref 300–900)
OVALOCYTES BLD QL SMEAR: SLIGHT — SIGNIFICANT CHANGE UP
OVALOCYTES BLD QL SMEAR: SLIGHT — SIGNIFICANT CHANGE UP
PCO2 BLDV: 45 MMHG — HIGH (ref 39–42)
PCO2 BLDV: 45 MMHG — HIGH (ref 39–42)
PCO2 BLDV: 46 MMHG — HIGH (ref 39–42)
PCO2 BLDV: 46 MMHG — HIGH (ref 39–42)
PH BLDV: 7.27 — LOW (ref 7.32–7.43)
PH UR: 6.5 — SIGNIFICANT CHANGE UP (ref 5–8)
PH UR: 6.5 — SIGNIFICANT CHANGE UP (ref 5–8)
PHOSPHATE SERPL-MCNC: 8.8 MG/DL — HIGH (ref 2.5–4.5)
PHOSPHATE SERPL-MCNC: 8.8 MG/DL — HIGH (ref 2.5–4.5)
PHOSPHATE SERPL-MCNC: 9.5 MG/DL — HIGH (ref 2.5–4.5)
PHOSPHATE SERPL-MCNC: 9.5 MG/DL — HIGH (ref 2.5–4.5)
PLAT MORPH BLD: NORMAL — SIGNIFICANT CHANGE UP
PLAT MORPH BLD: NORMAL — SIGNIFICANT CHANGE UP
PLATELET # BLD AUTO: 792 K/UL — HIGH (ref 150–400)
PLATELET # BLD AUTO: 792 K/UL — HIGH (ref 150–400)
PO2 BLDV: 45 MMHG — SIGNIFICANT CHANGE UP (ref 25–45)
PO2 BLDV: 45 MMHG — SIGNIFICANT CHANGE UP (ref 25–45)
PO2 BLDV: 48 MMHG — HIGH (ref 25–45)
PO2 BLDV: 48 MMHG — HIGH (ref 25–45)
POIKILOCYTOSIS BLD QL AUTO: SLIGHT — SIGNIFICANT CHANGE UP
POIKILOCYTOSIS BLD QL AUTO: SLIGHT — SIGNIFICANT CHANGE UP
POLYCHROMASIA BLD QL SMEAR: SLIGHT — SIGNIFICANT CHANGE UP
POLYCHROMASIA BLD QL SMEAR: SLIGHT — SIGNIFICANT CHANGE UP
POTASSIUM BLDV-SCNC: 4.2 MMOL/L — SIGNIFICANT CHANGE UP (ref 3.5–5.1)
POTASSIUM BLDV-SCNC: 4.2 MMOL/L — SIGNIFICANT CHANGE UP (ref 3.5–5.1)
POTASSIUM BLDV-SCNC: 4.5 MMOL/L — SIGNIFICANT CHANGE UP (ref 3.5–5.1)
POTASSIUM BLDV-SCNC: 4.5 MMOL/L — SIGNIFICANT CHANGE UP (ref 3.5–5.1)
POTASSIUM SERPL-MCNC: 3.7 MMOL/L — SIGNIFICANT CHANGE UP (ref 3.5–5.3)
POTASSIUM SERPL-MCNC: 3.7 MMOL/L — SIGNIFICANT CHANGE UP (ref 3.5–5.3)
POTASSIUM SERPL-MCNC: 4.4 MMOL/L — SIGNIFICANT CHANGE UP (ref 3.5–5.3)
POTASSIUM SERPL-MCNC: 4.4 MMOL/L — SIGNIFICANT CHANGE UP (ref 3.5–5.3)
POTASSIUM SERPL-SCNC: 3.7 MMOL/L — SIGNIFICANT CHANGE UP (ref 3.5–5.3)
POTASSIUM SERPL-SCNC: 3.7 MMOL/L — SIGNIFICANT CHANGE UP (ref 3.5–5.3)
POTASSIUM SERPL-SCNC: 4.4 MMOL/L — SIGNIFICANT CHANGE UP (ref 3.5–5.3)
POTASSIUM SERPL-SCNC: 4.4 MMOL/L — SIGNIFICANT CHANGE UP (ref 3.5–5.3)
PROT SERPL-MCNC: 6.5 G/DL — SIGNIFICANT CHANGE UP (ref 6–8.3)
PROT SERPL-MCNC: 6.5 G/DL — SIGNIFICANT CHANGE UP (ref 6–8.3)
PROT SERPL-MCNC: 6.6 G/DL — SIGNIFICANT CHANGE UP (ref 6–8.3)
PROT SERPL-MCNC: 6.6 G/DL — SIGNIFICANT CHANGE UP (ref 6–8.3)
PROT UR-MCNC: ABNORMAL
PROT UR-MCNC: ABNORMAL
PROTHROM AB SERPL-ACNC: 13.9 SEC — HIGH (ref 9.5–13)
PROTHROM AB SERPL-ACNC: 13.9 SEC — HIGH (ref 9.5–13)
RBC # BLD: 2.84 M/UL — LOW (ref 3.8–5.2)
RBC # BLD: 2.84 M/UL — LOW (ref 3.8–5.2)
RBC # FLD: 19.3 % — HIGH (ref 10.3–14.5)
RBC # FLD: 19.3 % — HIGH (ref 10.3–14.5)
RBC BLD AUTO: ABNORMAL
RBC BLD AUTO: ABNORMAL
RBC CASTS # UR COMP ASSIST: SIGNIFICANT CHANGE UP /HPF (ref 0–4)
RBC CASTS # UR COMP ASSIST: SIGNIFICANT CHANGE UP /HPF (ref 0–4)
RCV VOL RI: 32 CELLS/UL — HIGH (ref 0–5)
RCV VOL RI: 32 CELLS/UL — HIGH (ref 0–5)
SAO2 % BLDV: 64.8 % — LOW (ref 67–88)
SAO2 % BLDV: 64.8 % — LOW (ref 67–88)
SAO2 % BLDV: 75.8 % — SIGNIFICANT CHANGE UP (ref 67–88)
SAO2 % BLDV: 75.8 % — SIGNIFICANT CHANGE UP (ref 67–88)
SODIUM SERPL-SCNC: 139 MMOL/L — SIGNIFICANT CHANGE UP (ref 135–145)
SODIUM SERPL-SCNC: 139 MMOL/L — SIGNIFICANT CHANGE UP (ref 135–145)
SODIUM SERPL-SCNC: 145 MMOL/L — SIGNIFICANT CHANGE UP (ref 135–145)
SODIUM SERPL-SCNC: 145 MMOL/L — SIGNIFICANT CHANGE UP (ref 135–145)
SP GR SPEC: 1.02 — SIGNIFICANT CHANGE UP (ref 1–1.03)
SP GR SPEC: 1.02 — SIGNIFICANT CHANGE UP (ref 1–1.03)
SPECIMEN SOURCE: SIGNIFICANT CHANGE UP
SPECIMEN SOURCE: SIGNIFICANT CHANGE UP
TARGETS BLD QL SMEAR: SLIGHT — SIGNIFICANT CHANGE UP
TARGETS BLD QL SMEAR: SLIGHT — SIGNIFICANT CHANGE UP
TOTAL NUCLEATED CELL COUNT, BODY FLUID: 5917 CELLS/UL — HIGH (ref 0–5)
TOTAL NUCLEATED CELL COUNT, BODY FLUID: 5917 CELLS/UL — HIGH (ref 0–5)
TROPONIN T, HIGH SENSITIVITY RESULT: 55 NG/L — HIGH (ref 0–51)
TROPONIN T, HIGH SENSITIVITY RESULT: 55 NG/L — HIGH (ref 0–51)
UROBILINOGEN FLD QL: SIGNIFICANT CHANGE UP
UROBILINOGEN FLD QL: SIGNIFICANT CHANGE UP
WBC # BLD: 12.73 K/UL — HIGH (ref 3.8–10.5)
WBC # BLD: 12.73 K/UL — HIGH (ref 3.8–10.5)
WBC # FLD AUTO: 12.73 K/UL — HIGH (ref 3.8–10.5)
WBC # FLD AUTO: 12.73 K/UL — HIGH (ref 3.8–10.5)
WBC UR QL: SIGNIFICANT CHANGE UP /HPF (ref 0–5)
WBC UR QL: SIGNIFICANT CHANGE UP /HPF (ref 0–5)

## 2023-11-30 PROCEDURE — 93010 ELECTROCARDIOGRAM REPORT: CPT

## 2023-11-30 PROCEDURE — 36800 INSERTION OF CANNULA: CPT | Mod: GC,59

## 2023-11-30 PROCEDURE — 76700 US EXAM ABDOM COMPLETE: CPT | Mod: 26

## 2023-11-30 PROCEDURE — 36620 INSERTION CATHETER ARTERY: CPT | Mod: GC,59

## 2023-11-30 PROCEDURE — 99291 CRITICAL CARE FIRST HOUR: CPT | Mod: GC,25

## 2023-11-30 PROCEDURE — 71250 CT THORAX DX C-: CPT | Mod: 26

## 2023-11-30 PROCEDURE — 31624 DX BRONCHOSCOPE/LAVAGE: CPT | Mod: GC,59

## 2023-11-30 PROCEDURE — 74176 CT ABD & PELVIS W/O CONTRAST: CPT | Mod: 26

## 2023-11-30 PROCEDURE — 71045 X-RAY EXAM CHEST 1 VIEW: CPT | Mod: 26

## 2023-11-30 PROCEDURE — 36569 INSJ PICC 5 YR+ W/O IMAGING: CPT | Mod: GC,59

## 2023-11-30 RX ORDER — VANCOMYCIN HCL 1 G
VIAL (EA) INTRAVENOUS
Refills: 0 | Status: DISCONTINUED | OUTPATIENT
Start: 2023-11-30 | End: 2023-11-30

## 2023-11-30 RX ORDER — VANCOMYCIN HCL 1 G
1000 VIAL (EA) INTRAVENOUS ONCE
Refills: 0 | Status: DISCONTINUED | OUTPATIENT
Start: 2023-11-30 | End: 2023-11-30

## 2023-11-30 RX ORDER — SODIUM CHLORIDE 9 MG/ML
10 INJECTION INTRAMUSCULAR; INTRAVENOUS; SUBCUTANEOUS
Refills: 0 | Status: DISCONTINUED | OUTPATIENT
Start: 2023-11-30 | End: 2023-01-01

## 2023-11-30 RX ORDER — PIPERACILLIN AND TAZOBACTAM 4; .5 G/20ML; G/20ML
3.38 INJECTION, POWDER, LYOPHILIZED, FOR SOLUTION INTRAVENOUS ONCE
Refills: 0 | Status: COMPLETED | OUTPATIENT
Start: 2023-12-01 | End: 2023-12-01

## 2023-11-30 RX ORDER — VANCOMYCIN HCL 1 G
1000 VIAL (EA) INTRAVENOUS EVERY 24 HOURS
Refills: 0 | Status: DISCONTINUED | OUTPATIENT
Start: 2023-11-30 | End: 2023-12-01

## 2023-11-30 RX ORDER — PIPERACILLIN AND TAZOBACTAM 4; .5 G/20ML; G/20ML
3.38 INJECTION, POWDER, LYOPHILIZED, FOR SOLUTION INTRAVENOUS EVERY 12 HOURS
Refills: 0 | Status: DISCONTINUED | OUTPATIENT
Start: 2023-12-01 | End: 2023-12-01

## 2023-11-30 RX ORDER — CHLORHEXIDINE GLUCONATE 213 G/1000ML
1 SOLUTION TOPICAL
Refills: 0 | Status: DISCONTINUED | OUTPATIENT
Start: 2023-11-30 | End: 2023-01-01

## 2023-11-30 RX ORDER — PIPERACILLIN AND TAZOBACTAM 4; .5 G/20ML; G/20ML
3.38 INJECTION, POWDER, LYOPHILIZED, FOR SOLUTION INTRAVENOUS ONCE
Refills: 0 | Status: COMPLETED | OUTPATIENT
Start: 2023-11-30 | End: 2023-11-30

## 2023-11-30 RX ORDER — VASOPRESSIN 20 [USP'U]/ML
1 INJECTION INTRAVENOUS ONCE
Refills: 0 | Status: DISCONTINUED | OUTPATIENT
Start: 2023-11-30 | End: 2023-11-30

## 2023-11-30 RX ORDER — VASOPRESSIN 20 [USP'U]/ML
0.05 INJECTION INTRAVENOUS
Qty: 40 | Refills: 0 | Status: DISCONTINUED | OUTPATIENT
Start: 2023-11-30 | End: 2023-11-30

## 2023-11-30 RX ORDER — ALBUTEROL 90 UG/1
2.5 AEROSOL, METERED ORAL EVERY 6 HOURS
Refills: 0 | Status: DISCONTINUED | OUTPATIENT
Start: 2023-11-30 | End: 2023-12-01

## 2023-11-30 RX ORDER — LEVOTHYROXINE SODIUM 125 MCG
20 TABLET ORAL ONCE
Refills: 0 | Status: COMPLETED | OUTPATIENT
Start: 2023-11-30 | End: 2023-11-30

## 2023-11-30 RX ORDER — LEVOTHYROXINE SODIUM 125 MCG
20 TABLET ORAL
Qty: 200 | Refills: 0 | Status: DISCONTINUED | OUTPATIENT
Start: 2023-11-30 | End: 2023-12-01

## 2023-11-30 RX ADMIN — Medication 7.49 MICROGRAM(S)/KG/MIN: at 05:28

## 2023-11-30 RX ADMIN — Medication 50 MILLIGRAM(S): at 14:28

## 2023-11-30 RX ADMIN — SEVELAMER CARBONATE 800 MILLIGRAM(S): 2400 POWDER, FOR SUSPENSION ORAL at 17:30

## 2023-11-30 RX ADMIN — Medication 250 MILLIGRAM(S): at 20:46

## 2023-11-30 RX ADMIN — Medication 1 DROP(S): at 05:26

## 2023-11-30 RX ADMIN — CHLORHEXIDINE GLUCONATE 15 MILLILITER(S): 213 SOLUTION TOPICAL at 17:30

## 2023-11-30 RX ADMIN — Medication 325 MILLIGRAM(S): at 11:36

## 2023-11-30 RX ADMIN — PANTOPRAZOLE SODIUM 40 MILLIGRAM(S): 20 TABLET, DELAYED RELEASE ORAL at 11:35

## 2023-11-30 RX ADMIN — CHLORHEXIDINE GLUCONATE 15 MILLILITER(S): 213 SOLUTION TOPICAL at 05:22

## 2023-11-30 RX ADMIN — Medication 2: at 05:53

## 2023-11-30 RX ADMIN — SEVELAMER CARBONATE 800 MILLIGRAM(S): 2400 POWDER, FOR SUSPENSION ORAL at 11:36

## 2023-11-30 RX ADMIN — ALBUTEROL 2.5 MILLIGRAM(S): 90 AEROSOL, METERED ORAL at 18:28

## 2023-11-30 RX ADMIN — ALBUTEROL 2.5 MILLIGRAM(S): 90 AEROSOL, METERED ORAL at 23:47

## 2023-11-30 RX ADMIN — Medication 81 MILLIGRAM(S): at 11:36

## 2023-11-30 RX ADMIN — Medication 25 MICROGRAM(S)/HR: at 15:49

## 2023-11-30 RX ADMIN — Medication 20 MICROGRAM(S): at 14:28

## 2023-11-30 RX ADMIN — PIPERACILLIN AND TAZOBACTAM 200 GRAM(S): 4; .5 INJECTION, POWDER, LYOPHILIZED, FOR SOLUTION INTRAVENOUS at 20:46

## 2023-11-30 NOTE — CHART NOTE - NSCHARTNOTEFT_GEN_A_CORE
Per hospital policy, patient's death does not meet criteria for medical examiner. Discussed case with ME and patient does not meet criteria for ME examination.     - Bolivar Schaefer, PGY2

## 2023-11-30 NOTE — PROCEDURE NOTE - NSBRONCHPROCDETAILS_GEN_A_CORE_FT
?  Alexandro Amin  ?  Bronchoscopy Procedure Note:  Indication:  evaluation prior to organ donation     Operators:  Geraldine Amin     Pre-op Dx:  hypoxic respiratory failure     History:  patient s/p cardiac arrest undergoing bronchoscopy prior to organ donation       Findings:  Bronchoscope inserted through ETT. ETT noted to be in good position. Airway evaluation revealed purulent secretions in airways bilaterally, which were suctioned.   Airway mucosa appeared mildly erythematous but was otherwise normal without any obvious endobronchial lesions.  BAL performed in right middle lobe and right superior segment with adequate return.  Therapeutic aspiration of secretions then performed. Bronchoscope then withdrawn from ETT.  Patient tolerated procedure well.  There were no complications.      Specimens:  BAL for cell count, culture    Carlos Enrique Amin PGY6  [id number]

## 2023-11-30 NOTE — CHART NOTE - NSCHARTNOTEFT_GEN_A_CORE
Consulted for this patient in MICU; who has been declared brain dead on 11/27 at 15:55 HRS. Team requesting HD for organ donation managed by Navya.   Will place orders for HD.  HD unit made aware.      Scot Watt  Nephrology Fellow  Feel free to contact me on TEAMS  After 4 pm please contact the on-call Fellow. Consulted for this patient in MICU for dialysis. Underwent brain death confirmation on 11/27. Death was declared  on 11/27 at 15:55 HRS.   Team requesting HD for organ donation managed by Navya.   Will place orders for HD.  HD unit made aware.      Scot Watt  Nephrology Fellow  Feel free to contact me on TEAMS  After 4 pm please contact the on-call Fellow.

## 2023-11-30 NOTE — PROCEDURE NOTE - NSICDXIRPOSTOP_GEN_A_CORE_FT
POST-OP DIAGNOSIS:  Acute respiratory failure with hypoxia 30-Nov-2023 17:12:13  Alexandro Amin  
22-Feb-2022

## 2023-11-30 NOTE — PROGRESS NOTE ADULT - SUBJECTIVE AND OBJECTIVE BOX
INTERVAL HPI/OVERNIGHT EVENTS: No active issues reported.     SUBJECTIVE: Patient seen and examined at bedside.     ROS: All negative except as listed above.    VITAL SIGNS:  ICU Vital Signs Last 24 Hrs  T(C): 37.1 (30 Nov 2023 08:00), Max: 37.8 (30 Nov 2023 04:00)  T(F): 98.8 (30 Nov 2023 08:00), Max: 100 (30 Nov 2023 04:00)  HR: 79 (30 Nov 2023 08:45) (64 - 82)  BP: 92/53 (30 Nov 2023 08:45) (85/50 - 143/75)  BP(mean): 70 (30 Nov 2023 08:45) (61 - 103)  ABP: --  ABP(mean): --  RR: 20 (30 Nov 2023 08:45) (6 - 24)  SpO2: 98% (30 Nov 2023 08:45) (95% - 100%)    O2 Parameters below as of 30 Nov 2023 08:00  Patient On (Oxygen Delivery Method): ventilator    O2 Concentration (%): 40      Mode: AC/ CMV (Assist Control/ Continuous Mandatory Ventilation), RR (machine): 20, TV (machine): 400, FiO2: 40, PEEP: 5, ITime: 1, MAP: 11, PIP: 26  Plateau pressure:   P/F ratio:     11-29 @ 07:01  -  11-30 @ 07:00  --------------------------------------------------------  IN: 4222 mL / OUT: 1250 mL / NET: 2972 mL    11-30 @ 07:01  -  11-30 @ 09:09  --------------------------------------------------------  IN: 145 mL / OUT: 0 mL / NET: 145 mL      CAPILLARY BLOOD GLUCOSE      POCT Blood Glucose.: 164 mg/dL (30 Nov 2023 05:21)      ECG: reviewed.    PHYSICAL EXAM:    HEAD:  Atraumatic, Normocephalic  EYES: fixed pupils  ENT: Moist mucous membranes  NECK: Supple, No elevated JVP.  CHEST/LUNG: On mechanical ventilation, CTA BL   HEART: Regular rate and rhythm; No murmurs, rubs, or gallops  ABDOMEN: Bowel sounds present; Soft, nontender, nondistended  EXTREMITIES:  2+ Peripheral Pulses, brisk capillary refill. No clubbing, cyanosis, or edema  NERVOUS SYSTEM:  Patient not responsive   SKIN: No rashes or lesions    MEDICATIONS:  MEDICATIONS  (STANDING):  aspirin  chewable 81 milliGRAM(s) Oral daily  chlorhexidine 0.12% Liquid 15 milliLiter(s) Oral Mucosa every 12 hours  dextrose 5%. 1000 milliLiter(s) (50 mL/Hr) IV Continuous <Continuous>  ferrous    sulfate 325 milliGRAM(s) Oral daily  insulin lispro (ADMELOG) corrective regimen sliding scale   SubCutaneous every 6 hours  norepinephrine Infusion 0.05 MICROgram(s)/kG/Min (7.49 mL/Hr) IV Continuous <Continuous>  pantoprazole  Injectable 40 milliGRAM(s) IV Push daily  sevelamer carbonate Powder 800 milliGRAM(s) Oral three times a day with meals    MEDICATIONS  (PRN):  artificial tears (preservative free) Ophthalmic Solution 1 Drop(s) Both EYES every 6 hours PRN Dry Eyes      ALLERGIES:  Allergies    No Known Allergies    Intolerances        LABS:                        7.6    11.35 )-----------( 763      ( 29 Nov 2023 23:47 )             24.4     11-29    145  |  108  |  129<H>  ----------------------------<  128<H>  3.7   |  21<L>  |  4.79<H>    Ca    12.1<H>      29 Nov 2023 23:47  Phos  8.8     11-29  Mg     3.1     11-29    TPro  6.6  /  Alb  2.0<L>  /  TBili  0.5  /  DBili  x   /  AST  47<H>  /  ALT  19  /  AlkPhos  158<H>  11-29      Urinalysis Basic - ( 29 Nov 2023 23:47 )    Color: x / Appearance: x / SG: x / pH: x  Gluc: 128 mg/dL / Ketone: x  / Bili: x / Urobili: x   Blood: x / Protein: x / Nitrite: x   Leuk Esterase: x / RBC: x / WBC x   Sq Epi: x / Non Sq Epi: x / Bacteria: x      ABG:      vBG:    Micro:    Culture - Blood (collected 11-28-23 @ 00:15)  Source: .Blood Blood-Peripheral  Preliminary Report (11-30-23 @ 03:02):    No growth at 48 Hours    Culture - Blood (collected 11-28-23 @ 00:05)  Source: .Blood Blood-Peripheral  Preliminary Report (11-30-23 @ 03:02):    No growth at 48 Hours    Culture - Blood (collected 11-24-23 @ 23:58)  Source: .Blood Blood-Peripheral  Final Report (11-30-23 @ 04:00):    No growth at 5 days    Culture - Blood (collected 11-24-23 @ 17:20)  Source: .Blood Blood-Peripheral  Gram Stain (11-26-23 @ 22:29):    Growth in aerobic bottle: Gram Positive Cocci in Clusters    Growth in anaerobic bottle: Gram Positive Cocci in Clusters  Final Report (11-27-23 @ 19:05):    Growth in aerobic and anaerobic bottles: Staphylococcus epidermidis    Coagulase Negative Staphylococci isolated from a single blood culture set    may represent contamination.    Contact the Microbiology Department at [phone] if susceptibility    testing is clinically indicated.    Direct identification is available within approximately 3-5    hours either by Blood Panel Multiplexed PCR or Direct    MALDI-TOF. Details: [email or web address]    ***Add Freetext*** blood culture bottle turned positive after the final    report was released.  Organism: Blood Culture PCR (11-26-23 @ 22:29)      Method Type: PCR      -  Staphylococcus epidermidis, Methicillin resistant: Detec  Organism: Blood Culture PCR (11-26-23 @ 19:23)    Culture - Blood (collected 11-23-23 @ 00:52)  Source: .Blood Blood-Peripheral  Final Report (11-28-23 @ 11:00):    No growth at 5 days    Culture - Blood (collected 11-23-23 @ 00:52)  Source: .Blood Blood-Venous  Final Report (11-28-23 @ 11:00):    No growth at 5 days    Culture - Blood (collected 11-18-23 @ 17:40)  Source: .Blood Blood-Peripheral  Final Report (11-23-23 @ 23:01):    No growth at 5 days    Culture - Blood (collected 11-18-23 @ 17:30)  Source: .Blood Blood-Peripheral  Final Report (11-23-23 @ 23:01):    No growth at 5 days    Culture - Blood (collected 11-14-23 @ 18:15)  Source: .Blood Blood  Final Report (11-20-23 @ 01:00):    No growth at 5 days    Culture - Blood (collected 11-14-23 @ 18:00)  Source: .Blood Blood  Final Report (11-20-23 @ 01:00):    No growth at 5 days        Culture - Sputum (collected 11-24-23 @ 13:37)  Source: ET Tube ET Tube  Gram Stain (11-24-23 @ 23:23):    Moderate polymorphonuclear leukocytes per low power field    No Squamous epithelial cells per low power field    Rare Gram positive cocci in pairs per oil power field  Final Report (11-26-23 @ 21:19):    Normal Respiratory Jenni present    Culture - Sputum (collected 11-15-23 @ 01:08)  Source: .Sputum Sputum  Gram Stain (11-15-23 @ 16:34):    Rare polymorphonuclear leukocytes per low power field    No Squamous epithelial cells per low power field    No organisms seen per oil power field  Final Report (11-17-23 @ 12:04):    Normal Respiratory Jenni present        RADIOLOGY & ADDITIONAL TESTS: Reviewed.

## 2023-11-30 NOTE — PROGRESS NOTE ADULT - ASSESSMENT
43F with history of pre-DM, HTN, HLD, ADHD, BENJAMIN, migraines, thrombocytosis, hypothyroidism not on synthroid, uterine polyps s/p polypectomy in 2/2023 presented from [facility] post cardiac arrest in the field. Imaging c/f extensive PNA. Transferred to [facility] for further management. Patient remains on a ventilator with signs of anoxic brain injury.     =====NEURO=====  #Concern for Anoxic Brain Injury  - arrested for unknown period both in the field & hospital  - CTH at OSH: findings suggestive of diffuse cerebral & cerebellar injury   - EEG notable for profound diffuse cerebral dysfunction, nonspecific in etiology. Continuous EEG monitoring discontinued per neuro.   -MRI with findings consistent with anoxic brain injury (diffuse cortical restricted diffusion, multiple foci of restricted diffusion in brainstem)  - current exam with dilated pupils, no corneal reflex, - will not consider sedation given current mental status  -NM perfusion 11/24 no blood flow   -Neurology/MICU declared brain death   -Apnea test passed, family to now decide GOC     =====RESPIRATORY=====  #Intubated & Ventilated  #Patchy opacities  -Concern for VAP  -Afebrile, will D/C cefipime     =====CARDIOVASCULAR=====  #Cardiac Arrest  - unclear duration of total cardiac arrest  - unclear etiology - likely secondary to hypoxia given imaging findings & sxs  - currently on levophed- will wean as tolerated     =====GASTRO=====  -Tolerating tube feeds, normal bowel movements     =====RENAL=====  #SIVA  -Etiology ATN, sec. as a component of multi organ failure  - Cr 0.58 on presentation, now to 4.79  -US renal no hydronephrosis  -Urine CS -ve    - will monitor BMP/Cr/UOP    -#Hypernatremia   -Sodium 145, continue Free water flushes 400cc Q6 Hours  -on D5 @ 50 ml/hr   -will CTM     =====ID=====  #Patchy opacities on CT  -Concern for VAP  -BC 11/25 Gm +ve Cocci - S-epidermidis   -will D/C Cefipime as patient remains afebrile      =====HEME/ONC=====  #Anemia  - chronic history of anemia  -req. PRCx1 transf 11/29, repeat Hb 7.6   - Will monitor CBC    #Thrombocytosis  - history of thrombocytosis, was on ASA at home  - continue ASA     =====ENDOCRINE=====  #Hypothyroidism  - history of hypothyroid but not on synthroid as per endocrine    =====ETHICS=====  -Death declared   -Family to decide GOC  -Organ transplant team aware

## 2023-11-30 NOTE — ED PROCEDURE NOTE - CPROC ED PHYSICIAN PRESENCE1
I was present during the key portion of the procedure.

## 2023-11-30 NOTE — ED PROCEDURE NOTE - CPROC ED TIME OUT STATEMENT1
“Patient's name, , procedure and correct site were confirmed during the [place] Timeout.”
“Patient's name, , procedure and correct site were confirmed during the [place] Timeout.”

## 2023-11-30 NOTE — PROGRESS NOTE ADULT - ATTENDING COMMENTS
1. Acute hypoxemic respiratory  failure after at home cardiac arrest. . Pt currently being treated for pneumonia on cefepime. FIO2 up to 60%. Episode of desaturation last night. ? mucous plug CXR WNL.  2. Neuro. Severe anoxic brain injury. With brain perfusion scan with no flow pt meets criteria for brain death. Neurology input appreciated. Neuro agrees that patient is brain death. However, patents sister does not believe this since she took one breath on Friday 4-5 hours before the perfusion scan. Apnea test was done again today at the request of patients sister. Apnea test performed. No respiration at 10 min. ABG at 7 min 30 seconds revealed PCO2 went up from base line 45 to 70.  I again told patient's sister that now there is no conflicting evidence of brain death. She is still having problems accepting this fact. I talked with family physician DR. Luo of the situation. He is aware and will talk with family today. Pt's young daughter visited last night      Today, family  has talked with [facility] [place] and agreed for patient to be organ donor. Pt scheduled for Ct abd and ultrasound abdomen. Will follow [facility] [place] recommendations.    On my exam pt meets all criteria for brain death. Cols calorics done yesterday and this am.  No response both times.    3. Hypernatremia: 148. Continue free water 400ml q 6 hrs. Add D5W at 50cc/hr. Repeat  Na in afternoon.    4. Renal . Acute  non oliguric renal failure. ATN . Creatinine increasing     5. Hypotension.? sepsis vs brain death. Continues on cefepime and Levophed.     6. DVT prophylaxis> Sq heparin    7.GO: Full code    8 Pt has been contacted by [facility] for organ donation. Awaiting family decision about organ donation.

## 2023-12-01 VITALS — OXYGEN SATURATION: 100 % | HEART RATE: 75 BPM

## 2023-12-01 LAB
A1C WITH ESTIMATED AVERAGE GLUCOSE RESULT: 6.3 % — HIGH (ref 4–5.6)
A1C WITH ESTIMATED AVERAGE GLUCOSE RESULT: 6.3 % — HIGH (ref 4–5.6)
ALBUMIN SERPL ELPH-MCNC: 1.7 G/DL — LOW (ref 3.3–5)
ALBUMIN SERPL ELPH-MCNC: 1.7 G/DL — LOW (ref 3.3–5)
ALBUMIN SERPL ELPH-MCNC: 2.2 G/DL — LOW (ref 3.3–5)
ALBUMIN SERPL ELPH-MCNC: 2.2 G/DL — LOW (ref 3.3–5)
ALP SERPL-CCNC: 189 U/L — HIGH (ref 40–120)
ALP SERPL-CCNC: 189 U/L — HIGH (ref 40–120)
ALP SERPL-CCNC: 190 U/L — HIGH (ref 40–120)
ALP SERPL-CCNC: 190 U/L — HIGH (ref 40–120)
ALT FLD-CCNC: 17 U/L — SIGNIFICANT CHANGE UP (ref 10–45)
ALT FLD-CCNC: 17 U/L — SIGNIFICANT CHANGE UP (ref 10–45)
ALT FLD-CCNC: 18 U/L — SIGNIFICANT CHANGE UP (ref 10–45)
ALT FLD-CCNC: 18 U/L — SIGNIFICANT CHANGE UP (ref 10–45)
AMYLASE P1 CFR SERPL: 52 U/L — SIGNIFICANT CHANGE UP (ref 25–125)
AMYLASE P1 CFR SERPL: 52 U/L — SIGNIFICANT CHANGE UP (ref 25–125)
ANION GAP SERPL CALC-SCNC: 14 MMOL/L — SIGNIFICANT CHANGE UP (ref 5–17)
ANION GAP SERPL CALC-SCNC: 14 MMOL/L — SIGNIFICANT CHANGE UP (ref 5–17)
ANION GAP SERPL CALC-SCNC: 17 MMOL/L — SIGNIFICANT CHANGE UP (ref 5–17)
ANION GAP SERPL CALC-SCNC: 17 MMOL/L — SIGNIFICANT CHANGE UP (ref 5–17)
APTT BLD: 42.4 SEC — HIGH (ref 24.5–35.6)
APTT BLD: 42.4 SEC — HIGH (ref 24.5–35.6)
AST SERPL-CCNC: 36 U/L — SIGNIFICANT CHANGE UP (ref 10–40)
AST SERPL-CCNC: 36 U/L — SIGNIFICANT CHANGE UP (ref 10–40)
AST SERPL-CCNC: 41 U/L — HIGH (ref 10–40)
AST SERPL-CCNC: 41 U/L — HIGH (ref 10–40)
BASOPHILS # BLD AUTO: 0.02 K/UL — SIGNIFICANT CHANGE UP (ref 0–0.2)
BASOPHILS # BLD AUTO: 0.02 K/UL — SIGNIFICANT CHANGE UP (ref 0–0.2)
BASOPHILS # BLD AUTO: 0.05 K/UL — SIGNIFICANT CHANGE UP (ref 0–0.2)
BASOPHILS # BLD AUTO: 0.05 K/UL — SIGNIFICANT CHANGE UP (ref 0–0.2)
BASOPHILS NFR BLD AUTO: 0.1 % — SIGNIFICANT CHANGE UP (ref 0–2)
BASOPHILS NFR BLD AUTO: 0.1 % — SIGNIFICANT CHANGE UP (ref 0–2)
BASOPHILS NFR BLD AUTO: 0.3 % — SIGNIFICANT CHANGE UP (ref 0–2)
BASOPHILS NFR BLD AUTO: 0.3 % — SIGNIFICANT CHANGE UP (ref 0–2)
BILIRUB SERPL-MCNC: 0.4 MG/DL — SIGNIFICANT CHANGE UP (ref 0.2–1.2)
BILIRUB SERPL-MCNC: 0.4 MG/DL — SIGNIFICANT CHANGE UP (ref 0.2–1.2)
BILIRUB SERPL-MCNC: 0.5 MG/DL — SIGNIFICANT CHANGE UP (ref 0.2–1.2)
BILIRUB SERPL-MCNC: 0.5 MG/DL — SIGNIFICANT CHANGE UP (ref 0.2–1.2)
BUN SERPL-MCNC: 85 MG/DL — HIGH (ref 7–23)
BUN SERPL-MCNC: 85 MG/DL — HIGH (ref 7–23)
BUN SERPL-MCNC: 95 MG/DL — HIGH (ref 7–23)
BUN SERPL-MCNC: 95 MG/DL — HIGH (ref 7–23)
CALCIUM SERPL-MCNC: 10.1 MG/DL — SIGNIFICANT CHANGE UP (ref 8.4–10.5)
CALCIUM SERPL-MCNC: 10.1 MG/DL — SIGNIFICANT CHANGE UP (ref 8.4–10.5)
CALCIUM SERPL-MCNC: 10.2 MG/DL — SIGNIFICANT CHANGE UP (ref 8.4–10.5)
CALCIUM SERPL-MCNC: 10.2 MG/DL — SIGNIFICANT CHANGE UP (ref 8.4–10.5)
CHLORIDE SERPL-SCNC: 100 MMOL/L — SIGNIFICANT CHANGE UP (ref 96–108)
CHLORIDE SERPL-SCNC: 100 MMOL/L — SIGNIFICANT CHANGE UP (ref 96–108)
CHLORIDE SERPL-SCNC: 99 MMOL/L — SIGNIFICANT CHANGE UP (ref 96–108)
CHLORIDE SERPL-SCNC: 99 MMOL/L — SIGNIFICANT CHANGE UP (ref 96–108)
CO2 SERPL-SCNC: 19 MMOL/L — LOW (ref 22–31)
CO2 SERPL-SCNC: 19 MMOL/L — LOW (ref 22–31)
CO2 SERPL-SCNC: 23 MMOL/L — SIGNIFICANT CHANGE UP (ref 22–31)
CO2 SERPL-SCNC: 23 MMOL/L — SIGNIFICANT CHANGE UP (ref 22–31)
CREAT SERPL-MCNC: 3.25 MG/DL — HIGH (ref 0.5–1.3)
CREAT SERPL-MCNC: 3.25 MG/DL — HIGH (ref 0.5–1.3)
CREAT SERPL-MCNC: 3.63 MG/DL — HIGH (ref 0.5–1.3)
CREAT SERPL-MCNC: 3.63 MG/DL — HIGH (ref 0.5–1.3)
EGFR: 15 ML/MIN/1.73M2 — LOW
EGFR: 15 ML/MIN/1.73M2 — LOW
EGFR: 17 ML/MIN/1.73M2 — LOW
EGFR: 17 ML/MIN/1.73M2 — LOW
EOSINOPHIL # BLD AUTO: 0.01 K/UL — SIGNIFICANT CHANGE UP (ref 0–0.5)
EOSINOPHIL # BLD AUTO: 0.01 K/UL — SIGNIFICANT CHANGE UP (ref 0–0.5)
EOSINOPHIL # BLD AUTO: 0.09 K/UL — SIGNIFICANT CHANGE UP (ref 0–0.5)
EOSINOPHIL # BLD AUTO: 0.09 K/UL — SIGNIFICANT CHANGE UP (ref 0–0.5)
EOSINOPHIL NFR BLD AUTO: 0.1 % — SIGNIFICANT CHANGE UP (ref 0–6)
EOSINOPHIL NFR BLD AUTO: 0.1 % — SIGNIFICANT CHANGE UP (ref 0–6)
EOSINOPHIL NFR BLD AUTO: 0.5 % — SIGNIFICANT CHANGE UP (ref 0–6)
EOSINOPHIL NFR BLD AUTO: 0.5 % — SIGNIFICANT CHANGE UP (ref 0–6)
ESTIMATED AVERAGE GLUCOSE: 134 MG/DL — HIGH (ref 68–114)
ESTIMATED AVERAGE GLUCOSE: 134 MG/DL — HIGH (ref 68–114)
GAS PNL BLDA: SIGNIFICANT CHANGE UP
GLUCOSE BLDC GLUCOMTR-MCNC: 250 MG/DL — HIGH (ref 70–99)
GLUCOSE BLDC GLUCOMTR-MCNC: 250 MG/DL — HIGH (ref 70–99)
GLUCOSE BLDC GLUCOMTR-MCNC: 251 MG/DL — HIGH (ref 70–99)
GLUCOSE BLDC GLUCOMTR-MCNC: 251 MG/DL — HIGH (ref 70–99)
GLUCOSE SERPL-MCNC: 259 MG/DL — HIGH (ref 70–99)
GLUCOSE SERPL-MCNC: 259 MG/DL — HIGH (ref 70–99)
GLUCOSE SERPL-MCNC: 260 MG/DL — HIGH (ref 70–99)
GLUCOSE SERPL-MCNC: 260 MG/DL — HIGH (ref 70–99)
GRAM STN FLD: ABNORMAL
GRAM STN FLD: ABNORMAL
HCT VFR BLD CALC: 22.3 % — LOW (ref 34.5–45)
HCT VFR BLD CALC: 22.3 % — LOW (ref 34.5–45)
HCT VFR BLD CALC: 23.7 % — LOW (ref 34.5–45)
HCT VFR BLD CALC: 23.7 % — LOW (ref 34.5–45)
HGB BLD-MCNC: 7.3 G/DL — LOW (ref 11.5–15.5)
HGB BLD-MCNC: 7.3 G/DL — LOW (ref 11.5–15.5)
HGB BLD-MCNC: 7.5 G/DL — LOW (ref 11.5–15.5)
HGB BLD-MCNC: 7.5 G/DL — LOW (ref 11.5–15.5)
IMM GRANULOCYTES NFR BLD AUTO: 3.2 % — HIGH (ref 0–0.9)
IMM GRANULOCYTES NFR BLD AUTO: 3.2 % — HIGH (ref 0–0.9)
IMM GRANULOCYTES NFR BLD AUTO: 3.5 % — HIGH (ref 0–0.9)
IMM GRANULOCYTES NFR BLD AUTO: 3.5 % — HIGH (ref 0–0.9)
INR BLD: 1.41 RATIO — HIGH (ref 0.85–1.18)
INR BLD: 1.41 RATIO — HIGH (ref 0.85–1.18)
LIDOCAIN IGE QN: 52 U/L — SIGNIFICANT CHANGE UP (ref 7–60)
LIDOCAIN IGE QN: 52 U/L — SIGNIFICANT CHANGE UP (ref 7–60)
LYMPHOCYTES # BLD AUTO: 1.03 K/UL — SIGNIFICANT CHANGE UP (ref 1–3.3)
LYMPHOCYTES # BLD AUTO: 1.03 K/UL — SIGNIFICANT CHANGE UP (ref 1–3.3)
LYMPHOCYTES # BLD AUTO: 1.14 K/UL — SIGNIFICANT CHANGE UP (ref 1–3.3)
LYMPHOCYTES # BLD AUTO: 1.14 K/UL — SIGNIFICANT CHANGE UP (ref 1–3.3)
LYMPHOCYTES # BLD AUTO: 6.2 % — LOW (ref 13–44)
LYMPHOCYTES # BLD AUTO: 6.2 % — LOW (ref 13–44)
LYMPHOCYTES # BLD AUTO: 7.1 % — LOW (ref 13–44)
LYMPHOCYTES # BLD AUTO: 7.1 % — LOW (ref 13–44)
MAGNESIUM SERPL-MCNC: 2.6 MG/DL — SIGNIFICANT CHANGE UP (ref 1.6–2.6)
MCHC RBC-ENTMCNC: 24.7 PG — LOW (ref 27–34)
MCHC RBC-ENTMCNC: 24.7 PG — LOW (ref 27–34)
MCHC RBC-ENTMCNC: 25.3 PG — LOW (ref 27–34)
MCHC RBC-ENTMCNC: 25.3 PG — LOW (ref 27–34)
MCHC RBC-ENTMCNC: 31.6 GM/DL — LOW (ref 32–36)
MCHC RBC-ENTMCNC: 31.6 GM/DL — LOW (ref 32–36)
MCHC RBC-ENTMCNC: 32.7 GM/DL — SIGNIFICANT CHANGE UP (ref 32–36)
MCHC RBC-ENTMCNC: 32.7 GM/DL — SIGNIFICANT CHANGE UP (ref 32–36)
MCV RBC AUTO: 77.2 FL — LOW (ref 80–100)
MCV RBC AUTO: 77.2 FL — LOW (ref 80–100)
MCV RBC AUTO: 78 FL — LOW (ref 80–100)
MCV RBC AUTO: 78 FL — LOW (ref 80–100)
MONOCYTES # BLD AUTO: 0.33 K/UL — SIGNIFICANT CHANGE UP (ref 0–0.9)
MONOCYTES # BLD AUTO: 0.33 K/UL — SIGNIFICANT CHANGE UP (ref 0–0.9)
MONOCYTES # BLD AUTO: 0.37 K/UL — SIGNIFICANT CHANGE UP (ref 0–0.9)
MONOCYTES # BLD AUTO: 0.37 K/UL — SIGNIFICANT CHANGE UP (ref 0–0.9)
MONOCYTES NFR BLD AUTO: 2 % — SIGNIFICANT CHANGE UP (ref 2–14)
MONOCYTES NFR BLD AUTO: 2 % — SIGNIFICANT CHANGE UP (ref 2–14)
MONOCYTES NFR BLD AUTO: 2.3 % — SIGNIFICANT CHANGE UP (ref 2–14)
MONOCYTES NFR BLD AUTO: 2.3 % — SIGNIFICANT CHANGE UP (ref 2–14)
NEUTROPHILS # BLD AUTO: 14.02 K/UL — HIGH (ref 1.8–7.4)
NEUTROPHILS # BLD AUTO: 14.02 K/UL — HIGH (ref 1.8–7.4)
NEUTROPHILS # BLD AUTO: 14.52 K/UL — HIGH (ref 1.8–7.4)
NEUTROPHILS # BLD AUTO: 14.52 K/UL — HIGH (ref 1.8–7.4)
NEUTROPHILS NFR BLD AUTO: 86.9 % — HIGH (ref 43–77)
NEUTROPHILS NFR BLD AUTO: 86.9 % — HIGH (ref 43–77)
NEUTROPHILS NFR BLD AUTO: 87.8 % — HIGH (ref 43–77)
NEUTROPHILS NFR BLD AUTO: 87.8 % — HIGH (ref 43–77)
NIGHT BLUE STAIN TISS: SIGNIFICANT CHANGE UP
NIGHT BLUE STAIN TISS: SIGNIFICANT CHANGE UP
NRBC # BLD: 0 /100 WBCS — SIGNIFICANT CHANGE UP (ref 0–0)
PHOSPHATE SERPL-MCNC: 5.9 MG/DL — HIGH (ref 2.5–4.5)
PHOSPHATE SERPL-MCNC: 5.9 MG/DL — HIGH (ref 2.5–4.5)
PHOSPHATE SERPL-MCNC: 7.2 MG/DL — HIGH (ref 2.5–4.5)
PHOSPHATE SERPL-MCNC: 7.2 MG/DL — HIGH (ref 2.5–4.5)
PLATELET # BLD AUTO: 795 K/UL — HIGH (ref 150–400)
PLATELET # BLD AUTO: 795 K/UL — HIGH (ref 150–400)
PLATELET # BLD AUTO: 831 K/UL — HIGH (ref 150–400)
PLATELET # BLD AUTO: 831 K/UL — HIGH (ref 150–400)
POTASSIUM SERPL-MCNC: 4.2 MMOL/L — SIGNIFICANT CHANGE UP (ref 3.5–5.3)
POTASSIUM SERPL-MCNC: 4.2 MMOL/L — SIGNIFICANT CHANGE UP (ref 3.5–5.3)
POTASSIUM SERPL-MCNC: 4.3 MMOL/L — SIGNIFICANT CHANGE UP (ref 3.5–5.3)
POTASSIUM SERPL-MCNC: 4.3 MMOL/L — SIGNIFICANT CHANGE UP (ref 3.5–5.3)
POTASSIUM SERPL-SCNC: 4.2 MMOL/L — SIGNIFICANT CHANGE UP (ref 3.5–5.3)
POTASSIUM SERPL-SCNC: 4.2 MMOL/L — SIGNIFICANT CHANGE UP (ref 3.5–5.3)
POTASSIUM SERPL-SCNC: 4.3 MMOL/L — SIGNIFICANT CHANGE UP (ref 3.5–5.3)
POTASSIUM SERPL-SCNC: 4.3 MMOL/L — SIGNIFICANT CHANGE UP (ref 3.5–5.3)
PROT SERPL-MCNC: 6.9 G/DL — SIGNIFICANT CHANGE UP (ref 6–8.3)
PROT SERPL-MCNC: 6.9 G/DL — SIGNIFICANT CHANGE UP (ref 6–8.3)
PROT SERPL-MCNC: 7.1 G/DL — SIGNIFICANT CHANGE UP (ref 6–8.3)
PROT SERPL-MCNC: 7.1 G/DL — SIGNIFICANT CHANGE UP (ref 6–8.3)
PROTHROM AB SERPL-ACNC: 15.4 SEC — HIGH (ref 9.5–13)
PROTHROM AB SERPL-ACNC: 15.4 SEC — HIGH (ref 9.5–13)
RBC # BLD: 2.89 M/UL — LOW (ref 3.8–5.2)
RBC # BLD: 2.89 M/UL — LOW (ref 3.8–5.2)
RBC # BLD: 3.04 M/UL — LOW (ref 3.8–5.2)
RBC # BLD: 3.04 M/UL — LOW (ref 3.8–5.2)
RBC # FLD: 18.9 % — HIGH (ref 10.3–14.5)
RBC # FLD: 18.9 % — HIGH (ref 10.3–14.5)
RBC # FLD: 19.2 % — HIGH (ref 10.3–14.5)
RBC # FLD: 19.2 % — HIGH (ref 10.3–14.5)
SARS-COV-2 RNA SPEC QL NAA+PROBE: SIGNIFICANT CHANGE UP
SARS-COV-2 RNA SPEC QL NAA+PROBE: SIGNIFICANT CHANGE UP
SODIUM SERPL-SCNC: 136 MMOL/L — SIGNIFICANT CHANGE UP (ref 135–145)
SPECIMEN SOURCE: SIGNIFICANT CHANGE UP
WBC # BLD: 16.12 K/UL — HIGH (ref 3.8–10.5)
WBC # BLD: 16.12 K/UL — HIGH (ref 3.8–10.5)
WBC # BLD: 16.55 K/UL — HIGH (ref 3.8–10.5)
WBC # BLD: 16.55 K/UL — HIGH (ref 3.8–10.5)
WBC # FLD AUTO: 16.12 K/UL — HIGH (ref 3.8–10.5)
WBC # FLD AUTO: 16.12 K/UL — HIGH (ref 3.8–10.5)
WBC # FLD AUTO: 16.55 K/UL — HIGH (ref 3.8–10.5)
WBC # FLD AUTO: 16.55 K/UL — HIGH (ref 3.8–10.5)

## 2023-12-01 PROCEDURE — 83605 ASSAY OF LACTIC ACID: CPT

## 2023-12-01 PROCEDURE — 84702 CHORIONIC GONADOTROPIN TEST: CPT

## 2023-12-01 PROCEDURE — 36600 WITHDRAWAL OF ARTERIAL BLOOD: CPT

## 2023-12-01 PROCEDURE — 76700 US EXAM ABDOM COMPLETE: CPT

## 2023-12-01 PROCEDURE — 71045 X-RAY EXAM CHEST 1 VIEW: CPT

## 2023-12-01 PROCEDURE — 36415 COLL VENOUS BLD VENIPUNCTURE: CPT

## 2023-12-01 PROCEDURE — 94640 AIRWAY INHALATION TREATMENT: CPT

## 2023-12-01 PROCEDURE — 82565 ASSAY OF CREATININE: CPT

## 2023-12-01 PROCEDURE — 80048 BASIC METABOLIC PNL TOTAL CA: CPT

## 2023-12-01 PROCEDURE — 86850 RBC ANTIBODY SCREEN: CPT

## 2023-12-01 PROCEDURE — 36430 TRANSFUSION BLD/BLD COMPNT: CPT

## 2023-12-01 PROCEDURE — 95711 VEEG 2-12 HR UNMONITORED: CPT

## 2023-12-01 PROCEDURE — 71250 CT THORAX DX C-: CPT

## 2023-12-01 PROCEDURE — 86036 ANCA SCREEN EACH ANTIBODY: CPT

## 2023-12-01 PROCEDURE — 86901 BLOOD TYPING SEROLOGIC RH(D): CPT

## 2023-12-01 PROCEDURE — 86431 RHEUMATOID FACTOR QUANT: CPT

## 2023-12-01 PROCEDURE — 83735 ASSAY OF MAGNESIUM: CPT

## 2023-12-01 PROCEDURE — 85014 HEMATOCRIT: CPT

## 2023-12-01 PROCEDURE — 84100 ASSAY OF PHOSPHORUS: CPT

## 2023-12-01 PROCEDURE — 76770 US EXAM ABDO BACK WALL COMP: CPT

## 2023-12-01 PROCEDURE — 84132 ASSAY OF SERUM POTASSIUM: CPT

## 2023-12-01 PROCEDURE — 82533 TOTAL CORTISOL: CPT

## 2023-12-01 PROCEDURE — 86147 CARDIOLIPIN ANTIBODY EA IG: CPT

## 2023-12-01 PROCEDURE — 87150 DNA/RNA AMPLIFIED PROBE: CPT

## 2023-12-01 PROCEDURE — 87070 CULTURE OTHR SPECIMN AEROBIC: CPT

## 2023-12-01 PROCEDURE — 83615 LACTATE (LD) (LDH) ENZYME: CPT

## 2023-12-01 PROCEDURE — 87116 MYCOBACTERIA CULTURE: CPT

## 2023-12-01 PROCEDURE — 86923 COMPATIBILITY TEST ELECTRIC: CPT

## 2023-12-01 PROCEDURE — 99261: CPT

## 2023-12-01 PROCEDURE — 85730 THROMBOPLASTIN TIME PARTIAL: CPT

## 2023-12-01 PROCEDURE — 83010 ASSAY OF HAPTOGLOBIN QUANT: CPT

## 2023-12-01 PROCEDURE — 86146 BETA-2 GLYCOPROTEIN ANTIBODY: CPT

## 2023-12-01 PROCEDURE — 84436 ASSAY OF TOTAL THYROXINE: CPT

## 2023-12-01 PROCEDURE — 83935 ASSAY OF URINE OSMOLALITY: CPT

## 2023-12-01 PROCEDURE — 83036 HEMOGLOBIN GLYCOSYLATED A1C: CPT

## 2023-12-01 PROCEDURE — 87206 SMEAR FLUORESCENT/ACID STAI: CPT

## 2023-12-01 PROCEDURE — 82330 ASSAY OF CALCIUM: CPT

## 2023-12-01 PROCEDURE — 87081 CULTURE SCREEN ONLY: CPT

## 2023-12-01 PROCEDURE — 81001 URINALYSIS AUTO W/SCOPE: CPT

## 2023-12-01 PROCEDURE — P9047: CPT

## 2023-12-01 PROCEDURE — 84300 ASSAY OF URINE SODIUM: CPT

## 2023-12-01 PROCEDURE — 83930 ASSAY OF BLOOD OSMOLALITY: CPT

## 2023-12-01 PROCEDURE — 82947 ASSAY GLUCOSE BLOOD QUANT: CPT

## 2023-12-01 PROCEDURE — 84133 ASSAY OF URINE POTASSIUM: CPT

## 2023-12-01 PROCEDURE — 95700 EEG CONT REC W/VID EEG TECH: CPT

## 2023-12-01 PROCEDURE — 87015 SPECIMEN INFECT AGNT CONCNTJ: CPT

## 2023-12-01 PROCEDURE — 86900 BLOOD TYPING SEROLOGIC ABO: CPT

## 2023-12-01 PROCEDURE — 83540 ASSAY OF IRON: CPT

## 2023-12-01 PROCEDURE — 84156 ASSAY OF PROTEIN URINE: CPT

## 2023-12-01 PROCEDURE — 87086 URINE CULTURE/COLONY COUNT: CPT

## 2023-12-01 PROCEDURE — 89051 BODY FLUID CELL COUNT: CPT

## 2023-12-01 PROCEDURE — 82803 BLOOD GASES ANY COMBINATION: CPT

## 2023-12-01 PROCEDURE — 82962 GLUCOSE BLOOD TEST: CPT

## 2023-12-01 PROCEDURE — 83690 ASSAY OF LIPASE: CPT

## 2023-12-01 PROCEDURE — 74176 CT ABD & PELVIS W/O CONTRAST: CPT

## 2023-12-01 PROCEDURE — 87040 BLOOD CULTURE FOR BACTERIA: CPT

## 2023-12-01 PROCEDURE — 82150 ASSAY OF AMYLASE: CPT

## 2023-12-01 PROCEDURE — 84295 ASSAY OF SERUM SODIUM: CPT

## 2023-12-01 PROCEDURE — 84540 ASSAY OF URINE/UREA-N: CPT

## 2023-12-01 PROCEDURE — 82248 BILIRUBIN DIRECT: CPT

## 2023-12-01 PROCEDURE — 78803 RP LOCLZJ TUM SPECT 1 AREA: CPT

## 2023-12-01 PROCEDURE — 94799 UNLISTED PULMONARY SVC/PX: CPT

## 2023-12-01 PROCEDURE — 82435 ASSAY OF BLOOD CHLORIDE: CPT

## 2023-12-01 PROCEDURE — 87340 HEPATITIS B SURFACE AG IA: CPT

## 2023-12-01 PROCEDURE — P9016: CPT

## 2023-12-01 PROCEDURE — 82570 ASSAY OF URINE CREATININE: CPT

## 2023-12-01 PROCEDURE — 83550 IRON BINDING TEST: CPT

## 2023-12-01 PROCEDURE — 84480 ASSAY TRIIODOTHYRONINE (T3): CPT

## 2023-12-01 PROCEDURE — 93306 TTE W/DOPPLER COMPLETE: CPT

## 2023-12-01 PROCEDURE — A9521: CPT

## 2023-12-01 PROCEDURE — 0225U NFCT DS DNA&RNA 21 SARSCOV2: CPT

## 2023-12-01 PROCEDURE — 85610 PROTHROMBIN TIME: CPT

## 2023-12-01 PROCEDURE — 87449 NOS EACH ORGANISM AG IA: CPT

## 2023-12-01 PROCEDURE — 87077 CULTURE AEROBIC IDENTIFY: CPT

## 2023-12-01 PROCEDURE — 93005 ELECTROCARDIOGRAM TRACING: CPT

## 2023-12-01 PROCEDURE — 87899 AGENT NOS ASSAY W/OPTIC: CPT

## 2023-12-01 PROCEDURE — 85045 AUTOMATED RETICULOCYTE COUNT: CPT

## 2023-12-01 PROCEDURE — 95714 VEEG EA 12-26 HR UNMNTR: CPT

## 2023-12-01 PROCEDURE — 94002 VENT MGMT INPAT INIT DAY: CPT

## 2023-12-01 PROCEDURE — 86038 ANTINUCLEAR ANTIBODIES: CPT

## 2023-12-01 PROCEDURE — 85613 RUSSELL VIPER VENOM DILUTED: CPT

## 2023-12-01 PROCEDURE — 99291 CRITICAL CARE FIRST HOUR: CPT | Mod: GC

## 2023-12-01 PROCEDURE — 87641 MR-STAPH DNA AMP PROBE: CPT

## 2023-12-01 PROCEDURE — 80053 COMPREHEN METABOLIC PANEL: CPT

## 2023-12-01 PROCEDURE — 93306 TTE W/DOPPLER COMPLETE: CPT | Mod: 26

## 2023-12-01 PROCEDURE — 87640 STAPH A DNA AMP PROBE: CPT

## 2023-12-01 PROCEDURE — 70551 MRI BRAIN STEM W/O DYE: CPT

## 2023-12-01 PROCEDURE — 82010 KETONE BODYS QUAN: CPT

## 2023-12-01 PROCEDURE — 82550 ASSAY OF CK (CPK): CPT

## 2023-12-01 PROCEDURE — 82553 CREATINE MB FRACTION: CPT

## 2023-12-01 PROCEDURE — 87635 SARS-COV-2 COVID-19 AMP PRB: CPT

## 2023-12-01 PROCEDURE — 87102 FUNGUS ISOLATION CULTURE: CPT

## 2023-12-01 PROCEDURE — 84145 PROCALCITONIN (PCT): CPT

## 2023-12-01 PROCEDURE — 84443 ASSAY THYROID STIM HORMONE: CPT

## 2023-12-01 PROCEDURE — 85018 HEMOGLOBIN: CPT

## 2023-12-01 PROCEDURE — 84484 ASSAY OF TROPONIN QUANT: CPT

## 2023-12-01 PROCEDURE — 94003 VENT MGMT INPAT SUBQ DAY: CPT

## 2023-12-01 PROCEDURE — 85025 COMPLETE CBC W/AUTO DIFF WBC: CPT

## 2023-12-01 RX ORDER — FUROSEMIDE 40 MG
60 TABLET ORAL ONCE
Refills: 0 | Status: COMPLETED | OUTPATIENT
Start: 2023-12-01 | End: 2023-12-01

## 2023-12-01 RX ORDER — HEPARIN SODIUM 5000 [USP'U]/ML
5000 INJECTION INTRAVENOUS; SUBCUTANEOUS EVERY 8 HOURS
Refills: 0 | Status: DISCONTINUED | OUTPATIENT
Start: 2023-12-01 | End: 2023-01-01

## 2023-12-01 RX ORDER — ALBUMIN HUMAN 25 %
50 VIAL (ML) INTRAVENOUS ONCE
Refills: 0 | Status: COMPLETED | OUTPATIENT
Start: 2023-12-01 | End: 2023-12-01

## 2023-12-01 RX ORDER — CHLORHEXIDINE GLUCONATE 213 G/1000ML
15 SOLUTION TOPICAL EVERY 12 HOURS
Refills: 0 | Status: DISCONTINUED | OUTPATIENT
Start: 2023-12-01 | End: 2023-01-01

## 2023-12-01 RX ADMIN — PANTOPRAZOLE SODIUM 40 MILLIGRAM(S): 20 TABLET, DELAYED RELEASE ORAL at 11:28

## 2023-12-01 RX ADMIN — Medication 81 MILLIGRAM(S): at 11:28

## 2023-12-01 RX ADMIN — Medication 60 MILLIGRAM(S): at 06:18

## 2023-12-01 RX ADMIN — Medication 325 MILLIGRAM(S): at 11:28

## 2023-12-01 RX ADMIN — PIPERACILLIN AND TAZOBACTAM 25 GRAM(S): 4; .5 INJECTION, POWDER, LYOPHILIZED, FOR SOLUTION INTRAVENOUS at 05:35

## 2023-12-01 RX ADMIN — SEVELAMER CARBONATE 800 MILLIGRAM(S): 2400 POWDER, FOR SUSPENSION ORAL at 00:10

## 2023-12-01 RX ADMIN — Medication 7.49 MICROGRAM(S)/KG/MIN: at 07:18

## 2023-12-01 RX ADMIN — Medication 50 MICROGRAM(S)/HR: at 07:17

## 2023-12-01 RX ADMIN — ALBUTEROL 2.5 MILLIGRAM(S): 90 AEROSOL, METERED ORAL at 05:27

## 2023-12-01 RX ADMIN — Medication 50 MICROGRAM(S)/HR: at 08:53

## 2023-12-01 RX ADMIN — SEVELAMER CARBONATE 800 MILLIGRAM(S): 2400 POWDER, FOR SUSPENSION ORAL at 12:03

## 2023-12-01 RX ADMIN — CHLORHEXIDINE GLUCONATE 15 MILLILITER(S): 213 SOLUTION TOPICAL at 05:35

## 2023-12-01 RX ADMIN — ALBUTEROL 2.5 MILLIGRAM(S): 90 AEROSOL, METERED ORAL at 11:02

## 2023-12-01 RX ADMIN — Medication 4: at 05:40

## 2023-12-01 RX ADMIN — Medication 50 MILLILITER(S): at 07:15

## 2023-12-01 RX ADMIN — Medication 6: at 11:29

## 2023-12-01 RX ADMIN — SEVELAMER CARBONATE 800 MILLIGRAM(S): 2400 POWDER, FOR SUSPENSION ORAL at 08:52

## 2023-12-01 RX ADMIN — CHLORHEXIDINE GLUCONATE 1 APPLICATION(S): 213 SOLUTION TOPICAL at 05:42

## 2023-12-01 RX ADMIN — Medication 7.49 MICROGRAM(S)/KG/MIN: at 08:52

## 2023-12-01 RX ADMIN — Medication 6: at 00:15

## 2023-12-01 NOTE — CHART NOTE - NSCHARTNOTEFT_GEN_A_CORE
Will hemodialyze again today with UF 1L as tolerated with pressor support as per [facility].   HD unit made aware.      Scot Watt  Nephrology Fellow  Feel free to contact me on TEAMS  After 4 pm please contact the on-call Fellow. Will hemodialyze again today with UF 1L as tolerated with pressor support as per [facility].   HD unit made aware.    Addendum: As per [facility] patient no longer organ donation candidate, HD cancelled. Spoke with HD unit.       Scot Watt  Nephrology Fellow  Feel free to contact me on TEAMS  After 4 pm please contact the on-call Fellow. Will hemodialyze again today with UF 1L as tolerated with pressor support as per [facility].   HD unit made aware.    Addendum: As per [facility] patient no longer organ donation candidate, HD cancelled. Spoke with HD unit.     Will sign off. Please re-call if any questions.       Scot Watt  Nephrology Fellow  Feel free to contact me on TEAMS  After 4 pm please contact the on-call Fellow.

## 2023-12-01 NOTE — PROGRESS NOTE ADULT - ASSESSMENT
43F with history of pre-DM, HTN, HLD, ADHD, BENJAMIN, migraines, thrombocytosis, hypothyroidism not on synthroid, uterine polyps s/p polypectomy in 2/2023 presented from [facility] post cardiac arrest in the field. Imaging c/f extensive PNA. Transferred to [facility] for further management. Patient remains on a ventilator with signs of anoxic brain injury. Organ transplant team protocol in process    =====NEURO=====  #Concern for Anoxic Brain Injury  - arrested for unknown period both in the field & hospital  - CTH at OSH: findings suggestive of diffuse cerebral & cerebellar injury   - EEG notable for profound diffuse cerebral dysfunction, nonspecific in etiology. Continuous EEG monitoring discontinued per neuro.   -MRI with findings consistent with anoxic brain injury (diffuse cortical restricted diffusion, multiple foci of restricted diffusion in brainstem)  - current exam with dilated pupils, no corneal reflex, - will not consider sedation given current mental status  -NM perfusion 11/24 no blood flow   -Neurology/MICU declared brain death   -Organ transplant team protocol in process    =====RESPIRATORY=====  #Intubated & Ventilated  -Organ transplant team protocol in process    =====CARDIOVASCULAR=====  #Cardiac Arrest  - unclear duration of total cardiac arrest  - unclear etiology - likely secondary to hypoxia given imaging findings & sxs  - currently on levophed  -Organ transplant team protocol in process    =====GASTRO=====  -Tube feeds discontinued   -Organ transplant protocol in process    =====RENAL=====  #SIVA  -Etiology ATN, sec. as a component of multi organ failure  - Cr 0.58 on presentation, now to 3.25  -s/p HD x1  -Organ transplant team protocol in process    -#Hypernatremia   -Sodium 136, s/p HD x1  -Organ transplant team protocol in process    =====ID=====  #Patchy opacities on CT  -BC 11/25 Gm +ve Cocci - S-epidermidis   -on zosyn, vanc  -Organ transplant team protocol in process    =====HEME/ONC=====  #Anemia  - chronic history of anemia  -req. PRCx1 transf 11/29, repeat Hb 7.6   -Organ transplant team protocol in process    =====ENDOCRINE=====  no active issues     =====ETHICS=====  -Death declared   -Organ transplant team protocol in process

## 2023-12-01 NOTE — PROGRESS NOTE ADULT - PROVIDER SPECIALTY LIST ADULT
MICU
Neurology
MICU
Palliative Care

## 2023-12-01 NOTE — CHART NOTE - NSCHARTNOTESELECT_GEN_ALL_CORE
EEG prelim
Event Note
GOC/Event Note
Nephrology/Event Note
Nutrition Services
Nutrition Services
Palliative Medicine/Event Note
EEG prelim
Event Note
Event Note
MICU POCUS
Nephrology/Event Note
Palliative Medicine/Event Note
micu pocus note

## 2023-12-01 NOTE — PROGRESS NOTE ADULT - ATTENDING COMMENTS
1. Acute hypoxemic respiratory  failure after at home cardiac arrest. .  Continue PC ventilation with 10 peep as per [facility] Protocol.     2. Neuro. Severe anoxic brain injury. With brain perfusion scan with no flow pt meets criteria for brain death.     Today, family  has talked with [facility] [place] and agreed for patient to be organ donor. Pt scheduled for Ct abd and ultrasound abdomen. Will follow [facility] [place] recommendations.        3. Hypernatremia:  resolved . Stop free water.     4. Renal . Acute  non oliguric renal failure. ATN . HD as per [facility].     5. Hypotension.? brain death. Restarted on Levophed and Zosyn as per [facility]      6. DVT prophylaxis> Sq heparin    7.GOC: Full code    8 Pt  being worked up for organ donation.  Protocol as per [facility] [place].          Addendum.  Family has had meeting with [facility] [place] this afternoon. Family has decided against donation.    Will discuss extubation with family today.

## 2023-12-01 NOTE — CHART NOTE - NSCHARTNOTEFT_GEN_A_CORE
Informed by [facility] representative that family would like to withdraw their decision for organ donation. Spoke to multiple family members of the patient, including primary decision-maker Nathalie, who all confirmed the decision. Informed family to call any family members who wish to say goodbye. Family will ask an Aunt to visit. Informed family that palliative extubation will occur either today or tomorrow morning once the Aunt is able to visit.

## 2023-12-01 NOTE — PROGRESS NOTE ADULT - SUBJECTIVE AND OBJECTIVE BOX
INTERVAL HPI/OVERNIGHT EVENTS:    SUBJECTIVE: Patient seen and examined at bedside.     ROS: All negative except as listed above.    VITAL SIGNS:  ICU Vital Signs Last 24 Hrs  T(C): 36.8 (01 Dec 2023 08:00), Max: 37.2 (30 Nov 2023 16:00)  T(F): 98.2 (01 Dec 2023 08:00), Max: 99 (30 Nov 2023 16:00)  HR: 84 (01 Dec 2023 08:00) (74 - 91)  BP: 104/53 (30 Nov 2023 16:30) (85/45 - 118/57)  BP(mean): 74 (30 Nov 2023 16:30) (63 - 82)  ABP: 140/73 (01 Dec 2023 08:00) (104/44 - 147/71)  ABP(mean): 96 (01 Dec 2023 08:00) (60 - 101)  RR: 16 (01 Dec 2023 08:00) (14 - 27)  SpO2: 99% (01 Dec 2023 08:00) (92% - 100%)    O2 Parameters below as of 01 Dec 2023 08:00  Patient On (Oxygen Delivery Method): ventilator    O2 Concentration (%): 40      Mode: AC/ CMV (Assist Control/ Continuous Mandatory Ventilation), RR (machine): 16, TV (machine): 400, FiO2: 40, PEEP: 10, ITime: 1, MAP: 17, PC: 15, PIP: 25  Plateau pressure:   P/F ratio:     11-30 @ 07:01  -  12-01 @ 07:00  --------------------------------------------------------  IN: 3668 mL / OUT: 1920 mL / NET: 1748 mL    12-01 @ 07:01  -  12-01 @ 08:28  --------------------------------------------------------  IN: 300 mL / OUT: 0 mL / NET: 300 mL      CAPILLARY BLOOD GLUCOSE      POCT Blood Glucose.: 250 mg/dL (01 Dec 2023 05:36)      ECG: reviewed.    PHYSICAL EXAM:    HEAD:  Atraumatic, Normocephalic  EYES: fixed pupils  ENT: Moist mucous membranes  NECK: Supple, No elevated JVP.  CHEST/LUNG: On mechanical ventilation, CTA BL   HEART: Regular rate and rhythm; No murmurs, rubs, or gallops  ABDOMEN: Bowel sounds present; Soft, nontender, nondistended  EXTREMITIES:  2+ Peripheral Pulses, brisk capillary refill. No clubbing, cyanosis, or edema  NERVOUS SYSTEM:  Patient not responsive   SKIN: No rashes or lesions    MEDICATIONS:  MEDICATIONS  (STANDING):  albuterol    0.083% 2.5 milliGRAM(s) Nebulizer every 6 hours  aspirin  chewable 81 milliGRAM(s) Oral daily  chlorhexidine 0.12% Liquid 15 milliLiter(s) Oral Mucosa every 12 hours  chlorhexidine 4% Liquid 1 Application(s) Topical <User Schedule>  ferrous    sulfate 325 milliGRAM(s) Oral daily  insulin lispro (ADMELOG) corrective regimen sliding scale   SubCutaneous every 6 hours  levothyroxine Infusion 20 MICROgram(s)/Hr (50 mL/Hr) IV Continuous <Continuous>  methylPREDNISolone sodium succinate IVPB 1000 milliGRAM(s) IV Intermittent every 24 hours  norepinephrine Infusion 0.05 MICROgram(s)/kG/Min (7.49 mL/Hr) IV Continuous <Continuous>  pantoprazole  Injectable 40 milliGRAM(s) IV Push daily  piperacillin/tazobactam IVPB.. 3.375 Gram(s) IV Intermittent every 12 hours  sevelamer carbonate Powder 800 milliGRAM(s) Oral three times a day with meals  vancomycin  IVPB 1000 milliGRAM(s) IV Intermittent every 24 hours    MEDICATIONS  (PRN):  artificial tears (preservative free) Ophthalmic Solution 1 Drop(s) Both EYES every 6 hours PRN Dry Eyes  sodium chloride 0.9% lock flush 10 milliLiter(s) IV Push every 1 hour PRN Pre/post blood products, medications, blood draw, and to maintain line patency      ALLERGIES:  Allergies    No Known Allergies    Intolerances        LABS:                        7.3    16.12 )-----------( 831      ( 01 Dec 2023 07:50 )             22.3     12-01    136  |  100  |  95<H>  ----------------------------<  259<H>  4.2   |  19<L>  |  3.63<H>    Ca    10.1      01 Dec 2023 07:50  Phos  5.9     12-01  Mg     2.6     12-01    TPro  7.1  /  Alb  1.7<L>  /  TBili  0.4  /  DBili  x   /  AST  36  /  ALT  18  /  AlkPhos  189<H>  12-01    PT/INR - ( 01 Dec 2023 07:50 )   PT: 15.4 sec;   INR: 1.41 ratio         PTT - ( 01 Dec 2023 07:50 )  PTT:42.4 sec  Urinalysis Basic - ( 01 Dec 2023 07:50 )    Color: x / Appearance: x / SG: x / pH: x  Gluc: 259 mg/dL / Ketone: x  / Bili: x / Urobili: x   Blood: x / Protein: x / Nitrite: x   Leuk Esterase: x / RBC: x / WBC x   Sq Epi: x / Non Sq Epi: x / Bacteria: x      ABG:  pH, Arterial: 7.48 (12-01-23 @ 05:59)  pCO2, Arterial: 28 mmHg (12-01-23 @ 05:59)  pO2, Arterial: 250 mmHg (12-01-23 @ 05:59)  pH, Arterial: 7.48 (12-01-23 @ 02:35)  pCO2, Arterial: 30 mmHg (12-01-23 @ 02:35)  pO2, Arterial: 225 mmHg (12-01-23 @ 02:35)  pH, Arterial: 7.51 (12-01-23 @ 01:29)  pCO2, Arterial: 27 mmHg (12-01-23 @ 01:29)  pO2, Arterial: 72 mmHg (12-01-23 @ 01:29)  pH, Arterial: 7.38 (12-01-23 @ 00:00)  pCO2, Arterial: 42 mmHg (12-01-23 @ 00:00)  pO2, Arterial: 65 mmHg (12-01-23 @ 00:00)  pH, Arterial: 7.28 (11-30-23 @ 17:20)  pCO2, Arterial: 43 mmHg (11-30-23 @ 17:20)  pO2, Arterial: 71 mmHg (11-30-23 @ 17:20)      vBG:  pH, Venous: 7.27 (11-30-23 @ 15:06)  pCO2, Venous: 45 mmHg (11-30-23 @ 15:06)  pO2, Venous: 45 mmHg (11-30-23 @ 15:06)  HCO3, Venous: 21 mmol/L (11-30-23 @ 15:06)  pH, Venous: 7.27 (11-30-23 @ 15:00)  pCO2, Venous: 46 mmHg (11-30-23 @ 15:00)  pO2, Venous: 48 mmHg (11-30-23 @ 15:00)  HCO3, Venous: 21 mmol/L (11-30-23 @ 15:00)    Micro:    Culture - Blood (collected 11-28-23 @ 00:15)  Source: .Blood Blood-Peripheral  Preliminary Report (12-01-23 @ 03:01):    No growth at 72 Hours    Culture - Blood (collected 11-28-23 @ 00:05)  Source: .Blood Blood-Peripheral  Preliminary Report (12-01-23 @ 03:01):    No growth at 72 Hours    Culture - Blood (collected 11-24-23 @ 23:58)  Source: .Blood Blood-Peripheral  Final Report (11-30-23 @ 04:00):    No growth at 5 days    Culture - Blood (collected 11-24-23 @ 17:20)  Source: .Blood Blood-Peripheral  Gram Stain (11-26-23 @ 22:29):    Growth in aerobic bottle: Gram Positive Cocci in Clusters    Growth in anaerobic bottle: Gram Positive Cocci in Clusters  Final Report (11-27-23 @ 19:05):    Growth in aerobic and anaerobic bottles: Staphylococcus epidermidis    Coagulase Negative Staphylococci isolated from a single blood culture set    may represent contamination.    Contact the Microbiology Department at [phone] if susceptibility    testing is clinically indicated.    Direct identification is available within approximately 3-5    hours either by Blood Panel Multiplexed PCR or Direct    MALDI-TOF. Details: [email or web address]    ***Add Freetext*** blood culture bottle turned positive after the final    report was released.  Organism: Blood Culture PCR (11-26-23 @ 22:29)      Method Type: PCR      -  Staphylococcus epidermidis, Methicillin resistant: Detec  Organism: Blood Culture PCR (11-26-23 @ 19:23)    Culture - Blood (collected 11-23-23 @ 00:52)  Source: .Blood Blood-Peripheral  Final Report (11-28-23 @ 11:00):    No growth at 5 days    Culture - Blood (collected 11-23-23 @ 00:52)  Source: .Blood Blood-Venous  Final Report (11-28-23 @ 11:00):    No growth at 5 days    Culture - Blood (collected 11-18-23 @ 17:40)  Source: .Blood Blood-Peripheral  Final Report (11-23-23 @ 23:01):    No growth at 5 days    Culture - Blood (collected 11-18-23 @ 17:30)  Source: .Blood Blood-Peripheral  Final Report (11-23-23 @ 23:01):    No growth at 5 days    Culture - Blood (collected 11-14-23 @ 18:15)  Source: .Blood Blood  Final Report (11-20-23 @ 01:00):    No growth at 5 days    Culture - Blood (collected 11-14-23 @ 18:00)  Source: .Blood Blood  Final Report (11-20-23 @ 01:00):    No growth at 5 days        Culture - Sputum (collected 11-24-23 @ 13:37)  Source: ET Tube ET Tube  Gram Stain (11-24-23 @ 23:23):    Moderate polymorphonuclear leukocytes per low power field    No Squamous epithelial cells per low power field    Rare Gram positive cocci in pairs per oil power field  Final Report (11-26-23 @ 21:19):    Normal Respiratory Jenni present    Culture - Sputum (collected 11-15-23 @ 01:08)  Source: .Sputum Sputum  Gram Stain (11-15-23 @ 16:34):    Rare polymorphonuclear leukocytes per low power field    No Squamous epithelial cells per low power field    No organisms seen per oil power field  Final Report (11-17-23 @ 12:04):    Normal Respiratory Jenni present        RADIOLOGY & ADDITIONAL TESTS: Reviewed. INTERVAL HPI/OVERNIGHT EVENTS: unchanged.     SUBJECTIVE: Patient seen and examined at bedside.     VITAL SIGNS:  ICU Vital Signs Last 24 Hrs  T(C): 36.8 (01 Dec 2023 08:00), Max: 37.2 (30 Nov 2023 16:00)  T(F): 98.2 (01 Dec 2023 08:00), Max: 99 (30 Nov 2023 16:00)  HR: 84 (01 Dec 2023 08:00) (74 - 91)  BP: 104/53 (30 Nov 2023 16:30) (85/45 - 118/57)  BP(mean): 74 (30 Nov 2023 16:30) (63 - 82)  ABP: 140/73 (01 Dec 2023 08:00) (104/44 - 147/71)  ABP(mean): 96 (01 Dec 2023 08:00) (60 - 101)  RR: 16 (01 Dec 2023 08:00) (14 - 27)  SpO2: 99% (01 Dec 2023 08:00) (92% - 100%)    O2 Parameters below as of 01 Dec 2023 08:00  Patient On (Oxygen Delivery Method): ventilator    O2 Concentration (%): 40    Mode: AC/ CMV (Assist Control/ Continuous Mandatory Ventilation), RR (machine): 16, TV (machine): 400, FiO2: 40, PEEP: 10, ITime: 1, MAP: 17, PC: 15, PIP: 25  Plateau pressure:   P/F ratio:     11-30 @ 07:01  -  12-01 @ 07:00  --------------------------------------------------------  IN: 3668 mL / OUT: 1920 mL / NET: 1748 mL    12-01 @ 07:01 - 12-01 @ 08:28  --------------------------------------------------------  IN: 300 mL / OUT: 0 mL / NET: 300 mL      CAPILLARY BLOOD GLUCOSE      POCT Blood Glucose.: 250 mg/dL (01 Dec 2023 05:36)      ECG: reviewed.    PHYSICAL EXAM:    HEAD:  Atraumatic, Normocephalic  EYES: fixed pupils  ENT: Moist mucous membranes  NECK: Supple, No elevated JVP.  CHEST/LUNG: On mechanical ventilation, CTA BL   HEART: Regular rate and rhythm; No murmurs, rubs, or gallops  ABDOMEN: Bowel sounds present; Soft, nontender, nondistended  EXTREMITIES:  2+ Peripheral Pulses, brisk capillary refill. No clubbing, cyanosis, or edema  NERVOUS SYSTEM:  Patient not responsive   SKIN: No rashes or lesions    MEDICATIONS:  MEDICATIONS  (STANDING):  albuterol    0.083% 2.5 milliGRAM(s) Nebulizer every 6 hours  aspirin  chewable 81 milliGRAM(s) Oral daily  chlorhexidine 0.12% Liquid 15 milliLiter(s) Oral Mucosa every 12 hours  chlorhexidine 4% Liquid 1 Application(s) Topical <User Schedule>  ferrous    sulfate 325 milliGRAM(s) Oral daily  insulin lispro (ADMELOG) corrective regimen sliding scale   SubCutaneous every 6 hours  levothyroxine Infusion 20 MICROgram(s)/Hr (50 mL/Hr) IV Continuous <Continuous>  methylPREDNISolone sodium succinate IVPB 1000 milliGRAM(s) IV Intermittent every 24 hours  norepinephrine Infusion 0.05 MICROgram(s)/kG/Min (7.49 mL/Hr) IV Continuous <Continuous>  pantoprazole  Injectable 40 milliGRAM(s) IV Push daily  piperacillin/tazobactam IVPB.. 3.375 Gram(s) IV Intermittent every 12 hours  sevelamer carbonate Powder 800 milliGRAM(s) Oral three times a day with meals  vancomycin  IVPB 1000 milliGRAM(s) IV Intermittent every 24 hours    MEDICATIONS  (PRN):  artificial tears (preservative free) Ophthalmic Solution 1 Drop(s) Both EYES every 6 hours PRN Dry Eyes  sodium chloride 0.9% lock flush 10 milliLiter(s) IV Push every 1 hour PRN Pre/post blood products, medications, blood draw, and to maintain line patency      ALLERGIES:  Allergies    No Known Allergies    Intolerances        LABS:                        7.3    16.12 )-----------( 831      ( 01 Dec 2023 07:50 )             22.3     12-01    136  |  100  |  95<H>  ----------------------------<  259<H>  4.2   |  19<L>  |  3.63<H>    Ca    10.1      01 Dec 2023 07:50  Phos  5.9     12-01  Mg     2.6     12-01    TPro  7.1  /  Alb  1.7<L>  /  TBili  0.4  /  DBili  x   /  AST  36  /  ALT  18  /  AlkPhos  189<H>  12-01    PT/INR - ( 01 Dec 2023 07:50 )   PT: 15.4 sec;   INR: 1.41 ratio         PTT - ( 01 Dec 2023 07:50 )  PTT:42.4 sec  Urinalysis Basic - ( 01 Dec 2023 07:50 )    Color: x / Appearance: x / SG: x / pH: x  Gluc: 259 mg/dL / Ketone: x  / Bili: x / Urobili: x   Blood: x / Protein: x / Nitrite: x   Leuk Esterase: x / RBC: x / WBC x   Sq Epi: x / Non Sq Epi: x / Bacteria: x      ABG:  pH, Arterial: 7.48 (12-01-23 @ 05:59)  pCO2, Arterial: 28 mmHg (12-01-23 @ 05:59)  pO2, Arterial: 250 mmHg (12-01-23 @ 05:59)  pH, Arterial: 7.48 (12-01-23 @ 02:35)  pCO2, Arterial: 30 mmHg (12-01-23 @ 02:35)  pO2, Arterial: 225 mmHg (12-01-23 @ 02:35)  pH, Arterial: 7.51 (12-01-23 @ 01:29)  pCO2, Arterial: 27 mmHg (12-01-23 @ 01:29)  pO2, Arterial: 72 mmHg (12-01-23 @ 01:29)  pH, Arterial: 7.38 (12-01-23 @ 00:00)  pCO2, Arterial: 42 mmHg (12-01-23 @ 00:00)  pO2, Arterial: 65 mmHg (12-01-23 @ 00:00)  pH, Arterial: 7.28 (11-30-23 @ 17:20)  pCO2, Arterial: 43 mmHg (11-30-23 @ 17:20)  pO2, Arterial: 71 mmHg (11-30-23 @ 17:20)      vBG:  pH, Venous: 7.27 (11-30-23 @ 15:06)  pCO2, Venous: 45 mmHg (11-30-23 @ 15:06)  pO2, Venous: 45 mmHg (11-30-23 @ 15:06)  HCO3, Venous: 21 mmol/L (11-30-23 @ 15:06)  pH, Venous: 7.27 (11-30-23 @ 15:00)  pCO2, Venous: 46 mmHg (11-30-23 @ 15:00)  pO2, Venous: 48 mmHg (11-30-23 @ 15:00)  HCO3, Venous: 21 mmol/L (11-30-23 @ 15:00)    Micro:    Culture - Blood (collected 11-28-23 @ 00:15)  Source: .Blood Blood-Peripheral  Preliminary Report (12-01-23 @ 03:01):    No growth at 72 Hours    Culture - Blood (collected 11-28-23 @ 00:05)  Source: .Blood Blood-Peripheral  Preliminary Report (12-01-23 @ 03:01):    No growth at 72 Hours    Culture - Blood (collected 11-24-23 @ 23:58)  Source: .Blood Blood-Peripheral  Final Report (11-30-23 @ 04:00):    No growth at 5 days    Culture - Blood (collected 11-24-23 @ 17:20)  Source: .Blood Blood-Peripheral  Gram Stain (11-26-23 @ 22:29):    Growth in aerobic bottle: Gram Positive Cocci in Clusters    Growth in anaerobic bottle: Gram Positive Cocci in Clusters  Final Report (11-27-23 @ 19:05):    Growth in aerobic and anaerobic bottles: Staphylococcus epidermidis    Coagulase Negative Staphylococci isolated from a single blood culture set    may represent contamination.    Contact the Microbiology Department at [phone] if susceptibility    testing is clinically indicated.    Direct identification is available within approximately 3-5    hours either by Blood Panel Multiplexed PCR or Direct    MALDI-TOF. Details: [email or web address]    ***Add Freetext*** blood culture bottle turned positive after the final    report was released.  Organism: Blood Culture PCR (11-26-23 @ 22:29)      Method Type: PCR      -  Staphylococcus epidermidis, Methicillin resistant: Detec  Organism: Blood Culture PCR (11-26-23 @ 19:23)    Culture - Blood (collected 11-23-23 @ 00:52)  Source: .Blood Blood-Peripheral  Final Report (11-28-23 @ 11:00):    No growth at 5 days    Culture - Blood (collected 11-23-23 @ 00:52)  Source: .Blood Blood-Venous  Final Report (11-28-23 @ 11:00):    No growth at 5 days    Culture - Blood (collected 11-18-23 @ 17:40)  Source: .Blood Blood-Peripheral  Final Report (11-23-23 @ 23:01):    No growth at 5 days    Culture - Blood (collected 11-18-23 @ 17:30)  Source: .Blood Blood-Peripheral  Final Report (11-23-23 @ 23:01):    No growth at 5 days    Culture - Blood (collected 11-14-23 @ 18:15)  Source: .Blood Blood  Final Report (11-20-23 @ 01:00):    No growth at 5 days    Culture - Blood (collected 11-14-23 @ 18:00)  Source: .Blood Blood  Final Report (11-20-23 @ 01:00):    No growth at 5 days        Culture - Sputum (collected 11-24-23 @ 13:37)  Source: ET Tube ET Tube  Gram Stain (11-24-23 @ 23:23):    Moderate polymorphonuclear leukocytes per low power field    No Squamous epithelial cells per low power field    Rare Gram positive cocci in pairs per oil power field  Final Report (11-26-23 @ 21:19):    Normal Respiratory Jenni present    Culture - Sputum (collected 11-15-23 @ 01:08)  Source: .Sputum Sputum  Gram Stain (11-15-23 @ 16:34):    Rare polymorphonuclear leukocytes per low power field    No Squamous epithelial cells per low power field    No organisms seen per oil power field  Final Report (11-17-23 @ 12:04):    Normal Respiratory Jenni present        RADIOLOGY & ADDITIONAL TESTS: Reviewed.

## 2023-12-01 NOTE — AIRWAY REMOVAL NOTE  ADULT & PEDS - ARTIFICAL AIRWAY REMOVAL COMMENTS
written order for extubation verified. The patient was identified by full name and birth date compared to the identification band.  Present during the procedure was RACHANA Valente.

## 2023-12-01 NOTE — PROGRESS NOTE ADULT - REASON FOR ADMISSION
Cardiac Arrest

## 2023-12-02 LAB
CULTURE RESULTS: ABNORMAL
CULTURE RESULTS: ABNORMAL
SPECIMEN SOURCE: SIGNIFICANT CHANGE UP
SPECIMEN SOURCE: SIGNIFICANT CHANGE UP

## 2023-12-03 LAB
CULTURE RESULTS: SIGNIFICANT CHANGE UP
SPECIMEN SOURCE: SIGNIFICANT CHANGE UP

## 2023-12-11 ENCOUNTER — APPOINTMENT (OUTPATIENT)
Dept: BARIATRICS/WEIGHT MGMT | Facility: CLINIC | Age: 43
End: 2023-12-11

## 2023-12-31 PROBLEM — Z11.3 ENCOUNTER FOR SCREENING EXAMINATION FOR SEXUALLY TRANSMITTED DISEASE: Status: RESOLVED | Noted: 2023-01-01 | Resolved: 2023-01-01

## 2024-01-15 NOTE — H&P PST ADULT - BIRTH SEX
>>ASSESSMENT AND PLAN FOR SEVERE EPISODE OF RECURRENT MAJOR DEPRESSIVE DISORDER, WITHOUT PSYCHOTIC FEATURES (H) WRITTEN ON 1/15/2024  5:27 PM BY BROCK HERNÁNDEZ APRN CNP    Feels significantly improved.  Would like to continue this medication.  Discussed ETOH use while on selective serotonin reuptake inhibitor medications as well as the impact to depression and anxiety.  Will follow up again in 2 months.  
Feels significantly improved.  Would like to continue this medication.  Discussed ETOH use while on selective serotonin reuptake inhibitor medications as well as the impact to depression and anxiety.  Will follow up again in 2 months.  
Female

## 2024-01-17 LAB
CULTURE RESULTS: SIGNIFICANT CHANGE UP
SPECIMEN SOURCE: SIGNIFICANT CHANGE UP

## 2024-02-16 NOTE — ED PROVIDER NOTE - NS ED MD DISPO ADMIT SSH SERVICE LEVEL
Writer contacted patient and relayed Dr. Wick's response and recommendations below - patient verbalized understanding and had no further questions or concerns at this time.    Any Bed

## 2024-05-23 NOTE — H&P ADULT - NSTOBACCOSCREENHP_GEN_A_NCS
Ambulatory Visit  Name: Dayana Boyd      : 1966      MRN: [id number]  Encounter Provider: Neil Ace DO  Encounter Date: 2024   Encounter department: [facility] History: First saw me for mildly elevated ESR 49 in 2024. Had arthralgias that didn't sound overly inflammatory (no gelling, no robust response to NSAIDs) but given temporality (symptoms started soon after COVID infection) and enthesial tenderness to palpation I wanted to more thoroughly investigate for a peripheral SpA, particularly PsA. MSU of her feet did show active synovitis of bilateral 1st MTPs so started leflunomide.     Also felt that she had a component of FMS contributing to her all-over muscle achiness and tenderness to palpation.    Assessment & Plan   1. Psoriatic arthritis (HCC)  Assessment & Plan:  Not doing great on leflunomide 10 mg daily.    Currently getting treated for ear infection, advised her to stop leflunomide until after antibiotics finished and infection is resolved.  Will restart her at 20 mg daily.    In terms of escalating DMARD therapy; cannot add methotrexate because of past intolerance.  Cannot use sulfasalazine due to sulfa allergy.  Will have to escalate next to a biologic DMARD.    Has had gastric issues in the past with naproxen.  Has been taking meloxicam as needed, which does help her symptoms and she does tolerate.  Will try meloxicam scheduled daily for now, cannot use Celebrex because of sulfa allergy.  If meloxicam causes too many gastric issues, or is not adequate, then we will discuss low-dose prednisone.  Orders:  -     meloxicam (MOBIC) 15 mg tablet; Take 1 tablet (15 mg total) by mouth daily  -     leflunomide (ARAVA) 20 MG tablet; Take 1 tablet (20 mg total) by mouth daily Do not start until you are finished with your antibiotics.  2. Ear infection  3. Long-term use of immunosuppressant medication  4. History of Arterial thrombosis (HCC)  "(RLE)  Assessment & Plan:  Would avoid Scott inhibitors in the future due to this    Patient's rheumatologic disease(s) threaten long-term function if not appropriately treated.    Patient's rheumatologic medication(s) require intensive monitoring for toxicity.     History of Present Illness       Tried prednisone taper due to concern for flare @ last visit helped noticeably with her joints.  Then developed pneumonia and was put on antibiotics and prednisone again, and again, prednisone helped her joints.  After she stopped the prednisone, joint pain and swelling returned.  Currently on antibiotics for an ear infection.    Review of Systems    Objective     /80   Ht 5' 6\" (1.676 m)   Wt 84.8 kg (187 lb)   BMI 30.18 kg/m²      General: Well appearing, in no distress.   Eyes: Sclera non-icteric. EOMI  HENT: No oral ulcers. MMM.   Extremities: Warm, well perfused, no edema.   Neuro: Alert and oriented. No gross focal neurological deficits.   Skin: No rashes.  MSK exam: tenderness to palpation w synovitis few MCPs and PIPs. Tenderness to palpation bilateral knees      Physical Exam  Administrative Statements           " No

## 2024-06-20 NOTE — ED ADULT TRIAGE NOTE - NS ED NURSE BANDS TYPE
Name band; + diarrhea described as "watery" x 3 days  + fever x 2 days, noted yesterday, tmax 10  pmh breast ca on chemo 6/19, took tylenol 10am

## (undated) DEVICE — POSITIONER PATIENT SAFETY STRAP 3X60"

## (undated) DEVICE — PACK LITHOTOMY

## (undated) DEVICE — WARMING BLANKET UPPER ADULT

## (undated) DEVICE — SOL IRR POUR NS 0.9% 500ML

## (undated) DEVICE — DRAPE LIGHT HANDLE COVER (GREEN)

## (undated) DEVICE — SOL IRR GLYCINE 1.5% 3000L

## (undated) DEVICE — GLV 7.5 PROTEXIS (WHITE)

## (undated) DEVICE — ELCTR HF RESECTION LOOP ANGLED 22.5FR